# Patient Record
Sex: MALE | Race: ASIAN | NOT HISPANIC OR LATINO
[De-identification: names, ages, dates, MRNs, and addresses within clinical notes are randomized per-mention and may not be internally consistent; named-entity substitution may affect disease eponyms.]

---

## 2016-12-30 NOTE — H&P ADULT. - MUSCULOSKELETAL
detailed exam normal strength/normal/ROM intact/no joint swelling/no joint erythema/no joint warmth/no calf tenderness negative

## 2016-12-30 NOTE — H&P ADULT. - NEGATIVE NEUROLOGICAL SYMPTOMS
no transient paralysis/no weakness/no paresthesias/no generalized seizures/no focal seizures/no syncope/no vertigo/no loss of sensation/no difficulty walking

## 2016-12-30 NOTE — ED PROVIDER NOTE - NS ED MD SCRIBE ATTENDING SCRIBE SECTIONS
HISTORY OF PRESENT ILLNESS/REVIEW OF SYSTEMS/VITAL SIGNS( Pullset)/PHYSICAL EXAM/DISPOSITION/PAST MEDICAL/SURGICAL/SOCIAL HISTORY

## 2016-12-30 NOTE — H&P ADULT. - GASTROINTESTINAL DETAILS
normal/soft/nontender/no distention/no masses palpable/bowel sounds normal/no bruit/no rebound tenderness/no guarding/no rigidity/no organomegaly

## 2016-12-30 NOTE — H&P ADULT. - NEGATIVE GASTROINTESTINAL SYMPTOMS
no nausea/no vomiting/no diarrhea/no constipation/no change in bowel habits/no flatulence/no abdominal pain/no melena/no hematochezia/no steatorrhea/no jaundice/no hiccoughs

## 2016-12-30 NOTE — H&P ADULT. - FAMILY HISTORY
Father  Still living? Unknown  Family history of cancer, Age at diagnosis: Age Unknown     Mother  Still living? Unknown  Family history of cancer, Age at diagnosis: Age Unknown

## 2016-12-30 NOTE — H&P ADULT. - NEGATIVE CARDIOVASCULAR SYMPTOMS
no chest pain/no palpitations/no dyspnea on exertion/no orthopnea/no paroxysmal nocturnal dyspnea/no peripheral edema/no claudication

## 2016-12-30 NOTE — ED PROVIDER NOTE - MEDICAL DECISION MAKING DETAILS
72 y/o M c/o R arm tingling & weakness. Will evaluate for stroke. Pt took Aspirin this morning. 72 y/o M c/o R arm tingling & weakness. Will evaluate for stroke. Pt took Aspirin this morning.  at 12:50 pt complained of chest pain, ecg sinus 62, no acute st elevation or depression, intial enzymes negative, will admit give arm symptoms, age, and new chest pain.

## 2016-12-30 NOTE — H&P ADULT. - PROBLEM SELECTOR PLAN 1
most likely 2/2 peripheral neuropathy   non focal neuro exam , no slurring speech and no vision changes   hx chronic LLE numbness , new onset of RUE numbness   least concern for TIA , CT head - negative   T1- negative  ; EKG - NSR ; no chest pain , not trending anymore troponin  obtained O/P MRI records , note in chart , - chronic lumbar disc changes  f/u Dr. martino neuro recs   O/P neuro f/u   pt might benefit from gabapentin , consider starting

## 2016-12-30 NOTE — ED PROVIDER NOTE - OBJECTIVE STATEMENT
70 y/o M w/ PMHx of Paresthesia in L leg presents to the ED c/o R arm tingling & weakness onset yesterday. Pt states Sx has been constant since onset of Sx. Pt denies slurred speech, fever, chills or any other complaints. NKDA. 72 y/o M w/ PMHx of Paresthesia in L leg presents to the ED c/o R arm tingling & weakness onset yesterday. Pt states Sx has been constant since onset of Sx. Pt denies slurred speech, fever, chills or any other complaints. NKDA. It was sudden onset, different then his parasthesia in his leg, he felt like he couldn't write normally.

## 2016-12-30 NOTE — ED PROVIDER NOTE - PHYSICAL EXAMINATION
NEURO: 4.5/5. Slight weakness to R side. R sided sensation to light touch decreased. Sharp touch nml.

## 2016-12-30 NOTE — H&P ADULT. - NEUROLOGICAL DETAILS
alert and oriented x 3/responds to pain/responds to verbal commands/sensation intact/deep reflexes intact/cranial nerves intact/no spontaneous movement/superficial reflexes intact/normal strength

## 2016-12-30 NOTE — H&P ADULT. - ASSESSMENT
71 year old Male from home with PMHx of  boderline DM ( diet controlled ) , HTN , Paresthesia in L leg presents to the ED c/o R arm tingling & weakness from yesterday. Pt is baseline AAO x3 and walks without any assistance, As per pt his numbness is constant since yesterday , was sudden onset, different than his paresthesia in his leg, he felt like he couldn't write normally. He is otherwise normal and not having any other problems. Pt denies vision problems , acute weakness,  slurred speech, fever, chills or any other complaints.   In ED pt's labs and vitals are stable , CT head - negative

## 2016-12-30 NOTE — H&P ADULT. - RS GEN PE MLT RESP DETAILS PC
normal/airway patent/breath sounds equal/good air movement/respirations non-labored/clear to auscultation bilaterally/no chest wall tenderness/no intercostal retractions/no rales/no rhonchi/no subcutaneous emphysema/no wheezes

## 2017-01-03 PROBLEM — Z00.00 ENCOUNTER FOR PREVENTIVE HEALTH EXAMINATION: Status: ACTIVE | Noted: 2017-01-03

## 2017-01-04 ENCOUNTER — RX RENEWAL (OUTPATIENT)
Age: 72
End: 2017-01-04

## 2017-01-04 ENCOUNTER — RESULT REVIEW (OUTPATIENT)
Age: 72
End: 2017-01-04

## 2017-01-05 ENCOUNTER — APPOINTMENT (OUTPATIENT)
Dept: VASCULAR SURGERY | Facility: HOSPITAL | Age: 72
End: 2017-01-05

## 2017-01-08 ENCOUNTER — TRANSCRIPTION ENCOUNTER (OUTPATIENT)
Age: 72
End: 2017-01-08

## 2017-01-08 NOTE — DISCHARGE NOTE ADULT - CARE PROVIDER_API CALL
Cesar Zepeda), Surgery; Vascular Surgery  [address]  [place] [address]  Phone: [phone]  Fax: [phone]

## 2017-01-08 NOTE — DISCHARGE NOTE ADULT - PLAN OF CARE
tolerating diet, voiding, ambulating Follow up with surgeon as outpt, can shower leave steri strips intact

## 2017-01-08 NOTE — DISCHARGE NOTE ADULT - CARE PLAN
Principal Discharge DX:	Weakness  Goal:	tolerating diet, voiding, ambulating  Instructions for follow-up, activity and diet:	Follow up with surgeon as outpt, can shower leave steri strips intact

## 2017-01-08 NOTE — DISCHARGE NOTE ADULT - MEDICATION SUMMARY - MEDICATIONS TO TAKE
I will START or STAY ON the medications listed below when I get home from the hospital:    aspirin 81 mg oral tablet  -- 1 tab(s) by mouth once a day  -- Indication: For As directed    acetaminophen-codeine 300 mg-15 mg oral tablet  -- 1 tab(s) by mouth every 6 hours, As Needed -for moderate pain MDD:4  -- Caution federal law prohibits the transfer of this drug to any person other  than the person for whom it was prescribed.  May cause drowsiness.  Alcohol may intensify this effect.  Use care when operating dangerous machinery.  This product contains acetaminophen.  Do not use  with any other product containing acetaminophen to prevent possible liver damage.  Using more of this medication than prescribed may cause serious breathing problems.    -- Indication: For Pain meds    losartan 100 mg oral tablet  -- 1 tab(s) by mouth once a day  -- Indication: For As directed    amlodipine 10 mg oral tablet  -- 1 tab(s) by mouth once a day  -- Indication: For As directed    hydroCHLOROthiazide 25 mg oral tablet  -- 1 tab(s) by mouth once a day  -- Indication: For As directed    Flonase 50 mcg/inh nasal spray  -- 1 spray(s) into nose once a day  -- Indication: For As directed

## 2017-01-08 NOTE — DISCHARGE NOTE ADULT - NS AS ACTIVITY OBS
No Heavy lifting/straining/Do not make important decisions/Do not drive or operate machinery/Driving allowed

## 2017-01-08 NOTE — DISCHARGE NOTE ADULT - PATIENT PORTAL LINK FT
“You can access the [facility] Patient Portal, offered by [facility], by registering with the following website: [email or web address]”

## 2017-01-08 NOTE — DISCHARGE NOTE ADULT - HOSPITAL COURSE
71 year old Male from home with PMHx of  boderline DM ( diet controlled ) , HTN , Paresthesia in L leg presents to the ED c/o R arm tingling & weakness from yesterday. Pt is baseline AAO x3 and walks without any assistance, As per pt his numbness is constant since yesterday , was sudden onset, different than his paresthesia in his leg, he felt like he couldn't write normally. He is otherwise normal and not having any other problems. Pt denies vision problems , acute weakness,  slurred speech, fever, chills or any other complaints.   In ED pt's labs and vitals are stable , CT head - negative   S/p Left CEA 1/5, Currently patient comfortable, ambulating, denies HA, dizziness,cp/sob/n/v  Plan to discharge pt today. discussed with vascular attending

## 2017-01-08 NOTE — DISCHARGE NOTE ADULT - CARE PROVIDERS DIRECT ADDRESSES
[email or web address]

## 2017-01-10 ENCOUNTER — EMERGENCY (EMERGENCY)
Facility: HOSPITAL | Age: 72
LOS: 1 days | Discharge: HOME | End: 2017-01-10
Attending: EMERGENCY MEDICINE
Payer: COMMERCIAL

## 2017-01-10 VITALS
HEART RATE: 76 BPM | DIASTOLIC BLOOD PRESSURE: 81 MMHG | SYSTOLIC BLOOD PRESSURE: 160 MMHG | HEIGHT: 66 IN | TEMPERATURE: 98 F | OXYGEN SATURATION: 97 % | RESPIRATION RATE: 18 BRPM | WEIGHT: 169.98 LBS

## 2017-01-10 DIAGNOSIS — J45.909 UNSPECIFIED ASTHMA, UNCOMPLICATED: ICD-10-CM

## 2017-01-10 DIAGNOSIS — R51 HEADACHE: ICD-10-CM

## 2017-01-10 DIAGNOSIS — I10 ESSENTIAL (PRIMARY) HYPERTENSION: ICD-10-CM

## 2017-01-10 DIAGNOSIS — Z98.890 OTHER SPECIFIED POSTPROCEDURAL STATES: ICD-10-CM

## 2017-01-10 PROCEDURE — 99053 MED SERV 10PM-8AM 24 HR FAC: CPT

## 2017-01-10 PROCEDURE — 99284 EMERGENCY DEPT VISIT MOD MDM: CPT | Mod: 25

## 2017-01-10 RX ORDER — ACETAMINOPHEN 500 MG
975 TABLET ORAL ONCE
Refills: 0 | Status: COMPLETED | OUTPATIENT
Start: 2017-01-10 | End: 2017-01-10

## 2017-01-10 NOTE — ED PROVIDER NOTE - CRANIAL NERVE AND PUPILLARY EXAM
cranial nerves 2-12 intact/corneal reflex intact/central and peripheral vision intact/extra-ocular movements intact/tongue is midline cranial nerves 2-12 intact/extra-ocular movements intact/tongue is midline

## 2017-01-10 NOTE — ED PROVIDER NOTE - OBJECTIVE STATEMENT
72 y/o M pt w/ PMHx of asthma and HTN presents to ED c/o gradual onset mild L sided headache x3.5 hours (since 8pm today). Pt is s/p L sided carotid endarterectomy on 1/5/17 w/ Dr. Zepeda. Pt denies fever, N/V, numbness/tingling, focal weakness, speech/visual changes, neck pain/increase in L neck swelling, or any other complaints. NKDA.

## 2017-01-10 NOTE — ED PROVIDER NOTE - MEDICAL DECISION MAKING DETAILS
70 y/o M pt w/ headache s/p recent endarterectomy, no focal deficits or signs of infectious process. Will CT head, give Tylenol for pain, reassess. 70 y/o M pt w/ headache s/p recent endarterectomy, no focal neuro deficits or signs of infectious process. Will CT head, give Tylenol for pain, reassess.

## 2017-01-10 NOTE — ED PROVIDER NOTE - NS ED MD SCRIBE ATTENDING SCRIBE SECTIONS
HISTORY OF PRESENT ILLNESS/REVIEW OF SYSTEMS/VITAL SIGNS( Pullset)/PHYSICAL EXAM/RESULTS/DISPOSITION/PAST MEDICAL/SURGICAL/SOCIAL HISTORY

## 2017-01-10 NOTE — ED PROVIDER NOTE - PHYSICAL EXAMINATION
NECK: L carotid area surgical site intact, nontender, minimally swollen, no fluctuance no erythema. NECK: L ant lat neck, carotid area surgical site intact, nontender, minimally swollen, no fluctuance no erythema.

## 2017-01-10 NOTE — ED ADULT TRIAGE NOTE - CHIEF COMPLAINT QUOTE
c/o headache pain 5/10 presently, states it was more severe earlier and his MD told him to go to ED since he just had a carotid endarterectomy 2 days ago

## 2017-01-10 NOTE — ED PROVIDER NOTE - CHPI ED SYMPTOMS NEG
no tingling, no neck pain, no speech/visual changes/no fever/no nausea/no numbness/no vomiting/no weakness

## 2017-01-11 VITALS — DIASTOLIC BLOOD PRESSURE: 72 MMHG | SYSTOLIC BLOOD PRESSURE: 144 MMHG | HEART RATE: 63 BPM

## 2017-01-11 DIAGNOSIS — Z98.890 OTHER SPECIFIED POSTPROCEDURAL STATES: Chronic | ICD-10-CM

## 2017-01-11 PROCEDURE — 70450 CT HEAD/BRAIN W/O DYE: CPT | Mod: 26

## 2017-01-11 RX ADMIN — Medication 975 MILLIGRAM(S): at 01:19

## 2017-01-11 RX ADMIN — Medication 0.1 MILLIGRAM(S): at 01:22

## 2017-01-11 NOTE — ED ADULT NURSE NOTE - OBJECTIVE STATEMENT
pt.is aox3 came to er accompanied by his wife wit complaint of headache. pt had recent enderectomy surgery to left side off neck. pt was seen by md medication adm.

## 2017-01-13 ENCOUNTER — APPOINTMENT (OUTPATIENT)
Dept: VASCULAR SURGERY | Facility: CLINIC | Age: 72
End: 2017-01-13

## 2017-01-13 VITALS — HEIGHT: 66 IN

## 2017-01-13 VITALS
HEART RATE: 66 BPM | TEMPERATURE: 98.2 F | BODY MASS INDEX: 26.63 KG/M2 | DIASTOLIC BLOOD PRESSURE: 62 MMHG | RESPIRATION RATE: 16 BRPM | WEIGHT: 165 LBS | SYSTOLIC BLOOD PRESSURE: 142 MMHG

## 2017-01-20 ENCOUNTER — APPOINTMENT (OUTPATIENT)
Dept: VASCULAR SURGERY | Facility: CLINIC | Age: 72
End: 2017-01-20

## 2017-01-20 VITALS
HEART RATE: 66 BPM | DIASTOLIC BLOOD PRESSURE: 50 MMHG | RESPIRATION RATE: 10 BRPM | SYSTOLIC BLOOD PRESSURE: 138 MMHG | TEMPERATURE: 98.1 F

## 2017-01-20 DIAGNOSIS — Z78.9 OTHER SPECIFIED HEALTH STATUS: ICD-10-CM

## 2017-01-20 DIAGNOSIS — Z87.09 PERSONAL HISTORY OF OTHER DISEASES OF THE RESPIRATORY SYSTEM: ICD-10-CM

## 2017-01-20 DIAGNOSIS — Z87.39 PERSONAL HISTORY OF OTHER DISEASES OF THE MUSCULOSKELETAL SYSTEM AND CONNECTIVE TISSUE: ICD-10-CM

## 2017-01-20 DIAGNOSIS — Z87.01 PERSONAL HISTORY OF PNEUMONIA (RECURRENT): ICD-10-CM

## 2017-01-27 ENCOUNTER — APPOINTMENT (OUTPATIENT)
Dept: PULMONOLOGY | Facility: CLINIC | Age: 72
End: 2017-01-27

## 2017-01-27 VITALS
WEIGHT: 155 LBS | OXYGEN SATURATION: 98 % | TEMPERATURE: 98.1 F | HEART RATE: 91 BPM | HEIGHT: 66 IN | DIASTOLIC BLOOD PRESSURE: 70 MMHG | BODY MASS INDEX: 24.91 KG/M2 | SYSTOLIC BLOOD PRESSURE: 150 MMHG

## 2017-02-10 ENCOUNTER — APPOINTMENT (OUTPATIENT)
Dept: VASCULAR SURGERY | Facility: CLINIC | Age: 72
End: 2017-02-10

## 2017-02-10 VITALS
HEART RATE: 68 BPM | HEIGHT: 66 IN | SYSTOLIC BLOOD PRESSURE: 128 MMHG | TEMPERATURE: 98 F | BODY MASS INDEX: 26.2 KG/M2 | DIASTOLIC BLOOD PRESSURE: 68 MMHG | WEIGHT: 163 LBS

## 2017-02-10 RX ORDER — ACETAMINOPHEN AND CODEINE 300; 30 MG/1; MG/1
300-30 TABLET ORAL
Refills: 0 | Status: DISCONTINUED | COMMUNITY
End: 2017-02-10

## 2017-02-27 ENCOUNTER — APPOINTMENT (OUTPATIENT)
Dept: PULMONOLOGY | Facility: CLINIC | Age: 72
End: 2017-02-27

## 2017-02-27 VITALS
DIASTOLIC BLOOD PRESSURE: 70 MMHG | OXYGEN SATURATION: 97 % | WEIGHT: 164 LBS | HEIGHT: 66 IN | BODY MASS INDEX: 26.36 KG/M2 | HEART RATE: 73 BPM | SYSTOLIC BLOOD PRESSURE: 140 MMHG

## 2017-03-13 ENCOUNTER — RX RENEWAL (OUTPATIENT)
Age: 72
End: 2017-03-13

## 2017-07-04 ENCOUNTER — RX RENEWAL (OUTPATIENT)
Age: 72
End: 2017-07-04

## 2017-08-04 ENCOUNTER — APPOINTMENT (OUTPATIENT)
Dept: VASCULAR SURGERY | Facility: CLINIC | Age: 72
End: 2017-08-04
Payer: MEDICARE

## 2017-08-04 PROCEDURE — 93880 EXTRACRANIAL BILAT STUDY: CPT

## 2017-08-04 PROCEDURE — 99213 OFFICE O/P EST LOW 20 MIN: CPT | Mod: 25

## 2017-08-21 ENCOUNTER — APPOINTMENT (OUTPATIENT)
Dept: PULMONOLOGY | Facility: CLINIC | Age: 72
End: 2017-08-21
Payer: MEDICARE

## 2017-08-21 VITALS
HEIGHT: 66 IN | HEART RATE: 82 BPM | SYSTOLIC BLOOD PRESSURE: 130 MMHG | WEIGHT: 163 LBS | TEMPERATURE: 98.3 F | DIASTOLIC BLOOD PRESSURE: 70 MMHG | OXYGEN SATURATION: 95 % | RESPIRATION RATE: 12 BRPM | BODY MASS INDEX: 26.2 KG/M2

## 2017-08-21 PROCEDURE — 94060 EVALUATION OF WHEEZING: CPT

## 2017-08-21 PROCEDURE — 99214 OFFICE O/P EST MOD 30 MIN: CPT | Mod: 25

## 2017-08-23 ENCOUNTER — RX RENEWAL (OUTPATIENT)
Age: 72
End: 2017-08-23

## 2017-11-06 ENCOUNTER — APPOINTMENT (OUTPATIENT)
Dept: PULMONOLOGY | Facility: CLINIC | Age: 72
End: 2017-11-06
Payer: MEDICARE

## 2017-11-06 VITALS
TEMPERATURE: 98 F | DIASTOLIC BLOOD PRESSURE: 70 MMHG | BODY MASS INDEX: 26.03 KG/M2 | WEIGHT: 162 LBS | SYSTOLIC BLOOD PRESSURE: 150 MMHG | OXYGEN SATURATION: 95 % | HEART RATE: 92 BPM | HEIGHT: 66 IN

## 2017-11-06 PROCEDURE — 99214 OFFICE O/P EST MOD 30 MIN: CPT

## 2017-11-06 RX ORDER — FLUTICASONE PROPIONATE 50 MCG
50 SPRAY, SUSPENSION NASAL
Refills: 0 | Status: DISCONTINUED | COMMUNITY
End: 2017-11-06

## 2017-11-06 RX ORDER — HYDROCHLOROTHIAZIDE 12.5 MG/1
TABLET ORAL
Refills: 0 | Status: DISCONTINUED | COMMUNITY
End: 2017-11-06

## 2017-11-06 RX ORDER — AMLODIPINE BESYLATE 5 MG/1
TABLET ORAL
Refills: 0 | Status: DISCONTINUED | COMMUNITY
End: 2017-11-06

## 2017-11-06 RX ORDER — AZELASTINE HYDROCHLORIDE 137 UG/1
SPRAY, METERED NASAL
Refills: 0 | Status: DISCONTINUED | COMMUNITY
End: 2017-11-06

## 2017-11-20 ENCOUNTER — APPOINTMENT (OUTPATIENT)
Dept: PULMONOLOGY | Facility: CLINIC | Age: 72
End: 2017-11-20
Payer: MEDICARE

## 2017-11-20 PROCEDURE — 94729 DIFFUSING CAPACITY: CPT

## 2017-11-20 PROCEDURE — 94060 EVALUATION OF WHEEZING: CPT

## 2017-11-20 PROCEDURE — 94727 GAS DIL/WSHOT DETER LNG VOL: CPT

## 2018-01-12 ENCOUNTER — APPOINTMENT (OUTPATIENT)
Dept: PULMONOLOGY | Facility: CLINIC | Age: 73
End: 2018-01-12
Payer: MEDICARE

## 2018-01-12 VITALS
WEIGHT: 170 LBS | HEART RATE: 100 BPM | DIASTOLIC BLOOD PRESSURE: 72 MMHG | BODY MASS INDEX: 27.44 KG/M2 | TEMPERATURE: 98.6 F | RESPIRATION RATE: 16 BRPM | SYSTOLIC BLOOD PRESSURE: 154 MMHG | OXYGEN SATURATION: 95 %

## 2018-01-12 PROCEDURE — 99214 OFFICE O/P EST MOD 30 MIN: CPT

## 2018-02-09 ENCOUNTER — APPOINTMENT (OUTPATIENT)
Dept: VASCULAR SURGERY | Facility: CLINIC | Age: 73
End: 2018-02-09
Payer: MEDICARE

## 2018-02-09 VITALS
HEIGHT: 66 IN | TEMPERATURE: 98 F | OXYGEN SATURATION: 93 % | HEART RATE: 80 BPM | WEIGHT: 170 LBS | SYSTOLIC BLOOD PRESSURE: 130 MMHG | BODY MASS INDEX: 27.32 KG/M2 | DIASTOLIC BLOOD PRESSURE: 68 MMHG

## 2018-02-09 PROCEDURE — 99214 OFFICE O/P EST MOD 30 MIN: CPT

## 2018-02-09 PROCEDURE — 93880 EXTRACRANIAL BILAT STUDY: CPT

## 2018-02-12 ENCOUNTER — APPOINTMENT (OUTPATIENT)
Dept: PULMONOLOGY | Facility: CLINIC | Age: 73
End: 2018-02-12
Payer: MEDICARE

## 2018-02-22 ENCOUNTER — APPOINTMENT (OUTPATIENT)
Dept: PULMONOLOGY | Facility: CLINIC | Age: 73
End: 2018-02-22
Payer: MEDICARE

## 2018-02-22 VITALS
DIASTOLIC BLOOD PRESSURE: 78 MMHG | WEIGHT: 170 LBS | HEART RATE: 69 BPM | OXYGEN SATURATION: 97 % | SYSTOLIC BLOOD PRESSURE: 126 MMHG | RESPIRATION RATE: 16 BRPM | TEMPERATURE: 97.7 F | HEIGHT: 66 IN | BODY MASS INDEX: 27.32 KG/M2

## 2018-02-22 PROCEDURE — 99214 OFFICE O/P EST MOD 30 MIN: CPT

## 2018-02-22 RX ORDER — BUDESONIDE AND FORMOTEROL FUMARATE DIHYDRATE 160; 4.5 UG/1; UG/1
AEROSOL RESPIRATORY (INHALATION)
Refills: 0 | Status: COMPLETED | COMMUNITY
End: 2018-02-22

## 2018-02-22 RX ORDER — METHYLPREDNISOLONE 4 MG/1
4 TABLET ORAL
Qty: 1 | Refills: 1 | Status: COMPLETED | COMMUNITY
Start: 2018-01-12 | End: 2018-02-22

## 2018-02-22 RX ORDER — PREDNISONE 20 MG/1
20 TABLET ORAL DAILY
Qty: 5 | Refills: 1 | Status: COMPLETED | COMMUNITY
Start: 2017-11-20 | End: 2018-02-22

## 2018-02-22 RX ORDER — AZITHROMYCIN DIHYDRATE 250 MG/1
250 TABLET, FILM COATED ORAL
Qty: 1 | Refills: 0 | Status: COMPLETED | COMMUNITY
Start: 2018-01-12 | End: 2018-02-22

## 2018-02-26 ENCOUNTER — RX RENEWAL (OUTPATIENT)
Age: 73
End: 2018-02-26

## 2018-02-26 ENCOUNTER — APPOINTMENT (OUTPATIENT)
Dept: PULMONOLOGY | Facility: CLINIC | Age: 73
End: 2018-02-26

## 2018-07-25 ENCOUNTER — NON-APPOINTMENT (OUTPATIENT)
Age: 73
End: 2018-07-25

## 2018-07-25 ENCOUNTER — APPOINTMENT (OUTPATIENT)
Dept: CARDIOLOGY | Facility: CLINIC | Age: 73
End: 2018-07-25
Payer: MEDICARE

## 2018-07-25 VITALS
OXYGEN SATURATION: 98 % | HEART RATE: 69 BPM | WEIGHT: 165 LBS | SYSTOLIC BLOOD PRESSURE: 122 MMHG | BODY MASS INDEX: 26.63 KG/M2 | DIASTOLIC BLOOD PRESSURE: 75 MMHG

## 2018-07-25 DIAGNOSIS — Z86.79 PERSONAL HISTORY OF OTHER DISEASES OF THE CIRCULATORY SYSTEM: ICD-10-CM

## 2018-07-25 PROCEDURE — 99203 OFFICE O/P NEW LOW 30 MIN: CPT

## 2018-07-25 PROCEDURE — 93000 ELECTROCARDIOGRAM COMPLETE: CPT

## 2018-07-25 RX ORDER — MONTELUKAST SODIUM 10 MG/1
TABLET, FILM COATED ORAL
Refills: 0 | Status: DISCONTINUED | COMMUNITY
End: 2018-07-25

## 2018-07-25 RX ORDER — PROMETHAZINE HYDROCHLORIDE 6.25 MG/5ML
6.25 SOLUTION ORAL
Qty: 210 | Refills: 0 | Status: DISCONTINUED | COMMUNITY
Start: 2018-01-11 | End: 2018-07-25

## 2018-07-25 RX ORDER — AMMONIUM LACTATE 12 %
12 CREAM (GRAM) TOPICAL
Qty: 140 | Refills: 0 | Status: DISCONTINUED | COMMUNITY
Start: 2017-10-11 | End: 2018-07-25

## 2018-07-25 RX ORDER — POTASSIUM CHLORIDE 750 MG/1
10 TABLET, FILM COATED, EXTENDED RELEASE ORAL
Qty: 90 | Refills: 0 | Status: DISCONTINUED | COMMUNITY
Start: 2017-08-22 | End: 2018-07-25

## 2018-07-25 RX ORDER — HYDROCORTISONE 25 MG/G
2.5 CREAM TOPICAL
Qty: 30 | Refills: 0 | Status: DISCONTINUED | COMMUNITY
Start: 2017-10-04 | End: 2018-07-25

## 2018-08-02 ENCOUNTER — RX RENEWAL (OUTPATIENT)
Age: 73
End: 2018-08-02

## 2018-08-23 ENCOUNTER — APPOINTMENT (OUTPATIENT)
Dept: PULMONOLOGY | Facility: CLINIC | Age: 73
End: 2018-08-23
Payer: MEDICARE

## 2018-08-23 VITALS
TEMPERATURE: 98 F | HEART RATE: 74 BPM | DIASTOLIC BLOOD PRESSURE: 70 MMHG | OXYGEN SATURATION: 96 % | BODY MASS INDEX: 26.95 KG/M2 | WEIGHT: 167 LBS | RESPIRATION RATE: 14 BRPM | SYSTOLIC BLOOD PRESSURE: 120 MMHG

## 2018-08-23 PROCEDURE — 99214 OFFICE O/P EST MOD 30 MIN: CPT

## 2018-08-24 ENCOUNTER — APPOINTMENT (OUTPATIENT)
Dept: VASCULAR SURGERY | Facility: CLINIC | Age: 73
End: 2018-08-24
Payer: MEDICARE

## 2018-09-07 ENCOUNTER — APPOINTMENT (OUTPATIENT)
Dept: VASCULAR SURGERY | Facility: CLINIC | Age: 73
End: 2018-09-07
Payer: MEDICARE

## 2018-09-07 PROCEDURE — 99213 OFFICE O/P EST LOW 20 MIN: CPT

## 2018-09-07 PROCEDURE — 93880 EXTRACRANIAL BILAT STUDY: CPT

## 2018-09-24 PROBLEM — Z86.79 HISTORY OF CAROTID STENOSIS: Status: ACTIVE | Noted: 2017-01-13

## 2018-11-26 ENCOUNTER — APPOINTMENT (OUTPATIENT)
Dept: PULMONOLOGY | Facility: CLINIC | Age: 73
End: 2018-11-26

## 2018-12-03 ENCOUNTER — APPOINTMENT (OUTPATIENT)
Dept: PULMONOLOGY | Facility: CLINIC | Age: 73
End: 2018-12-03
Payer: MEDICARE

## 2018-12-03 VITALS
OXYGEN SATURATION: 94 % | TEMPERATURE: 98.2 F | HEART RATE: 90 BPM | BODY MASS INDEX: 27.76 KG/M2 | SYSTOLIC BLOOD PRESSURE: 156 MMHG | WEIGHT: 172 LBS | DIASTOLIC BLOOD PRESSURE: 78 MMHG

## 2018-12-03 PROCEDURE — 99213 OFFICE O/P EST LOW 20 MIN: CPT | Mod: 25

## 2018-12-03 PROCEDURE — 36415 COLL VENOUS BLD VENIPUNCTURE: CPT

## 2018-12-03 RX ORDER — DIPHENHYDRAMINE HCL 50 MG/1
50 CAPSULE ORAL
Qty: 30 | Refills: 0 | Status: COMPLETED | COMMUNITY
Start: 2018-07-12

## 2018-12-03 RX ORDER — PNEUMOCOCCAL 23-VAL P-SAC VAC 25MCG/0.5
25 VIAL (ML) INJECTION
Qty: 1 | Refills: 0 | Status: COMPLETED | COMMUNITY
Start: 2018-11-29

## 2018-12-03 RX ORDER — BECLOMETHASONE DIPROPIONATE 80 UG/1
80 AEROSOL, METERED RESPIRATORY (INHALATION) TWICE DAILY
Qty: 3 | Refills: 1 | Status: DISCONTINUED | COMMUNITY
Start: 2018-08-23 | End: 2018-12-03

## 2018-12-04 ENCOUNTER — RX RENEWAL (OUTPATIENT)
Age: 73
End: 2018-12-04

## 2018-12-04 LAB — IGE SER-MCNC: 1702 IU/ML

## 2018-12-06 ENCOUNTER — CLINICAL ADVICE (OUTPATIENT)
Age: 73
End: 2018-12-06

## 2018-12-11 ENCOUNTER — RX RENEWAL (OUTPATIENT)
Age: 73
End: 2018-12-11

## 2018-12-11 RX ORDER — BECLOMETHASONE DIPROPIONATE HFA 80 UG/1
80 AEROSOL, METERED RESPIRATORY (INHALATION)
Qty: 1 | Refills: 2 | Status: DISCONTINUED | COMMUNITY
Start: 2018-12-03 | End: 2018-12-11

## 2018-12-21 ENCOUNTER — APPOINTMENT (OUTPATIENT)
Dept: PULMONOLOGY | Facility: CLINIC | Age: 73
End: 2018-12-21

## 2019-01-10 ENCOUNTER — APPOINTMENT (OUTPATIENT)
Dept: PULMONOLOGY | Facility: CLINIC | Age: 74
End: 2019-01-10
Payer: MEDICARE

## 2019-01-10 VITALS
OXYGEN SATURATION: 95 % | SYSTOLIC BLOOD PRESSURE: 118 MMHG | DIASTOLIC BLOOD PRESSURE: 70 MMHG | RESPIRATION RATE: 14 BRPM | HEART RATE: 81 BPM | BODY MASS INDEX: 27.92 KG/M2 | WEIGHT: 173 LBS

## 2019-01-10 PROCEDURE — 94060 EVALUATION OF WHEEZING: CPT

## 2019-01-10 PROCEDURE — 99214 OFFICE O/P EST MOD 30 MIN: CPT | Mod: 25

## 2019-01-10 RX ORDER — AZITHROMYCIN 250 MG/1
250 TABLET, FILM COATED ORAL
Qty: 1 | Refills: 1 | Status: COMPLETED | COMMUNITY
Start: 2018-12-03 | End: 2019-01-10

## 2019-01-10 RX ORDER — BECLOMETHASONE DIPROPIONATE HFA 80 UG/1
80 AEROSOL, METERED RESPIRATORY (INHALATION)
Qty: 1 | Refills: 1 | Status: COMPLETED | COMMUNITY
Start: 2018-12-11 | End: 2019-01-10

## 2019-01-10 RX ORDER — FLUTICASONE PROPIONATE 110 UG/1
110 AEROSOL, METERED RESPIRATORY (INHALATION)
Qty: 1 | Refills: 1 | Status: COMPLETED | COMMUNITY
Start: 2018-12-11 | End: 2019-01-10

## 2019-01-11 NOTE — HISTORY OF PRESENT ILLNESS
[FreeTextEntry1] : He came for a follow up follow up visit. He is feeling better now. Off prednisone. On Symbicort. No cough, wheezing or shortness of breath. \par \par He saw an Allergist in [facility]. Dr. Reno. \par \par

## 2019-01-11 NOTE — DISCUSSION/SUMMARY
[FreeTextEntry1] : He is a 73 year-old man with mild obstructive airways disease.He recently had an exacerbation. He is doing much better now. \par \par He is off prednisone. He is to continue with Symbicort, montelukast and albuterol as needed.  He should follow up with his Allergist. \par \par Follow up in three months. Sooner if needed. \par \par \par

## 2019-01-11 NOTE — REVIEW OF SYSTEMS
[Ear Disturbance] : ear disturbance [Nasal Congestion] : nasal congestion [Postnasal Drip] : postnasal drip [Hypertension] : ~T hypertension [Fever] : no fever [Fatigue] : no fatigue [Sinus Problems] : no sinus problems [Sore Throat] : no sore throat [Dry Mouth] : no dry mouth [Cough] : no cough [Sputum] : not coughing up ~M sputum [Hemoptysis] : no hemoptysis [Dyspnea] : no dyspnea [Chest Tightness] : no chest tightness [Pleuritic Pain] : no pleuritic pain [Wheezing] : no wheezing [Chest Discomfort] : no chest discomfort [PND] : no PND [Palpitations] : no palpitations [Edema] : ~T edema was not present [Nasal Discharge] : no nasal discharge [Heartburn] : no heartburn [Dysphagia] : no dysphagia [Nocturia] : no nocturia [Back Pain] : ~T no back pain [Myalgias] : no myalgias [Syncope] : no fainting [Depression] : no depression [DVT] : no DVT [Difficulty Initiating Sleep] : no difficulty falling asleep [Difficulty Maintaining Sleep] : no difficulty maintaining sleep [Snoring] : no snoring

## 2019-01-11 NOTE — PROCEDURE
[FreeTextEntry1] : Pulmonary function testing performed on November 20, 2017 demonstrated mild obstructive airways disease. Significant improvement was noted after administration of bronchodilator. Mild restriction was present. Diffusion capacity was normal. FEV1 was 1.77 L.\par \par A chest radiograph was performed on August 14, 2015. The report indicates no acute chest findings.

## 2019-01-11 NOTE — PHYSICAL EXAM
[Normal Appearance] : normal appearance [General Appearance - In No Acute Distress] : no acute distress [Neck Cervical Mass (___cm)] : no neck mass was observed [Heart Sounds] : normal S1 and S2 [Murmurs] : no murmurs present [Respiration, Rhythm And Depth] : normal respiratory rhythm and effort [Exaggerated Use Of Accessory Muscles For Inspiration] : no accessory muscle use [Bowel Sounds] : normal bowel sounds [Abdomen Soft] : soft [Abdomen Tenderness] : non-tender [Abnormal Walk] : normal gait [Cyanosis, Localized] : no localized cyanosis [] : no rash [No Focal Deficits] : no focal deficits [Oriented To Time, Place, And Person] : oriented to person, place, and time [Impaired Insight] : insight and judgment were intact [Erythema] : no erythema of the pharynx [FreeTextEntry1] : There was bilateral wheezing and rhonchi were present.

## 2019-02-04 ENCOUNTER — APPOINTMENT (OUTPATIENT)
Dept: PULMONOLOGY | Facility: CLINIC | Age: 74
End: 2019-02-04
Payer: MEDICARE

## 2019-02-04 VITALS
RESPIRATION RATE: 16 BRPM | HEART RATE: 93 BPM | BODY MASS INDEX: 27.12 KG/M2 | SYSTOLIC BLOOD PRESSURE: 140 MMHG | OXYGEN SATURATION: 96 % | TEMPERATURE: 97.7 F | DIASTOLIC BLOOD PRESSURE: 60 MMHG | WEIGHT: 168 LBS

## 2019-02-04 PROCEDURE — 99214 OFFICE O/P EST MOD 30 MIN: CPT

## 2019-02-04 NOTE — PROCEDURE
[FreeTextEntry1] : Sleep study 1/18/19 demonstrated moderate obstructive pneumonia. \par \par Pulmonary function testing performed on November 20, 2017 demonstrated mild obstructive airways disease. Significant improvement was noted after administration of bronchodilator. Mild restriction was present. Diffusion capacity was normal. FEV1 was 1.77 L.\par \par PFT 1/10/19: Spirometry demonstrated mild obstructive airways disease. No significant response after bronchodilator. \par \par A chest radiograph was performed on August 14, 2015. The report indicates no acute chest findings.

## 2019-02-04 NOTE — REVIEW OF SYSTEMS
[Ear Disturbance] : ear disturbance [Nasal Congestion] : nasal congestion [Postnasal Drip] : postnasal drip [Hypertension] : ~T hypertension [Fatigue] : no fatigue [Sinus Problems] : no sinus problems [Sore Throat] : no sore throat [Dry Mouth] : no dry mouth [Cough] : no cough [Hemoptysis] : no hemoptysis [Dyspnea] : no dyspnea [Chest Tightness] : no chest tightness [Pleuritic Pain] : no pleuritic pain [Wheezing] : no wheezing [Chest Discomfort] : no chest discomfort [PND] : no PND [Palpitations] : no palpitations [Edema] : ~T edema was not present [Nasal Discharge] : no nasal discharge [Heartburn] : no heartburn [Dysphagia] : no dysphagia [Nocturia] : no nocturia [Back Pain] : ~T no back pain [Myalgias] : no myalgias [Syncope] : no fainting [Depression] : no depression [DVT] : no DVT [Difficulty Initiating Sleep] : no difficulty falling asleep [Difficulty Maintaining Sleep] : no difficulty maintaining sleep [Snoring] : no snoring

## 2019-02-04 NOTE — PHYSICAL EXAM
[General Appearance - In No Acute Distress] : no acute distress [Neck Cervical Mass (___cm)] : no neck mass was observed [Heart Sounds] : normal S1 and S2 [Murmurs] : no murmurs present [Bowel Sounds] : normal bowel sounds [Abdomen Soft] : soft [Abdomen Tenderness] : non-tender [Abnormal Walk] : normal gait [Cyanosis, Localized] : no localized cyanosis [] : no rash [No Focal Deficits] : no focal deficits [Oriented To Time, Place, And Person] : oriented to person, place, and time [Impaired Insight] : insight and judgment were intact [Auscultation Breath Sounds / Voice Sounds] : lungs were clear to auscultation bilaterally [Erythema] : no erythema of the pharynx [FreeTextEntry1] : He has a carotid endarterectomy scar on the left side of his neck.

## 2019-02-04 NOTE — DISCUSSION/SUMMARY
[FreeTextEntry1] : He is a 73 year-old man with hypertension, asthma and chronic allergies. \par \par His asthma is adequately controlled at this point in time. \par \par Given his in home sleep apnea test a CPAP titration study will be obtained. \par \par Follow up in three months. \par \par \par \par

## 2019-02-04 NOTE — HISTORY OF PRESENT ILLNESS
[FreeTextEntry1] : He came for a follow up follow up visit. \par \par He is feeling better now. On Symbicort. Needs to use Flovent intermittently as per symptoms. \par \par No cough, wheezing or shortness of breath. \par \par He had sleep study recently. \par \par \par \par

## 2019-03-21 ENCOUNTER — RX RENEWAL (OUTPATIENT)
Age: 74
End: 2019-03-21

## 2019-03-27 ENCOUNTER — RX RENEWAL (OUTPATIENT)
Age: 74
End: 2019-03-27

## 2019-03-27 RX ORDER — ALBUTEROL SULFATE 90 UG/1
108 (90 BASE) AEROSOL, METERED RESPIRATORY (INHALATION) EVERY 6 HOURS
Qty: 1 | Refills: 2 | Status: DISCONTINUED | COMMUNITY
Start: 2018-12-03 | End: 2019-03-27

## 2019-04-18 ENCOUNTER — APPOINTMENT (OUTPATIENT)
Dept: PULMONOLOGY | Facility: CLINIC | Age: 74
End: 2019-04-18
Payer: MEDICARE

## 2019-04-18 VITALS
OXYGEN SATURATION: 96 % | SYSTOLIC BLOOD PRESSURE: 128 MMHG | HEIGHT: 66 IN | DIASTOLIC BLOOD PRESSURE: 64 MMHG | WEIGHT: 167 LBS | BODY MASS INDEX: 26.84 KG/M2 | HEART RATE: 76 BPM

## 2019-04-18 PROCEDURE — 99214 OFFICE O/P EST MOD 30 MIN: CPT

## 2019-04-18 RX ORDER — PREDNISONE 10 MG/1
10 TABLET ORAL
Qty: 60 | Refills: 1 | Status: DISCONTINUED | COMMUNITY
Start: 2018-12-03 | End: 2019-04-18

## 2019-04-18 NOTE — HISTORY OF PRESENT ILLNESS
[FreeTextEntry1] : He came for a follow up follow up visit. \par \par He is feeling better now. On Symbicort. Needs to use Flovent 110 mcg intermittently as per symptoms. This helps him stay off systemic steroids. \par \par He has been on CPAP for several weeks now. Has been using it for 7.5 hours per night on average. \par \par \par

## 2019-04-18 NOTE — PROCEDURE
[FreeTextEntry1] : Home sleep study 1/18/19 was suspicious for moderate obstructive sleep apnea. \par \par Split sleep study performed on February 12, 2019 demonstrated severe obstructive sleep apnea.  The AHI was 42.8. The CPAP titration component indicated a therapeutic pressure of 5 cm of water.\par \par Pulmonary function testing performed on November 20, 2017 demonstrated mild obstructive airways disease. Significant improvement was noted after administration of bronchodilator. Mild restriction was present. Diffusion capacity was normal. FEV1 was 1.77 L.\par \par PFT 1/10/19: Spirometry demonstrated mild obstructive airways disease. No significant response after bronchodilator. \par \par A chest radiograph was performed on August 14, 2015. The report indicates no acute chest findings.

## 2019-04-18 NOTE — PHYSICAL EXAM
[General Appearance - In No Acute Distress] : no acute distress [Neck Cervical Mass (___cm)] : no neck mass was observed [Heart Sounds] : normal S1 and S2 [Murmurs] : no murmurs present [Auscultation Breath Sounds / Voice Sounds] : lungs were clear to auscultation bilaterally [Bowel Sounds] : normal bowel sounds [Abdomen Tenderness] : non-tender [Abdomen Soft] : soft [Cyanosis, Localized] : no localized cyanosis [Abnormal Walk] : normal gait [No Focal Deficits] : no focal deficits [] : no rash [Oriented To Time, Place, And Person] : oriented to person, place, and time [Impaired Insight] : insight and judgment were intact [Erythema] : no erythema of the pharynx [Normal Appearance] : normal appearance [FreeTextEntry1] : He has a carotid endarterectomy scar on the left side of his neck.

## 2019-04-18 NOTE — REVIEW OF SYSTEMS
[Nasal Congestion] : nasal congestion [Ear Disturbance] : ear disturbance [Postnasal Drip] : postnasal drip [Hypertension] : ~T hypertension [Fever] : no fever [Sore Throat] : no sore throat [Sinus Problems] : no sinus problems [Fatigue] : no fatigue [Hemoptysis] : no hemoptysis [Dry Mouth] : no dry mouth [Dyspnea] : no dyspnea [Chest Discomfort] : no chest discomfort [Wheezing] : no wheezing [Palpitations] : no palpitations [PND] : no PND [Nasal Discharge] : no nasal discharge [Edema] : ~T edema was not present [Nocturia] : no nocturia [Heartburn] : no heartburn [Dysphagia] : no dysphagia [Back Pain] : ~T no back pain [Myalgias] : no myalgias [Depression] : no depression [Syncope] : no fainting [Difficulty Initiating Sleep] : no difficulty falling asleep [DVT] : no DVT [Difficulty Maintaining Sleep] : no difficulty maintaining sleep [Snoring] : no snoring

## 2019-04-18 NOTE — DISCUSSION/SUMMARY
[FreeTextEntry1] : He is a 73 year-old man with hypertension, asthma and chronic allergies. He also has severe obstructive sleep apnea and was placed on CPAP in February 2019. Has been on APAP of 4 to 10 cm H2O. \par \par His asthma is adequately controlled at this point in time. He is to continue with the current medications.\par \par His sleep apnea is also well controlled with APAP of 4 to 10 cm of water. \par \par His pulmonary status is stable for his upcoming colonoscopy procedure.\par \par Follow up in three months.

## 2019-05-02 ENCOUNTER — RESULT REVIEW (OUTPATIENT)
Age: 74
End: 2019-05-02

## 2019-05-02 ENCOUNTER — OUTPATIENT (OUTPATIENT)
Dept: OUTPATIENT SERVICES | Facility: HOSPITAL | Age: 74
LOS: 1 days | Discharge: HOME | End: 2019-05-02
Payer: MEDICARE

## 2019-05-02 DIAGNOSIS — Z86.010 PERSONAL HISTORY OF COLONIC POLYPS: ICD-10-CM

## 2019-05-02 DIAGNOSIS — Z98.890 OTHER SPECIFIED POSTPROCEDURAL STATES: Chronic | ICD-10-CM

## 2019-05-02 PROCEDURE — 88305 TISSUE EXAM BY PATHOLOGIST: CPT | Mod: 26

## 2019-05-06 LAB — SURGICAL PATHOLOGY STUDY: SIGNIFICANT CHANGE UP

## 2019-06-19 ENCOUNTER — RX RENEWAL (OUTPATIENT)
Age: 74
End: 2019-06-19

## 2019-07-18 ENCOUNTER — APPOINTMENT (OUTPATIENT)
Dept: PULMONOLOGY | Facility: CLINIC | Age: 74
End: 2019-07-18
Payer: MEDICARE

## 2019-07-18 VITALS
WEIGHT: 167 LBS | TEMPERATURE: 98 F | BODY MASS INDEX: 26.95 KG/M2 | SYSTOLIC BLOOD PRESSURE: 140 MMHG | RESPIRATION RATE: 16 BRPM | HEART RATE: 65 BPM | OXYGEN SATURATION: 95 % | DIASTOLIC BLOOD PRESSURE: 80 MMHG

## 2019-07-18 PROCEDURE — 99214 OFFICE O/P EST MOD 30 MIN: CPT

## 2019-07-18 NOTE — PHYSICAL EXAM
[Normal Appearance] : normal appearance [General Appearance - In No Acute Distress] : no acute distress [Neck Cervical Mass (___cm)] : no neck mass was observed [Heart Sounds] : normal S1 and S2 [Murmurs] : no murmurs present [Auscultation Breath Sounds / Voice Sounds] : lungs were clear to auscultation bilaterally [Bowel Sounds] : normal bowel sounds [Abdomen Soft] : soft [Abdomen Tenderness] : non-tender [Abnormal Walk] : normal gait [Cyanosis, Localized] : no localized cyanosis [] : no rash [No Focal Deficits] : no focal deficits [Oriented To Time, Place, And Person] : oriented to person, place, and time [Impaired Insight] : insight and judgment were intact [Erythema] : no erythema of the pharynx [FreeTextEntry1] : He has a carotid endarterectomy scar on the left side of his neck.

## 2019-07-18 NOTE — HISTORY OF PRESENT ILLNESS
[FreeTextEntry1] : He came for a follow up follow up visit. \par \par He was wheezing a little. Went back on Flovent in addition to Symbicort was well. He is is feeling better now. \par \par He has been on CPAP for several weeks now. Has been using it for 7.5 hours per night on average. \par \par \par

## 2019-07-18 NOTE — DISCUSSION/SUMMARY
[FreeTextEntry1] : He is a 74 year-old man with hypertension, asthma and chronic nasal allergies. He also has severe obstructive sleep apnea and was placed on CPAP in February 2019. Has been on APAP of 4 to 10 cm of water.  \par \par Advised to continue with Symbicort and Flovent as noted. He is also on Flonase. \par \par His sleep apnea is also well controlled with APAP of 4 to 10 cm of water. \par \par Follow up in three months.

## 2019-07-18 NOTE — REVIEW OF SYSTEMS
[Ear Disturbance] : ear disturbance [Nasal Congestion] : nasal congestion [Postnasal Drip] : postnasal drip [Hypertension] : ~T hypertension [Cough] : cough [Wheezing] : wheezing [Fatigue] : no fatigue [Sinus Problems] : no sinus problems [Sore Throat] : no sore throat [Dry Mouth] : no dry mouth [Hemoptysis] : no hemoptysis [Dyspnea] : no dyspnea [Chest Discomfort] : no chest discomfort [PND] : no PND [Palpitations] : no palpitations [Edema] : ~T edema was not present [Nasal Discharge] : no nasal discharge [Heartburn] : no heartburn [Dysphagia] : no dysphagia [Nocturia] : no nocturia [Back Pain] : ~T no back pain [Myalgias] : no myalgias [Syncope] : no fainting [Depression] : no depression [DVT] : no DVT [Difficulty Initiating Sleep] : no difficulty falling asleep [Difficulty Maintaining Sleep] : no difficulty maintaining sleep [Snoring] : no snoring

## 2019-08-12 ENCOUNTER — RX RENEWAL (OUTPATIENT)
Age: 74
End: 2019-08-12

## 2019-08-29 ENCOUNTER — RX RENEWAL (OUTPATIENT)
Age: 74
End: 2019-08-29

## 2019-09-13 ENCOUNTER — APPOINTMENT (OUTPATIENT)
Dept: PULMONOLOGY | Facility: CLINIC | Age: 74
End: 2019-09-13
Payer: MEDICARE

## 2019-09-13 VITALS
BODY MASS INDEX: 26.68 KG/M2 | HEIGHT: 66 IN | DIASTOLIC BLOOD PRESSURE: 70 MMHG | OXYGEN SATURATION: 96 % | RESPIRATION RATE: 16 BRPM | WEIGHT: 166 LBS | HEART RATE: 76 BPM | SYSTOLIC BLOOD PRESSURE: 142 MMHG | TEMPERATURE: 97 F

## 2019-09-13 PROCEDURE — 99214 OFFICE O/P EST MOD 30 MIN: CPT

## 2019-09-13 NOTE — HISTORY OF PRESENT ILLNESS
[FreeTextEntry1] : He came for a follow up follow up visit. \par \par He is feeling well. He is on Symbicort.\par \par He has been on CPAP for several weeks now. Has been using it every night.

## 2019-09-13 NOTE — DISCUSSION/SUMMARY
[FreeTextEntry1] : He is a 74 year-old man with hypertension, asthma and chronic allergic rhinitis. He also has severe obstructive sleep apnea and was placed on CPAP in February 2019. Has been on APAP of 4 to 10 cm of water.  \par \par For his asthma he is to continue with Symbicort and Flovent as noted. He is also on Flonase. \par \par His sleep apnea is also well controlled with APAP of 4 to 10 cm of water. \par \par To have an MRI of the adrenals. Adenomas noted in 2014. His PCP ordered an MRI.\par \par He declined the flu shot. \par \par Follow up in four months.

## 2019-09-13 NOTE — REVIEW OF SYSTEMS
[Ear Disturbance] : ear disturbance [Nasal Congestion] : nasal congestion [Postnasal Drip] : postnasal drip [Hypertension] : ~T hypertension [Fever] : no fever [Fatigue] : no fatigue [Sinus Problems] : no sinus problems [Sore Throat] : no sore throat [Dry Mouth] : no dry mouth [Cough] : no cough [Sputum] : not coughing up ~M sputum [Dyspnea] : no dyspnea [Wheezing] : no wheezing [Chest Discomfort] : no chest discomfort [PND] : no PND [Palpitations] : no palpitations [Edema] : ~T edema was not present [Nasal Discharge] : no nasal discharge [Heartburn] : no heartburn [Dysphagia] : no dysphagia [Nocturia] : no nocturia [Back Pain] : ~T no back pain [Myalgias] : no myalgias [Syncope] : no fainting [DVT] : no DVT [Depression] : no depression [Difficulty Initiating Sleep] : no difficulty falling asleep [Difficulty Maintaining Sleep] : no difficulty maintaining sleep [Snoring] : no snoring

## 2019-09-13 NOTE — PHYSICAL EXAM
[Normal Appearance] : normal appearance [General Appearance - In No Acute Distress] : no acute distress [Neck Cervical Mass (___cm)] : no neck mass was observed [Heart Sounds] : normal S1 and S2 [Murmurs] : no murmurs present [Bowel Sounds] : normal bowel sounds [Auscultation Breath Sounds / Voice Sounds] : lungs were clear to auscultation bilaterally [Abdomen Soft] : soft [Abdomen Tenderness] : non-tender [Cyanosis, Localized] : no localized cyanosis [Abnormal Walk] : normal gait [] : no rash [No Focal Deficits] : no focal deficits [Oriented To Time, Place, And Person] : oriented to person, place, and time [Impaired Insight] : insight and judgment were intact [Erythema] : no erythema of the pharynx [FreeTextEntry1] : He has a carotid endarterectomy scar on the left side of his neck. [Skin Turgor] : normal skin turgor

## 2019-10-04 ENCOUNTER — APPOINTMENT (OUTPATIENT)
Dept: VASCULAR SURGERY | Facility: CLINIC | Age: 74
End: 2019-10-04
Payer: MEDICARE

## 2019-10-04 PROCEDURE — 99213 OFFICE O/P EST LOW 20 MIN: CPT

## 2019-10-04 PROCEDURE — 93880 EXTRACRANIAL BILAT STUDY: CPT

## 2019-10-04 NOTE — HISTORY OF PRESENT ILLNESS
[FreeTextEntry1] : patient with history of carotid disease. Status post left carotid endarterectomy. No CVA or TIAs.

## 2019-10-04 NOTE — ASSESSMENT
[FreeTextEntry1] : patient with carotid disease, status post left carotid endarterectomy. Asymptomatic. Continue antiplatelet therapy. Followup in 6 months.

## 2019-11-12 ENCOUNTER — INPATIENT (INPATIENT)
Facility: HOSPITAL | Age: 74
LOS: 12 days | Discharge: HOME HEALTH | DRG: 234 | End: 2019-11-25
Attending: THORACIC SURGERY (CARDIOTHORACIC VASCULAR SURGERY) | Admitting: INTERNAL MEDICINE
Payer: MEDICARE

## 2019-11-12 VITALS
SYSTOLIC BLOOD PRESSURE: 137 MMHG | HEIGHT: 66 IN | HEART RATE: 105 BPM | WEIGHT: 169.98 LBS | TEMPERATURE: 98 F | OXYGEN SATURATION: 96 % | RESPIRATION RATE: 16 BRPM | DIASTOLIC BLOOD PRESSURE: 73 MMHG

## 2019-11-12 DIAGNOSIS — Z98.890 OTHER SPECIFIED POSTPROCEDURAL STATES: Chronic | ICD-10-CM

## 2019-11-12 DIAGNOSIS — R94.39 ABNORMAL RESULT OF OTHER CARDIOVASCULAR FUNCTION STUDY: ICD-10-CM

## 2019-11-12 LAB
ALBUMIN SERPL ELPH-MCNC: 4.3 G/DL — SIGNIFICANT CHANGE UP (ref 3.3–5)
ALP SERPL-CCNC: 78 U/L — SIGNIFICANT CHANGE UP (ref 40–120)
ALT FLD-CCNC: 22 U/L — SIGNIFICANT CHANGE UP (ref 10–45)
ANION GAP SERPL CALC-SCNC: 11 MMOL/L — SIGNIFICANT CHANGE UP (ref 5–17)
APPEARANCE UR: CLEAR — SIGNIFICANT CHANGE UP
AST SERPL-CCNC: 26 U/L — SIGNIFICANT CHANGE UP (ref 10–40)
BILIRUB SERPL-MCNC: 0.9 MG/DL — SIGNIFICANT CHANGE UP (ref 0.2–1.2)
BILIRUB UR-MCNC: NEGATIVE — SIGNIFICANT CHANGE UP
BLD GP AB SCN SERPL QL: NEGATIVE — SIGNIFICANT CHANGE UP
BUN SERPL-MCNC: 10 MG/DL — SIGNIFICANT CHANGE UP (ref 7–23)
CALCIUM SERPL-MCNC: 9.6 MG/DL — SIGNIFICANT CHANGE UP (ref 8.4–10.5)
CHLORIDE SERPL-SCNC: 100 MMOL/L — SIGNIFICANT CHANGE UP (ref 96–108)
CO2 SERPL-SCNC: 26 MMOL/L — SIGNIFICANT CHANGE UP (ref 22–31)
COLOR SPEC: SIGNIFICANT CHANGE UP
CREAT SERPL-MCNC: 0.92 MG/DL — SIGNIFICANT CHANGE UP (ref 0.5–1.3)
DIFF PNL FLD: NEGATIVE — SIGNIFICANT CHANGE UP
GLUCOSE BLDC GLUCOMTR-MCNC: 111 MG/DL — HIGH (ref 70–99)
GLUCOSE BLDC GLUCOMTR-MCNC: 127 MG/DL — HIGH (ref 70–99)
GLUCOSE SERPL-MCNC: 125 MG/DL — HIGH (ref 70–99)
GLUCOSE UR QL: NEGATIVE — SIGNIFICANT CHANGE UP
HCT VFR BLD CALC: 47.8 % — SIGNIFICANT CHANGE UP (ref 39–50)
HGB BLD-MCNC: 16.3 G/DL — SIGNIFICANT CHANGE UP (ref 13–17)
KETONES UR-MCNC: NEGATIVE — SIGNIFICANT CHANGE UP
LEUKOCYTE ESTERASE UR-ACNC: NEGATIVE — SIGNIFICANT CHANGE UP
MCHC RBC-ENTMCNC: 30.2 PG — SIGNIFICANT CHANGE UP (ref 27–34)
MCHC RBC-ENTMCNC: 34.1 GM/DL — SIGNIFICANT CHANGE UP (ref 32–36)
MCV RBC AUTO: 88.5 FL — SIGNIFICANT CHANGE UP (ref 80–100)
MRSA PCR RESULT.: SIGNIFICANT CHANGE UP
NITRITE UR-MCNC: NEGATIVE — SIGNIFICANT CHANGE UP
NRBC # BLD: 0 /100 WBCS — SIGNIFICANT CHANGE UP (ref 0–0)
PH UR: 6 — SIGNIFICANT CHANGE UP (ref 5–8)
PLATELET # BLD AUTO: 200 K/UL — SIGNIFICANT CHANGE UP (ref 150–400)
POTASSIUM SERPL-MCNC: 4.2 MMOL/L — SIGNIFICANT CHANGE UP (ref 3.5–5.3)
POTASSIUM SERPL-SCNC: 4.2 MMOL/L — SIGNIFICANT CHANGE UP (ref 3.5–5.3)
PROT SERPL-MCNC: 7.8 G/DL — SIGNIFICANT CHANGE UP (ref 6–8.3)
PROT UR-MCNC: NEGATIVE — SIGNIFICANT CHANGE UP
RBC # BLD: 5.4 M/UL — SIGNIFICANT CHANGE UP (ref 4.2–5.8)
RBC # FLD: 12.4 % — SIGNIFICANT CHANGE UP (ref 10.3–14.5)
RH IG SCN BLD-IMP: POSITIVE — SIGNIFICANT CHANGE UP
S AUREUS DNA NOSE QL NAA+PROBE: DETECTED
SODIUM SERPL-SCNC: 137 MMOL/L — SIGNIFICANT CHANGE UP (ref 135–145)
SP GR SPEC: 1.02 — SIGNIFICANT CHANGE UP (ref 1.01–1.02)
UROBILINOGEN FLD QL: SIGNIFICANT CHANGE UP
WBC # BLD: 8.34 K/UL — SIGNIFICANT CHANGE UP (ref 3.8–10.5)
WBC # FLD AUTO: 8.34 K/UL — SIGNIFICANT CHANGE UP (ref 3.8–10.5)

## 2019-11-12 PROCEDURE — 99231 SBSQ HOSP IP/OBS SF/LOW 25: CPT

## 2019-11-12 PROCEDURE — 99152 MOD SED SAME PHYS/QHP 5/>YRS: CPT

## 2019-11-12 PROCEDURE — 99252 IP/OBS CONSLTJ NEW/EST SF 35: CPT

## 2019-11-12 PROCEDURE — 93010 ELECTROCARDIOGRAM REPORT: CPT

## 2019-11-12 PROCEDURE — 93454 CORONARY ARTERY ANGIO S&I: CPT | Mod: 26

## 2019-11-12 PROCEDURE — 93010 ELECTROCARDIOGRAM REPORT: CPT | Mod: 77

## 2019-11-12 PROCEDURE — 71045 X-RAY EXAM CHEST 1 VIEW: CPT | Mod: 26

## 2019-11-12 RX ORDER — ALBUTEROL 90 UG/1
2 AEROSOL, METERED ORAL EVERY 6 HOURS
Refills: 0 | Status: DISCONTINUED | OUTPATIENT
Start: 2019-11-12 | End: 2019-11-15

## 2019-11-12 RX ORDER — SODIUM CHLORIDE 9 MG/ML
10 INJECTION INTRAMUSCULAR; INTRAVENOUS; SUBCUTANEOUS
Refills: 0 | Status: DISCONTINUED | OUTPATIENT
Start: 2019-11-12 | End: 2019-11-15

## 2019-11-12 RX ORDER — ATORVASTATIN CALCIUM 80 MG/1
40 TABLET, FILM COATED ORAL AT BEDTIME
Refills: 0 | Status: DISCONTINUED | OUTPATIENT
Start: 2019-11-12 | End: 2019-11-15

## 2019-11-12 RX ORDER — CHOLECALCIFEROL (VITAMIN D3) 125 MCG
2000 CAPSULE ORAL DAILY
Refills: 0 | Status: DISCONTINUED | OUTPATIENT
Start: 2019-11-12 | End: 2019-11-15

## 2019-11-12 RX ORDER — FLUTICASONE PROPIONATE 220 MCG
2 AEROSOL WITH ADAPTER (GRAM) INHALATION
Qty: 0 | Refills: 0 | DISCHARGE

## 2019-11-12 RX ORDER — SODIUM CHLORIDE 0.65 %
1 AEROSOL, SPRAY (ML) NASAL THREE TIMES A DAY
Refills: 0 | Status: DISCONTINUED | OUTPATIENT
Start: 2019-11-12 | End: 2019-11-15

## 2019-11-12 RX ORDER — LEVALBUTEROL 1.25 MG/.5ML
0.63 SOLUTION, CONCENTRATE RESPIRATORY (INHALATION) ONCE
Refills: 0 | Status: COMPLETED | OUTPATIENT
Start: 2019-11-12 | End: 2019-11-12

## 2019-11-12 RX ORDER — PANTOPRAZOLE SODIUM 20 MG/1
40 TABLET, DELAYED RELEASE ORAL
Refills: 0 | Status: DISCONTINUED | OUTPATIENT
Start: 2019-11-12 | End: 2019-11-15

## 2019-11-12 RX ORDER — HEPARIN SODIUM 5000 [USP'U]/ML
5000 INJECTION INTRAVENOUS; SUBCUTANEOUS EVERY 8 HOURS
Refills: 0 | Status: DISCONTINUED | OUTPATIENT
Start: 2019-11-12 | End: 2019-11-14

## 2019-11-12 RX ORDER — BUDESONIDE AND FORMOTEROL FUMARATE DIHYDRATE 160; 4.5 UG/1; UG/1
2 AEROSOL RESPIRATORY (INHALATION)
Refills: 0 | Status: DISCONTINUED | OUTPATIENT
Start: 2019-11-12 | End: 2019-11-15

## 2019-11-12 RX ORDER — METOPROLOL TARTRATE 50 MG
25 TABLET ORAL
Refills: 0 | Status: DISCONTINUED | OUTPATIENT
Start: 2019-11-12 | End: 2019-11-15

## 2019-11-12 RX ORDER — CHOLECALCIFEROL (VITAMIN D3) 125 MCG
1 CAPSULE ORAL
Qty: 0 | Refills: 0 | DISCHARGE

## 2019-11-12 RX ORDER — ASPIRIN/CALCIUM CARB/MAGNESIUM 324 MG
81 TABLET ORAL DAILY
Refills: 0 | Status: DISCONTINUED | OUTPATIENT
Start: 2019-11-12 | End: 2019-11-15

## 2019-11-12 RX ORDER — AMLODIPINE BESYLATE 2.5 MG/1
1 TABLET ORAL
Qty: 0 | Refills: 0 | DISCHARGE

## 2019-11-12 RX ORDER — POTASSIUM CHLORIDE 20 MEQ
1 PACKET (EA) ORAL
Qty: 0 | Refills: 0 | DISCHARGE

## 2019-11-12 RX ORDER — OMEGA-3 ACID ETHYL ESTERS 1 G
1 CAPSULE ORAL
Qty: 0 | Refills: 0 | DISCHARGE

## 2019-11-12 RX ADMIN — LEVALBUTEROL 0.63 MILLIGRAM(S): 1.25 SOLUTION, CONCENTRATE RESPIRATORY (INHALATION) at 09:20

## 2019-11-12 RX ADMIN — SODIUM CHLORIDE 10 MILLILITER(S): 9 INJECTION INTRAMUSCULAR; INTRAVENOUS; SUBCUTANEOUS at 17:34

## 2019-11-12 RX ADMIN — ATORVASTATIN CALCIUM 40 MILLIGRAM(S): 80 TABLET, FILM COATED ORAL at 21:57

## 2019-11-12 RX ADMIN — Medication 25 MILLIGRAM(S): at 15:30

## 2019-11-12 RX ADMIN — BUDESONIDE AND FORMOTEROL FUMARATE DIHYDRATE 2 PUFF(S): 160; 4.5 AEROSOL RESPIRATORY (INHALATION) at 22:26

## 2019-11-12 NOTE — H&P CARDIOLOGY - PMH
Asthma    History of hypertension Asthma    Carotid stenosis, bilateral    History of hypertension    PVD (peripheral vascular disease) Asthma    Borderline diabetes    Carotid stenosis, bilateral    History of hypertension    PVD (peripheral vascular disease)

## 2019-11-12 NOTE — H&P CARDIOLOGY - REVIEW OF SYSTEMS
General:  no weakness, fatigue, fevers or chills  Skin: no itching, burning, rashes, or lesions   HEENT: no visual changes;  no headache, no vertigo, no recent colds, nasal stuffiness or sore throat   Neck: no pain, stiffness or swollen glands  Respiratory: no cough, sputum, wheezing, hemoptysis; no shortness of breath  Cardiovascular: no chest pain, dyspnea or palpitations  GI: no abdominal or epigastric pain. no heartburn, nausea, vomiting, or hematemesis; no diarrhea or constipation. no melena or hematochezia  : no dysuria, frequency or hematuria  Peripheral Vascular: no intermittent claudication, leg cramps, varicose veins, swelling or swelling with redness and tenderness   Neuro: no changes in orientation, memory, insight or judgment, no changes in mood, attention or speech.  no dizziness, vertigo or fainting, numbness, tingling or weakness

## 2019-11-12 NOTE — H&P CARDIOLOGY - HISTORY OF PRESENT ILLNESS
75 y/o Chinese male (denies implanted cardiac devices), with PMHx of HTN, Asthma - never requiring intubation, B/L Carotid Stenosis s/p R CEA 75 y/o [place] male (denies implanted cardiac devices), with PMHx of HTN, Asthma - never requiring intubation, B/L Carotid Stenosis s/p L CEA in 2017, and PVD offering c/o intermittent exertional left sided CP with onset 3 months ago - Class II Angina.  He denies accompanying symptoms.  Patient was evaluated by his Cardiologist - Dr. King in 8/2019 and had Exercise stress test with abnormal findings (report unavailable).  It was recommended patient have LHC at that time but he deferred for personal reasons.  Patient has now decided to proceed with LHC for which he presents today.      Antianginals - Amlodipine 75 y/o [place] male (denies implanted cardiac devices), with PMHx of Borderline DM - A1C unknown, HTN, Asthma - never requiring intubation, B/L Carotid Stenosis s/p L CEA in 2017, and PVD offering c/o intermittent exertional left sided CP with onset 3 months ago - Class II Angina.  He denies accompanying symptoms.  Patient was evaluated by his Cardiologist - Dr. King in 8/2019 and had Exercise stress test with abnormal findings (report unavailable).  It was recommended patient have LHC at that time but he deferred for personal reasons.  Patient has now decided to proceed with LHC for which he presents today.      Antianginals - Amlodipine

## 2019-11-12 NOTE — CONSULT NOTE ADULT - SUBJECTIVE AND OBJECTIVE BOX
CHIEF COMPLAINT:Patient is a 74y old  Male who presents with a chief complaint of     HISTORY OF PRESENT ILLNESS:    74 male with history as below known to me from office with abnormal stress test underwent cath showing 3vd planned for cabg  no cp or sob now   has mild wheezing   no dizziness  no syncope     PAST MEDICAL & SURGICAL HISTORY:  Borderline diabetes  PVD (peripheral vascular disease)  Carotid stenosis, bilateral  History of hypertension  Asthma  H/O sinus surgery  H/O carotid endarterectomy          MEDICATIONS:  aspirin  chewable 81 milliGRAM(s) Oral daily  heparin  Injectable 5000 Unit(s) SubCutaneous every 8 hours  metoprolol tartrate 25 milliGRAM(s) Oral two times a day          pantoprazole    Tablet 40 milliGRAM(s) Oral before breakfast    atorvastatin 40 milliGRAM(s) Oral at bedtime    sodium chloride 0.9% lock flush 10 milliLiter(s) IV Push two times a day      FAMILY HISTORY:  Family history of cancer      Non-contributory    SOCIAL HISTORY:    No tobacco, drugs or etoh    Allergies    No Known Allergies    Intolerances    	    REVIEW OF SYSTEMS:  as above  The rest of the 14 points ROS reviewed and except above they are unremarkable.        PHYSICAL EXAM:  T(C): 36.7 (11-12-19 @ 14:10), Max: 36.7 (11-12-19 @ 08:26)  HR: 86 (11-12-19 @ 15:24) (86 - 105)  BP: 138/72 (11-12-19 @ 15:24) (137/73 - 139/82)  RR: 16 (11-12-19 @ 15:24) (16 - 16)  SpO2: 94% (11-12-19 @ 15:24) (94% - 96%)  Wt(kg): --  I&O's Summary      JVP: Normal  Neck: supple  Lung: clear   CV: S1 S2 , Murmur:  Abd: soft  Ext: No edema  neuro: Awake / alert  Psych: flat affect  Skin: normal      LABS/DATA:    TELEMETRY: 	    ECG:  	   	  CARDIAC MARKERS:                                      16.3   8.34  )-----------( 200      ( 12 Nov 2019 08:47 )             47.8     11-12    137  |  100  |  10  ----------------------------<  125<H>  4.2   |  26  |  0.92    Ca    9.6      12 Nov 2019 08:47    TPro  7.8  /  Alb  4.3  /  TBili  0.9  /  DBili  x   /  AST  26  /  ALT  22  /  AlkPhos  78  11-12    proBNP:   Lipid Profile:   HgA1c:   TSH:

## 2019-11-12 NOTE — CONSULT NOTE ADULT - SUBJECTIVE AND OBJECTIVE BOX
History of Present Illness:  74y Male presents for elective cath after he reports "tired " last few weeks. 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  ETOH use: [ ] Yes  [x ] No              FREQUENCY / QUANTITY:  Ilicit Drug use:  [ ] Yes  [x ] No  Occupation: retired   Live with: wife  Assist device use:    Relevant Family History  FAMILY HISTORY:  Family history of cancer      Review of Systems  GENERAL:  Fevers[] chills[] sweats[] fatigue[] weight loss[] weight gain []                                        NEURO:  parathesias[] seizures []  syncope []  confusion []                                                                                  EYES: glasses[x]  blurry vision[]  discharge[] pain[] glaucoma []                                                                            ENMT:  difficulty hearing []  vertigo[]  dysphagia[] epistaxis[] recent dental work []                                      CV:  chest pain[] palpitations[] ASHTON [] diaphoresis [] edema[]                                                                                             RESPIRATORY:  wheezing[] SOB[] cough [] sputum[] hemoptysis[]                                                                    GI:  nausea[]  vomiting []  diarrhea[] constipation [] melena []                                                                        : hematuria[ ]  dysuria[ ] urgency[] incontinence[]                                                                                              MUSKULOSKELETAL:  arthritis[ ]  joint swelling [ ] muscle weakness [ ]                                                                  SKIN/BREAST:  rash[ ] itching [ ]  hair loss[ ] masses[ ]                                                                                                PSYCH:  dementia [ ] depresion [ ] anxiety[ ]                                                                                                                  HEME/LYMPH:  bruises easily[ ] enlarged lymph nodes[ ] tender lymph nodes[ ]                                                 ENDOCRINE:  cold intolerance[ ] heat intolerance[ ] polydipsia[ ]                                                                              PHYSICAL EXAM  Vital Signs Last 24 Hrs  T(C): 36.7 (12 Nov 2019 08:26), Max: 36.7 (12 Nov 2019 08:26)  T(F): 98.1 (12 Nov 2019 08:26), Max: 98.1 (12 Nov 2019 08:26)  HR: 105 (12 Nov 2019 08:26) (105 - 105)  BP: 137/73 (12 Nov 2019 08:26) (137/73 - 137/73)  BP(mean): 94 (12 Nov 2019 08:26) (94 - 94)  RR: 16 (12 Nov 2019 08:26) (16 - 16)  SpO2: 96% (12 Nov 2019 08:26) (96% - 96%)    General: Well nourished, well developed, no acute distress.                                                         Neuro: Normal exam oriented to person/place & time with no focal motor or sensory  deficits.                    Eyes: Normal exam of conjunctiva & lids, pupils equally reactive.   ENT: Normal exam of nasal/oral mucosa with absence of cyanosis.   Neck: Normal exam of jugular veins, trachea & thyroid.   Chest: Normal lung exam with good air movement absence of wheezes, rales, or rhonchi:                                                                          CV:  Auscultation: normal [x ] S3[ ] S4[ ] Irregular [ ] Rub[ ] Clicks[ ]  Murmurs none:[x ]systolic [ ]  diastolic [ ] holosystolic [ ]  Carotids: No Bruits[x ] Other____________ Abdominal Aorta: normal [x ] nonpalpable[ ]                                                                         GI: Normal exam of abdomen, liver & spleen with no noted masses or tenderness.                                                                                              Extremities: Normal no evidence of cyanosis or deformity Edema: none[ x]trace[ ]1+[ ]2+[ ]3+[ ]4+[ ]  Lower Extremity Pulses: Right[ ] Left[ ]Varicosities[ ]  SKIN : Normal exam to inspection & palpation.                                                           LABS:                        16.3   8.34  )-----------( 200      ( 12 Nov 2019 08:47 )             47.8     11-12    137  |  100  |  10  ----------------------------<  125<H>  4.2   |  26  |  0.92    Ca    9.6      12 Nov 2019 08:47    TPro  7.8  /  Alb  4.3  /  TBili  0.9  /  DBili  x   /  AST  26  /  ALT  22  /  AlkPhos  78  11-12                Cardiac Cath:  < from: Cardiac Cath Lab - Adult (11.12.19 @ 09:52) >  CORONARY VESSELS: The coronary circulation is right dominant.  LM:   --  LM: Angiography showed minor luminal irregularities with no flow  limiting lesions.  LAD:   --  Mid LAD: There was a 90 % stenosis.  CX:   --  Ostial circumflex: There was a 60 % stenosis.  --  Proximal circumflex: There was a 90 % stenosis.  --  OM2: There was a 90 % stenosis.  RI:   --  Mid ramus intermedius: There was a 90 % stenosis.  RCA:   --  Proximal RCA: There was a 90 % stenosis.  --  RPDA: There was a 80 % stenosis.        Assessment:  74y Male presents with abnormal stress test 3 months ago>elective cath today revealed 3 VCAD    Plan:    Preop workup initiated   ASA statin betablocker  Will discuss with Dr Phillips

## 2019-11-12 NOTE — CONSULT NOTE ADULT - ASSESSMENT
CAD 3VD   planned for cabg   cont asa, statin   cont BB  obtain echo   carotid doppler   PFT     Wheezing  nebs  consider pulm eval   PFT     Carotid doppler   history of CEA  obtain carotid doppler    HTN  stable  cont current meds

## 2019-11-13 DIAGNOSIS — Z86.79 PERSONAL HISTORY OF OTHER DISEASES OF THE CIRCULATORY SYSTEM: ICD-10-CM

## 2019-11-13 DIAGNOSIS — R73.03 PREDIABETES: ICD-10-CM

## 2019-11-13 DIAGNOSIS — E27.8 OTHER SPECIFIED DISORDERS OF ADRENAL GLAND: ICD-10-CM

## 2019-11-13 DIAGNOSIS — I25.10 ATHEROSCLEROTIC HEART DISEASE OF NATIVE CORONARY ARTERY WITHOUT ANGINA PECTORIS: ICD-10-CM

## 2019-11-13 LAB
ALBUMIN SERPL ELPH-MCNC: 3.7 G/DL — SIGNIFICANT CHANGE UP (ref 3.3–5)
ALP SERPL-CCNC: 71 U/L — SIGNIFICANT CHANGE UP (ref 40–120)
ALT FLD-CCNC: 19 U/L — SIGNIFICANT CHANGE UP (ref 10–45)
ANION GAP SERPL CALC-SCNC: 12 MMOL/L — SIGNIFICANT CHANGE UP (ref 5–17)
AST SERPL-CCNC: 19 U/L — SIGNIFICANT CHANGE UP (ref 10–40)
BILIRUB DIRECT SERPL-MCNC: 0.1 MG/DL — SIGNIFICANT CHANGE UP (ref 0–0.2)
BILIRUB INDIRECT FLD-MCNC: 0.8 MG/DL — SIGNIFICANT CHANGE UP (ref 0.2–1)
BILIRUB SERPL-MCNC: 0.9 MG/DL — SIGNIFICANT CHANGE UP (ref 0.2–1.2)
BUN SERPL-MCNC: 13 MG/DL — SIGNIFICANT CHANGE UP (ref 7–23)
CALCIUM SERPL-MCNC: 9.5 MG/DL — SIGNIFICANT CHANGE UP (ref 8.4–10.5)
CHLORIDE SERPL-SCNC: 103 MMOL/L — SIGNIFICANT CHANGE UP (ref 96–108)
CO2 SERPL-SCNC: 24 MMOL/L — SIGNIFICANT CHANGE UP (ref 22–31)
CREAT SERPL-MCNC: 0.87 MG/DL — SIGNIFICANT CHANGE UP (ref 0.5–1.3)
FIBRINOGEN PPP-MCNC: 564 MG/DL — HIGH (ref 350–510)
GLUCOSE BLDC GLUCOMTR-MCNC: 106 MG/DL — HIGH (ref 70–99)
GLUCOSE BLDC GLUCOMTR-MCNC: 121 MG/DL — HIGH (ref 70–99)
GLUCOSE BLDC GLUCOMTR-MCNC: 121 MG/DL — HIGH (ref 70–99)
GLUCOSE BLDC GLUCOMTR-MCNC: 143 MG/DL — HIGH (ref 70–99)
GLUCOSE SERPL-MCNC: 112 MG/DL — HIGH (ref 70–99)
INR BLD: 1.05 RATIO — SIGNIFICANT CHANGE UP (ref 0.88–1.16)
NT-PROBNP SERPL-SCNC: 39 PG/ML — SIGNIFICANT CHANGE UP (ref 0–300)
POTASSIUM SERPL-MCNC: 3.8 MMOL/L — SIGNIFICANT CHANGE UP (ref 3.5–5.3)
POTASSIUM SERPL-SCNC: 3.8 MMOL/L — SIGNIFICANT CHANGE UP (ref 3.5–5.3)
PROT SERPL-MCNC: 7 G/DL — SIGNIFICANT CHANGE UP (ref 6–8.3)
PROTHROM AB SERPL-ACNC: 12.1 SEC — SIGNIFICANT CHANGE UP (ref 10–12.9)
SODIUM SERPL-SCNC: 139 MMOL/L — SIGNIFICANT CHANGE UP (ref 135–145)

## 2019-11-13 PROCEDURE — 99233 SBSQ HOSP IP/OBS HIGH 50: CPT

## 2019-11-13 PROCEDURE — 93880 EXTRACRANIAL BILAT STUDY: CPT | Mod: 26

## 2019-11-13 PROCEDURE — 94010 BREATHING CAPACITY TEST: CPT | Mod: 26

## 2019-11-13 PROCEDURE — 93306 TTE W/DOPPLER COMPLETE: CPT | Mod: 26

## 2019-11-13 RX ORDER — MUPIROCIN 20 MG/G
1 OINTMENT TOPICAL
Refills: 0 | Status: DISCONTINUED | OUTPATIENT
Start: 2019-11-13 | End: 2019-11-13

## 2019-11-13 RX ORDER — MUPIROCIN 20 MG/G
1 OINTMENT TOPICAL
Refills: 0 | Status: DISCONTINUED | OUTPATIENT
Start: 2019-11-13 | End: 2019-11-15

## 2019-11-13 RX ORDER — POTASSIUM CHLORIDE 20 MEQ
20 PACKET (EA) ORAL ONCE
Refills: 0 | Status: COMPLETED | OUTPATIENT
Start: 2019-11-13 | End: 2019-11-13

## 2019-11-13 RX ADMIN — BUDESONIDE AND FORMOTEROL FUMARATE DIHYDRATE 2 PUFF(S): 160; 4.5 AEROSOL RESPIRATORY (INHALATION) at 06:42

## 2019-11-13 RX ADMIN — Medication 25 MILLIGRAM(S): at 06:42

## 2019-11-13 RX ADMIN — BUDESONIDE AND FORMOTEROL FUMARATE DIHYDRATE 2 PUFF(S): 160; 4.5 AEROSOL RESPIRATORY (INHALATION) at 17:46

## 2019-11-13 RX ADMIN — HEPARIN SODIUM 5000 UNIT(S): 5000 INJECTION INTRAVENOUS; SUBCUTANEOUS at 06:41

## 2019-11-13 RX ADMIN — PANTOPRAZOLE SODIUM 40 MILLIGRAM(S): 20 TABLET, DELAYED RELEASE ORAL at 06:42

## 2019-11-13 RX ADMIN — Medication 81 MILLIGRAM(S): at 10:55

## 2019-11-13 RX ADMIN — SODIUM CHLORIDE 10 MILLILITER(S): 9 INJECTION INTRAMUSCULAR; INTRAVENOUS; SUBCUTANEOUS at 06:37

## 2019-11-13 RX ADMIN — HEPARIN SODIUM 5000 UNIT(S): 5000 INJECTION INTRAVENOUS; SUBCUTANEOUS at 14:03

## 2019-11-13 RX ADMIN — Medication 20 MILLIEQUIVALENT(S): at 10:54

## 2019-11-13 RX ADMIN — SODIUM CHLORIDE 10 MILLILITER(S): 9 INJECTION INTRAMUSCULAR; INTRAVENOUS; SUBCUTANEOUS at 17:44

## 2019-11-13 RX ADMIN — MUPIROCIN 1 APPLICATION(S): 20 OINTMENT TOPICAL at 06:42

## 2019-11-13 RX ADMIN — Medication 25 MILLIGRAM(S): at 17:46

## 2019-11-13 RX ADMIN — Medication 2000 UNIT(S): at 10:55

## 2019-11-13 RX ADMIN — MUPIROCIN 1 APPLICATION(S): 20 OINTMENT TOPICAL at 17:45

## 2019-11-13 RX ADMIN — HEPARIN SODIUM 5000 UNIT(S): 5000 INJECTION INTRAVENOUS; SUBCUTANEOUS at 21:33

## 2019-11-13 RX ADMIN — ATORVASTATIN CALCIUM 40 MILLIGRAM(S): 80 TABLET, FILM COATED ORAL at 21:33

## 2019-11-13 NOTE — PROGRESS NOTE ADULT - PROBLEM SELECTOR PLAN 1
asa, statin, BB  Pre-op work up  Pending carotid dopplers- performed.   F/U results  OR with Dr. Phillips on Friday

## 2019-11-13 NOTE — CONSULT NOTE ADULT - PROBLEM SELECTOR RECOMMENDATION 2
Suggest chemical workup of adrenal mass before procedure tomorrow.  Will order cortisol, aldosterone, metanephrine, and TFT labs and FU.

## 2019-11-13 NOTE — CONSULT NOTE ADULT - PROBLEM SELECTOR RECOMMENDATION 9
Blood sugars within acceptable range without management. Will order A1C.  Will continue monitoring FS preop and postop, will FU. Will continue monitoring FS preop and postop, will FU.

## 2019-11-13 NOTE — CHART NOTE - NSCHARTNOTEFT_GEN_A_CORE
Called by lab with critical results of nasal swab done on 11/12/19, in which MRSA/MSSA was detected. The was done as a preap screen. Mupirocin ointment to nostrils was ordered. Will follow up with day team.     Yudelka Guerrero NP, [id number]

## 2019-11-13 NOTE — PROGRESS NOTE ADULT - SUBJECTIVE AND OBJECTIVE BOX
VITAL SIGNS    Telemetry:  SR 70  Vital Signs Last 24 Hrs  T(C): 36.5 (19 @ 13:46), Max: 36.8 (19 @ 05:06)  T(F): 97.7 (19 @ 13:46), Max: 98.3 (19 @ 05:06)  HR: 64 (19 @ 13:46) (64 - 70)  BP: 128/68 (19 @ 13:46) (128/68 - 143/72)  RR: 18 (19 @ 13:46) (16 - 18)  SpO2: 93% (19 @ 13:46) (93% - 95%)             @ 07:01  -   @ 07:00  --------------------------------------------------------  IN: 140 mL / OUT: 1200 mL / NET: -1060 mL     @ 07:01  -   @ 17:35  --------------------------------------------------------  IN: 830 mL / OUT: 1350 mL / NET: -520 mL       Daily     Daily Weight in k.7 (2019 07:55)  Admit Wt: Drug Dosing Weight  Height (cm): 167.64 (2019 09:06)  Weight (kg): 77.1 (2019 09:06)  BMI (kg/m2): 27.4 (2019 09:06)  BSA (m2): 1.87 (2019 09:06)    Bilirubin Total, Serum: 0.9 mg/dL ( @ 06:49)  Bilirubin Direct, Serum: 0.1 mg/dL ( 06:49)    CAPILLARY BLOOD GLUCOSE      POCT Blood Glucose.: 121 mg/dL (2019 17:21)  POCT Blood Glucose.: 121 mg/dL (2019 11:40)  POCT Blood Glucose.: 106 mg/dL (2019 08:07)  POCT Blood Glucose.: 111 mg/dL (2019 21:53)  POCT Blood Glucose.: 127 mg/dL (2019 18:26)          MEDICATIONS  ALBUTerol    90 MICROgram(s) HFA Inhaler 2 Puff(s) Inhalation every 6 hours PRN  aspirin  chewable 81 milliGRAM(s) Oral daily  atorvastatin 40 milliGRAM(s) Oral at bedtime  budesonide 160 MICROgram(s)/formoterol 4.5 MICROgram(s) Inhaler 2 Puff(s) Inhalation two times a day  cholecalciferol 2000 Unit(s) Oral daily  heparin  Injectable 5000 Unit(s) SubCutaneous every 8 hours  metoprolol tartrate 25 milliGRAM(s) Oral two times a day  mupirocin 2% Ointment 1 Application(s) Topical two times a day  pantoprazole    Tablet 40 milliGRAM(s) Oral before breakfast  sodium chloride 0.65% Nasal 1 Spray(s) Both Nostrils three times a day PRN  sodium chloride 0.9% lock flush 10 milliLiter(s) IV Push two times a day      Interval History: Pt denies any chest pain or sob    PHYSICAL EXAM  General: WN, WD, NAD  Neurology: alert and oriented x 3, nonfocal, no gross deficits  CV : s1, s2  Sternal Wound :  CDI , Stable  Lungs: CTA B/L  Abdomen: soft, NT,ND, ( +)Bowel sounds  :  voiding  Extremities:  -c/c/e     LABS      139  |  103  |  13  ----------------------------<  112<H>  3.8   |  24  |  0.87    Ca    9.5      2019 06:49    TPro  7.0  /  Alb  3.7  /  TBili  0.9  /  DBili  0.1  /  AST  19  /  ALT  19  /  AlkPhos  71                                   16.3   8.34  )-----------( 200      ( 2019 08:47 )             47.8          PT/INR - ( 2019 06:51 )   PT: 12.1 sec;   INR: 1.05 ratio                PAST MEDICAL & SURGICAL HISTORY:  Borderline diabetes  PVD (peripheral vascular disease)  Carotid stenosis, bilateral  History of hypertension  Asthma  H/O sinus surgery  H/O carotid endarterectomy

## 2019-11-13 NOTE — CONSULT NOTE ADULT - ATTENDING COMMENTS
Advanced care planning was discussed with patient and family.  Risks, benefits and alternatives of the cardiac treatments and medical therapy including procedures were discussed in detail and all questions were answered. Importance of compliance with medical therapy and lifestyle modification to improve cardiovascular health were addressed. Appropriate forms and patient educational materials were reviewed. 30 minutes face to face spent.
Blood sugars within acceptable range without management. Will order A1C.

## 2019-11-13 NOTE — PROGRESS NOTE ADULT - SUBJECTIVE AND OBJECTIVE BOX
Subjective: Patient seen and examined. No new events except as noted.     SUBJECTIVE/ROS:  No chest pain, dyspnea, palpitation, or dizziness.       MEDICATIONS:  MEDICATIONS  (STANDING):  aspirin  chewable 81 milliGRAM(s) Oral daily  atorvastatin 40 milliGRAM(s) Oral at bedtime  budesonide 160 MICROgram(s)/formoterol 4.5 MICROgram(s) Inhaler 2 Puff(s) Inhalation two times a day  cholecalciferol 2000 Unit(s) Oral daily  heparin  Injectable 5000 Unit(s) SubCutaneous every 8 hours  metoprolol tartrate 25 milliGRAM(s) Oral two times a day  mupirocin 2% Ointment 1 Application(s) Topical two times a day  pantoprazole    Tablet 40 milliGRAM(s) Oral before breakfast  sodium chloride 0.9% lock flush 10 milliLiter(s) IV Push two times a day      PHYSICAL EXAM:  T(C): 36.8 (11-13-19 @ 05:06), Max: 36.8 (11-13-19 @ 05:06)  HR: 70 (11-13-19 @ 05:06) (66 - 105)  BP: 135/74 (11-13-19 @ 05:06) (135/74 - 143/72)  RR: 18 (11-13-19 @ 05:06) (16 - 18)  SpO2: 94% (11-13-19 @ 05:06) (94% - 96%)  Wt(kg): --  I&O's Summary    12 Nov 2019 07:01  -  13 Nov 2019 07:00  --------------------------------------------------------  IN: 140 mL / OUT: 1200 mL / NET: -1060 mL      Height (cm): 167.64 (11-12 @ 09:06)  Weight (kg): 77.1 (11-12 @ 09:06)  BMI (kg/m2): 27.4 (11-12 @ 09:06)  BSA (m2): 1.87 (11-12 @ 09:06)      JVP: Normal  Neck: supple  Lung: clear   CV: S1 S2 , Murmur:  Abd: soft  Ext: No edema  neuro: Awake / alert  Psych: flat affect  Skin: normal``    LABS/DATA:    CARDIAC MARKERS:                                16.3   8.34  )-----------( 200      ( 12 Nov 2019 08:47 )             47.8     11-13    139  |  103  |  13  ----------------------------<  112<H>  3.8   |  24  |  0.87    Ca    9.5      13 Nov 2019 06:49    TPro  7.0  /  Alb  3.7  /  TBili  0.9  /  DBili  0.1  /  AST  19  /  ALT  19  /  AlkPhos  71  11-13    proBNP: Serum Pro-Brain Natriuretic Peptide: 39 pg/mL (11-13 @ 06:49)    Lipid Profile:   HgA1c:   TSH:     TELE:  EKG:

## 2019-11-13 NOTE — CONSULT NOTE ADULT - ASSESSMENT
Assessment    Assessment  DMT2: 74y Male with "borderline diabetes" as per chart, A1C unknown, was not on DM meds at home, blood sugars trending within acceptable range without management, no hypoglycemic episode, eating meals.  Adrenal Mass: 74y Male with hx adrenal enlargements scheduled for CABG tomorrow. As per patient's documents, enlargements are benign, 2.4cm, not much change/growth between 2014 to present, followed by his endo.  CAD: Planning CABG tomorrow, on medications, no chest pain, stable, monitored.  HTN: Controlled,  on antihypertensive medications.          Subha Iyer MD  Cell: [phone]  Office: [phone] Assessment  DMT2: 74y Male with "borderline diabetes" as per chart, A1C unknown, was not on DM meds at home, blood sugars trending within acceptable range without management, no hypoglycemic episode, eating meals.  Adrenal Mass: 74y Male with hx adrenal enlargements scheduled for CABG tomorrow. As per patient's documents, enlargements are benign, 2.4cm, not much change/growth between 2014 to present, followed by his endo.  CAD: Planning CABG tomorrow, on medications, no chest pain, stable, monitored.  HTN: Controlled,  on antihypertensive medications.          Subha Iyer MD  Cell: [phone]  Office: [phone]

## 2019-11-13 NOTE — CONSULT NOTE ADULT - SUBJECTIVE AND OBJECTIVE BOX
HPI:  75 y/o [place] male (denies implanted cardiac devices), with PMHx of Borderline DM - A1C unknown, HTN, Asthma - never requiring intubation, B/L Carotid Stenosis s/p L CEA in 2017, and PVD offering c/o intermittent exertional left sided CP with onset 3 months ago - Class II Angina.  He denies accompanying symptoms.  Patient was evaluated by his Cardiologist - Dr. King in 8/2019 and had Exercise stress test with abnormal findings (report unavailable).  It was recommended patient have LHC at that time but he deferred for personal reasons.  Patient has now decided to proceed with LHC for which he presents today.      Antianginals - Amlodipine (12 Nov 2019 08:26)      PAST MEDICAL & SURGICAL HISTORY:  Borderline diabetes  PVD (peripheral vascular disease)  Carotid stenosis, bilateral  History of hypertension  Asthma  H/O sinus surgery  H/O carotid endarterectomy      FAMILY HISTORY:  Family history of cancer      Social History:    Outpatient Medications:    MEDICATIONS  (STANDING):  aspirin  chewable 81 milliGRAM(s) Oral daily  atorvastatin 40 milliGRAM(s) Oral at bedtime  budesonide 160 MICROgram(s)/formoterol 4.5 MICROgram(s) Inhaler 2 Puff(s) Inhalation two times a day  cholecalciferol 2000 Unit(s) Oral daily  heparin  Injectable 5000 Unit(s) SubCutaneous every 8 hours  metoprolol tartrate 25 milliGRAM(s) Oral two times a day  mupirocin 2% Ointment 1 Application(s) Topical two times a day  pantoprazole    Tablet 40 milliGRAM(s) Oral before breakfast  sodium chloride 0.9% lock flush 10 milliLiter(s) IV Push two times a day    MEDICATIONS  (PRN):  ALBUTerol    90 MICROgram(s) HFA Inhaler 2 Puff(s) Inhalation every 6 hours PRN Shortness of Breath  sodium chloride 0.65% Nasal 1 Spray(s) Both Nostrils three times a day PRN Nasal Congestion      Allergies    No Known Allergies    Intolerances      Review of Systems:  Constitutional: No fever, no chills  Eyes: No blurry vision  Neuro: No tremors  HEENT: No pain, no neck swelling  Cardiovascular: No chest pain, no palpitations  Respiratory: Has SOB, no cough  GI: No nausea, vomiting, abdominal pain  : No dysuria  Skin: no rash  MSK: Has leg swelling.  Psych: no depression  Endocrine: no polyuria, polydipsia    ALL OTHER SYSTEMS REVIEWED AND NEGATIVE    UNABLE TO OBTAIN    PHYSICAL EXAM:  VITALS: T(C): 36.8 (11-13-19 @ 05:06)  T(F): 98.3 (11-13-19 @ 05:06), Max: 98.3 (11-13-19 @ 05:06)  HR: 70 (11-13-19 @ 05:06) (66 - 87)  BP: 135/74 (11-13-19 @ 05:06) (135/74 - 143/72)  RR:  (16 - 18)  SpO2:  (94% - 96%)  Wt(kg): --  GENERAL: NAD, well-groomed, well-developed  EYES: No proptosis, no lid lag  HEENT:  Atraumatic, Normocephalic  THYROID: Normal size, no palpable nodules  RESPIRATORY: Clear to auscultation bilaterally; No rales, rhonchi, wheezing  CARDIOVASCULAR: Si S2, No murmurs;  GI: Soft, non distended, normal bowel sounds  SKIN: Dry, intact, No rashes or lesions  MUSCULOSKELETAL: Has BL lower extremity edema.  NEURO:  no tremor, sensation decreased in feet BL,    POCT Blood Glucose.: 106 mg/dL (11-13-19 @ 08:07)  POCT Blood Glucose.: 111 mg/dL (11-12-19 @ 21:53)  POCT Blood Glucose.: 127 mg/dL (11-12-19 @ 18:26)                            16.3   8.34  )-----------( 200      ( 12 Nov 2019 08:47 )             47.8       11-13    139  |  103  |  13  ----------------------------<  112<H>  3.8   |  24  |  0.87    EGFR if : 99  EGFR if non : 86    Ca    9.5      11-13    TPro  7.0  /  Alb  3.7  /  TBili  0.9  /  DBili  0.1  /  AST  19  /  ALT  19  /  AlkPhos  71  11-13      Thyroid Function Tests:              Radiology: HPI:  73 y/o German male (denies implanted cardiac devices), with PMHx of Borderline DM - A1C unknown, HTN, Asthma - never requiring intubation, B/L Carotid Stenosis s/p L CEA in 2017, and PVD offering c/o intermittent exertional left sided CP with onset 3 months ago - Class II Angina.  He denies accompanying symptoms.  Patient was evaluated by his Cardiologist - Dr. King in 8/2019 and had Exercise stress test with abnormal findings (report unavailable).  It was recommended patient have LHC at that time but he deferred for personal reasons.  Patient has now decided to proceed with LHC for which he presents today.      Antianginals - Amlodipine (12 Nov 2019 08:26)      PAST MEDICAL & SURGICAL HISTORY:  Borderline diabetes  PVD (peripheral vascular disease)  Carotid stenosis, bilateral  History of hypertension  Asthma  H/O sinus surgery  H/O carotid endarterectomy      FAMILY HISTORY:  Family history of cancer      Social History:    Outpatient Medications:    MEDICATIONS  (STANDING):  aspirin  chewable 81 milliGRAM(s) Oral daily  atorvastatin 40 milliGRAM(s) Oral at bedtime  budesonide 160 MICROgram(s)/formoterol 4.5 MICROgram(s) Inhaler 2 Puff(s) Inhalation two times a day  cholecalciferol 2000 Unit(s) Oral daily  heparin  Injectable 5000 Unit(s) SubCutaneous every 8 hours  metoprolol tartrate 25 milliGRAM(s) Oral two times a day  mupirocin 2% Ointment 1 Application(s) Topical two times a day  pantoprazole    Tablet 40 milliGRAM(s) Oral before breakfast  sodium chloride 0.9% lock flush 10 milliLiter(s) IV Push two times a day    MEDICATIONS  (PRN):  ALBUTerol    90 MICROgram(s) HFA Inhaler 2 Puff(s) Inhalation every 6 hours PRN Shortness of Breath  sodium chloride 0.65% Nasal 1 Spray(s) Both Nostrils three times a day PRN Nasal Congestion      Allergies    No Known Allergies    Intolerances      Review of Systems:  Constitutional: No fever, no chills  Eyes: No blurry vision  Neuro: No tremors  HEENT: No pain, no neck swelling  Cardiovascular: No chest pain, no palpitations  Respiratory: Has SOB, no cough  GI: No nausea, vomiting, abdominal pain  : No dysuria  Skin: no rash  MSK: Has leg swelling.  Psych: no depression  Endocrine: no polyuria, polydipsia    ALL OTHER SYSTEMS REVIEWED AND NEGATIVE    UNABLE TO OBTAIN    PHYSICAL EXAM:  VITALS: T(C): 36.8 (11-13-19 @ 05:06)  T(F): 98.3 (11-13-19 @ 05:06), Max: 98.3 (11-13-19 @ 05:06)  HR: 70 (11-13-19 @ 05:06) (66 - 87)  BP: 135/74 (11-13-19 @ 05:06) (135/74 - 143/72)  RR:  (16 - 18)  SpO2:  (94% - 96%)  Wt(kg): --  GENERAL: NAD, well-groomed, well-developed  EYES: No proptosis, no lid lag  HEENT:  Atraumatic, Normocephalic  THYROID: Normal size, no palpable nodules  RESPIRATORY: Clear to auscultation bilaterally; No rales, rhonchi, wheezing  CARDIOVASCULAR: Si S2, No murmurs;  GI: Soft, non distended, normal bowel sounds  SKIN: Dry, intact, No rashes or lesions  MUSCULOSKELETAL: Has BL lower extremity edema.  NEURO:  no tremor, sensation decreased in feet BL,    POCT Blood Glucose.: 106 mg/dL (11-13-19 @ 08:07)  POCT Blood Glucose.: 111 mg/dL (11-12-19 @ 21:53)  POCT Blood Glucose.: 127 mg/dL (11-12-19 @ 18:26)                            16.3   8.34  )-----------( 200      ( 12 Nov 2019 08:47 )             47.8       11-13    139  |  103  |  13  ----------------------------<  112<H>  3.8   |  24  |  0.87    EGFR if : 99  EGFR if non : 86    Ca    9.5      11-13    TPro  7.0  /  Alb  3.7  /  TBili  0.9  /  DBili  0.1  /  AST  19  /  ALT  19  /  AlkPhos  71  11-13      Thyroid Function Tests:              Radiology:

## 2019-11-13 NOTE — PROGRESS NOTE ADULT - ASSESSMENT
73 y/o [place]ian male (denies implanted cardiac devices), with PMHx of Borderline DM - A1C unknown, HTN, Asthma - never requiring intubation, B/L Carotid Stenosis s/p L CEA in 2017, and PVD offering c/o intermittent exertional left sided CP with onset 3 months ago - Class II Angina.  He denies accompanying symptoms.  Patient was evaluated by his Cardiologist - Dr. King in 8/2019 and had Exercise stress test with abnormal findings (report unavailable).     11/12 s/p LHC S/P TVD  11/13 VSS; s/p TTE minimal MR. Nml LV/RV. Pending carotid dopplers- performed. F/U results. OR with Dr. Phillips on Friday

## 2019-11-14 LAB
ALDOST SERPL-MCNC: 6.5 NG/DL — SIGNIFICANT CHANGE UP
ANION GAP SERPL CALC-SCNC: 10 MMOL/L — SIGNIFICANT CHANGE UP (ref 5–17)
APTT BLD: 34.9 SEC — SIGNIFICANT CHANGE UP (ref 27.5–36.3)
BLD GP AB SCN SERPL QL: NEGATIVE — SIGNIFICANT CHANGE UP
BUN SERPL-MCNC: 14 MG/DL — SIGNIFICANT CHANGE UP (ref 7–23)
CALCIUM SERPL-MCNC: 9.1 MG/DL — SIGNIFICANT CHANGE UP (ref 8.4–10.5)
CHLORIDE SERPL-SCNC: 105 MMOL/L — SIGNIFICANT CHANGE UP (ref 96–108)
CO2 SERPL-SCNC: 25 MMOL/L — SIGNIFICANT CHANGE UP (ref 22–31)
CORTIS AM PEAK SERPL-MCNC: 8.2 UG/DL — SIGNIFICANT CHANGE UP (ref 6–18.4)
CREAT SERPL-MCNC: 0.89 MG/DL — SIGNIFICANT CHANGE UP (ref 0.5–1.3)
GLUCOSE BLDC GLUCOMTR-MCNC: 101 MG/DL — HIGH (ref 70–99)
GLUCOSE BLDC GLUCOMTR-MCNC: 103 MG/DL — HIGH (ref 70–99)
GLUCOSE BLDC GLUCOMTR-MCNC: 113 MG/DL — HIGH (ref 70–99)
GLUCOSE BLDC GLUCOMTR-MCNC: 89 MG/DL — SIGNIFICANT CHANGE UP (ref 70–99)
GLUCOSE SERPL-MCNC: 116 MG/DL — HIGH (ref 70–99)
HBA1C BLD-MCNC: 6.2 % — HIGH (ref 4–5.6)
HCT VFR BLD CALC: 45.5 % — SIGNIFICANT CHANGE UP (ref 39–50)
HGB BLD-MCNC: 15.3 G/DL — SIGNIFICANT CHANGE UP (ref 13–17)
MAGNESIUM SERPL-MCNC: 2 MG/DL — SIGNIFICANT CHANGE UP (ref 1.6–2.6)
MCHC RBC-ENTMCNC: 29.9 PG — SIGNIFICANT CHANGE UP (ref 27–34)
MCHC RBC-ENTMCNC: 33.6 GM/DL — SIGNIFICANT CHANGE UP (ref 32–36)
MCV RBC AUTO: 88.9 FL — SIGNIFICANT CHANGE UP (ref 80–100)
NRBC # BLD: 0 /100 WBCS — SIGNIFICANT CHANGE UP (ref 0–0)
PLATELET # BLD AUTO: 181 K/UL — SIGNIFICANT CHANGE UP (ref 150–400)
POTASSIUM SERPL-MCNC: 3.8 MMOL/L — SIGNIFICANT CHANGE UP (ref 3.5–5.3)
POTASSIUM SERPL-SCNC: 3.8 MMOL/L — SIGNIFICANT CHANGE UP (ref 3.5–5.3)
RBC # BLD: 5.12 M/UL — SIGNIFICANT CHANGE UP (ref 4.2–5.8)
RBC # FLD: 12.3 % — SIGNIFICANT CHANGE UP (ref 10.3–14.5)
RH IG SCN BLD-IMP: POSITIVE — SIGNIFICANT CHANGE UP
SODIUM SERPL-SCNC: 140 MMOL/L — SIGNIFICANT CHANGE UP (ref 135–145)
T4 FREE SERPL-MCNC: 1.3 NG/DL — SIGNIFICANT CHANGE UP (ref 0.9–1.8)
TSH SERPL-MCNC: 2.45 UIU/ML — SIGNIFICANT CHANGE UP (ref 0.27–4.2)
WBC # BLD: 7.88 K/UL — SIGNIFICANT CHANGE UP (ref 3.8–10.5)
WBC # FLD AUTO: 7.88 K/UL — SIGNIFICANT CHANGE UP (ref 3.8–10.5)

## 2019-11-14 RX ORDER — CEFUROXIME AXETIL 250 MG
1500 TABLET ORAL ONCE
Refills: 0 | Status: DISCONTINUED | OUTPATIENT
Start: 2019-11-14 | End: 2019-11-15

## 2019-11-14 RX ORDER — CHLORHEXIDINE GLUCONATE 213 G/1000ML
1 SOLUTION TOPICAL ONCE
Refills: 0 | Status: COMPLETED | OUTPATIENT
Start: 2019-11-14 | End: 2019-11-14

## 2019-11-14 RX ORDER — CHLORHEXIDINE GLUCONATE 213 G/1000ML
30 SOLUTION TOPICAL ONCE
Refills: 0 | Status: COMPLETED | OUTPATIENT
Start: 2019-11-14 | End: 2019-11-15

## 2019-11-14 RX ADMIN — CHLORHEXIDINE GLUCONATE 1 APPLICATION(S): 213 SOLUTION TOPICAL at 19:07

## 2019-11-14 RX ADMIN — ATORVASTATIN CALCIUM 40 MILLIGRAM(S): 80 TABLET, FILM COATED ORAL at 22:42

## 2019-11-14 RX ADMIN — SODIUM CHLORIDE 10 MILLILITER(S): 9 INJECTION INTRAMUSCULAR; INTRAVENOUS; SUBCUTANEOUS at 05:30

## 2019-11-14 RX ADMIN — MUPIROCIN 1 APPLICATION(S): 20 OINTMENT TOPICAL at 17:06

## 2019-11-14 RX ADMIN — Medication 81 MILLIGRAM(S): at 13:11

## 2019-11-14 RX ADMIN — SODIUM CHLORIDE 10 MILLILITER(S): 9 INJECTION INTRAMUSCULAR; INTRAVENOUS; SUBCUTANEOUS at 17:05

## 2019-11-14 RX ADMIN — BUDESONIDE AND FORMOTEROL FUMARATE DIHYDRATE 2 PUFF(S): 160; 4.5 AEROSOL RESPIRATORY (INHALATION) at 05:29

## 2019-11-14 RX ADMIN — MUPIROCIN 1 APPLICATION(S): 20 OINTMENT TOPICAL at 05:29

## 2019-11-14 RX ADMIN — Medication 25 MILLIGRAM(S): at 17:06

## 2019-11-14 RX ADMIN — HEPARIN SODIUM 5000 UNIT(S): 5000 INJECTION INTRAVENOUS; SUBCUTANEOUS at 05:29

## 2019-11-14 RX ADMIN — Medication 2000 UNIT(S): at 13:11

## 2019-11-14 RX ADMIN — BUDESONIDE AND FORMOTEROL FUMARATE DIHYDRATE 2 PUFF(S): 160; 4.5 AEROSOL RESPIRATORY (INHALATION) at 17:06

## 2019-11-14 RX ADMIN — PANTOPRAZOLE SODIUM 40 MILLIGRAM(S): 20 TABLET, DELAYED RELEASE ORAL at 05:29

## 2019-11-14 RX ADMIN — Medication 25 MILLIGRAM(S): at 05:29

## 2019-11-14 NOTE — PROGRESS NOTE ADULT - PROBLEM SELECTOR PLAN 1
Will continue monitoring FS and FU.  Patient counseled for compliance with consistent low carb diet and exercise as tolerated outpatient.

## 2019-11-14 NOTE — PROGRESS NOTE ADULT - SUBJECTIVE AND OBJECTIVE BOX
Chief complaint  Patient is a 74y old  Male who presents with a chief complaint of  Review of systems  Patient in bed, looks comfortable, no fever, no hypoglycemia.    Labs and Fingersticks  CAPILLARY BLOOD GLUCOSE      POCT Blood Glucose.: 89 mg/dL (14 Nov 2019 11:49)  POCT Blood Glucose.: 101 mg/dL (14 Nov 2019 08:01)  POCT Blood Glucose.: 143 mg/dL (13 Nov 2019 21:33)  POCT Blood Glucose.: 121 mg/dL (13 Nov 2019 17:21)      Anion Gap, Serum: 10 (11-14 @ 06:39)  Anion Gap, Serum: 12 (11-13 @ 06:49)    Hemoglobin A1C, Whole Blood: 6.2 <H> (11-14 @ 08:36)    Calcium, Total Serum: 9.1 (11-14 @ 06:39)  Calcium, Total Serum: 9.5 (11-13 @ 06:49)  Albumin, Serum: 3.7 (11-13 @ 06:49)    Alanine Aminotransferase (ALT/SGPT): 19 (11-13 @ 06:49)  Alkaline Phosphatase, Serum: 71 (11-13 @ 06:49)  Aspartate Aminotransferase (AST/SGOT): 19 (11-13 @ 06:49)        11-14    140  |  105  |  14  ----------------------------<  116<H>  3.8   |  25  |  0.89    Ca    9.1      14 Nov 2019 06:39  Mg     2.0     11-14    TPro  7.0  /  Alb  3.7  /  TBili  0.9  /  DBili  0.1  /  AST  19  /  ALT  19  /  AlkPhos  71  11-13                        15.3   7.88  )-----------( 181      ( 14 Nov 2019 12:50 )             45.5     Medications  MEDICATIONS  (STANDING):  aspirin  chewable 81 milliGRAM(s) Oral daily  atorvastatin 40 milliGRAM(s) Oral at bedtime  budesonide 160 MICROgram(s)/formoterol 4.5 MICROgram(s) Inhaler 2 Puff(s) Inhalation two times a day  cefuroxime  IVPB 1500 milliGRAM(s) IV Intermittent once  chlorhexidine 0.12% Liquid 30 milliLiter(s) Swish and Spit once  chlorhexidine 4% Liquid 1 Application(s) Topical once  cholecalciferol 2000 Unit(s) Oral daily  metoprolol tartrate 25 milliGRAM(s) Oral two times a day  mupirocin 2% Ointment 1 Application(s) Topical two times a day  pantoprazole    Tablet 40 milliGRAM(s) Oral before breakfast  sodium chloride 0.9% lock flush 10 milliLiter(s) IV Push two times a day      Physical Exam  General: Patient comfortable in bed  Vital Signs Last 12 Hrs  T(F): 98.4 (11-14-19 @ 13:00), Max: 98.4 (11-14-19 @ 05:12)  HR: 66 (11-14-19 @ 13:00) (66 - 67)  BP: 146/69 (11-14-19 @ 13:00) (146/69 - 147/72)  BP(mean): --  RR: 18 (11-14-19 @ 13:00) (18 - 18)  SpO2: 94% (11-14-19 @ 13:00) (94% - 95%)  Neck: No palpable thyroid nodules.  CVS: S1S2, No murmurs  Respiratory: No wheezing, no crepitations  GI: Abdomen soft, bowel sounds positive  Musculoskeletal:  edema lower extremities.   Skin: No skin rashes, no ecchymosis    Diagnostics    Hemoglobin A1C, Whole Blood: Routine (11-13 @ 11:44)  Cortisol AM, Serum: Routine (11-13 @ 11:44)  Aldosterone, Serum: Routine (11-13 @ 11:44)  Metanephrine, Plasma: Routine (11-13 @ 11:44)  Thyroid Stimulating Hormone, Serum: AM Sched. Collection: 14-Nov-2019 06:00 (11-13 @ 11:44)  Free Thyroxine, Serum: AM Sched. Collection: 14-Nov-2019 06:00 (11-13 @ 11:44) Chief complaint  Patient is a 74y old  Male who presents with a chief complaint of  Review of systems  Patient in bed, looks comfortable, no fever, no  hypoglycemia.    Labs and Fingersticks  CAPILLARY BLOOD GLUCOSE      POCT Blood Glucose.: 89 mg/dL (14 Nov 2019 11:49)  POCT Blood Glucose.: 101 mg/dL (14 Nov 2019 08:01)  POCT Blood Glucose.: 143 mg/dL (13 Nov 2019 21:33)  POCT Blood Glucose.: 121 mg/dL (13 Nov 2019 17:21)      Anion Gap, Serum: 10 (11-14 @ 06:39)  Anion Gap, Serum: 12 (11-13 @ 06:49)    Hemoglobin A1C, Whole Blood: 6.2 <H> (11-14 @ 08:36)    Calcium, Total Serum: 9.1 (11-14 @ 06:39)  Calcium, Total Serum: 9.5 (11-13 @ 06:49)  Albumin, Serum: 3.7 (11-13 @ 06:49)    Alanine Aminotransferase (ALT/SGPT): 19 (11-13 @ 06:49)  Alkaline Phosphatase, Serum: 71 (11-13 @ 06:49)  Aspartate Aminotransferase (AST/SGOT): 19 (11-13 @ 06:49)        11-14    140  |  105  |  14  ----------------------------<  116<H>  3.8   |  25  |  0.89    Ca    9.1      14 Nov 2019 06:39  Mg     2.0     11-14    TPro  7.0  /  Alb  3.7  /  TBili  0.9  /  DBili  0.1  /  AST  19  /  ALT  19  /  AlkPhos  71  11-13                        15.3   7.88  )-----------( 181      ( 14 Nov 2019 12:50 )             45.5     Medications  MEDICATIONS  (STANDING):  aspirin  chewable 81 milliGRAM(s) Oral daily  atorvastatin 40 milliGRAM(s) Oral at bedtime  budesonide 160 MICROgram(s)/formoterol 4.5 MICROgram(s) Inhaler 2 Puff(s) Inhalation two times a day  cefuroxime  IVPB 1500 milliGRAM(s) IV Intermittent once  chlorhexidine 0.12% Liquid 30 milliLiter(s) Swish and Spit once  chlorhexidine 4% Liquid 1 Application(s) Topical once  cholecalciferol 2000 Unit(s) Oral daily  metoprolol tartrate 25 milliGRAM(s) Oral two times a day  mupirocin 2% Ointment 1 Application(s) Topical two times a day  pantoprazole    Tablet 40 milliGRAM(s) Oral before breakfast  sodium chloride 0.9% lock flush 10 milliLiter(s) IV Push two times a day      Physical Exam  General: Patient comfortable in bed  Vital Signs Last 12 Hrs  T(F): 98.4 (11-14-19 @ 13:00), Max: 98.4 (11-14-19 @ 05:12)  HR: 66 (11-14-19 @ 13:00) (66 - 67)  BP: 146/69 (11-14-19 @ 13:00) (146/69 - 147/72)  BP(mean): --  RR: 18 (11-14-19 @ 13:00) (18 - 18)  SpO2: 94% (11-14-19 @ 13:00) (94% - 95%)  Neck: No palpable thyroid nodules.  CVS: S1S2, No murmurs  Respiratory: No wheezing, no crepitations  GI: Abdomen soft, bowel sounds positive  Musculoskeletal:  edema lower extremities.   Skin: No skin rashes, no ecchymosis    Diagnostics    Hemoglobin A1C, Whole Blood: Routine (11-13 @ 11:44)  Cortisol AM, Serum: Routine (11-13 @ 11:44)  Aldosterone, Serum: Routine (11-13 @ 11:44)  Metanephrine, Plasma: Routine (11-13 @ 11:44)  Thyroid Stimulating Hormone, Serum: AM Sched. Collection: 14-Nov-2019 06:00 (11-13 @ 11:44)  Free Thyroxine, Serum: AM Sched. Collection: 14-Nov-2019 06:00 (11-13 @ 11:44)

## 2019-11-14 NOTE — PROGRESS NOTE ADULT - SUBJECTIVE AND OBJECTIVE BOX
Cardiac Surgery Pre-op Note:    CC: Patient is a 74y old  Male who presents with a chief complaint of     Referring Physician: Dr. King                                                                                                           Surgeon: Dr. Phillips    Procedure: 11/15/19  CABG    Allergies    No Known Allergies    Intolerances        HPI:  73 y/o [place] male (denies implanted cardiac devices), with PMHx of Borderline DM - A1C unknown, HTN, Asthma - never requiring intubation, B/L Carotid Stenosis s/p L CEA in 2017, and PVD offering c/o intermittent exertional left sided CP with onset 3 months ago - Class II Angina.  He denies accompanying symptoms.  Patient was evaluated by his Cardiologist - Dr. King in 2019 and had Exercise stress test with abnormal findings (report unavailable).  It was recommended patient have LHC at that time but he deferred for personal reasons.  Patient has now decided to proceed with LHC for which he presents today.            PAST MEDICAL & SURGICAL HISTORY:  Borderline diabetes  PVD (peripheral vascular disease)  Carotid stenosis, bilateral  History of hypertension  Asthma  H/O sinus surgery  H/O carotid endarterectomy      MEDICATIONS  (STANDING):  aspirin  chewable 81 milliGRAM(s) Oral daily  atorvastatin 40 milliGRAM(s) Oral at bedtime  budesonide 160 MICROgram(s)/formoterol 4.5 MICROgram(s) Inhaler 2 Puff(s) Inhalation two times a day  cefuroxime  IVPB 1500 milliGRAM(s) IV Intermittent once  chlorhexidine 0.12% Liquid 30 milliLiter(s) Swish and Spit once  chlorhexidine 4% Liquid 1 Application(s) Topical once  cholecalciferol 2000 Unit(s) Oral daily  metoprolol tartrate 25 milliGRAM(s) Oral two times a day  mupirocin 2% Ointment 1 Application(s) Topical two times a day  pantoprazole    Tablet 40 milliGRAM(s) Oral before breakfast  sodium chloride 0.9% lock flush 10 milliLiter(s) IV Push two times a day    MEDICATIONS  (PRN):  ALBUTerol    90 MICROgram(s) HFA Inhaler 2 Puff(s) Inhalation every 6 hours PRN Shortness of Breath  sodium chloride 0.65% Nasal 1 Spray(s) Both Nostrils three times a day PRN Nasal Congestion        Labs:                        15.3   7.88  )-----------( 181      ( 2019 12:50 )             45.5     11-14    140  |  105  |  14  ----------------------------<  116<H>  3.8   |  25  |  0.89    Ca    9.1      2019 06:39  Mg     2.0         TPro  7.0  /  Alb  3.7  /  TBili  0.9  /  DBili  0.1  /  AST  19  /  ALT  19  /  AlkPhos  71      PT/INR - ( 2019 06:51 )   PT: 12.1 sec;   INR: 1.05 ratio         PTT - ( 2019 08:23 )  PTT:34.9 sec    Blood Type: ABO Interpretation: B ( @ 16:45)    HGB A1C: Hemoglobin A1C, Whole Blood: 6.2 % ( @ 08:36)    Prealbumin:   Pro-BNP: Serum Pro-Brain Natriuretic Peptide: 39 pg/mL ( @ 06:49)    Thyroid Panel:  @ 09:15/2.45  1.3/--/--    MRSA: MRSA PCR Result.: James ( @ 19:03)   / MSSA:   Urinalysis Basic - ( 2019 19:03 )    Color: Light Yellow / Appearance: Clear / S.018 / pH: x  Gluc: x / Ketone: Negative  / Bili: Negative / Urobili: <2 mg/dL   Blood: x / Protein: Negative / Nitrite: Negative   Leuk Esterase: Negative / RBC: x / WBC x   Sq Epi: x / Non Sq Epi: x / Bacteria: x        CXR: < from: Xray Chest 1 View- PORTABLE-Urgent (19 @ 18:01) >  IMPRESSION:    Clear lungs.    < end of copied text >      EKG: < from: 12 Lead ECG (19 @ 14:49) >  Diagnosis Line SINUS RHYTHM WITH 1ST DEGREE A-V BLOCK  NONSPECIFIC T WAVE ABNORMALITY  ABNORMAL ECG    < end of copied text >      Carotid Duplex:    < from: VA Duplex Carotid, Bilat (19 @ 15:52) >    There is antegrade flow through both vertebral arteries.    IMPRESSION: There is a moderate, 50-69% stenosis of the right internal   carotid artery.  Despite the elevated velocities, the left internal carotid artery appears   widely patent.    < end of copied text >      PFT's: Completed 19    Echocardiogram: < from: Transthoracic Echocardiogram (19 @ 08:38) >  Conclusions:  1. Normal mitral valve. Minimal mitral regurgitation.  2. Calcified trileaflet aortic valve with normal opening.  No aortic valve regurgitation seen.  3. Normal left ventricular systolic function. No segmental  wall motion abnormalities.  4. Normal right ventricular size and function.    < end of copied text >      Cardiac catheterization:  < from: Cardiac Cath Lab - Adult (19 @ 09:52) >  CORONARY VESSELS: The coronary circulation is right dominant.  LM:   --  LM: Angiography showed minor luminal irregularities with no flow  limiting lesions.  LAD:   --  Mid LAD: There was a 90 % stenosis.  CX:   --  Ostial circumflex: There was a 60 % stenosis.  --  Proximal circumflex: There was a 90 % stenosis.  --  OM2: There was a 90 % stenosis.  RI:   --  Mid ramus intermedius: There was a 90 % stenosis.  RCA:   --  Proximal RCA: There was a 90 % stenosis.  --  RPDA: There was a 80 % stenosis.  COMPLICATIONS: There were no complications.  DIAGNOSTIC RECOMMENDATIONS: Consultation with a cardiac surgeon will be  obtained for coronary artery bypass grafting.    < end of copied text >        Gen: WN/WD NAD  Neuro: AAOx3, nonfocal  Pulm: CTA B/L  CV: RRR, S1S2  Abd: Soft, NT, ND +BS  Ext: No edema, + peripheral pulses      Pt has AICD/PPM [ ] Yes  [ x] No             Brand Name:  Pre-op Beta Blocker ordered within 24 hrs of surgery (CABG ONLY)?  [x ] Yes  [ ] No  If not, Why?  Type & Cross  [x ] Yes  [ ] No  NPO after Midnight [x ] Yes  [ ] No  Pre-op ABX ordered, to be taped on chart:  [x ] Yes  [ ] No     Hibiclens/Peridex ordered [x ] Yes  [ ] No  Intraop on Hold: PRBCs, CXR, ANALI [x ]   Consent obtained  [x ] Yes  [ ] No

## 2019-11-14 NOTE — PROGRESS NOTE ADULT - ASSESSMENT
Assessment  DMT2: 74y Male with borderline diabetes as per chart, A1C 6.2% , was not on DM meds at home, blood sugars trending within acceptable range without management, no hypoglycemic episode, eating meals.  Adrenal Mass: 74y Male with hx adrenal enlargements scheduled for CABG tomorrow. As per patient's documents, enlargements are benign, 2.4cm, not much change/growth between 2014 to present, followed by his endo. Aldosterone, cortisol, TFT labs within normal limits.  CAD: Planning CABG tomorrow, on medications, no chest pain, stable, monitored.  HTN: Controlled,  on antihypertensive medications.          Subha Iyer MD  Cell: [phone]  Office: [phone] Assessment  DMT2: 74y Male with borderline diabetes as per chart, A1C 6.2% , was not on DM meds at home, blood sugars trending within acceptable range without management, no hypoglycemic episode, eating meals.  Adrenal Mass: 74y Male with hx adrenal enlargements scheduled for CABG tomorrow. As per patient's documents, enlargements are benign, 2.4cm, not much change/growth between 2014 to present, followed by his endo. Aldosterone, cortisol,  TFT labs within normal limits.  CAD: Planning CABG tomorrow, on medications, no chest pain, stable, monitored.  HTN: Controlled,  on antihypertensive medications.          Subha Iyer MD  Cell: [phone]  Office: [phone]

## 2019-11-14 NOTE — PROGRESS NOTE ADULT - PROBLEM SELECTOR PLAN 2
Adrenal mass benign, labs within normal limits.  Patient cleared for cardiac op, suggest to FU endo outpatient. Patient cleared for cardiac op, suggest to FU endo outpatient.

## 2019-11-14 NOTE — PROGRESS NOTE ADULT - SUBJECTIVE AND OBJECTIVE BOX
Subjective: Patient seen and examined. No new events except as noted.     SUBJECTIVE/ROS:    No chest pain, dyspnea, palpitation, or dizziness.     MEDICATIONS:  MEDICATIONS  (STANDING):  aspirin  chewable 81 milliGRAM(s) Oral daily  atorvastatin 40 milliGRAM(s) Oral at bedtime  budesonide 160 MICROgram(s)/formoterol 4.5 MICROgram(s) Inhaler 2 Puff(s) Inhalation two times a day  cholecalciferol 2000 Unit(s) Oral daily  heparin  Injectable 5000 Unit(s) SubCutaneous every 8 hours  metoprolol tartrate 25 milliGRAM(s) Oral two times a day  mupirocin 2% Ointment 1 Application(s) Topical two times a day  pantoprazole    Tablet 40 milliGRAM(s) Oral before breakfast  sodium chloride 0.9% lock flush 10 milliLiter(s) IV Push two times a day      PHYSICAL EXAM:  T(C): 36.9 (11-14-19 @ 05:12), Max: 36.9 (11-13-19 @ 20:19)  HR: 67 (11-14-19 @ 05:12) (63 - 68)  BP: 147/72 (11-14-19 @ 05:12) (128/68 - 147/72)  RR: 18 (11-14-19 @ 05:12) (18 - 18)  SpO2: 95% (11-14-19 @ 05:12) (93% - 95%)  Wt(kg): --  I&O's Summary    13 Nov 2019 07:01  -  14 Nov 2019 07:00  --------------------------------------------------------  IN: 1190 mL / OUT: 3175 mL / NET: -1985 mL    14 Nov 2019 07:01  -  14 Nov 2019 08:42  --------------------------------------------------------  IN: 0 mL / OUT: 500 mL / NET: -500 mL            JVP: Normal  Neck: supple  Lung: clear   CV: S1 S2 , Murmur:  Abd: soft  Ext: No edema  neuro: Awake / alert  Psych: flat affect  Skin: normal``    LABS/DATA:    CARDIAC MARKERS:                                16.3   8.34  )-----------( 200      ( 12 Nov 2019 08:47 )             47.8     11-14    140  |  105  |  14  ----------------------------<  116<H>  3.8   |  25  |  0.89    Ca    9.1      14 Nov 2019 06:39  Mg     2.0     11-14    TPro  7.0  /  Alb  3.7  /  TBili  0.9  /  DBili  0.1  /  AST  19  /  ALT  19  /  AlkPhos  71  11-13    proBNP:   Lipid Profile:   HgA1c:   TSH:     TELE:  EKG:      < from: Transthoracic Echocardiogram (11.13.19 @ 08:38) >  ------------------------------------------------------------------------  Conclusions:  1. Normal mitral valve. Minimal mitral regurgitation.  2. Calcified trileaflet aortic valve with normal opening.  No aortic valve regurgitation seen.  3. Normal left ventricular systolic function. No segmental  wall motion abnormalities.  4. Normal right ventricular size and function.  *** No previous Echo exam.  ------------------------------------------------------------------------  Confirmed on  11/13/2019 - 11:56:15 by Rajesh Booth M.D.  ------------------------------------------------------------------------    < end of copied text >

## 2019-11-14 NOTE — PROGRESS NOTE ADULT - ASSESSMENT
CAD 3VD   planned for cabg tomorrow   cont asa, statin   cont BB  normal LV function echo     Carotid doppler   history of CEA  obtain carotid doppler    HTN  stable  cont current meds

## 2019-11-15 ENCOUNTER — APPOINTMENT (OUTPATIENT)
Dept: CARDIOTHORACIC SURGERY | Facility: HOSPITAL | Age: 74
End: 2019-11-15

## 2019-11-15 LAB
ALBUMIN SERPL ELPH-MCNC: 2.9 G/DL — LOW (ref 3.3–5)
ALP SERPL-CCNC: 43 U/L — SIGNIFICANT CHANGE UP (ref 40–120)
ALT FLD-CCNC: 17 U/L — SIGNIFICANT CHANGE UP (ref 10–45)
ANION GAP SERPL CALC-SCNC: 10 MMOL/L — SIGNIFICANT CHANGE UP (ref 5–17)
APTT BLD: 33.5 SEC — SIGNIFICANT CHANGE UP (ref 27.5–36.3)
AST SERPL-CCNC: 28 U/L — SIGNIFICANT CHANGE UP (ref 10–40)
BASOPHILS # BLD AUTO: 0.03 K/UL — SIGNIFICANT CHANGE UP (ref 0–0.2)
BASOPHILS NFR BLD AUTO: 0.2 % — SIGNIFICANT CHANGE UP (ref 0–2)
BILIRUB SERPL-MCNC: 1.2 MG/DL — SIGNIFICANT CHANGE UP (ref 0.2–1.2)
BUN SERPL-MCNC: 12 MG/DL — SIGNIFICANT CHANGE UP (ref 7–23)
CALCIUM SERPL-MCNC: 7.5 MG/DL — LOW (ref 8.4–10.5)
CHLORIDE SERPL-SCNC: 108 MMOL/L — SIGNIFICANT CHANGE UP (ref 96–108)
CK MB BLD-MCNC: 5.1 % — HIGH (ref 0–3.5)
CK MB CFR SERPL CALC: 20.4 NG/ML — HIGH (ref 0–6.7)
CK SERPL-CCNC: 403 U/L — HIGH (ref 30–200)
CO2 SERPL-SCNC: 19 MMOL/L — LOW (ref 22–31)
CREAT SERPL-MCNC: 0.66 MG/DL — SIGNIFICANT CHANGE UP (ref 0.5–1.3)
EOSINOPHIL # BLD AUTO: 0.09 K/UL — SIGNIFICANT CHANGE UP (ref 0–0.5)
EOSINOPHIL NFR BLD AUTO: 0.5 % — SIGNIFICANT CHANGE UP (ref 0–6)
FIBRINOGEN PPP-MCNC: 383 MG/DL — SIGNIFICANT CHANGE UP (ref 350–510)
GAS PNL BLDA: SIGNIFICANT CHANGE UP
GLUCOSE BLDC GLUCOMTR-MCNC: 139 MG/DL — HIGH (ref 70–99)
GLUCOSE BLDC GLUCOMTR-MCNC: 151 MG/DL — HIGH (ref 70–99)
GLUCOSE SERPL-MCNC: 190 MG/DL — HIGH (ref 70–99)
HCT VFR BLD CALC: 35 % — LOW (ref 39–50)
HEPARINASE TEG R TIME: 8.3 MIN — SIGNIFICANT CHANGE UP (ref 4.3–8.3)
HGB BLD-MCNC: 12 G/DL — LOW (ref 13–17)
IMM GRANULOCYTES NFR BLD AUTO: 0.7 % — SIGNIFICANT CHANGE UP (ref 0–1.5)
INR BLD: 1.2 RATIO — HIGH (ref 0.88–1.16)
LYMPHOCYTES # BLD AUTO: 1.91 K/UL — SIGNIFICANT CHANGE UP (ref 1–3.3)
LYMPHOCYTES # BLD AUTO: 10.9 % — LOW (ref 13–44)
MAGNESIUM SERPL-MCNC: 2.7 MG/DL — HIGH (ref 1.6–2.6)
MCHC RBC-ENTMCNC: 30.8 PG — SIGNIFICANT CHANGE UP (ref 27–34)
MCHC RBC-ENTMCNC: 34.3 GM/DL — SIGNIFICANT CHANGE UP (ref 32–36)
MCV RBC AUTO: 89.7 FL — SIGNIFICANT CHANGE UP (ref 80–100)
MONOCYTES # BLD AUTO: 0.85 K/UL — SIGNIFICANT CHANGE UP (ref 0–0.9)
MONOCYTES NFR BLD AUTO: 4.9 % — SIGNIFICANT CHANGE UP (ref 2–14)
NEUTROPHILS # BLD AUTO: 14.46 K/UL — HIGH (ref 1.8–7.4)
NEUTROPHILS NFR BLD AUTO: 82.8 % — HIGH (ref 43–77)
NRBC # BLD: 0 /100 WBCS — SIGNIFICANT CHANGE UP (ref 0–0)
PHOSPHATE SERPL-MCNC: 2 MG/DL — LOW (ref 2.5–4.5)
PLATELET # BLD AUTO: 133 K/UL — LOW (ref 150–400)
POTASSIUM SERPL-MCNC: 4.8 MMOL/L — SIGNIFICANT CHANGE UP (ref 3.5–5.3)
POTASSIUM SERPL-SCNC: 4.8 MMOL/L — SIGNIFICANT CHANGE UP (ref 3.5–5.3)
PROT SERPL-MCNC: 4.8 G/DL — LOW (ref 6–8.3)
PROTHROM AB SERPL-ACNC: 13.7 SEC — HIGH (ref 10–12.9)
RAPIDTEG MAXIMUM AMPLITUDE: 64.2 MM — SIGNIFICANT CHANGE UP (ref 52–70)
RBC # BLD: 3.9 M/UL — LOW (ref 4.2–5.8)
RBC # FLD: 12.3 % — SIGNIFICANT CHANGE UP (ref 10.3–14.5)
SODIUM SERPL-SCNC: 137 MMOL/L — SIGNIFICANT CHANGE UP (ref 135–145)
TEG FUNCTIONAL FIBRINOGEN: 23.9 MM — SIGNIFICANT CHANGE UP (ref 15–32)
TEG MAXIMUM AMPLITUDE: 60.3 MM — SIGNIFICANT CHANGE UP (ref 52–69)
TEG REACTION TIME: 9.6 MIN (ref 4.6–9.1)
TROPONIN T, HIGH SENSITIVITY RESULT: 277 NG/L — HIGH (ref 0–51)
WBC # BLD: 17.47 K/UL — HIGH (ref 3.8–10.5)
WBC # FLD AUTO: 17.47 K/UL — HIGH (ref 3.8–10.5)

## 2019-11-15 PROCEDURE — 93010 ELECTROCARDIOGRAM REPORT: CPT

## 2019-11-15 PROCEDURE — 33508 ENDOSCOPIC VEIN HARVEST: CPT

## 2019-11-15 PROCEDURE — 71045 X-RAY EXAM CHEST 1 VIEW: CPT | Mod: 26

## 2019-11-15 PROCEDURE — 33519 CABG ARTERY-VEIN THREE: CPT

## 2019-11-15 PROCEDURE — 99291 CRITICAL CARE FIRST HOUR: CPT

## 2019-11-15 PROCEDURE — 33533 CABG ARTERIAL SINGLE: CPT

## 2019-11-15 PROCEDURE — 33519 CABG ARTERY-VEIN THREE: CPT | Mod: AS

## 2019-11-15 PROCEDURE — 33508 ENDOSCOPIC VEIN HARVEST: CPT | Mod: AS

## 2019-11-15 PROCEDURE — 33533 CABG ARTERIAL SINGLE: CPT | Mod: AS

## 2019-11-15 RX ORDER — INSULIN HUMAN 100 [IU]/ML
3 INJECTION, SOLUTION SUBCUTANEOUS
Qty: 100 | Refills: 0 | Status: DISCONTINUED | OUTPATIENT
Start: 2019-11-15 | End: 2019-11-21

## 2019-11-15 RX ORDER — DEXTROSE 50 % IN WATER 50 %
50 SYRINGE (ML) INTRAVENOUS
Refills: 0 | Status: DISCONTINUED | OUTPATIENT
Start: 2019-11-15 | End: 2019-11-25

## 2019-11-15 RX ORDER — ASPIRIN/CALCIUM CARB/MAGNESIUM 324 MG
81 TABLET ORAL DAILY
Refills: 0 | Status: DISCONTINUED | OUTPATIENT
Start: 2019-11-16 | End: 2019-11-25

## 2019-11-15 RX ORDER — ACETAMINOPHEN 500 MG
650 TABLET ORAL EVERY 6 HOURS
Refills: 0 | Status: DISCONTINUED | OUTPATIENT
Start: 2019-11-15 | End: 2019-11-25

## 2019-11-15 RX ORDER — SODIUM CHLORIDE 9 MG/ML
1000 INJECTION INTRAMUSCULAR; INTRAVENOUS; SUBCUTANEOUS
Refills: 0 | Status: DISCONTINUED | OUTPATIENT
Start: 2019-11-15 | End: 2019-11-25

## 2019-11-15 RX ORDER — ALBUTEROL 90 UG/1
1 AEROSOL, METERED ORAL EVERY 4 HOURS
Refills: 0 | Status: DISCONTINUED | OUTPATIENT
Start: 2019-11-15 | End: 2019-11-15

## 2019-11-15 RX ORDER — ASPIRIN/CALCIUM CARB/MAGNESIUM 324 MG
81 TABLET ORAL ONCE
Refills: 0 | Status: COMPLETED | OUTPATIENT
Start: 2019-11-15 | End: 2019-11-15

## 2019-11-15 RX ORDER — OXYCODONE HYDROCHLORIDE 5 MG/1
5 TABLET ORAL EVERY 6 HOURS
Refills: 0 | Status: DISCONTINUED | OUTPATIENT
Start: 2019-11-15 | End: 2019-11-20

## 2019-11-15 RX ORDER — CALCIUM GLUCONATE 100 MG/ML
1 VIAL (ML) INTRAVENOUS ONCE
Refills: 0 | Status: COMPLETED | OUTPATIENT
Start: 2019-11-15 | End: 2019-11-15

## 2019-11-15 RX ORDER — ALBUTEROL 90 UG/1
1 AEROSOL, METERED ORAL EVERY 4 HOURS
Refills: 0 | Status: DISCONTINUED | OUTPATIENT
Start: 2019-11-15 | End: 2019-11-16

## 2019-11-15 RX ORDER — METOCLOPRAMIDE HCL 10 MG
10 TABLET ORAL EVERY 8 HOURS
Refills: 0 | Status: COMPLETED | OUTPATIENT
Start: 2019-11-15 | End: 2019-11-17

## 2019-11-15 RX ORDER — DEXTROSE 50 % IN WATER 50 %
25 SYRINGE (ML) INTRAVENOUS
Refills: 0 | Status: DISCONTINUED | OUTPATIENT
Start: 2019-11-15 | End: 2019-11-25

## 2019-11-15 RX ORDER — POTASSIUM CHLORIDE 20 MEQ
10 PACKET (EA) ORAL
Refills: 0 | Status: DISCONTINUED | OUTPATIENT
Start: 2019-11-15 | End: 2019-11-25

## 2019-11-15 RX ORDER — CHLORHEXIDINE GLUCONATE 213 G/1000ML
1 SOLUTION TOPICAL
Refills: 0 | Status: DISCONTINUED | OUTPATIENT
Start: 2019-11-15 | End: 2019-11-25

## 2019-11-15 RX ORDER — IPRATROPIUM/ALBUTEROL SULFATE 18-103MCG
3 AEROSOL WITH ADAPTER (GRAM) INHALATION EVERY 6 HOURS
Refills: 0 | Status: DISCONTINUED | OUTPATIENT
Start: 2019-11-15 | End: 2019-11-15

## 2019-11-15 RX ORDER — CHLORHEXIDINE GLUCONATE 213 G/1000ML
15 SOLUTION TOPICAL EVERY 12 HOURS
Refills: 0 | Status: DISCONTINUED | OUTPATIENT
Start: 2019-11-15 | End: 2019-11-15

## 2019-11-15 RX ORDER — MEPERIDINE HYDROCHLORIDE 50 MG/ML
25 INJECTION INTRAMUSCULAR; INTRAVENOUS; SUBCUTANEOUS ONCE
Refills: 0 | Status: DISCONTINUED | OUTPATIENT
Start: 2019-11-15 | End: 2019-11-15

## 2019-11-15 RX ORDER — POLYETHYLENE GLYCOL 3350 17 G/17G
17 POWDER, FOR SOLUTION ORAL DAILY
Refills: 0 | Status: DISCONTINUED | OUTPATIENT
Start: 2019-11-15 | End: 2019-11-25

## 2019-11-15 RX ORDER — ASPIRIN/CALCIUM CARB/MAGNESIUM 324 MG
300 TABLET ORAL ONCE
Refills: 0 | Status: DISCONTINUED | OUTPATIENT
Start: 2019-11-15 | End: 2019-11-16

## 2019-11-15 RX ORDER — IPRATROPIUM/ALBUTEROL SULFATE 18-103MCG
3 AEROSOL WITH ADAPTER (GRAM) INHALATION EVERY 6 HOURS
Refills: 0 | Status: DISCONTINUED | OUTPATIENT
Start: 2019-11-15 | End: 2019-11-23

## 2019-11-15 RX ORDER — PANTOPRAZOLE SODIUM 20 MG/1
40 TABLET, DELAYED RELEASE ORAL DAILY
Refills: 0 | Status: DISCONTINUED | OUTPATIENT
Start: 2019-11-15 | End: 2019-11-15

## 2019-11-15 RX ORDER — CEFUROXIME AXETIL 250 MG
1500 TABLET ORAL EVERY 8 HOURS
Refills: 0 | Status: COMPLETED | OUTPATIENT
Start: 2019-11-15 | End: 2019-11-17

## 2019-11-15 RX ORDER — HYDROMORPHONE HYDROCHLORIDE 2 MG/ML
1 INJECTION INTRAMUSCULAR; INTRAVENOUS; SUBCUTANEOUS ONCE
Refills: 0 | Status: DISCONTINUED | OUTPATIENT
Start: 2019-11-15 | End: 2019-11-15

## 2019-11-15 RX ORDER — BUDESONIDE, MICRONIZED 100 %
0.5 POWDER (GRAM) MISCELLANEOUS
Refills: 0 | Status: DISCONTINUED | OUTPATIENT
Start: 2019-11-15 | End: 2019-11-25

## 2019-11-15 RX ORDER — ACETAMINOPHEN 500 MG
1000 TABLET ORAL ONCE
Refills: 0 | Status: COMPLETED | OUTPATIENT
Start: 2019-11-15 | End: 2019-11-15

## 2019-11-15 RX ORDER — ATORVASTATIN CALCIUM 80 MG/1
40 TABLET, FILM COATED ORAL AT BEDTIME
Refills: 0 | Status: DISCONTINUED | OUTPATIENT
Start: 2019-11-15 | End: 2019-11-25

## 2019-11-15 RX ORDER — SODIUM CHLORIDE 9 MG/ML
250 INJECTION, SOLUTION INTRAVENOUS ONCE
Refills: 0 | Status: COMPLETED | OUTPATIENT
Start: 2019-11-15 | End: 2019-11-15

## 2019-11-15 RX ORDER — NOREPINEPHRINE BITARTRATE/D5W 8 MG/250ML
0.08 PLASTIC BAG, INJECTION (ML) INTRAVENOUS
Qty: 8 | Refills: 0 | Status: DISCONTINUED | OUTPATIENT
Start: 2019-11-15 | End: 2019-11-16

## 2019-11-15 RX ORDER — TIOTROPIUM BROMIDE 18 UG/1
1 CAPSULE ORAL; RESPIRATORY (INHALATION) DAILY
Refills: 0 | Status: DISCONTINUED | OUTPATIENT
Start: 2019-11-15 | End: 2019-11-15

## 2019-11-15 RX ORDER — TIOTROPIUM BROMIDE 18 UG/1
1 CAPSULE ORAL; RESPIRATORY (INHALATION) DAILY
Refills: 0 | Status: DISCONTINUED | OUTPATIENT
Start: 2019-11-15 | End: 2019-11-16

## 2019-11-15 RX ADMIN — INSULIN HUMAN 3 UNIT(S)/HR: 100 INJECTION, SOLUTION SUBCUTANEOUS at 18:25

## 2019-11-15 RX ADMIN — Medication 81 MILLIGRAM(S): at 20:46

## 2019-11-15 RX ADMIN — Medication 25 MILLIGRAM(S): at 05:39

## 2019-11-15 RX ADMIN — PANTOPRAZOLE SODIUM 40 MILLIGRAM(S): 20 TABLET, DELAYED RELEASE ORAL at 06:59

## 2019-11-15 RX ADMIN — SODIUM CHLORIDE 1000 MILLILITER(S): 9 INJECTION, SOLUTION INTRAVENOUS at 15:13

## 2019-11-15 RX ADMIN — HYDROMORPHONE HYDROCHLORIDE 1 MILLIGRAM(S): 2 INJECTION INTRAMUSCULAR; INTRAVENOUS; SUBCUTANEOUS at 20:46

## 2019-11-15 RX ADMIN — BUDESONIDE AND FORMOTEROL FUMARATE DIHYDRATE 2 PUFF(S): 160; 4.5 AEROSOL RESPIRATORY (INHALATION) at 05:38

## 2019-11-15 RX ADMIN — CHLORHEXIDINE GLUCONATE 15 MILLILITER(S): 213 SOLUTION TOPICAL at 18:25

## 2019-11-15 RX ADMIN — Medication 100 MILLIGRAM(S): at 16:44

## 2019-11-15 RX ADMIN — SODIUM CHLORIDE 1000 MILLILITER(S): 9 INJECTION, SOLUTION INTRAVENOUS at 15:12

## 2019-11-15 RX ADMIN — Medication 10 MILLIGRAM(S): at 22:24

## 2019-11-15 RX ADMIN — MUPIROCIN 1 APPLICATION(S): 20 OINTMENT TOPICAL at 05:39

## 2019-11-15 RX ADMIN — Medication 200 GRAM(S): at 15:13

## 2019-11-15 RX ADMIN — Medication 1000 MILLIGRAM(S): at 17:58

## 2019-11-15 RX ADMIN — Medication 11.56 MICROGRAM(S)/KG/MIN: at 18:25

## 2019-11-15 RX ADMIN — CHLORHEXIDINE GLUCONATE 30 MILLILITER(S): 213 SOLUTION TOPICAL at 05:44

## 2019-11-15 RX ADMIN — Medication 400 MILLIGRAM(S): at 17:28

## 2019-11-15 RX ADMIN — HYDROMORPHONE HYDROCHLORIDE 1 MILLIGRAM(S): 2 INJECTION INTRAMUSCULAR; INTRAVENOUS; SUBCUTANEOUS at 21:00

## 2019-11-15 RX ADMIN — SODIUM CHLORIDE 10 MILLILITER(S): 9 INJECTION INTRAMUSCULAR; INTRAVENOUS; SUBCUTANEOUS at 05:35

## 2019-11-15 RX ADMIN — Medication 10 MILLIGRAM(S): at 17:28

## 2019-11-15 NOTE — PROGRESS NOTE ADULT - SUBJECTIVE AND OBJECTIVE BOX
INOCENCIA ARMSTRONG  MRN-[id number]  Patient is a 74y old  Male who presents with a chief complaint of   HPI:  73 y/o [place] male (denies implanted cardiac devices), with PMHx of Borderline DM - A1C unknown, HTN, Asthma - never requiring intubation, B/L Carotid Stenosis s/p L CEA in 2017, and PVD offering c/o intermittent exertional left sided CP with onset 3 months ago - Class II Angina.  He denies accompanying symptoms.  Patient was evaluated by his Cardiologist - Dr. King in 8/2019 and had Exercise stress test with abnormal findings (report unavailable).  It was recommended patient have LHC at that time but he deferred for personal reasons.  Patient has now decided to proceed with LHC for which he presents today.      Antianginals - Amlodipine (12 Nov 2019 08:26)      Surgery/Hospital course:    Today:    REVIEW OF SYSTEMS:  Gen: No fever  EYES/ENT: No visual changes;  No vertigo or throat pain   NECK: No pain   RES:  No shortness of breath or Cough  Chest: + incisional pain  CARD: No chest pain   GI: No abdominal pain  : No dysuria  NEURO: No weakness  SKIN: No itching, rashes     Physical Exam:  Vital Signs Last 24 Hrs  T(C): 36.8 (15 Nov 2019 17:45), Max: 36.8 (15 Nov 2019 16:45)  T(F): 98.2 (15 Nov 2019 17:45), Max: 98.2 (15 Nov 2019 16:45)  HR: 77 (15 Nov 2019 17:45) (68 - 81)  BP: 144/79 (15 Nov 2019 08:51) (144/79 - 171/78)  BP(mean): --  RR: 11 (15 Nov 2019 17:45) (11 - 18)  SpO2: 99% (15 Nov 2019 17:45) (95% - 100%)  Gen:  Awake, alert   CNS: non focal 	  Neck: no JVD  RES : clear , no wheezing    Chest:   + chest tubes                     CVS: Regular  rhythm. Normal S1/S2  Abd: Soft, non-distended. Bowel sounds present.  Skin: No rash.  Ext:  no edema, A Line  PSY:  ============================I/O===========================   I&O's Detail    14 Nov 2019 07:01  -  15 Nov 2019 07:00  --------------------------------------------------------  IN:    Oral Fluid: 540 mL  Total IN: 540 mL    OUT:    Voided: 1850 mL  Total OUT: 1850 mL    Total NET: -1310 mL      15 Nov 2019 07:01  -  15 Nov 2019 18:09  --------------------------------------------------------  IN:    Lactated Ringers IV Bolus: 500 mL    norepinephrine Infusion: 26.6 mL  Total IN: 526.6 mL    OUT:    Chest Tube: 80 mL    Indwelling Catheter - Urethral: 570 mL  Total OUT: 650 mL    Total NET: -123.4 mL        ============================ LABS =========================                        12.0   17.47 )-----------( 133      ( 15 Nov 2019 14:02 )             35.0     11-15    137  |  108  |  12  ----------------------------<  190<H>  4.8   |  19<L>  |  0.66    Ca    7.5<L>      15 Nov 2019 14:02  Phos  2.0     11-15  Mg     2.7     11-15    TPro  4.8<L>  /  Alb  2.9<L>  /  TBili  1.2  /  DBili  x   /  AST  28  /  ALT  17  /  AlkPhos  43  11-15    LIVER FUNCTIONS - ( 15 Nov 2019 14:02 )  Alb: 2.9 g/dL / Pro: 4.8 g/dL / ALK PHOS: 43 U/L / ALT: 17 U/L / AST: 28 U/L / GGT: x           PT/INR - ( 15 Nov 2019 14:02 )   PT: 13.7 sec;   INR: 1.20 ratio         PTT - ( 15 Nov 2019 14:02 )  PTT:33.5 sec  ABG - ( 15 Nov 2019 16:58 )  pH, Arterial: 7.39  pH, Blood: x     /  pCO2: 37    /  pO2: 181   / HCO3: 22    / Base Excess: -1.7  /  SaO2: 99                  ======================Micro/Rad/Cardio=================  Culture: Reviewed   CXR: Reviewed  Echo:Reviewed  ======================================================  PAST MEDICAL & SURGICAL HISTORY:  Borderline diabetes  PVD (peripheral vascular disease)  Carotid stenosis, bilateral  History of hypertension  Asthma  H/O sinus surgery  H/O carotid endarterectomy    ====================ASSESMENT ==============  CNS:  RES:  CVS:  Hem:  Marc:  GI:  Endo:  ID:  Skin  Plan:  ====================== NEUROLOGY=====================  acetaminophen   Tablet .. 650 milliGRAM(s) Oral every 6 hours PRN Mild Pain (1 - 3)  aspirin Suppository 300 milliGRAM(s) Rectal once  meperidine     Injectable 25 milliGRAM(s) IV Push once  metoclopramide Injectable 10 milliGRAM(s) IV Push every 8 hours  oxyCODONE    IR 5 milliGRAM(s) Oral every 6 hours PRN Moderate Pain (4 - 6)    ==================== RESPIRATORY======================  Mechanical Ventilation:  Mode: AC/ CMV (Assist Control/ Continuous Mandatory Ventilation)  RR (machine): 12  TV (machine): 600  FiO2: 50  PEEP: 5  ITime: 1  MAP: 10  PIP: 25      ====================CARDIOVASCULAR==================  norepinephrine Infusion 0.08 MICROgram(s)/kG/Min (11.565 mL/Hr) IV Continuous <Continuous>    ===================HEMATOLOGIC/ONC ===================    ===================== RENAL =========================  Jacobsen for monitoring urine output    ==================== GASTROINTESTINAL===================  pantoprazole  Injectable 40 milliGRAM(s) IV Push daily  polyethylene glycol 3350 17 Gram(s) Oral daily  potassium chloride  10 mEq/50 mL IVPB 10 milliEquivalent(s) IV Intermittent every 1 hour  potassium chloride  10 mEq/50 mL IVPB 10 milliEquivalent(s) IV Intermittent every 1 hour  potassium chloride  10 mEq/50 mL IVPB 10 milliEquivalent(s) IV Intermittent every 1 hour  sodium chloride 0.9%. 1000 milliLiter(s) (10 mL/Hr) IV Continuous <Continuous>    =======================    ENDOCRINE  =====================  dextrose 50% Injectable 50 milliLiter(s) IV Push every 15 minutes  dextrose 50% Injectable 25 milliLiter(s) IV Push every 15 minutes  insulin regular Infusion 3 Unit(s)/Hr (3 mL/Hr) IV Continuous <Continuous>    ========================INFECTIOUS DISEASE================  cefuroxime  IVPB 1500 milliGRAM(s) IV Intermittent every 8 hours    .crit INOCENCIA ARMSTRONG  MRN-[id number]  Patient is a 74y old  Male who presents with a chief complaint of   HPI:  75 y/o [place] male (denies implanted cardiac devices), with PMHx of Borderline DM - A1C unknown, HTN, Asthma - never requiring intubation, B/L Carotid Stenosis s/p L CEA in 2017, and PVD offering c/o intermittent exertional left sided CP with onset 3 months ago - Class II Angina.  He denies accompanying symptoms.  Patient was evaluated by his Cardiologist - Dr. King in 8/2019 and had Exercise stress test with abnormal findings (report unavailable).  It was recommended patient have LHC at that time but he deferred for personal reasons.  Patient has now decided to proceed with LHC for which he presents today.    Antianginals - Amlodipine (12 Nov 2019 08:26)      Surgery/Hospital course:  11/15/2019 CABG C4 L     intubated , full vent support  on insulin and Norepinephrine      Physical Exam:  Vital Signs Last 24 Hrs  T(C): 36.8 (15 Nov 2019 17:45), Max: 36.8 (15 Nov 2019 16:45)  T(F): 98.2 (15 Nov 2019 17:45), Max: 98.2 (15 Nov 2019 16:45)  HR: 77 (15 Nov 2019 17:45) (68 - 81)  BP: 144/79 (15 Nov 2019 08:51) (144/79 - 171/78)  BP(mean): --  RR: 11 (15 Nov 2019 17:45) (11 - 18)  SpO2: 99% (15 Nov 2019 17:45) (95% - 100%)  Gen:  intubated  CNS: non focal 	  Neck: no JVD  RES : clear , no wheezing    Chest:   + chest tubes                     CVS: Regular  rhythm. Normal S1/S2  Abd: Soft, non-distended. Bowel sounds present.  Skin: No rash.  Ext:  no edema, A Line    ============================I/O===========================   I&O's Detail    14 Nov 2019 07:01  -  15 Nov 2019 07:00  --------------------------------------------------------  IN:    Oral Fluid: 540 mL  Total IN: 540 mL    OUT:    Voided: 1850 mL  Total OUT: 1850 mL    Total NET: -1310 mL      15 Nov 2019 07:01  -  15 Nov 2019 18:09  --------------------------------------------------------  IN:    Lactated Ringers IV Bolus: 500 mL    norepinephrine Infusion: 26.6 mL  Total IN: 526.6 mL    OUT:    Chest Tube: 80 mL    Indwelling Catheter - Urethral: 570 mL  Total OUT: 650 mL    Total NET: -123.4 mL        ============================ LABS =========================                        12.0   17.47 )-----------( 133      ( 15 Nov 2019 14:02 )             35.0     11-15    137  |  108  |  12  ----------------------------<  190<H>  4.8   |  19<L>  |  0.66    Ca    7.5<L>      15 Nov 2019 14:02  Phos  2.0     11-15  Mg     2.7     11-15    TPro  4.8<L>  /  Alb  2.9<L>  /  TBili  1.2  /  DBili  x   /  AST  28  /  ALT  17  /  AlkPhos  43  11-15    LIVER FUNCTIONS - ( 15 Nov 2019 14:02 )  Alb: 2.9 g/dL / Pro: 4.8 g/dL / ALK PHOS: 43 U/L / ALT: 17 U/L / AST: 28 U/L / GGT: x           PT/INR - ( 15 Nov 2019 14:02 )   PT: 13.7 sec;   INR: 1.20 ratio  PTT - ( 15 Nov 2019 14:02 )  PTT:33.5 sec  ABG - ( 15 Nov 2019 16:58 ) pH, Arterial: 7.39  pH, Blood: x     /  pCO2: 37    /  pO2: 181   / HCO3: 22    / Base Excess: -1.7  /  SaO2: 99        ======================Micro/Rad/Cardio=================  CXR: Reviewed  Echo:Reviewed  ======================================================  PAST MEDICAL & SURGICAL HISTORY:  Borderline diabetes  PVD (peripheral vascular disease)  Carotid stenosis, bilateral  History of hypertension  Asthma  H/O sinus surgery  H/O carotid endarterectomy    ====================ASSESMENT ==============  11/15/2019 CABG C4 L   hypotension  hyperglycemia  DM2  Hyperlipidemia  Asthma      Plan:  ====================== NEUROLOGY=====================  acetaminophen   Tablet .. 650 milliGRAM(s) Oral every 6 hours PRN Mild Pain (1 - 3)  aspirin Suppository 300 milliGRAM(s) Rectal once  meperidine     Injectable 25 milliGRAM(s) IV Push once  metoclopramide Injectable 10 milliGRAM(s) IV Push every 8 hours  oxyCODONE    IR 5 milliGRAM(s) Oral every 6 hours PRN Moderate Pain (4 - 6)    ==================== RESPIRATORY======================  Mechanical Ventilation:  Mode: AC/ CMV (Assist Control/ Continuous Mandatory Ventilation)  RR (machine): 12  TV (machine): 600  FiO2: 50  PEEP: 5  ITime: 1  wean to extubate  ====================CARDIOVASCULAR==================  norepinephrine Infusion 0.08 MICROgram(s)/kG/Min (11.565 mL/Hr) IV Continuous <Continuous>  titrate to wean off   ===================HEMATOLOGIC/ONC ===================    ===================== RENAL =========================  Jacobsen for monitoring urine output    ==================== GASTROINTESTINAL===================  pantoprazole  Injectable 40 milliGRAM(s) IV Push daily  polyethylene glycol 3350 17 Gram(s) Oral daily  potassium chloride  10 mEq/50 mL IVPB 10 milliEquivalent(s) IV Intermittent every 1 hour  potassium chloride  10 mEq/50 mL IVPB 10 milliEquivalent(s) IV Intermittent every 1 hour  potassium chloride  10 mEq/50 mL IVPB 10 milliEquivalent(s) IV Intermittent every 1 hour  sodium chloride 0.9%. 1000 milliLiter(s) (10 mL/Hr) IV Continuous <Continuous>    =======================    ENDOCRINE  =====================  dextrose 50% Injectable 50 milliLiter(s) IV Push every 15 minutes  dextrose 50% Injectable 25 milliLiter(s) IV Push every 15 minutes  insulin regular Infusion 3 Unit(s)/Hr (3 mL/Hr) IV Continuous <Continuous>    ========================INFECTIOUS DISEASE================  cefuroxime  IVPB 1500 milliGRAM(s) IV Intermittent every 8 hours    Patient requires continuous monitoring with bedside rhythm monitoring,arterial line,pulse oximetry,ventilator monitoring;intermittent blood gas analysis.  Care plan discussed with ICU care team.  patient remain critical; required more than usual post op care; I have spent 35 minutes providing non routine post op care.

## 2019-11-15 NOTE — PROGRESS NOTE ADULT - ASSESSMENT
Assessment  DMT2: 74y Male with borderline diabetes as per chart, A1C 6.2% , was not on DM meds at home, blood sugars trending within acceptable range without management, no hypoglycemic episode, OR for CABG today  Adrenal Mass: 74y Male with hx adrenal enlargements scheduled for CABG today. As per patient's documents, enlargements are benign, 2.4cm, not much change/growth between 2014 to present, followed by his endo. Aldosterone, cortisol, TFT labs within normal limits, cleared to proceed with cardiac surgery.  CAD: CABG today, on medications, no chest pain, stable, monitored.  HTN: Controlled,  on antihypertensive medications.          Subha Iyer MD  Cell: [phone]  Office: [phone] Assessment  DMT2: 74y Male with borderline diabetes as per chart, A1C 6.2% , was not on DM meds at home, blood sugars trending within acceptable range without management, no hypoglycemic episode, went  for CABG   Adrenal Mass: 74y Male with hx adrenal enlargements scheduled for CABG today. As per patient's documents, enlargements are benign, 2.4cm, not much change/growth between 2014 to present, followed by his endo. Aldosterone, cortisol, TFT labs within normal limits, cleared to proceed with cardiac surgery.  CAD: CABG today, on medications, no chest pain, stable, monitored.  HTN: Controlled,  on antihypertensive medications.    esteban Ortega MD  [phone]

## 2019-11-15 NOTE — PROGRESS NOTE ADULT - SUBJECTIVE AND OBJECTIVE BOX
Chief complaint  Patient is a 74y old  Male who presents with a chief complaint of  Review of systems  Patient in bed, looks comfortable, no fever, no hypoglycemia.    Labs and Fingersticks  CAPILLARY BLOOD GLUCOSE      POCT Blood Glucose.: 113 mg/dL (14 Nov 2019 21:46)  POCT Blood Glucose.: 103 mg/dL (14 Nov 2019 16:58)      Anion Gap, Serum: 10 (11-14 @ 06:39)    Hemoglobin A1C, Whole Blood: 6.2 <H> (11-14 @ 08:36)    Calcium, Total Serum: 9.1 (11-14 @ 06:39)          11-14    140  |  105  |  14  ----------------------------<  116<H>  3.8   |  25  |  0.89    Ca    9.1      14 Nov 2019 06:39  Mg     2.0     11-14                          15.3   7.88  )-----------( 181      ( 14 Nov 2019 12:50 )             45.5     Medications  MEDICATIONS  (STANDING):      Physical Exam  General: Patient comfortable in bed  Vital Signs Last 12 Hrs  T(F): 98.1 (11-15-19 @ 05:08), Max: 98.1 (11-15-19 @ 05:08)  HR: 68 (11-15-19 @ 05:08) (68 - 70)  BP: 144/79 (11-15-19 @ 08:51) (144/79 - 144/79)  BP(mean): --  RR: 18 (11-15-19 @ 08:51) (18 - 18)  SpO2: 98% (11-15-19 @ 08:51) (98% - 99%)  Neck: No palpable thyroid nodules.  CVS: S1S2, No murmurs  Respiratory: No wheezing, no crepitations  GI: Abdomen soft, bowel sounds positive  Musculoskeletal:  edema lower extremities.   Skin: No skin rashes, no ecchymosis    Diagnostics    Hemoglobin A1C, Whole Blood: Routine (11-13 @ 11:44)  Cortisol AM, Serum: Routine (11-13 @ 11:44)  Aldosterone, Serum: Routine (11-13 @ 11:44)  Metanephrine, Plasma: Routine (11-13 @ 11:44)  Thyroid Stimulating Hormone, Serum: AM Sched. Collection: 14-Nov-2019 06:00 (11-13 @ 11:44)  Free Thyroxine, Serum: AM Sched. Collection: 14-Nov-2019 06:00 (11-13 @ 11:44)

## 2019-11-15 NOTE — PROGRESS NOTE ADULT - ASSESSMENT
CAD 3VD   planned for cabg today   cont asa, statin   cont BB  normal LV function echo     HTN  stable  cont current meds

## 2019-11-15 NOTE — BRIEF OPERATIVE NOTE - OPERATION/FINDINGS
good function  good targets  CABx4 (lima-lad, v-om-diag, v-rca) good function  good targets  CABx4 (lima-lad, v-om1-om2, v-rpda)

## 2019-11-15 NOTE — AIRWAY REMOVAL NOTE  ADULT & PEDS - ARTIFICAL AIRWAY REMOVAL COMMENTS
Written order for extubation verified. The patient was identified by full name and birth date compared to the identification band. Present during the procedure was morgan borrego RN

## 2019-11-15 NOTE — BRIEF OPERATIVE NOTE - NSICDXBRIEFPROCEDURE_GEN_ALL_CORE_FT
PROCEDURES:  CABG, with ANALI 15-Nov-2019 11:05:50  Kale, Alexia PROCEDURES:  CABG, with ANALI 15-Nov-2019 11:05:50 CABx4 (lima-lad, v-om1-om2, v-rpda) Race, Alexia

## 2019-11-15 NOTE — PROGRESS NOTE ADULT - SUBJECTIVE AND OBJECTIVE BOX
Subjective: Patient seen and examined. No new events except as noted.     SUBJECTIVE/ROS:    plan for cabg today     MEDICATIONS:  MEDICATIONS  (STANDING):      PHYSICAL EXAM:  T(C): 36.7 (11-15-19 @ 05:08), Max: 36.9 (11-14-19 @ 13:00)  HR: 68 (11-15-19 @ 05:08) (66 - 71)  BP: 144/79 (11-15-19 @ 05:08) (144/79 - 171/78)  RR: 18 (11-15-19 @ 05:08) (18 - 18)  SpO2: 98% (11-15-19 @ 05:08) (94% - 99%)  Wt(kg): --  I&O's Summary    14 Nov 2019 07:01  -  15 Nov 2019 07:00  --------------------------------------------------------  IN: 540 mL / OUT: 1850 mL / NET: -1310 mL            JVP: Normal  Neck: supple  Lung: clear   CV: S1 S2 , Murmur:  Abd: soft  Ext: No edema  neuro: Awake / alert  Psych: flat affect  Skin: normal``    LABS/DATA:    CARDIAC MARKERS:                                15.3   7.88  )-----------( 181      ( 14 Nov 2019 12:50 )             45.5     11-14    140  |  105  |  14  ----------------------------<  116<H>  3.8   |  25  |  0.89    Ca    9.1      14 Nov 2019 06:39  Mg     2.0     11-14      proBNP:   Lipid Profile:   HgA1c:   TSH: Thyroid Stimulating Hormone, Serum: 2.45 uIU/mL (11-14 @ 09:15)      TELE:  EKG:

## 2019-11-16 LAB
ALBUMIN SERPL ELPH-MCNC: 3.1 G/DL — LOW (ref 3.3–5)
ALP SERPL-CCNC: 42 U/L — SIGNIFICANT CHANGE UP (ref 40–120)
ALT FLD-CCNC: 23 U/L — SIGNIFICANT CHANGE UP (ref 10–45)
ANION GAP SERPL CALC-SCNC: 11 MMOL/L — SIGNIFICANT CHANGE UP (ref 5–17)
APTT BLD: 27.3 SEC — LOW (ref 27.5–36.3)
AST SERPL-CCNC: 30 U/L — SIGNIFICANT CHANGE UP (ref 10–40)
BILIRUB SERPL-MCNC: 0.6 MG/DL — SIGNIFICANT CHANGE UP (ref 0.2–1.2)
BUN SERPL-MCNC: 16 MG/DL — SIGNIFICANT CHANGE UP (ref 7–23)
CALCIUM SERPL-MCNC: 8.1 MG/DL — LOW (ref 8.4–10.5)
CHLORIDE SERPL-SCNC: 106 MMOL/L — SIGNIFICANT CHANGE UP (ref 96–108)
CO2 SERPL-SCNC: 23 MMOL/L — SIGNIFICANT CHANGE UP (ref 22–31)
CREAT SERPL-MCNC: 0.9 MG/DL — SIGNIFICANT CHANGE UP (ref 0.5–1.3)
GAS PNL BLDA: SIGNIFICANT CHANGE UP
GAS PNL BLDA: SIGNIFICANT CHANGE UP
GAS PNL BLDV: SIGNIFICANT CHANGE UP
GLUCOSE BLDC GLUCOMTR-MCNC: 123 MG/DL — HIGH (ref 70–99)
GLUCOSE BLDC GLUCOMTR-MCNC: 134 MG/DL — HIGH (ref 70–99)
GLUCOSE BLDC GLUCOMTR-MCNC: 135 MG/DL — HIGH (ref 70–99)
GLUCOSE BLDC GLUCOMTR-MCNC: 136 MG/DL — HIGH (ref 70–99)
GLUCOSE BLDC GLUCOMTR-MCNC: 139 MG/DL — HIGH (ref 70–99)
GLUCOSE BLDC GLUCOMTR-MCNC: 144 MG/DL — HIGH (ref 70–99)
GLUCOSE BLDC GLUCOMTR-MCNC: 145 MG/DL — HIGH (ref 70–99)
GLUCOSE BLDC GLUCOMTR-MCNC: 145 MG/DL — HIGH (ref 70–99)
GLUCOSE BLDC GLUCOMTR-MCNC: 151 MG/DL — HIGH (ref 70–99)
GLUCOSE BLDC GLUCOMTR-MCNC: 157 MG/DL — HIGH (ref 70–99)
GLUCOSE BLDC GLUCOMTR-MCNC: 173 MG/DL — HIGH (ref 70–99)
GLUCOSE BLDC GLUCOMTR-MCNC: 174 MG/DL — HIGH (ref 70–99)
GLUCOSE BLDC GLUCOMTR-MCNC: 216 MG/DL — HIGH (ref 70–99)
GLUCOSE BLDC GLUCOMTR-MCNC: 220 MG/DL — HIGH (ref 70–99)
GLUCOSE BLDC GLUCOMTR-MCNC: 235 MG/DL — HIGH (ref 70–99)
GLUCOSE BLDC GLUCOMTR-MCNC: 249 MG/DL — HIGH (ref 70–99)
GLUCOSE BLDC GLUCOMTR-MCNC: 87 MG/DL — SIGNIFICANT CHANGE UP (ref 70–99)
GLUCOSE SERPL-MCNC: 148 MG/DL — HIGH (ref 70–99)
HCT VFR BLD CALC: 32.1 % — LOW (ref 39–50)
HGB BLD-MCNC: 11 G/DL — LOW (ref 13–17)
INR BLD: 1.19 RATIO — HIGH (ref 0.88–1.16)
MAGNESIUM SERPL-MCNC: 2.5 MG/DL — SIGNIFICANT CHANGE UP (ref 1.6–2.6)
MCHC RBC-ENTMCNC: 30.3 PG — SIGNIFICANT CHANGE UP (ref 27–34)
MCHC RBC-ENTMCNC: 34.3 GM/DL — SIGNIFICANT CHANGE UP (ref 32–36)
MCV RBC AUTO: 88.4 FL — SIGNIFICANT CHANGE UP (ref 80–100)
NRBC # BLD: 0 /100 WBCS — SIGNIFICANT CHANGE UP (ref 0–0)
PHOSPHATE SERPL-MCNC: 3.3 MG/DL — SIGNIFICANT CHANGE UP (ref 2.5–4.5)
PLATELET # BLD AUTO: 131 K/UL — LOW (ref 150–400)
POTASSIUM SERPL-MCNC: 4.3 MMOL/L — SIGNIFICANT CHANGE UP (ref 3.5–5.3)
POTASSIUM SERPL-SCNC: 4.3 MMOL/L — SIGNIFICANT CHANGE UP (ref 3.5–5.3)
PROT SERPL-MCNC: 5.3 G/DL — LOW (ref 6–8.3)
PROTHROM AB SERPL-ACNC: 13.6 SEC — HIGH (ref 10–12.9)
RBC # BLD: 3.63 M/UL — LOW (ref 4.2–5.8)
RBC # FLD: 12.4 % — SIGNIFICANT CHANGE UP (ref 10.3–14.5)
SODIUM SERPL-SCNC: 140 MMOL/L — SIGNIFICANT CHANGE UP (ref 135–145)
WBC # BLD: 16.02 K/UL — HIGH (ref 3.8–10.5)
WBC # FLD AUTO: 16.02 K/UL — HIGH (ref 3.8–10.5)

## 2019-11-16 PROCEDURE — 93010 ELECTROCARDIOGRAM REPORT: CPT

## 2019-11-16 PROCEDURE — 71045 X-RAY EXAM CHEST 1 VIEW: CPT | Mod: 26

## 2019-11-16 PROCEDURE — 99233 SBSQ HOSP IP/OBS HIGH 50: CPT

## 2019-11-16 RX ORDER — POTASSIUM CHLORIDE 20 MEQ
10 PACKET (EA) ORAL
Refills: 0 | Status: COMPLETED | OUTPATIENT
Start: 2019-11-16 | End: 2019-11-16

## 2019-11-16 RX ORDER — ONDANSETRON 8 MG/1
4 TABLET, FILM COATED ORAL ONCE
Refills: 0 | Status: COMPLETED | OUTPATIENT
Start: 2019-11-16 | End: 2019-11-16

## 2019-11-16 RX ORDER — ACETAMINOPHEN 500 MG
1000 TABLET ORAL ONCE
Refills: 0 | Status: COMPLETED | OUTPATIENT
Start: 2019-11-16 | End: 2019-11-16

## 2019-11-16 RX ORDER — AMIODARONE HYDROCHLORIDE 400 MG/1
200 TABLET ORAL DAILY
Refills: 0 | Status: DISCONTINUED | OUTPATIENT
Start: 2019-11-20 | End: 2019-11-21

## 2019-11-16 RX ORDER — AMIODARONE HYDROCHLORIDE 400 MG/1
400 TABLET ORAL EVERY 8 HOURS
Refills: 0 | Status: COMPLETED | OUTPATIENT
Start: 2019-11-16 | End: 2019-11-20

## 2019-11-16 RX ORDER — HYDROMORPHONE HYDROCHLORIDE 2 MG/ML
0.5 INJECTION INTRAMUSCULAR; INTRAVENOUS; SUBCUTANEOUS ONCE
Refills: 0 | Status: DISCONTINUED | OUTPATIENT
Start: 2019-11-16 | End: 2019-11-16

## 2019-11-16 RX ORDER — METOPROLOL TARTRATE 50 MG
25 TABLET ORAL EVERY 8 HOURS
Refills: 0 | Status: DISCONTINUED | OUTPATIENT
Start: 2019-11-16 | End: 2019-11-19

## 2019-11-16 RX ORDER — INSULIN LISPRO 100/ML
VIAL (ML) SUBCUTANEOUS AT BEDTIME
Refills: 0 | Status: DISCONTINUED | OUTPATIENT
Start: 2019-11-16 | End: 2019-11-25

## 2019-11-16 RX ORDER — ALBUMIN HUMAN 25 %
250 VIAL (ML) INTRAVENOUS ONCE
Refills: 0 | Status: COMPLETED | OUTPATIENT
Start: 2019-11-16 | End: 2019-11-16

## 2019-11-16 RX ORDER — INSULIN GLARGINE 100 [IU]/ML
10 INJECTION, SOLUTION SUBCUTANEOUS ONCE
Refills: 0 | Status: COMPLETED | OUTPATIENT
Start: 2019-11-16 | End: 2019-11-16

## 2019-11-16 RX ORDER — AMIODARONE HYDROCHLORIDE 400 MG/1
TABLET ORAL
Refills: 0 | Status: DISCONTINUED | OUTPATIENT
Start: 2019-11-16 | End: 2019-11-21

## 2019-11-16 RX ORDER — ENOXAPARIN SODIUM 100 MG/ML
40 INJECTION SUBCUTANEOUS DAILY
Refills: 0 | Status: DISCONTINUED | OUTPATIENT
Start: 2019-11-16 | End: 2019-11-25

## 2019-11-16 RX ORDER — METOPROLOL TARTRATE 50 MG
25 TABLET ORAL
Refills: 0 | Status: DISCONTINUED | OUTPATIENT
Start: 2019-11-16 | End: 2019-11-16

## 2019-11-16 RX ORDER — INSULIN GLARGINE 100 [IU]/ML
10 INJECTION, SOLUTION SUBCUTANEOUS AT BEDTIME
Refills: 0 | Status: DISCONTINUED | OUTPATIENT
Start: 2019-11-17 | End: 2019-11-17

## 2019-11-16 RX ORDER — MAGNESIUM SULFATE 500 MG/ML
2 VIAL (ML) INJECTION ONCE
Refills: 0 | Status: COMPLETED | OUTPATIENT
Start: 2019-11-16 | End: 2019-11-16

## 2019-11-16 RX ORDER — AMIODARONE HYDROCHLORIDE 400 MG/1
150 TABLET ORAL ONCE
Refills: 0 | Status: COMPLETED | OUTPATIENT
Start: 2019-11-16 | End: 2019-11-16

## 2019-11-16 RX ORDER — ONDANSETRON 8 MG/1
4 TABLET, FILM COATED ORAL ONCE
Refills: 0 | Status: DISCONTINUED | OUTPATIENT
Start: 2019-11-16 | End: 2019-11-16

## 2019-11-16 RX ORDER — FUROSEMIDE 40 MG
40 TABLET ORAL ONCE
Refills: 0 | Status: COMPLETED | OUTPATIENT
Start: 2019-11-16 | End: 2019-11-16

## 2019-11-16 RX ORDER — PANTOPRAZOLE SODIUM 20 MG/1
40 TABLET, DELAYED RELEASE ORAL
Refills: 0 | Status: DISCONTINUED | OUTPATIENT
Start: 2019-11-16 | End: 2019-11-25

## 2019-11-16 RX ORDER — BUDESONIDE AND FORMOTEROL FUMARATE DIHYDRATE 160; 4.5 UG/1; UG/1
2 AEROSOL RESPIRATORY (INHALATION)
Refills: 0 | Status: DISCONTINUED | OUTPATIENT
Start: 2019-11-16 | End: 2019-11-25

## 2019-11-16 RX ORDER — INSULIN LISPRO 100/ML
VIAL (ML) SUBCUTANEOUS
Refills: 0 | Status: DISCONTINUED | OUTPATIENT
Start: 2019-11-16 | End: 2019-11-25

## 2019-11-16 RX ADMIN — HYDROMORPHONE HYDROCHLORIDE 0.5 MILLIGRAM(S): 2 INJECTION INTRAMUSCULAR; INTRAVENOUS; SUBCUTANEOUS at 04:46

## 2019-11-16 RX ADMIN — Medication 100 MILLIGRAM(S): at 15:42

## 2019-11-16 RX ADMIN — Medication 0.5 MILLIGRAM(S): at 05:26

## 2019-11-16 RX ADMIN — Medication 3 MILLILITER(S): at 00:26

## 2019-11-16 RX ADMIN — Medication 0.5 MILLIGRAM(S): at 17:36

## 2019-11-16 RX ADMIN — Medication 125 MILLILITER(S): at 10:54

## 2019-11-16 RX ADMIN — HYDROMORPHONE HYDROCHLORIDE 0.5 MILLIGRAM(S): 2 INJECTION INTRAMUSCULAR; INTRAVENOUS; SUBCUTANEOUS at 18:13

## 2019-11-16 RX ADMIN — OXYCODONE HYDROCHLORIDE 5 MILLIGRAM(S): 5 TABLET ORAL at 14:00

## 2019-11-16 RX ADMIN — AMIODARONE HYDROCHLORIDE 400 MILLIGRAM(S): 400 TABLET ORAL at 20:07

## 2019-11-16 RX ADMIN — ATORVASTATIN CALCIUM 40 MILLIGRAM(S): 80 TABLET, FILM COATED ORAL at 21:43

## 2019-11-16 RX ADMIN — Medication 100 MILLIEQUIVALENT(S): at 06:21

## 2019-11-16 RX ADMIN — HYDROMORPHONE HYDROCHLORIDE 0.5 MILLIGRAM(S): 2 INJECTION INTRAMUSCULAR; INTRAVENOUS; SUBCUTANEOUS at 18:33

## 2019-11-16 RX ADMIN — Medication 25 MILLIGRAM(S): at 13:26

## 2019-11-16 RX ADMIN — Medication 3 MILLILITER(S): at 11:16

## 2019-11-16 RX ADMIN — BUDESONIDE AND FORMOTEROL FUMARATE DIHYDRATE 2 PUFF(S): 160; 4.5 AEROSOL RESPIRATORY (INHALATION) at 17:36

## 2019-11-16 RX ADMIN — AMIODARONE HYDROCHLORIDE 600 MILLIGRAM(S): 400 TABLET ORAL at 19:39

## 2019-11-16 RX ADMIN — Medication 1000 MILLIGRAM(S): at 09:27

## 2019-11-16 RX ADMIN — OXYCODONE HYDROCHLORIDE 5 MILLIGRAM(S): 5 TABLET ORAL at 13:26

## 2019-11-16 RX ADMIN — Medication 3 MILLILITER(S): at 17:36

## 2019-11-16 RX ADMIN — Medication 100 MILLIGRAM(S): at 01:16

## 2019-11-16 RX ADMIN — ENOXAPARIN SODIUM 40 MILLIGRAM(S): 100 INJECTION SUBCUTANEOUS at 15:50

## 2019-11-16 RX ADMIN — Medication 50 GRAM(S): at 19:39

## 2019-11-16 RX ADMIN — ONDANSETRON 4 MILLIGRAM(S): 8 TABLET, FILM COATED ORAL at 04:47

## 2019-11-16 RX ADMIN — Medication 400 MILLIGRAM(S): at 09:00

## 2019-11-16 RX ADMIN — HYDROMORPHONE HYDROCHLORIDE 0.5 MILLIGRAM(S): 2 INJECTION INTRAMUSCULAR; INTRAVENOUS; SUBCUTANEOUS at 09:30

## 2019-11-16 RX ADMIN — POLYETHYLENE GLYCOL 3350 17 GRAM(S): 17 POWDER, FOR SOLUTION ORAL at 12:56

## 2019-11-16 RX ADMIN — Medication 100 MILLIGRAM(S): at 09:24

## 2019-11-16 RX ADMIN — Medication 3 MILLILITER(S): at 05:25

## 2019-11-16 RX ADMIN — HYDROMORPHONE HYDROCHLORIDE 0.5 MILLIGRAM(S): 2 INJECTION INTRAMUSCULAR; INTRAVENOUS; SUBCUTANEOUS at 09:27

## 2019-11-16 RX ADMIN — Medication 10 MILLIGRAM(S): at 21:43

## 2019-11-16 RX ADMIN — INSULIN GLARGINE 10 UNIT(S): 100 INJECTION, SOLUTION SUBCUTANEOUS at 15:04

## 2019-11-16 RX ADMIN — Medication 10 MILLIGRAM(S): at 06:22

## 2019-11-16 RX ADMIN — Medication 10 MILLIGRAM(S): at 13:26

## 2019-11-16 RX ADMIN — Medication 100 MILLIEQUIVALENT(S): at 06:22

## 2019-11-16 RX ADMIN — HYDROMORPHONE HYDROCHLORIDE 0.5 MILLIGRAM(S): 2 INJECTION INTRAMUSCULAR; INTRAVENOUS; SUBCUTANEOUS at 05:00

## 2019-11-16 RX ADMIN — Medication 81 MILLIGRAM(S): at 12:56

## 2019-11-16 RX ADMIN — CHLORHEXIDINE GLUCONATE 1 APPLICATION(S): 213 SOLUTION TOPICAL at 06:17

## 2019-11-16 RX ADMIN — ATORVASTATIN CALCIUM 40 MILLIGRAM(S): 80 TABLET, FILM COATED ORAL at 01:16

## 2019-11-16 RX ADMIN — Medication 40 MILLIGRAM(S): at 21:50

## 2019-11-16 NOTE — PHYSICAL THERAPY INITIAL EVALUATION ADULT - ADDITIONAL COMMENTS
Pt lives will private home with his wife, 5 steps to enter, +handrail. Pt states that his bedroom is on the second floor, 1 flight of stairs, +handrail; but he is able to reside on the first floor while recovering. Pt states prior to this admission he was independent with all ADLs and functional mobility. Pt denies owning any DME.

## 2019-11-16 NOTE — PHYSICAL THERAPY INITIAL EVALUATION ADULT - TRANSFER TRAINING, PT EVAL
GOAL: Pt will perform all transfers, independently within 2 weeks GOAL: Pt will perform all transfers, with least restrictive device,  independently within 2 weeks

## 2019-11-16 NOTE — PHYSICAL THERAPY INITIAL EVALUATION ADULT - GAIT TRAINING, PT EVAL
GOAL: Pt will ambulate 150 feet, independently within 2 weeks GOAL: Pt will ambulate 150 feet, with least restrictive device, independently within 2 weeks

## 2019-11-16 NOTE — PROGRESS NOTE ADULT - SUBJECTIVE AND OBJECTIVE BOX
Endocrinology Attending Covering for Dr. Iyer      Chief complaint  Patient is a 74y old  Male who presents with a chief complaint of  Review of systems  Patient in bed, looks comfortable, no fever,  had no hypoglycemia.    Labs and Fingersticks  CAPILLARY BLOOD GLUCOSE      POCT Blood Glucose.: 235 mg/dL (16 Nov 2019 11:57)  POCT Blood Glucose.: 174 mg/dL (16 Nov 2019 11:01)  POCT Blood Glucose.: 157 mg/dL (16 Nov 2019 10:09)  POCT Blood Glucose.: 151 mg/dL (16 Nov 2019 08:55)  POCT Blood Glucose.: 139 mg/dL (16 Nov 2019 08:27)  POCT Blood Glucose.: 144 mg/dL (16 Nov 2019 06:51)  POCT Blood Glucose.: 145 mg/dL (16 Nov 2019 06:26)  POCT Blood Glucose.: 145 mg/dL (16 Nov 2019 05:35)  POCT Blood Glucose.: 123 mg/dL (16 Nov 2019 03:21)  POCT Blood Glucose.: 135 mg/dL (16 Nov 2019 01:57)  POCT Blood Glucose.: 136 mg/dL (16 Nov 2019 01:07)  POCT Blood Glucose.: 139 mg/dL (15 Nov 2019 22:59)  POCT Blood Glucose.: 151 mg/dL (15 Nov 2019 21:03)      Anion Gap, Serum: 11 (11-16 @ 00:38)  Anion Gap, Serum: 10 (11-15 @ 14:02)      Calcium, Total Serum: 8.1 <L> (11-16 @ 00:38)  Calcium, Total Serum: 7.5 <L> (11-15 @ 14:02)  Albumin, Serum: 3.1 <L> (11-16 @ 00:38)  Albumin, Serum: 2.9 <L> (11-15 @ 14:02)    Alanine Aminotransferase (ALT/SGPT): 23 (11-16 @ 00:38)  Alanine Aminotransferase (ALT/SGPT): 17 (11-15 @ 14:02)  Alkaline Phosphatase, Serum: 42 (11-16 @ 00:38)  Alkaline Phosphatase, Serum: 43 (11-15 @ 14:02)  Aspartate Aminotransferase (AST/SGOT): 30 (11-16 @ 00:38)  Aspartate Aminotransferase (AST/SGOT): 28 (11-15 @ 14:02)        11-16    140  |  106  |  16  ----------------------------<  148<H>  4.3   |  23  |  0.90    Ca    8.1<L>      16 Nov 2019 00:38  Phos  3.3     11-16  Mg     2.5     11-16    TPro  5.3<L>  /  Alb  3.1<L>  /  TBili  0.6  /  DBili  x   /  AST  30  /  ALT  23  /  AlkPhos  42  11-16                        11.0   16.02 )-----------( 131      ( 16 Nov 2019 00:38 )             32.1     Medications  MEDICATIONS  (STANDING):  albuterol/ipratropium for Nebulization 3 milliLiter(s) Nebulizer every 6 hours  aspirin enteric coated 81 milliGRAM(s) Oral daily  atorvastatin 40 milliGRAM(s) Oral at bedtime  buDESOnide    Inhalation Suspension 0.5 milliGRAM(s) Inhalation two times a day  cefuroxime  IVPB 1500 milliGRAM(s) IV Intermittent every 8 hours  chlorhexidine 2% Cloths 1 Application(s) Topical <User Schedule>  dextrose 50% Injectable 50 milliLiter(s) IV Push every 15 minutes  dextrose 50% Injectable 25 milliLiter(s) IV Push every 15 minutes  insulin regular Infusion 3 Unit(s)/Hr (3 mL/Hr) IV Continuous <Continuous>  metoclopramide Injectable 10 milliGRAM(s) IV Push every 8 hours  polyethylene glycol 3350 17 Gram(s) Oral daily  potassium chloride  10 mEq/50 mL IVPB 10 milliEquivalent(s) IV Intermittent every 1 hour  potassium chloride  10 mEq/50 mL IVPB 10 milliEquivalent(s) IV Intermittent every 1 hour  potassium chloride  10 mEq/50 mL IVPB 10 milliEquivalent(s) IV Intermittent every 1 hour  sodium chloride 0.9%. 1000 milliLiter(s) (10 mL/Hr) IV Continuous <Continuous>      Physical Exam  General: Patient comfortable in bed  Vital Signs Last 12 Hrs  T(F): 98.8 (11-16-19 @ 12:00), Max: 99.3 (11-16-19 @ 08:00)  HR: 104 (11-16-19 @ 12:00) (71 - 104)  BP: 129/62 (11-16-19 @ 11:00) (124/65 - 147/70)  BP(mean): 88 (11-16-19 @ 11:00) (86 - 95)  RR: 30 (11-16-19 @ 12:00) (15 - 30)  SpO2: 98% (11-16-19 @ 12:00) (91% - 99%)  Neck: No palpable thyroid nodules.  CVS: S1S2, No murmurs  Respiratory: No wheezing, no crepitations  GI: Abdomen soft, bowel sounds positive  Musculoskeletal:  edema lower extremities.   Skin: No skin rashes, no ecchymosis    Diagnostics

## 2019-11-16 NOTE — PROGRESS NOTE ADULT - SUBJECTIVE AND OBJECTIVE BOX
Patient discussed on morning rounds with attending     Operation / Date: 11/15 C4L     SUBJECTIVE ASSESSMENT:  74y Male PMH DM A1c 6.2, HTN, asthma, B/L Carotid a. stenosis s/p L CEA, PVD now s/p C4L POD1     Vital Signs Last 24 Hrs  T(C): 37.1 (16 Nov 2019 20:37), Max: 37.6 (16 Nov 2019 16:00)  T(F): 98.7 (16 Nov 2019 20:37), Max: 99.7 (16 Nov 2019 16:00)  HR: 102 (16 Nov 2019 20:37) (71 - 108)  BP: 113/62 (16 Nov 2019 20:37) (111/59 - 152/67)  BP(mean): 79 (16 Nov 2019 19:00) (78 - 99)  RR: 18 (16 Nov 2019 20:37) (14 - 30)  SpO2: 94% (16 Nov 2019 20:37) (91% - 100%)  I&O's Detail    15 Nov 2019 07:01  -  16 Nov 2019 07:00  --------------------------------------------------------  IN:    insulin regular Infusion: 39 mL    Lactated Ringers IV Bolus: 500 mL    norepinephrine Infusion: 28.6 mL    sodium chloride 0.9%.: 120 mL  Total IN: 687.6 mL    OUT:    Chest Tube: 175 mL    Indwelling Catheter - Urethral: 1305 mL  Total OUT: 1480 mL    Total NET: -792.4 mL      16 Nov 2019 07:01  -  16 Nov 2019 23:17  --------------------------------------------------------  IN:    insulin regular Infusion: 43 mL    IV PiggyBack: 550 mL    Oral Fluid: 320 mL    sodium chloride 0.9%.: 110 mL  Total IN: 1023 mL    OUT:    Chest Tube: 10 mL    Chest Tube: 160 mL    Indwelling Catheter - Urethral: 640 mL    Voided: 150 mL  Total OUT: 960 mL    Total NET: 63 mL    CHEST TUBE:  Yes, Meds and LPL. AIR LEAKS: No. Suction  CHRIS DRAIN:  No.  EPICARDIAL WIRES: 1V, 1 ground.  TIE DOWNS: No.  NORRIS: No.    PHYSICAL EXAM:  General: NAD  HEENT:  NC/AT  Neuro: A&Ox4, gait steady, speech clear, no focal deficits noted  Respiratory: B/L BS CTA  Cardiovascular: Atrial fibrillation, rate controlled, normal S1S2, no murmur noted  GI: Abd soft, NT/ND, +BSx4Q +BM  Peripheral Vascular: 2+ pedal edema, 2+ peripheral pulses, no clubbing, cyanosis, varicosities/PVD noted  Musculoskeletal: B/L UE and LE 5/5 strength   Psychiatric: Normal mood, normal affect observed  Skin: Normal to inspection and palpation, warm and well perfused   Incision Sites: MSI c/d/i    LABS:                        11.0   16.02 )-----------( 131      ( 16 Nov 2019 00:38 )             32.1       COUMADIN:  No    PT/INR - ( 16 Nov 2019 00:38 )   PT: 13.6 sec;   INR: 1.19 ratio         PTT - ( 16 Nov 2019 00:38 )  PTT:27.3 sec    11-16    140  |  106  |  16  ----------------------------<  148<H>  4.3   |  23  |  0.90    Ca    8.1<L>      16 Nov 2019 00:38  Phos  3.3     11-16  Mg     2.5     11-16    TPro  5.3<L>  /  Alb  3.1<L>  /  TBili  0.6  /  DBili  x   /  AST  30  /  ALT  23  /  AlkPhos  42  11-16      MEDICATIONS  (STANDING):  albuterol/ipratropium for Nebulization 3 milliLiter(s) Nebulizer every 6 hours  aMIOdarone    Tablet 400 milliGRAM(s) Oral every 8 hours  aMIOdarone    Tablet   Oral   aspirin enteric coated 81 milliGRAM(s) Oral daily  atorvastatin 40 milliGRAM(s) Oral at bedtime  buDESOnide    Inhalation Suspension 0.5 milliGRAM(s) Inhalation two times a day  budesonide 160 MICROgram(s)/formoterol 4.5 MICROgram(s) Inhaler 2 Puff(s) Inhalation two times a day  cefuroxime  IVPB 1500 milliGRAM(s) IV Intermittent every 8 hours  chlorhexidine 2% Cloths 1 Application(s) Topical <User Schedule>  dextrose 50% Injectable 50 milliLiter(s) IV Push every 15 minutes  dextrose 50% Injectable 25 milliLiter(s) IV Push every 15 minutes  enoxaparin Injectable 40 milliGRAM(s) SubCutaneous daily  insulin lispro (HumaLOG) corrective regimen sliding scale   SubCutaneous three times a day before meals  insulin lispro (HumaLOG) corrective regimen sliding scale   SubCutaneous at bedtime  insulin regular Infusion 3 Unit(s)/Hr (3 mL/Hr) IV Continuous <Continuous>  metoclopramide Injectable 10 milliGRAM(s) IV Push every 8 hours  metoprolol tartrate 25 milliGRAM(s) Oral two times a day  pantoprazole    Tablet 40 milliGRAM(s) Oral before breakfast  polyethylene glycol 3350 17 Gram(s) Oral daily  potassium chloride  10 mEq/50 mL IVPB 10 milliEquivalent(s) IV Intermittent every 1 hour  potassium chloride  10 mEq/50 mL IVPB 10 milliEquivalent(s) IV Intermittent every 1 hour  potassium chloride  10 mEq/50 mL IVPB 10 milliEquivalent(s) IV Intermittent every 1 hour  sodium chloride 0.9%. 1000 milliLiter(s) (10 mL/Hr) IV Continuous <Continuous>    MEDICATIONS  (PRN):  acetaminophen   Tablet .. 650 milliGRAM(s) Oral every 6 hours PRN Mild Pain (1 - 3)  oxyCODONE    IR 5 milliGRAM(s) Oral every 6 hours PRN Moderate Pain (4 - 6)        RADIOLOGY & ADDITIONAL TESTS: < from: Xray Chest 1 View- PORTABLE-Routine (11.16.19 @ 04:16) >    IMPRESSION:    Clear lungs.    < end of copied text >

## 2019-11-16 NOTE — PROGRESS NOTE ADULT - PROBLEM SELECTOR PLAN 1
can start Lantus 10 units tonigth, D/C iv insulin infusion 2hr after starting lantus  since not eating well, fS with coverage for now  Patient counseled for compliance with consistent low carb diet and exercise as tolerated outpatient.

## 2019-11-16 NOTE — PROGRESS NOTE ADULT - PROBLEM SELECTOR PLAN 1
s/p C4L POD 1  ASA/Statin for graft patency  On Lopressor and Amio load 2/2 GEORGES. Will uptitrate BB as BP allows  K>4, Mg>2  DVT prophylaxis  GI prophylaxis  Humalog SSI and Lantus for FSG control. Consult Endocrinology  Monitor chest tube output and d/c as per Dr. Phillips   D/c intro in AM after AM labs   Incentive spirometry, ambulate, OOB - chair  Shower POD 5  Discharge planning s/p C4L POD 1  ASA/Statin for graft patency  On Lopressor and Amio load 2/2 GEORGES. Will uptitrate BB as BP allows  K>4, Mg>2  DVT prophylaxis  GI prophylaxis  Humalog SSI and Lantus for FSG control. Consult Endocrinology  Monitor chest tube output and d/c as per Dr. Phililps   D/c intro in AM after AM labs   Incentive spirometry, ambulate, OOB - chair  Lasix 40 IVx1 for 2+ pedal edema   Shower POD 5  Discharge planning

## 2019-11-16 NOTE — PHYSICAL THERAPY INITIAL EVALUATION ADULT - GENERAL OBSERVATIONS, REHAB EVAL
Pt oscar 40 min initial eval well. Pt s/p CABG. Pt rec'd in chair, +tele, +chest tube, +ext pacer, +5L NC, +ryan, daughter at bedside. PT educated on sternal precautions. Pt oscar 40 min initial eval well. Pt s/p CABG x4. Pt rec'd in chair, +tele, +chest tube, +ext pacer, +5L NC, +ryan, daughter at bedside. PT educated on sternal precautions.

## 2019-11-16 NOTE — PROGRESS NOTE ADULT - ASSESSMENT
Assessment  DMT2: 74y Male with borderline diabetes as per chart, A1C 6.2% ,   Post CABG,on IV insulin drip  Adrenal Mass: 74y Male with hx adrenal enlargements scheduled for CABG today. As per patient's documents, enlargements are benign, 2.4cm, not much change/growth between 2014 to present, followed by his endo. Aldosterone, cortisol, TFT labs within normal limits, cleared to proceed with cardiac surgery.  CAD: CABG today, on medications, no chest pain, stable, monitored.  HTN: Controlled,  on antihypertensive medications.  Case discussed with the NP  esteban Ortega MD  [phone]

## 2019-11-16 NOTE — PHYSICAL THERAPY INITIAL EVALUATION ADULT - PERTINENT HX OF CURRENT PROBLEM, REHAB EVAL
75 y/o M (denies implanted cardiac devices), with PMHx of Borderline DM - A1C unknown, HTN, Asthma - never requiring intubation, B/L Carotid Stenosis s/p L CEA in 2017, and PVD offering c/o intermittent exertional left sided CP with onset 3 months ago - Class II Angina.  He denies accompanying symptoms. Pt had abnormal findings on his exercise stress test (8/2019)(report unavailable). It was recommended patient have LHC at that time but he deferred for personal reasons.  Pt now s/p CABG.

## 2019-11-16 NOTE — PROGRESS NOTE ADULT - SUBJECTIVE AND OBJECTIVE BOX
INOCENCIA ARMSTRONG  MRN-[id number]  Patient is a 74y old  Male who presents with a chief complaint of CABG (16 Nov 2019 12:33)    HPI:  73 y/o [place] male (denies implanted cardiac devices), with PMHx of Borderline DM - A1C unknown, HTN, Asthma - never requiring intubation, B/L Carotid Stenosis s/p L CEA in 2017, and PVD offering c/o intermittent exertional left sided CP with onset 3 months ago - Class II Angina.  He denies accompanying symptoms.  Patient was evaluated by his Cardiologist - Dr. King in 8/2019 and had Exercise stress test with abnormal findings (report unavailable).  It was recommended patient have LHC at that time but he deferred for personal reasons.  Patient has now decided to proceed with LHC for which he presents today.      Antianginals - Amlodipine (12 Nov 2019 08:26)      Surgery/Hospital course:    Today:    REVIEW OF SYSTEMS:  Gen: No fever  EYES/ENT: No visual changes;  No vertigo or throat pain   NECK: No pain   RES:  No shortness of breath or Cough  Chest: + incisional pain  CARD: No chest pain   GI: No abdominal pain  : No dysuria  NEURO: No weakness  SKIN: No itching, rashes     Physical Exam:  Vital Signs Last 24 Hrs  T(C): 37.6 (16 Nov 2019 16:00), Max: 37.6 (16 Nov 2019 16:00)  T(F): 99.7 (16 Nov 2019 16:00), Max: 99.7 (16 Nov 2019 16:00)  HR: 85 (16 Nov 2019 17:00) (71 - 108)  BP: 114/59 (16 Nov 2019 17:00) (111/59 - 152/67)  BP(mean): 82 (16 Nov 2019 17:00) (78 - 99)  RR: 20 (16 Nov 2019 17:00) (11 - 30)  SpO2: 99% (16 Nov 2019 17:00) (91% - 99%)  Gen:  Awake, alert   CNS: non focal 	  Neck: no JVD  RES : clear , no wheezing    Chest:   + chest tubes                     CVS: Regular  rhythm. Normal S1/S2  Abd: Soft, non-distended. Bowel sounds present.  Skin: No rash.  Ext:  no edema, A Line  PSY:  ============================I/O===========================   I&O's Detail    15 Nov 2019 07:01  -  16 Nov 2019 07:00  --------------------------------------------------------  IN:    insulin regular Infusion: 39 mL    Lactated Ringers IV Bolus: 500 mL    norepinephrine Infusion: 28.6 mL    sodium chloride 0.9%.: 120 mL  Total IN: 687.6 mL    OUT:    Chest Tube: 175 mL    Indwelling Catheter - Urethral: 1305 mL  Total OUT: 1480 mL    Total NET: -792.4 mL      16 Nov 2019 07:01  -  16 Nov 2019 17:43  --------------------------------------------------------  IN:    insulin regular Infusion: 43 mL    IV PiggyBack: 350 mL    Oral Fluid: 320 mL    sodium chloride 0.9%.: 100 mL  Total IN: 813 mL    OUT:    Chest Tube: 10 mL    Chest Tube: 130 mL    Indwelling Catheter - Urethral: 580 mL  Total OUT: 720 mL    Total NET: 93 mL        ============================ LABS =========================                        11.0   16.02 )-----------( 131      ( 16 Nov 2019 00:38 )             32.1     11-16    140  |  106  |  16  ----------------------------<  148<H>  4.3   |  23  |  0.90    Ca    8.1<L>      16 Nov 2019 00:38  Phos  3.3     11-16  Mg     2.5     11-16    TPro  5.3<L>  /  Alb  3.1<L>  /  TBili  0.6  /  DBili  x   /  AST  30  /  ALT  23  /  AlkPhos  42  11-16    LIVER FUNCTIONS - ( 16 Nov 2019 00:38 )  Alb: 3.1 g/dL / Pro: 5.3 g/dL / ALK PHOS: 42 U/L / ALT: 23 U/L / AST: 30 U/L / GGT: x           PT/INR - ( 16 Nov 2019 00:38 )   PT: 13.6 sec;   INR: 1.19 ratio         PTT - ( 16 Nov 2019 00:38 )  PTT:27.3 sec  ABG - ( 16 Nov 2019 04:29 )  pH, Arterial: 7.43  pH, Blood: x     /  pCO2: 40    /  pO2: 79    / HCO3: 26    / Base Excess: 2.0   /  SaO2: 96                  ======================Micro/Rad/Cardio=================  Culture: Reviewed   CXR: Reviewed  Echo:Reviewed  ======================================================  PAST MEDICAL & SURGICAL HISTORY:  Borderline diabetes  PVD (peripheral vascular disease)  Carotid stenosis, bilateral  History of hypertension  Asthma  H/O sinus surgery  H/O carotid endarterectomy    ====================ASSESMENT ==============  CNS:  RES:  CVS:  Hem:  Marc:  GI:  Endo:  ID:  Skin  Plan:  ====================== NEUROLOGY=====================  acetaminophen   Tablet .. 650 milliGRAM(s) Oral every 6 hours PRN Mild Pain (1 - 3)  HYDROmorphone  Injectable 0.5 milliGRAM(s) IV Push once  metoclopramide Injectable 10 milliGRAM(s) IV Push every 8 hours  oxyCODONE    IR 5 milliGRAM(s) Oral every 6 hours PRN Moderate Pain (4 - 6)    ==================== RESPIRATORY======================  Mechanical Ventilation:  Mode: CPAP with PS  FiO2: 50  PEEP: 5  PS: 5  MAP: 10  PIP: 23    albuterol/ipratropium for Nebulization 3 milliLiter(s) Nebulizer every 6 hours  buDESOnide    Inhalation Suspension 0.5 milliGRAM(s) Inhalation two times a day  budesonide 160 MICROgram(s)/formoterol 4.5 MICROgram(s) Inhaler 2 Puff(s) Inhalation two times a day    ====================CARDIOVASCULAR==================  metoprolol tartrate 25 milliGRAM(s) Oral two times a day    ===================HEMATOLOGIC/ONC ===================  aspirin enteric coated 81 milliGRAM(s) Oral daily  enoxaparin Injectable 40 milliGRAM(s) SubCutaneous daily    ===================== RENAL =========================  Jacobsen for monitoring urine output    ==================== GASTROINTESTINAL===================  pantoprazole    Tablet 40 milliGRAM(s) Oral before breakfast  polyethylene glycol 3350 17 Gram(s) Oral daily  potassium chloride  10 mEq/50 mL IVPB 10 milliEquivalent(s) IV Intermittent every 1 hour  potassium chloride  10 mEq/50 mL IVPB 10 milliEquivalent(s) IV Intermittent every 1 hour  potassium chloride  10 mEq/50 mL IVPB 10 milliEquivalent(s) IV Intermittent every 1 hour  sodium chloride 0.9%. 1000 milliLiter(s) (10 mL/Hr) IV Continuous <Continuous>    =======================    ENDOCRINE  =====================  atorvastatin 40 milliGRAM(s) Oral at bedtime  dextrose 50% Injectable 50 milliLiter(s) IV Push every 15 minutes  dextrose 50% Injectable 25 milliLiter(s) IV Push every 15 minutes  insulin lispro (HumaLOG) corrective regimen sliding scale   SubCutaneous three times a day before meals  insulin lispro (HumaLOG) corrective regimen sliding scale   SubCutaneous at bedtime  insulin regular Infusion 3 Unit(s)/Hr (3 mL/Hr) IV Continuous <Continuous>    ========================INFECTIOUS DISEASE================  cefuroxime  IVPB 1500 milliGRAM(s) IV Intermittent every 8 hours    .crit

## 2019-11-16 NOTE — PROGRESS NOTE ADULT - ASSESSMENT
74y Male PMH DM A1c 6.2, HTN, asthma, B/L Carotid a. stenosis s/p L CEA, PVD now s/p C4L  POD 1 GEORGES given 150mg Amio bolus and started on PO Amio load. Transferred to SDU

## 2019-11-16 NOTE — PHYSICAL THERAPY INITIAL EVALUATION ADULT - PLANNED THERAPY INTERVENTIONS, PT EVAL
gait training/strengthening/transfer training/balance training/GOAL: Pt will negotiate 5 steps, unilateral handrail, independently within 2 weeks

## 2019-11-16 NOTE — PROGRESS NOTE ADULT - ASSESSMENT
Cad   s/p cabg  cont asa, statin   appreciate cticu care     dm  Monitor finger stick. Insulin coverage.

## 2019-11-16 NOTE — PROGRESS NOTE ADULT - SUBJECTIVE AND OBJECTIVE BOX
Subjective: Patient seen and examined. No new events except as noted.     SUBJECTIVE/ROS:  had n/v tody   no cp   no sob       MEDICATIONS:  MEDICATIONS  (STANDING):  albuterol/ipratropium for Nebulization 3 milliLiter(s) Nebulizer every 6 hours  aspirin enteric coated 81 milliGRAM(s) Oral daily  atorvastatin 40 milliGRAM(s) Oral at bedtime  buDESOnide    Inhalation Suspension 0.5 milliGRAM(s) Inhalation two times a day  cefuroxime  IVPB 1500 milliGRAM(s) IV Intermittent every 8 hours  chlorhexidine 2% Cloths 1 Application(s) Topical <User Schedule>  dextrose 50% Injectable 50 milliLiter(s) IV Push every 15 minutes  dextrose 50% Injectable 25 milliLiter(s) IV Push every 15 minutes  insulin regular Infusion 3 Unit(s)/Hr (3 mL/Hr) IV Continuous <Continuous>  metoclopramide Injectable 10 milliGRAM(s) IV Push every 8 hours  polyethylene glycol 3350 17 Gram(s) Oral daily  potassium chloride  10 mEq/50 mL IVPB 10 milliEquivalent(s) IV Intermittent every 1 hour  potassium chloride  10 mEq/50 mL IVPB 10 milliEquivalent(s) IV Intermittent every 1 hour  potassium chloride  10 mEq/50 mL IVPB 10 milliEquivalent(s) IV Intermittent every 1 hour  sodium chloride 0.9%. 1000 milliLiter(s) (10 mL/Hr) IV Continuous <Continuous>      PHYSICAL EXAM:  T(C): 36.7 (11-16-19 @ 04:00), Max: 36.8 (11-15-19 @ 16:45)  HR: 96 (11-16-19 @ 07:00) (70 - 101)  BP: 144/79 (11-15-19 @ 08:51) (144/79 - 144/79)  RR: 17 (11-16-19 @ 07:00) (11 - 23)  SpO2: 98% (11-16-19 @ 07:00) (91% - 100%)  Wt(kg): --  I&O's Summary    15 Nov 2019 07:01  -  16 Nov 2019 07:00  --------------------------------------------------------  IN: 687.6 mL / OUT: 1480 mL / NET: -792.4 mL      Height (cm): 167.6 (11-15 @ 08:51)  Weight (kg): 77.1 (11-15 @ 08:51)  BMI (kg/m2): 27.4 (11-15 @ 08:51)  BSA (m2): 1.87 (11-15 @ 08:51)      JVP: Normal  Neck: supple  Lung: clear   CV: S1 S2 , Murmur:  Abd: soft  Ext: No edema  neuro: Awake / alert  Psych: flat affect  Skin: normal``    LABS/DATA:    CARDIAC MARKERS:  CARDIAC MARKERS ( 15 Nov 2019 14:02 )  x     / x     / 403 U/L / x     / 20.4 ng/mL                                11.0   16.02 )-----------( 131      ( 16 Nov 2019 00:38 )             32.1     11-16    140  |  106  |  16  ----------------------------<  148<H>  4.3   |  23  |  0.90    Ca    8.1<L>      16 Nov 2019 00:38  Phos  3.3     11-16  Mg     2.5     11-16    TPro  5.3<L>  /  Alb  3.1<L>  /  TBili  0.6  /  DBili  x   /  AST  30  /  ALT  23  /  AlkPhos  42  11-16    proBNP:   Lipid Profile:   HgA1c:   TSH:     TELE:  EKG:

## 2019-11-17 DIAGNOSIS — Z96.89 PRESENCE OF OTHER SPECIFIED FUNCTIONAL IMPLANTS: ICD-10-CM

## 2019-11-17 DIAGNOSIS — I48.91 UNSPECIFIED ATRIAL FIBRILLATION: ICD-10-CM

## 2019-11-17 DIAGNOSIS — Z29.9 ENCOUNTER FOR PROPHYLACTIC MEASURES, UNSPECIFIED: ICD-10-CM

## 2019-11-17 LAB
ANION GAP SERPL CALC-SCNC: 11 MMOL/L — SIGNIFICANT CHANGE UP (ref 5–17)
BUN SERPL-MCNC: 17 MG/DL — SIGNIFICANT CHANGE UP (ref 7–23)
CALCIUM SERPL-MCNC: 8.1 MG/DL — LOW (ref 8.4–10.5)
CHLORIDE SERPL-SCNC: 102 MMOL/L — SIGNIFICANT CHANGE UP (ref 96–108)
CO2 SERPL-SCNC: 25 MMOL/L — SIGNIFICANT CHANGE UP (ref 22–31)
CREAT SERPL-MCNC: 0.85 MG/DL — SIGNIFICANT CHANGE UP (ref 0.5–1.3)
GLUCOSE BLDC GLUCOMTR-MCNC: 166 MG/DL — HIGH (ref 70–99)
GLUCOSE BLDC GLUCOMTR-MCNC: 172 MG/DL — HIGH (ref 70–99)
GLUCOSE BLDC GLUCOMTR-MCNC: 177 MG/DL — HIGH (ref 70–99)
GLUCOSE BLDC GLUCOMTR-MCNC: 203 MG/DL — HIGH (ref 70–99)
GLUCOSE SERPL-MCNC: 172 MG/DL — HIGH (ref 70–99)
HCT VFR BLD CALC: 23.8 % — LOW (ref 39–50)
HCT VFR BLD CALC: 24.8 % — LOW (ref 39–50)
HGB BLD-MCNC: 8.3 G/DL — LOW (ref 13–17)
HGB BLD-MCNC: 8.5 G/DL — LOW (ref 13–17)
MCHC RBC-ENTMCNC: 30.7 PG — SIGNIFICANT CHANGE UP (ref 27–34)
MCHC RBC-ENTMCNC: 31.1 PG — SIGNIFICANT CHANGE UP (ref 27–34)
MCHC RBC-ENTMCNC: 34.3 GM/DL — SIGNIFICANT CHANGE UP (ref 32–36)
MCHC RBC-ENTMCNC: 34.9 GM/DL — SIGNIFICANT CHANGE UP (ref 32–36)
MCV RBC AUTO: 89.1 FL — SIGNIFICANT CHANGE UP (ref 80–100)
MCV RBC AUTO: 89.5 FL — SIGNIFICANT CHANGE UP (ref 80–100)
NRBC # BLD: 0 /100 WBCS — SIGNIFICANT CHANGE UP (ref 0–0)
NRBC # BLD: 0 /100 WBCS — SIGNIFICANT CHANGE UP (ref 0–0)
PLATELET # BLD AUTO: 113 K/UL — LOW (ref 150–400)
PLATELET # BLD AUTO: 133 K/UL — LOW (ref 150–400)
POTASSIUM SERPL-MCNC: 4.2 MMOL/L — SIGNIFICANT CHANGE UP (ref 3.5–5.3)
POTASSIUM SERPL-SCNC: 4.2 MMOL/L — SIGNIFICANT CHANGE UP (ref 3.5–5.3)
RBC # BLD: 2.67 M/UL — LOW (ref 4.2–5.8)
RBC # BLD: 2.77 M/UL — LOW (ref 4.2–5.8)
RBC # FLD: 12.7 % — SIGNIFICANT CHANGE UP (ref 10.3–14.5)
RBC # FLD: 12.9 % — SIGNIFICANT CHANGE UP (ref 10.3–14.5)
SODIUM SERPL-SCNC: 138 MMOL/L — SIGNIFICANT CHANGE UP (ref 135–145)
WBC # BLD: 22.39 K/UL — HIGH (ref 3.8–10.5)
WBC # BLD: 22.99 K/UL — HIGH (ref 3.8–10.5)
WBC # FLD AUTO: 22.39 K/UL — HIGH (ref 3.8–10.5)
WBC # FLD AUTO: 22.99 K/UL — HIGH (ref 3.8–10.5)

## 2019-11-17 PROCEDURE — 93010 ELECTROCARDIOGRAM REPORT: CPT

## 2019-11-17 PROCEDURE — 71045 X-RAY EXAM CHEST 1 VIEW: CPT | Mod: 26

## 2019-11-17 RX ORDER — POTASSIUM CHLORIDE 20 MEQ
20 PACKET (EA) ORAL DAILY
Refills: 0 | Status: DISCONTINUED | OUTPATIENT
Start: 2019-11-17 | End: 2019-11-25

## 2019-11-17 RX ORDER — FUROSEMIDE 40 MG
40 TABLET ORAL DAILY
Refills: 0 | Status: DISCONTINUED | OUTPATIENT
Start: 2019-11-17 | End: 2019-11-25

## 2019-11-17 RX ORDER — SODIUM CHLORIDE 9 MG/ML
1000 INJECTION, SOLUTION INTRAVENOUS
Refills: 0 | Status: DISCONTINUED | OUTPATIENT
Start: 2019-11-17 | End: 2019-11-25

## 2019-11-17 RX ORDER — INSULIN GLARGINE 100 [IU]/ML
12 INJECTION, SOLUTION SUBCUTANEOUS AT BEDTIME
Refills: 0 | Status: DISCONTINUED | OUTPATIENT
Start: 2019-11-17 | End: 2019-11-25

## 2019-11-17 RX ORDER — GLUCAGON INJECTION, SOLUTION 0.5 MG/.1ML
1 INJECTION, SOLUTION SUBCUTANEOUS ONCE
Refills: 0 | Status: DISCONTINUED | OUTPATIENT
Start: 2019-11-17 | End: 2019-11-25

## 2019-11-17 RX ORDER — DEXTROSE 50 % IN WATER 50 %
15 SYRINGE (ML) INTRAVENOUS ONCE
Refills: 0 | Status: DISCONTINUED | OUTPATIENT
Start: 2019-11-17 | End: 2019-11-25

## 2019-11-17 RX ORDER — INSULIN LISPRO 100/ML
4 VIAL (ML) SUBCUTANEOUS
Refills: 0 | Status: DISCONTINUED | OUTPATIENT
Start: 2019-11-17 | End: 2019-11-25

## 2019-11-17 RX ADMIN — BUDESONIDE AND FORMOTEROL FUMARATE DIHYDRATE 2 PUFF(S): 160; 4.5 AEROSOL RESPIRATORY (INHALATION) at 05:48

## 2019-11-17 RX ADMIN — PANTOPRAZOLE SODIUM 40 MILLIGRAM(S): 20 TABLET, DELAYED RELEASE ORAL at 05:47

## 2019-11-17 RX ADMIN — Medication 25 MILLIGRAM(S): at 01:04

## 2019-11-17 RX ADMIN — ENOXAPARIN SODIUM 40 MILLIGRAM(S): 100 INJECTION SUBCUTANEOUS at 08:41

## 2019-11-17 RX ADMIN — Medication 25 MILLIGRAM(S): at 13:09

## 2019-11-17 RX ADMIN — Medication 81 MILLIGRAM(S): at 08:41

## 2019-11-17 RX ADMIN — AMIODARONE HYDROCHLORIDE 400 MILLIGRAM(S): 400 TABLET ORAL at 13:09

## 2019-11-17 RX ADMIN — Medication 25 MILLIGRAM(S): at 21:55

## 2019-11-17 RX ADMIN — Medication 40 MILLIGRAM(S): at 08:37

## 2019-11-17 RX ADMIN — Medication 0.5 MILLIGRAM(S): at 05:46

## 2019-11-17 RX ADMIN — Medication 3 MILLILITER(S): at 12:15

## 2019-11-17 RX ADMIN — POLYETHYLENE GLYCOL 3350 17 GRAM(S): 17 POWDER, FOR SOLUTION ORAL at 08:41

## 2019-11-17 RX ADMIN — Medication 4: at 12:14

## 2019-11-17 RX ADMIN — ATORVASTATIN CALCIUM 40 MILLIGRAM(S): 80 TABLET, FILM COATED ORAL at 21:55

## 2019-11-17 RX ADMIN — AMIODARONE HYDROCHLORIDE 400 MILLIGRAM(S): 400 TABLET ORAL at 21:55

## 2019-11-17 RX ADMIN — Medication 2: at 08:40

## 2019-11-17 RX ADMIN — AMIODARONE HYDROCHLORIDE 400 MILLIGRAM(S): 400 TABLET ORAL at 05:46

## 2019-11-17 RX ADMIN — Medication 3 MILLILITER(S): at 01:03

## 2019-11-17 RX ADMIN — BUDESONIDE AND FORMOTEROL FUMARATE DIHYDRATE 2 PUFF(S): 160; 4.5 AEROSOL RESPIRATORY (INHALATION) at 17:01

## 2019-11-17 RX ADMIN — Medication 4 UNIT(S): at 17:07

## 2019-11-17 RX ADMIN — Medication 3 MILLILITER(S): at 05:46

## 2019-11-17 RX ADMIN — Medication 100 MILLIGRAM(S): at 01:03

## 2019-11-17 RX ADMIN — Medication 20 MILLIEQUIVALENT(S): at 08:37

## 2019-11-17 RX ADMIN — Medication 0.5 MILLIGRAM(S): at 17:01

## 2019-11-17 RX ADMIN — INSULIN GLARGINE 12 UNIT(S): 100 INJECTION, SOLUTION SUBCUTANEOUS at 21:55

## 2019-11-17 RX ADMIN — Medication 25 MILLIGRAM(S): at 05:46

## 2019-11-17 RX ADMIN — Medication 10 MILLIGRAM(S): at 05:51

## 2019-11-17 RX ADMIN — Medication 3 MILLILITER(S): at 17:01

## 2019-11-17 RX ADMIN — Medication 2: at 17:07

## 2019-11-17 NOTE — PROGRESS NOTE ADULT - SUBJECTIVE AND OBJECTIVE BOX
VITAL SIGNS-Telemetry:  afib 80s converted to SR at 3:30am 88  Vital Signs Last 24 Hrs  T(C): 36.7 (11-17-19 @ 08:15), Max: 37.6 (11-16-19 @ 16:00)  T(F): 98.1 (11-17-19 @ 08:15), Max: 99.7 (11-16-19 @ 16:00)  HR: 90 (11-17-19 @ 08:15) (71 - 108)  BP: 109/57 (11-17-19 @ 08:15) (103/56 - 152/67)  RR: 18 (11-17-19 @ 08:15) (18 - 30)  SpO2: 95% (11-17-19 @ 08:15) (93% - 100%)         11-16 @ 07:01  -  11-17 @ 07:00  --------------------------------------------------------  IN: 1023 mL / OUT: 1525 mL / NET: -502 mL    11-17 @ 07:01  -  11-17 @ 10:24  --------------------------------------------------------  IN: 480 mL / OUT: 400 mL / NET: 80 mL    Daily     Daily     CAPILLARY BLOOD GLUCOSE  POCT Blood Glucose.: 177 mg/dL (17 Nov 2019 07:59)  POCT Blood Glucose.: 249 mg/dL (16 Nov 2019 21:39)  POCT Blood Glucose.: 87 mg/dL (16 Nov 2019 17:13)    Drains:     MS         [ x ] Drainage:     80/240 total            Pacing Wires        [  ]   Settings:                                  Isolated  [  x]    PHYSICAL EXAM:  Neurology: alert and oriented x 3, nonfocal, no gross deficits  CV : S1S2  Sternal Wound :  CDI , Stable  Lungs:   Abdomen: soft, nontender, nondistended, positive bowel sounds, last bowel movement         Extremities:         acetaminophen   Tablet .. 650 milliGRAM(s) Oral every 6 hours PRN  albuterol/ipratropium for Nebulization 3 milliLiter(s) Nebulizer every 6 hours  aMIOdarone    Tablet 400 milliGRAM(s) Oral every 8 hours  aMIOdarone    Tablet   Oral   aspirin enteric coated 81 milliGRAM(s) Oral daily  atorvastatin 40 milliGRAM(s) Oral at bedtime  buDESOnide    Inhalation Suspension 0.5 milliGRAM(s) Inhalation two times a day  budesonide 160 MICROgram(s)/formoterol 4.5 MICROgram(s) Inhaler 2 Puff(s) Inhalation two times a day  chlorhexidine 2% Cloths 1 Application(s) Topical <User Schedule>  dextrose 50% Injectable 50 milliLiter(s) IV Push every 15 minutes  dextrose 50% Injectable 25 milliLiter(s) IV Push every 15 minutes  enoxaparin Injectable 40 milliGRAM(s) SubCutaneous daily  furosemide    Tablet 40 milliGRAM(s) Oral daily  insulin glargine Injectable (LANTUS) 10 Unit(s) SubCutaneous at bedtime  insulin lispro (HumaLOG) corrective regimen sliding scale   SubCutaneous three times a day before meals  insulin lispro (HumaLOG) corrective regimen sliding scale   SubCutaneous at bedtime  insulin regular Infusion 3 Unit(s)/Hr IV Continuous <Continuous>  metoprolol tartrate 25 milliGRAM(s) Oral every 8 hours  oxyCODONE    IR 5 milliGRAM(s) Oral every 6 hours PRN  pantoprazole    Tablet 40 milliGRAM(s) Oral before breakfast  polyethylene glycol 3350 17 Gram(s) Oral daily  potassium chloride    Tablet ER 20 milliEquivalent(s) Oral daily  potassium chloride  10 mEq/50 mL IVPB 10 milliEquivalent(s) IV Intermittent every 1 hour  potassium chloride  10 mEq/50 mL IVPB 10 milliEquivalent(s) IV Intermittent every 1 hour  potassium chloride  10 mEq/50 mL IVPB 10 milliEquivalent(s) IV Intermittent every 1 hour  sodium chloride 0.9%. 1000 milliLiter(s) IV Continuous <Continuous>      Physical Therapy Rec:   Home  [  ]   Home w/ PT  [  x]  Rehab  [  ]  Discussed with Cardiothoracic Team at AM rounds.

## 2019-11-17 NOTE — PROGRESS NOTE ADULT - SUBJECTIVE AND OBJECTIVE BOX
Subjective: Patient seen and examined. No new events except as noted.     SUBJECTIVE/ROS:  resting  afib overnight converted to sinus with amio       MEDICATIONS:  MEDICATIONS  (STANDING):  albuterol/ipratropium for Nebulization 3 milliLiter(s) Nebulizer every 6 hours  aMIOdarone    Tablet 400 milliGRAM(s) Oral every 8 hours  aMIOdarone    Tablet   Oral   aspirin enteric coated 81 milliGRAM(s) Oral daily  atorvastatin 40 milliGRAM(s) Oral at bedtime  buDESOnide    Inhalation Suspension 0.5 milliGRAM(s) Inhalation two times a day  budesonide 160 MICROgram(s)/formoterol 4.5 MICROgram(s) Inhaler 2 Puff(s) Inhalation two times a day  chlorhexidine 2% Cloths 1 Application(s) Topical <User Schedule>  dextrose 50% Injectable 50 milliLiter(s) IV Push every 15 minutes  dextrose 50% Injectable 25 milliLiter(s) IV Push every 15 minutes  enoxaparin Injectable 40 milliGRAM(s) SubCutaneous daily  furosemide    Tablet 40 milliGRAM(s) Oral daily  insulin glargine Injectable (LANTUS) 10 Unit(s) SubCutaneous at bedtime  insulin lispro (HumaLOG) corrective regimen sliding scale   SubCutaneous three times a day before meals  insulin lispro (HumaLOG) corrective regimen sliding scale   SubCutaneous at bedtime  insulin regular Infusion 3 Unit(s)/Hr (3 mL/Hr) IV Continuous <Continuous>  metoprolol tartrate 25 milliGRAM(s) Oral every 8 hours  pantoprazole    Tablet 40 milliGRAM(s) Oral before breakfast  polyethylene glycol 3350 17 Gram(s) Oral daily  potassium chloride    Tablet ER 20 milliEquivalent(s) Oral daily  potassium chloride  10 mEq/50 mL IVPB 10 milliEquivalent(s) IV Intermittent every 1 hour  potassium chloride  10 mEq/50 mL IVPB 10 milliEquivalent(s) IV Intermittent every 1 hour  potassium chloride  10 mEq/50 mL IVPB 10 milliEquivalent(s) IV Intermittent every 1 hour  sodium chloride 0.9%. 1000 milliLiter(s) (10 mL/Hr) IV Continuous <Continuous>      PHYSICAL EXAM:  T(C): 36.7 (11-17-19 @ 08:15), Max: 37.6 (11-16-19 @ 16:00)  HR: 90 (11-17-19 @ 08:15) (71 - 108)  BP: 109/57 (11-17-19 @ 08:15) (103/56 - 152/67)  RR: 18 (11-17-19 @ 08:15) (18 - 30)  SpO2: 95% (11-17-19 @ 08:15) (93% - 100%)  Wt(kg): --  I&O's Summary    16 Nov 2019 07:01  -  17 Nov 2019 07:00  --------------------------------------------------------  IN: 1023 mL / OUT: 1525 mL / NET: -502 mL    17 Nov 2019 07:01  -  17 Nov 2019 09:42  --------------------------------------------------------  IN: 480 mL / OUT: 400 mL / NET: 80 mL            JVP: Normal  Neck: supple  Lung: clear   CV: S1 S2 , Murmur:  Abd: soft  Ext: pos edema  neuro: Awake / alert  Psych: flat affect  Skin: normal``    LABS/DATA:    CARDIAC MARKERS:  CARDIAC MARKERS ( 15 Nov 2019 14:02 )  x     / x     / 403 U/L / x     / 20.4 ng/mL                                8.5    22.39 )-----------( 133      ( 17 Nov 2019 07:47 )             24.8     11-17    138  |  102  |  17  ----------------------------<  172<H>  4.2   |  25  |  0.85    Ca    8.1<L>      17 Nov 2019 05:42  Phos  3.3     11-16  Mg     2.5     11-16    TPro  5.3<L>  /  Alb  3.1<L>  /  TBili  0.6  /  DBili  x   /  AST  30  /  ALT  23  /  AlkPhos  42  11-16    proBNP:   Lipid Profile:   HgA1c:   TSH:     TELE:  EKG:

## 2019-11-17 NOTE — PROGRESS NOTE ADULT - ASSESSMENT
Assessment  DMT2: 74y Male with borderline diabetes as per chart, A1C 6.2% , was not on DM meds at home, now post CABG on insulin, patient is starting to eat full meals, blood sugars trending up.  Adrenal Mass: 74y Male with hx adrenal enlargements scheduled for CABG today. As per patient's documents, enlargements are benign, 2.4cm, not much change/growth between 2014 to present, followed by his endo. Aldosterone, cortisol, TFT labs within normal limits, cleared to proceed with cardiac surgery.  CAD: S/P CABG, on medications, no chest pain, stable, monitored.  HTN: Controlled,  on antihypertensive medications.  Case discussed with the NP  esteban Ortega MD  [phone]

## 2019-11-17 NOTE — PROGRESS NOTE ADULT - ASSESSMENT
74y Male PMH DM A1c 6.2, HTN, asthma, B/L Carotid a. stenosis s/p L CEA, PVD now s/p C4L  POD 1 GEORGES given 150mg Amio bolus and started on PO Amio load. Transferred to SDU POD #1  11/17/19 VSS rounds made w/ dr. agrawal- HCT down to 24 from 32- pt asymptomatic - up in weight- lasix 40mg po daily initiated - will monitor u/o. wbc 22 - inc. cdi afebrile.  l ct removed. mediastinal ct remains in place. gastric bubble -reglan. d/c planning

## 2019-11-17 NOTE — PROGRESS NOTE ADULT - PROBLEM SELECTOR PLAN 1
Will increase Lantus to 12u at bedtime.  Now that patient is eating meals, will start pre-meal Humalog 4u before each meal and continue sliding scale. Will continue monitoring FS, log, and FU.  Patient counseled for compliance with consistent low carb diet and exercise as tolerated outpatient.

## 2019-11-17 NOTE — PROGRESS NOTE ADULT - PROBLEM SELECTOR PLAN 1
s/p C4L POD 1  ASA/Statin for graft patency  On Lopressor and Amio load 2/2 GEORGES. Will uptitrate BB as BP allows  K>4, Mg>2  DVT prophylaxis  GI prophylaxis  Humalog SSI and Lantus for FSG control. Consult Endocrinology  Monitor chest tube output and d/c as per Dr. Phillips   D/c intro in AM after AM labs   Incentive spirometry, ambulate, OOB - chair  Lasix 40 IVx1 for 2+ pedal edema   Shower POD 5  Discharge planning

## 2019-11-17 NOTE — PROGRESS NOTE ADULT - ASSESSMENT
Cad   s/p cabg  cont asa, statin     post op afib  in sinus  cont amio     st changes on tele   obtain ekg  ?pericarditis     dm  Monitor finger stick. Insulin coverage.

## 2019-11-17 NOTE — PROGRESS NOTE ADULT - SUBJECTIVE AND OBJECTIVE BOX
Chief complaint  Patient is a 74y old  Male who presents with a chief complaint of sob (17 Nov 2019 10:24)   Review of systems  Patient in bed, looks comfortable, no fever, no hypoglycemia.    Labs and Fingersticks  CAPILLARY BLOOD GLUCOSE      POCT Blood Glucose.: 203 mg/dL (17 Nov 2019 11:49)  POCT Blood Glucose.: 177 mg/dL (17 Nov 2019 07:59)  POCT Blood Glucose.: 249 mg/dL (16 Nov 2019 21:39)  POCT Blood Glucose.: 87 mg/dL (16 Nov 2019 17:13)  POCT Blood Glucose.: 134 mg/dL (16 Nov 2019 15:49)  POCT Blood Glucose.: 173 mg/dL (16 Nov 2019 15:01)  POCT Blood Glucose.: 216 mg/dL (16 Nov 2019 14:09)  POCT Blood Glucose.: 220 mg/dL (16 Nov 2019 13:29)      Anion Gap, Serum: 11 (11-17 @ 05:42)  Anion Gap, Serum: 11 (11-16 @ 00:38)  Anion Gap, Serum: 10 (11-15 @ 14:02)      Calcium, Total Serum: 8.1 <L> (11-17 @ 05:42)  Calcium, Total Serum: 8.1 <L> (11-16 @ 00:38)  Calcium, Total Serum: 7.5 <L> (11-15 @ 14:02)  Albumin, Serum: 3.1 <L> (11-16 @ 00:38)  Albumin, Serum: 2.9 <L> (11-15 @ 14:02)    Alanine Aminotransferase (ALT/SGPT): 23 (11-16 @ 00:38)  Alanine Aminotransferase (ALT/SGPT): 17 (11-15 @ 14:02)  Alkaline Phosphatase, Serum: 42 (11-16 @ 00:38)  Alkaline Phosphatase, Serum: 43 (11-15 @ 14:02)  Aspartate Aminotransferase (AST/SGOT): 30 (11-16 @ 00:38)  Aspartate Aminotransferase (AST/SGOT): 28 (11-15 @ 14:02)        11-17    138  |  102  |  17  ----------------------------<  172<H>  4.2   |  25  |  0.85    Ca    8.1<L>      17 Nov 2019 05:42  Phos  3.3     11-16  Mg     2.5     11-16    TPro  5.3<L>  /  Alb  3.1<L>  /  TBili  0.6  /  DBili  x   /  AST  30  /  ALT  23  /  AlkPhos  42  11-16                        8.5    22.39 )-----------( 133      ( 17 Nov 2019 07:47 )             24.8     Medications  MEDICATIONS  (STANDING):  albuterol/ipratropium for Nebulization 3 milliLiter(s) Nebulizer every 6 hours  aMIOdarone    Tablet 400 milliGRAM(s) Oral every 8 hours  aMIOdarone    Tablet   Oral   aspirin enteric coated 81 milliGRAM(s) Oral daily  atorvastatin 40 milliGRAM(s) Oral at bedtime  buDESOnide    Inhalation Suspension 0.5 milliGRAM(s) Inhalation two times a day  budesonide 160 MICROgram(s)/formoterol 4.5 MICROgram(s) Inhaler 2 Puff(s) Inhalation two times a day  chlorhexidine 2% Cloths 1 Application(s) Topical <User Schedule>  dextrose 5%. 1000 milliLiter(s) (50 mL/Hr) IV Continuous <Continuous>  dextrose 50% Injectable 50 milliLiter(s) IV Push every 15 minutes  dextrose 50% Injectable 25 milliLiter(s) IV Push every 15 minutes  enoxaparin Injectable 40 milliGRAM(s) SubCutaneous daily  furosemide    Tablet 40 milliGRAM(s) Oral daily  insulin glargine Injectable (LANTUS) 12 Unit(s) SubCutaneous at bedtime  insulin lispro (HumaLOG) corrective regimen sliding scale   SubCutaneous three times a day before meals  insulin lispro (HumaLOG) corrective regimen sliding scale   SubCutaneous at bedtime  insulin lispro Injectable (HumaLOG) 4 Unit(s) SubCutaneous three times a day before meals  insulin regular Infusion 3 Unit(s)/Hr (3 mL/Hr) IV Continuous <Continuous>  metoprolol tartrate 25 milliGRAM(s) Oral every 8 hours  pantoprazole    Tablet 40 milliGRAM(s) Oral before breakfast  polyethylene glycol 3350 17 Gram(s) Oral daily  potassium chloride    Tablet ER 20 milliEquivalent(s) Oral daily  potassium chloride  10 mEq/50 mL IVPB 10 milliEquivalent(s) IV Intermittent every 1 hour  potassium chloride  10 mEq/50 mL IVPB 10 milliEquivalent(s) IV Intermittent every 1 hour  potassium chloride  10 mEq/50 mL IVPB 10 milliEquivalent(s) IV Intermittent every 1 hour  sodium chloride 0.9%. 1000 milliLiter(s) (10 mL/Hr) IV Continuous <Continuous>      Physical Exam  General: Patient comfortable in bed  Vital Signs Last 12 Hrs  T(F): 98.3 (11-17-19 @ 11:18), Max: 98.3 (11-17-19 @ 11:18)  HR: 82 (11-17-19 @ 11:18) (81 - 99)  BP: 126/63 (11-17-19 @ 11:18) (103/56 - 126/63)  BP(mean): --  RR: 18 (11-17-19 @ 11:18) (18 - 18)  SpO2: 96% (11-17-19 @ 11:18) (95% - 98%)  Neck: No palpable thyroid nodules.  CVS: S1S2, No murmurs  Respiratory: No wheezing, no crepitations  GI: Abdomen soft, bowel sounds positive  Musculoskeletal:  edema lower extremities.   Skin: No skin rashes, no ecchymosis    Diagnostics    Hemoglobin A1C, Whole Blood: Routine (11-13 @ 11:44)  Cortisol AM, Serum: Routine (11-13 @ 11:44)  Aldosterone, Serum: Routine (11-13 @ 11:44)  Metanephrine, Plasma: Routine (11-13 @ 11:44)  Thyroid Stimulating Hormone, Serum: AM Sched. Collection: 14-Nov-2019 06:00 (11-13 @ 11:44)  Free Thyroxine, Serum: AM Sched. Collection: 14-Nov-2019 06:00 (11-13 @ 11:44)

## 2019-11-18 LAB
ANION GAP SERPL CALC-SCNC: 11 MMOL/L — SIGNIFICANT CHANGE UP (ref 5–17)
BUN SERPL-MCNC: 16 MG/DL — SIGNIFICANT CHANGE UP (ref 7–23)
CALCIUM SERPL-MCNC: 8.1 MG/DL — LOW (ref 8.4–10.5)
CHLORIDE SERPL-SCNC: 103 MMOL/L — SIGNIFICANT CHANGE UP (ref 96–108)
CO2 SERPL-SCNC: 25 MMOL/L — SIGNIFICANT CHANGE UP (ref 22–31)
CREAT SERPL-MCNC: 0.79 MG/DL — SIGNIFICANT CHANGE UP (ref 0.5–1.3)
GLUCOSE BLDC GLUCOMTR-MCNC: 124 MG/DL — HIGH (ref 70–99)
GLUCOSE BLDC GLUCOMTR-MCNC: 135 MG/DL — HIGH (ref 70–99)
GLUCOSE BLDC GLUCOMTR-MCNC: 147 MG/DL — HIGH (ref 70–99)
GLUCOSE BLDC GLUCOMTR-MCNC: 167 MG/DL — HIGH (ref 70–99)
GLUCOSE SERPL-MCNC: 122 MG/DL — HIGH (ref 70–99)
HCT VFR BLD CALC: 23.6 % — LOW (ref 39–50)
HGB BLD-MCNC: 8.1 G/DL — LOW (ref 13–17)
MCHC RBC-ENTMCNC: 30.7 PG — SIGNIFICANT CHANGE UP (ref 27–34)
MCHC RBC-ENTMCNC: 34.3 GM/DL — SIGNIFICANT CHANGE UP (ref 32–36)
MCV RBC AUTO: 89.4 FL — SIGNIFICANT CHANGE UP (ref 80–100)
NRBC # BLD: 0 /100 WBCS — SIGNIFICANT CHANGE UP (ref 0–0)
PLATELET # BLD AUTO: 126 K/UL — LOW (ref 150–400)
POTASSIUM SERPL-MCNC: 3.9 MMOL/L — SIGNIFICANT CHANGE UP (ref 3.5–5.3)
POTASSIUM SERPL-SCNC: 3.9 MMOL/L — SIGNIFICANT CHANGE UP (ref 3.5–5.3)
RBC # BLD: 2.64 M/UL — LOW (ref 4.2–5.8)
RBC # FLD: 12.8 % — SIGNIFICANT CHANGE UP (ref 10.3–14.5)
SODIUM SERPL-SCNC: 139 MMOL/L — SIGNIFICANT CHANGE UP (ref 135–145)
WBC # BLD: 15.37 K/UL — HIGH (ref 3.8–10.5)
WBC # FLD AUTO: 15.37 K/UL — HIGH (ref 3.8–10.5)

## 2019-11-18 PROCEDURE — 71045 X-RAY EXAM CHEST 1 VIEW: CPT | Mod: 26

## 2019-11-18 RX ADMIN — OXYCODONE HYDROCHLORIDE 5 MILLIGRAM(S): 5 TABLET ORAL at 01:38

## 2019-11-18 RX ADMIN — Medication 0.5 MILLIGRAM(S): at 06:19

## 2019-11-18 RX ADMIN — Medication 1 DROP(S): at 20:07

## 2019-11-18 RX ADMIN — Medication 4 UNIT(S): at 17:16

## 2019-11-18 RX ADMIN — INSULIN GLARGINE 12 UNIT(S): 100 INJECTION, SOLUTION SUBCUTANEOUS at 22:01

## 2019-11-18 RX ADMIN — AMIODARONE HYDROCHLORIDE 400 MILLIGRAM(S): 400 TABLET ORAL at 06:18

## 2019-11-18 RX ADMIN — Medication 3 MILLILITER(S): at 11:15

## 2019-11-18 RX ADMIN — Medication 3 MILLILITER(S): at 17:19

## 2019-11-18 RX ADMIN — Medication 3 MILLILITER(S): at 23:29

## 2019-11-18 RX ADMIN — AMIODARONE HYDROCHLORIDE 400 MILLIGRAM(S): 400 TABLET ORAL at 22:01

## 2019-11-18 RX ADMIN — Medication 81 MILLIGRAM(S): at 11:15

## 2019-11-18 RX ADMIN — ENOXAPARIN SODIUM 40 MILLIGRAM(S): 100 INJECTION SUBCUTANEOUS at 11:14

## 2019-11-18 RX ADMIN — BUDESONIDE AND FORMOTEROL FUMARATE DIHYDRATE 2 PUFF(S): 160; 4.5 AEROSOL RESPIRATORY (INHALATION) at 06:19

## 2019-11-18 RX ADMIN — BUDESONIDE AND FORMOTEROL FUMARATE DIHYDRATE 2 PUFF(S): 160; 4.5 AEROSOL RESPIRATORY (INHALATION) at 17:17

## 2019-11-18 RX ADMIN — Medication 25 MILLIGRAM(S): at 06:18

## 2019-11-18 RX ADMIN — Medication 3 MILLILITER(S): at 06:18

## 2019-11-18 RX ADMIN — Medication 40 MILLIGRAM(S): at 06:19

## 2019-11-18 RX ADMIN — Medication 25 MILLIGRAM(S): at 13:07

## 2019-11-18 RX ADMIN — CHLORHEXIDINE GLUCONATE 1 APPLICATION(S): 213 SOLUTION TOPICAL at 07:45

## 2019-11-18 RX ADMIN — PANTOPRAZOLE SODIUM 40 MILLIGRAM(S): 20 TABLET, DELAYED RELEASE ORAL at 06:19

## 2019-11-18 RX ADMIN — Medication 20 MILLIEQUIVALENT(S): at 11:15

## 2019-11-18 RX ADMIN — Medication 4 UNIT(S): at 12:03

## 2019-11-18 RX ADMIN — Medication 25 MILLIGRAM(S): at 22:01

## 2019-11-18 RX ADMIN — Medication 3 MILLILITER(S): at 01:48

## 2019-11-18 RX ADMIN — AMIODARONE HYDROCHLORIDE 400 MILLIGRAM(S): 400 TABLET ORAL at 13:07

## 2019-11-18 RX ADMIN — ATORVASTATIN CALCIUM 40 MILLIGRAM(S): 80 TABLET, FILM COATED ORAL at 22:01

## 2019-11-18 RX ADMIN — Medication 2: at 17:16

## 2019-11-18 RX ADMIN — Medication 0.5 MILLIGRAM(S): at 17:17

## 2019-11-18 RX ADMIN — Medication 1 DROP(S): at 11:14

## 2019-11-18 RX ADMIN — POLYETHYLENE GLYCOL 3350 17 GRAM(S): 17 POWDER, FOR SOLUTION ORAL at 11:14

## 2019-11-18 RX ADMIN — Medication 4 UNIT(S): at 07:46

## 2019-11-18 NOTE — PROGRESS NOTE ADULT - ASSESSMENT
Assessment  DMT2: 74y Male with borderline diabetes as per chart, A1C 6.2% , was not on DM meds at home, now post CABG on basal bolus insulin, blood sugars improving and trending within acceptable range. Patient is eating meals, ambulating, appears comfortable, family by the bedside.  Adrenal Mass: 74y Male with hx adrenal enlargements scheduled for CABG today. As per patient's documents, enlargements are benign, 2.4cm, not much change/growth between 2014 to present, followed by his endo. Aldosterone, cortisol, TFT labs within normal limits, cleared to proceed with cardiac surgery.  CAD: S/P CABG, on medications, no chest pain, stable, monitored.  HTN: Controlled,  on antihypertensive medications.  Case discussed with the NP  esteban Ortega MD  [phone] Assessment  DMT2: 74y Male with borderline diabetes as per chart, A1C 6.2% , was not on DM meds at home, now post CABG on basal bolus insulin, blood sugars improving and trending within acceptable  range. Patient is eating meals, ambulating, appears comfortable, family by the bedside.  Adrenal Mass: 74y Male with hx adrenal enlargements scheduled for CABG today. As per patient's documents, enlargements are benign, 2.4cm, not much change/growth between 2014 to present, followed by his endo. Aldosterone, cortisol, TFT labs within normal limits, cleared to proceed with cardiac surgery.  CAD: S/P CABG, on medications, no chest pain, stable, monitored.  HTN: Controlled,  on antihypertensive medications.  Case discussed with the NP  esteban Ortega MD  [phone]

## 2019-11-18 NOTE — PROGRESS NOTE ADULT - SUBJECTIVE AND OBJECTIVE BOX
im ok    VITAL SIGNS    Telemetry:  nsr 84    Vital Signs Last 24 Hrs  T(C): 36.8 (19 @ 10:56), Max: 36.9 (19 @ 14:59)  T(F): 98.3 (19 @ 10:56), Max: 98.4 (19 @ 14:59)  HR: 94 (19 @ 14:00) (75 - 94)  BP: 156/84 (19 @ 14:00) (121/69 - 156/84)  RR: 18 (19 @ 10:56) (18 - 18)  SpO2: 92% (19 @ 14:00) (91% - 97%)                    @ 07:01  -   @ 07:00  --------------------------------------------------------  IN: 2400 mL / OUT: 2610 mL / NET: -210 mL     @ 07:01  -   @ 14:51  --------------------------------------------------------  IN: 600 mL / OUT: 850 mL / NET: -250 mL          Daily     Daily Weight in k.3 (2019 08:21)            CAPILLARY BLOOD GLUCOSE      POCT Blood Glucose.: 135 mg/dL (2019 11:51)  POCT Blood Glucose.: 124 mg/dL (2019 07:43)  POCT Blood Glucose.: 166 mg/dL (2019 21:27)  POCT Blood Glucose.: 172 mg/dL (2019 17:06)            Drains:     MS         [ x ] Drainage:         30        L Pleural  [  ]  Drainage:                R Pleural  [  ]  Drainage:    Pacing Wires        [  ]   Settings:                                  Isolated  [ x ]    Coumadin    [ ] YES          [ x ]      NO                             PHYSICAL EXAM        Neurology: alert and oriented x 3, nonfocal, no gross deficits  CV : s1 s2 RRR  Sternal Wound :  CDI , Stable  Lungs: cta  Abdomen: soft, nontender, nondistended, positive bowel sounds, last bowel movement                       chest tubes  :    voiding / ryan - sbd         Extremities:      edema   /  -   calve tenderness ,    L leg  /  R leg  incisions cdi          acetaminophen   Tablet .. 650 milliGRAM(s) Oral every 6 hours PRN  albuterol/ipratropium for Nebulization 3 milliLiter(s) Nebulizer every 6 hours  aMIOdarone    Tablet 400 milliGRAM(s) Oral every 8 hours  aMIOdarone    Tablet   Oral   artificial tears (preservative free) Ophthalmic Solution 1 Drop(s) Both EYES three times a day PRN  aspirin enteric coated 81 milliGRAM(s) Oral daily  atorvastatin 40 milliGRAM(s) Oral at bedtime  buDESOnide    Inhalation Suspension 0.5 milliGRAM(s) Inhalation two times a day  budesonide 160 MICROgram(s)/formoterol 4.5 MICROgram(s) Inhaler 2 Puff(s) Inhalation two times a day  chlorhexidine 2% Cloths 1 Application(s) Topical <User Schedule>  dextrose 40% Gel 15 Gram(s) Oral once PRN  dextrose 5%. 1000 milliLiter(s) IV Continuous <Continuous>  dextrose 50% Injectable 50 milliLiter(s) IV Push every 15 minutes  dextrose 50% Injectable 25 milliLiter(s) IV Push every 15 minutes  enoxaparin Injectable 40 milliGRAM(s) SubCutaneous daily  furosemide    Tablet 40 milliGRAM(s) Oral daily  glucagon  Injectable 1 milliGRAM(s) IntraMuscular once PRN  insulin glargine Injectable (LANTUS) 12 Unit(s) SubCutaneous at bedtime  insulin lispro (HumaLOG) corrective regimen sliding scale   SubCutaneous three times a day before meals  insulin lispro (HumaLOG) corrective regimen sliding scale   SubCutaneous at bedtime  insulin lispro Injectable (HumaLOG) 4 Unit(s) SubCutaneous three times a day before meals  insulin regular Infusion 3 Unit(s)/Hr IV Continuous <Continuous>  metoprolol tartrate 25 milliGRAM(s) Oral every 8 hours  oxyCODONE    IR 5 milliGRAM(s) Oral every 6 hours PRN  pantoprazole    Tablet 40 milliGRAM(s) Oral before breakfast  polyethylene glycol 3350 17 Gram(s) Oral daily  potassium chloride    Tablet ER 20 milliEquivalent(s) Oral daily  potassium chloride  10 mEq/50 mL IVPB 10 milliEquivalent(s) IV Intermittent every 1 hour  potassium chloride  10 mEq/50 mL IVPB 10 milliEquivalent(s) IV Intermittent every 1 hour  potassium chloride  10 mEq/50 mL IVPB 10 milliEquivalent(s) IV Intermittent every 1 hour  sodium chloride 0.9%. 1000 milliLiter(s) IV Continuous <Continuous>                    Physical Therapy Rec:   Home  [  ]   Home w/ PT  [  ]  Rehab  [  ]  Discussed with Cardiothoracic Team at AM rounds.

## 2019-11-18 NOTE — PROGRESS NOTE ADULT - ASSESSMENT
Cad   s/p cabg  cont asa, statin     post op afib, aflutter   in sinus  cont amio   if recurrent , start a/c once deemed safe     dm  Monitor finger stick. Insulin coverage.

## 2019-11-18 NOTE — PROGRESS NOTE ADULT - SUBJECTIVE AND OBJECTIVE BOX
Chief complaint  Patient is a 74y old  Male who presents with a chief complaint of CABG (17 Nov 2019 12:00)   Review of systems  Patient in bed, looks comfortable, no fever, no hypoglycemia.    Labs and Fingersticks  CAPILLARY BLOOD GLUCOSE      POCT Blood Glucose.: 135 mg/dL (18 Nov 2019 11:51)  POCT Blood Glucose.: 124 mg/dL (18 Nov 2019 07:43)  POCT Blood Glucose.: 166 mg/dL (17 Nov 2019 21:27)  POCT Blood Glucose.: 172 mg/dL (17 Nov 2019 17:06)      Anion Gap, Serum: 11 (11-18 @ 07:16)  Anion Gap, Serum: 11 (11-17 @ 05:42)      Calcium, Total Serum: 8.1 <L> (11-18 @ 07:16)  Calcium, Total Serum: 8.1 <L> (11-17 @ 05:42)          11-18    139  |  103  |  16  ----------------------------<  122<H>  3.9   |  25  |  0.79    Ca    8.1<L>      18 Nov 2019 07:16                          8.1    15.37 )-----------( 126      ( 18 Nov 2019 07:16 )             23.6     Medications  MEDICATIONS  (STANDING):  albuterol/ipratropium for Nebulization 3 milliLiter(s) Nebulizer every 6 hours  aMIOdarone    Tablet 400 milliGRAM(s) Oral every 8 hours  aMIOdarone    Tablet   Oral   aspirin enteric coated 81 milliGRAM(s) Oral daily  atorvastatin 40 milliGRAM(s) Oral at bedtime  buDESOnide    Inhalation Suspension 0.5 milliGRAM(s) Inhalation two times a day  budesonide 160 MICROgram(s)/formoterol 4.5 MICROgram(s) Inhaler 2 Puff(s) Inhalation two times a day  chlorhexidine 2% Cloths 1 Application(s) Topical <User Schedule>  dextrose 5%. 1000 milliLiter(s) (50 mL/Hr) IV Continuous <Continuous>  dextrose 50% Injectable 50 milliLiter(s) IV Push every 15 minutes  dextrose 50% Injectable 25 milliLiter(s) IV Push every 15 minutes  enoxaparin Injectable 40 milliGRAM(s) SubCutaneous daily  furosemide    Tablet 40 milliGRAM(s) Oral daily  insulin glargine Injectable (LANTUS) 12 Unit(s) SubCutaneous at bedtime  insulin lispro (HumaLOG) corrective regimen sliding scale   SubCutaneous three times a day before meals  insulin lispro (HumaLOG) corrective regimen sliding scale   SubCutaneous at bedtime  insulin lispro Injectable (HumaLOG) 4 Unit(s) SubCutaneous three times a day before meals  insulin regular Infusion 3 Unit(s)/Hr (3 mL/Hr) IV Continuous <Continuous>  metoprolol tartrate 25 milliGRAM(s) Oral every 8 hours  pantoprazole    Tablet 40 milliGRAM(s) Oral before breakfast  polyethylene glycol 3350 17 Gram(s) Oral daily  potassium chloride    Tablet ER 20 milliEquivalent(s) Oral daily  potassium chloride  10 mEq/50 mL IVPB 10 milliEquivalent(s) IV Intermittent every 1 hour  potassium chloride  10 mEq/50 mL IVPB 10 milliEquivalent(s) IV Intermittent every 1 hour  potassium chloride  10 mEq/50 mL IVPB 10 milliEquivalent(s) IV Intermittent every 1 hour  sodium chloride 0.9%. 1000 milliLiter(s) (10 mL/Hr) IV Continuous <Continuous>      Physical Exam  General: Patient comfortable in bed  Vital Signs Last 12 Hrs  T(F): 98.3 (11-18-19 @ 10:56), Max: 98.4 (11-18-19 @ 02:58)  HR: 94 (11-18-19 @ 14:00) (80 - 94)  BP: 156/84 (11-18-19 @ 14:00) (121/69 - 156/84)  BP(mean): 98 (11-18-19 @ 13:07) (89 - 100)  RR: 18 (11-18-19 @ 10:56) (18 - 18)  SpO2: 92% (11-18-19 @ 14:00) (91% - 96%)  Neck: No palpable thyroid nodules.  CVS: S1S2, No murmurs  Respiratory: No wheezing, no crepitations  GI: Abdomen soft, bowel sounds positive  Musculoskeletal:  edema lower extremities.   Skin: No skin rashes, no ecchymosis    Diagnostics    Hemoglobin A1C, Whole Blood: Routine (11-13 @ 11:44)  Cortisol AM, Serum: Routine (11-13 @ 11:44)  Aldosterone, Serum: Routine (11-13 @ 11:44)  Metanephrine, Plasma: Routine (11-13 @ 11:44)  Thyroid Stimulating Hormone, Serum: AM Sched. Collection: 14-Nov-2019 06:00 (11-13 @ 11:44)  Free Thyroxine, Serum: AM Sched. Collection: 14-Nov-2019 06:00 (11-13 @ 11:44) Chief complaint  Patient is a 74y old  Male who presents with a chief complaint of CABG (17 Nov 2019 12:00)   Review of systems  Patient in bed, looks comfortable, no fever,  no hypoglycemia.    Labs and Fingersticks  CAPILLARY BLOOD GLUCOSE      POCT Blood Glucose.: 135 mg/dL (18 Nov 2019 11:51)  POCT Blood Glucose.: 124 mg/dL (18 Nov 2019 07:43)  POCT Blood Glucose.: 166 mg/dL (17 Nov 2019 21:27)  POCT Blood Glucose.: 172 mg/dL (17 Nov 2019 17:06)      Anion Gap, Serum: 11 (11-18 @ 07:16)  Anion Gap, Serum: 11 (11-17 @ 05:42)      Calcium, Total Serum: 8.1 <L> (11-18 @ 07:16)  Calcium, Total Serum: 8.1 <L> (11-17 @ 05:42)          11-18    139  |  103  |  16  ----------------------------<  122<H>  3.9   |  25  |  0.79    Ca    8.1<L>      18 Nov 2019 07:16                          8.1    15.37 )-----------( 126      ( 18 Nov 2019 07:16 )             23.6     Medications  MEDICATIONS  (STANDING):  albuterol/ipratropium for Nebulization 3 milliLiter(s) Nebulizer every 6 hours  aMIOdarone    Tablet 400 milliGRAM(s) Oral every 8 hours  aMIOdarone    Tablet   Oral   aspirin enteric coated 81 milliGRAM(s) Oral daily  atorvastatin 40 milliGRAM(s) Oral at bedtime  buDESOnide    Inhalation Suspension 0.5 milliGRAM(s) Inhalation two times a day  budesonide 160 MICROgram(s)/formoterol 4.5 MICROgram(s) Inhaler 2 Puff(s) Inhalation two times a day  chlorhexidine 2% Cloths 1 Application(s) Topical <User Schedule>  dextrose 5%. 1000 milliLiter(s) (50 mL/Hr) IV Continuous <Continuous>  dextrose 50% Injectable 50 milliLiter(s) IV Push every 15 minutes  dextrose 50% Injectable 25 milliLiter(s) IV Push every 15 minutes  enoxaparin Injectable 40 milliGRAM(s) SubCutaneous daily  furosemide    Tablet 40 milliGRAM(s) Oral daily  insulin glargine Injectable (LANTUS) 12 Unit(s) SubCutaneous at bedtime  insulin lispro (HumaLOG) corrective regimen sliding scale   SubCutaneous three times a day before meals  insulin lispro (HumaLOG) corrective regimen sliding scale   SubCutaneous at bedtime  insulin lispro Injectable (HumaLOG) 4 Unit(s) SubCutaneous three times a day before meals  insulin regular Infusion 3 Unit(s)/Hr (3 mL/Hr) IV Continuous <Continuous>  metoprolol tartrate 25 milliGRAM(s) Oral every 8 hours  pantoprazole    Tablet 40 milliGRAM(s) Oral before breakfast  polyethylene glycol 3350 17 Gram(s) Oral daily  potassium chloride    Tablet ER 20 milliEquivalent(s) Oral daily  potassium chloride  10 mEq/50 mL IVPB 10 milliEquivalent(s) IV Intermittent every 1 hour  potassium chloride  10 mEq/50 mL IVPB 10 milliEquivalent(s) IV Intermittent every 1 hour  potassium chloride  10 mEq/50 mL IVPB 10 milliEquivalent(s) IV Intermittent every 1 hour  sodium chloride 0.9%. 1000 milliLiter(s) (10 mL/Hr) IV Continuous <Continuous>      Physical Exam  General: Patient comfortable in bed  Vital Signs Last 12 Hrs  T(F): 98.3 (11-18-19 @ 10:56), Max: 98.4 (11-18-19 @ 02:58)  HR: 94 (11-18-19 @ 14:00) (80 - 94)  BP: 156/84 (11-18-19 @ 14:00) (121/69 - 156/84)  BP(mean): 98 (11-18-19 @ 13:07) (89 - 100)  RR: 18 (11-18-19 @ 10:56) (18 - 18)  SpO2: 92% (11-18-19 @ 14:00) (91% - 96%)  Neck: No palpable thyroid nodules.  CVS: S1S2, No murmurs  Respiratory: No wheezing, no crepitations  GI: Abdomen soft, bowel sounds positive  Musculoskeletal:  edema lower extremities.   Skin: No skin rashes, no ecchymosis    Diagnostics    Hemoglobin A1C, Whole Blood: Routine (11-13 @ 11:44)  Cortisol AM, Serum: Routine (11-13 @ 11:44)  Aldosterone, Serum: Routine (11-13 @ 11:44)  Metanephrine, Plasma: Routine (11-13 @ 11:44)  Thyroid Stimulating Hormone, Serum: AM Sched. Collection: 14-Nov-2019 06:00 (11-13 @ 11:44)  Free Thyroxine, Serum: AM Sched. Collection: 14-Nov-2019 06:00 (11-13 @ 11:44)

## 2019-11-18 NOTE — PROGRESS NOTE ADULT - ASSESSMENT
74y Male PMH DM A1c 6.2, HTN, asthma, B/L Carotid a. stenosis s/p L CEA, PVD now s/p C4L  POD 1 GEORGES given 150mg Amio bolus and started on PO Amio load. Transferred to SDU POD #1  11/17/19 VSS rounds made w/ dr. agrawal- HCT down to 24 from 32- pt asymptomatic - up in weight- lasix 40mg po daily initiated - will monitor u/o. wbc 22 - inc. cdi afebrile.  l ct removed. mediastinal ct remains in place. gastric bubble -reglan. d/c planning  11/18 Remaining ct d/c.  Pt ambulating, no c/o abd discomfort  Paf overnight, if any further consider anticoagulation with hep/coum. Cont amio  Hct 23.4  May transfer to floor

## 2019-11-19 LAB
ANION GAP SERPL CALC-SCNC: 9 MMOL/L — SIGNIFICANT CHANGE UP (ref 5–17)
BUN SERPL-MCNC: 12 MG/DL — SIGNIFICANT CHANGE UP (ref 7–23)
CALCIUM SERPL-MCNC: 8 MG/DL — LOW (ref 8.4–10.5)
CHLORIDE SERPL-SCNC: 103 MMOL/L — SIGNIFICANT CHANGE UP (ref 96–108)
CO2 SERPL-SCNC: 24 MMOL/L — SIGNIFICANT CHANGE UP (ref 22–31)
CREAT SERPL-MCNC: 0.68 MG/DL — SIGNIFICANT CHANGE UP (ref 0.5–1.3)
GLUCOSE BLDC GLUCOMTR-MCNC: 116 MG/DL — HIGH (ref 70–99)
GLUCOSE BLDC GLUCOMTR-MCNC: 126 MG/DL — HIGH (ref 70–99)
GLUCOSE BLDC GLUCOMTR-MCNC: 133 MG/DL — HIGH (ref 70–99)
GLUCOSE BLDC GLUCOMTR-MCNC: 133 MG/DL — HIGH (ref 70–99)
GLUCOSE SERPL-MCNC: 123 MG/DL — HIGH (ref 70–99)
HCT VFR BLD CALC: 26.8 % — LOW (ref 39–50)
HGB BLD-MCNC: 9 G/DL — LOW (ref 13–17)
MCHC RBC-ENTMCNC: 30.6 PG — SIGNIFICANT CHANGE UP (ref 27–34)
MCHC RBC-ENTMCNC: 33.6 GM/DL — SIGNIFICANT CHANGE UP (ref 32–36)
MCV RBC AUTO: 91.2 FL — SIGNIFICANT CHANGE UP (ref 80–100)
METANEPHRINE, PL: 58 PG/ML — SIGNIFICANT CHANGE UP (ref 0–62)
NORMETANEPHRINE, PL: 54 PG/ML — SIGNIFICANT CHANGE UP (ref 0–145)
NRBC # BLD: 0 /100 WBCS — SIGNIFICANT CHANGE UP (ref 0–0)
PLATELET # BLD AUTO: 167 K/UL — SIGNIFICANT CHANGE UP (ref 150–400)
POTASSIUM SERPL-MCNC: 3.8 MMOL/L — SIGNIFICANT CHANGE UP (ref 3.5–5.3)
POTASSIUM SERPL-SCNC: 3.8 MMOL/L — SIGNIFICANT CHANGE UP (ref 3.5–5.3)
RBC # BLD: 2.94 M/UL — LOW (ref 4.2–5.8)
RBC # FLD: 12.9 % — SIGNIFICANT CHANGE UP (ref 10.3–14.5)
SODIUM SERPL-SCNC: 136 MMOL/L — SIGNIFICANT CHANGE UP (ref 135–145)
WBC # BLD: 14.85 K/UL — HIGH (ref 3.8–10.5)
WBC # FLD AUTO: 14.85 K/UL — HIGH (ref 3.8–10.5)

## 2019-11-19 RX ORDER — POTASSIUM BICARBONATE 978 MG/1
25 TABLET, EFFERVESCENT ORAL ONCE
Refills: 0 | Status: COMPLETED | OUTPATIENT
Start: 2019-11-19 | End: 2019-11-19

## 2019-11-19 RX ORDER — DILTIAZEM HCL 120 MG
30 CAPSULE, EXT RELEASE 24 HR ORAL EVERY 6 HOURS
Refills: 0 | Status: DISCONTINUED | OUTPATIENT
Start: 2019-11-19 | End: 2019-11-21

## 2019-11-19 RX ORDER — CHLORHEXIDINE GLUCONATE 213 G/1000ML
1 SOLUTION TOPICAL
Refills: 0 | Status: DISCONTINUED | OUTPATIENT
Start: 2019-11-19 | End: 2019-11-25

## 2019-11-19 RX ADMIN — ENOXAPARIN SODIUM 40 MILLIGRAM(S): 100 INJECTION SUBCUTANEOUS at 11:43

## 2019-11-19 RX ADMIN — Medication 3 MILLILITER(S): at 11:42

## 2019-11-19 RX ADMIN — Medication 40 MILLIGRAM(S): at 05:06

## 2019-11-19 RX ADMIN — Medication 25 MILLIGRAM(S): at 05:06

## 2019-11-19 RX ADMIN — PANTOPRAZOLE SODIUM 40 MILLIGRAM(S): 20 TABLET, DELAYED RELEASE ORAL at 05:06

## 2019-11-19 RX ADMIN — Medication 3 MILLILITER(S): at 23:07

## 2019-11-19 RX ADMIN — Medication 3 MILLILITER(S): at 05:07

## 2019-11-19 RX ADMIN — Medication 25 MILLIGRAM(S): at 14:43

## 2019-11-19 RX ADMIN — Medication 20 MILLIEQUIVALENT(S): at 11:43

## 2019-11-19 RX ADMIN — BUDESONIDE AND FORMOTEROL FUMARATE DIHYDRATE 2 PUFF(S): 160; 4.5 AEROSOL RESPIRATORY (INHALATION) at 18:19

## 2019-11-19 RX ADMIN — BUDESONIDE AND FORMOTEROL FUMARATE DIHYDRATE 2 PUFF(S): 160; 4.5 AEROSOL RESPIRATORY (INHALATION) at 05:07

## 2019-11-19 RX ADMIN — Medication 0.5 MILLIGRAM(S): at 05:07

## 2019-11-19 RX ADMIN — Medication 30 MILLIGRAM(S): at 23:07

## 2019-11-19 RX ADMIN — Medication 81 MILLIGRAM(S): at 11:42

## 2019-11-19 RX ADMIN — Medication 3 MILLILITER(S): at 18:19

## 2019-11-19 RX ADMIN — ATORVASTATIN CALCIUM 40 MILLIGRAM(S): 80 TABLET, FILM COATED ORAL at 21:10

## 2019-11-19 RX ADMIN — AMIODARONE HYDROCHLORIDE 400 MILLIGRAM(S): 400 TABLET ORAL at 05:07

## 2019-11-19 RX ADMIN — Medication 4 UNIT(S): at 18:19

## 2019-11-19 RX ADMIN — AMIODARONE HYDROCHLORIDE 400 MILLIGRAM(S): 400 TABLET ORAL at 21:10

## 2019-11-19 RX ADMIN — POTASSIUM BICARBONATE 25 MILLIEQUIVALENT(S): 978 TABLET, EFFERVESCENT ORAL at 09:32

## 2019-11-19 RX ADMIN — CHLORHEXIDINE GLUCONATE 1 APPLICATION(S): 213 SOLUTION TOPICAL at 05:44

## 2019-11-19 RX ADMIN — AMIODARONE HYDROCHLORIDE 400 MILLIGRAM(S): 400 TABLET ORAL at 14:43

## 2019-11-19 RX ADMIN — Medication 4 UNIT(S): at 08:49

## 2019-11-19 RX ADMIN — INSULIN GLARGINE 12 UNIT(S): 100 INJECTION, SOLUTION SUBCUTANEOUS at 21:54

## 2019-11-19 RX ADMIN — Medication 4 UNIT(S): at 11:52

## 2019-11-19 NOTE — PROGRESS NOTE ADULT - ASSESSMENT
Cad   s/p cabg  cont asa, statin     post op afib, aflutter   in sinus  cont amio   if recurrent , start a/c once deemed safe     dm  Monitor finger stick. Insulin coverage.     dc planning as per primary team

## 2019-11-19 NOTE — PROGRESS NOTE ADULT - ASSESSMENT
74y Male PMH DM A1c 6.2, HTN, asthma, B/L Carotid a. stenosis s/p L CEA, PVD now s/p C4L  POD 1 GEORGES given 150mg Amio bolus and started on PO Amio load. Transferred to SDU POD #1  11/17/19 VSS rounds made w/ dr. agrawal- HCT down to 24 from 32- pt asymptomatic - up in weight- lasix 40mg po daily initiated - will monitor u/o. wbc 22 - inc. cdi afebrile.  l ct removed. mediastinal ct remains in place. gastric bubble -reglan. d/c planning  11/18 Remaining ct d/c.  Pt ambulating, no c/o abd discomfort  Paf overnight, if any further consider anticoagulation with hep/coum. Cont amio  Hct 23.4  May transfer to floor  11/19 to d/c pw, increase ambulation.  Cont amio.  D/c pw, may transfer to floor

## 2019-11-19 NOTE — PROGRESS NOTE ADULT - ASSESSMENT
Assessment  DMT2: 74y Male with prediabetes, A1C 6.2% , was not on DM meds at home, now post CABG on low-dose basal bolus insulin, blood sugars improved and trending within acceptable  range. Patient is eating meals, ambulating, appears comfortable, family by the bedside.  Adrenal Mass: 74y Male with hx adrenal enlargements scheduled for CABG today. As per patient's documents, enlargements are benign, 2.4cm, not much change/growth between 2014 to present, followed by his endo. Aldosterone, cortisol, TFT labs within normal limits, cleared to proceed with cardiac surgery.  CAD: S/P CABG, on medications, no chest pain, stable, monitored.  HTN: Controlled,  on antihypertensive medications.  Case discussed with the NP  esteban Ortega MD  [phone] Assessment  DMT2: 74y Male with prediabetes, A1C 6.2% , was not on DM meds at home, now post CABG on low-dose basal bolus insulin, blood sugars improved and trending within acceptable range. Patient is eating meals, ambulating, appears comfortable, family by the bedside.  Adrenal Mass: 74y Male with hx adrenal enlargements scheduled for CABG today. As per patient's documents, enlargements are benign, 2.4cm, not much change/growth between 2014 to present, followed by his endo. Aldosterone, cortisol, TFT labs within normal limits, cleared to proceed with cardiac surgery.  CAD: S/P CABG, on medications, no chest pain, stable, monitored.  HTN: Controlled,  on antihypertensive medications.  Case discussed with the NP  esteban Ortega MD  [phone]

## 2019-11-19 NOTE — PROGRESS NOTE ADULT - SUBJECTIVE AND OBJECTIVE BOX
Subjective: Patient seen and examined. No new events except as noted.     SUBJECTIVE/ROS:  feels well  No chest pain, dyspnea, palpitation, or dizziness.       MEDICATIONS:  MEDICATIONS  (STANDING):  albuterol/ipratropium for Nebulization 3 milliLiter(s) Nebulizer every 6 hours  aMIOdarone    Tablet 400 milliGRAM(s) Oral every 8 hours  aMIOdarone    Tablet   Oral   aspirin enteric coated 81 milliGRAM(s) Oral daily  atorvastatin 40 milliGRAM(s) Oral at bedtime  buDESOnide    Inhalation Suspension 0.5 milliGRAM(s) Inhalation two times a day  budesonide 160 MICROgram(s)/formoterol 4.5 MICROgram(s) Inhaler 2 Puff(s) Inhalation two times a day  chlorhexidine 2% Cloths 1 Application(s) Topical <User Schedule>  dextrose 5%. 1000 milliLiter(s) (50 mL/Hr) IV Continuous <Continuous>  dextrose 50% Injectable 50 milliLiter(s) IV Push every 15 minutes  dextrose 50% Injectable 25 milliLiter(s) IV Push every 15 minutes  enoxaparin Injectable 40 milliGRAM(s) SubCutaneous daily  furosemide    Tablet 40 milliGRAM(s) Oral daily  insulin glargine Injectable (LANTUS) 12 Unit(s) SubCutaneous at bedtime  insulin lispro (HumaLOG) corrective regimen sliding scale   SubCutaneous three times a day before meals  insulin lispro (HumaLOG) corrective regimen sliding scale   SubCutaneous at bedtime  insulin lispro Injectable (HumaLOG) 4 Unit(s) SubCutaneous three times a day before meals  insulin regular Infusion 3 Unit(s)/Hr (3 mL/Hr) IV Continuous <Continuous>  metoprolol tartrate 25 milliGRAM(s) Oral every 8 hours  pantoprazole    Tablet 40 milliGRAM(s) Oral before breakfast  polyethylene glycol 3350 17 Gram(s) Oral daily  potassium chloride    Tablet ER 20 milliEquivalent(s) Oral daily  potassium chloride  10 mEq/50 mL IVPB 10 milliEquivalent(s) IV Intermittent every 1 hour  potassium chloride  10 mEq/50 mL IVPB 10 milliEquivalent(s) IV Intermittent every 1 hour  potassium chloride  10 mEq/50 mL IVPB 10 milliEquivalent(s) IV Intermittent every 1 hour  sodium chloride 0.9%. 1000 milliLiter(s) (10 mL/Hr) IV Continuous <Continuous>      PHYSICAL EXAM:  T(C): 36.4 (11-19-19 @ 07:01), Max: 37.1 (11-18-19 @ 19:23)  HR: 76 (11-19-19 @ 07:01) (76 - 94)  BP: 119/65 (11-19-19 @ 07:01) (119/65 - 156/84)  RR: 18 (11-19-19 @ 07:01) (18 - 18)  SpO2: 94% (11-19-19 @ 07:01) (91% - 97%)  Wt(kg): --  I&O's Summary    18 Nov 2019 07:01  -  19 Nov 2019 07:00  --------------------------------------------------------  IN: 1380 mL / OUT: 1850 mL / NET: -470 mL    19 Nov 2019 07:01  -  19 Nov 2019 08:37  --------------------------------------------------------  IN: 0 mL / OUT: 500 mL / NET: -500 mL            JVP: Normal  Neck: supple  Lung: clear   CV: S1 S2 , Murmur:  Abd: soft  Ext: No edema  neuro: Awake / alert  Psych: flat affect  Skin: normal``    LABS/DATA:    CARDIAC MARKERS:                                9.0    14.85 )-----------( 167      ( 19 Nov 2019 05:40 )             26.8     11-19    136  |  103  |  12  ----------------------------<  123<H>  3.8   |  24  |  0.68    Ca    8.0<L>      19 Nov 2019 05:39      proBNP:   Lipid Profile:   HgA1c:   TSH:     TELE:  EKG:

## 2019-11-19 NOTE — PROGRESS NOTE ADULT - SUBJECTIVE AND OBJECTIVE BOX
im ok    VITAL SIGNS    Telemetry:  nsr 77  Vital Signs Last 24 Hrs  T(C): 37 (19 @ 11:10), Max: 37.1 (19 @ 19:23)  T(F): 98.6 (19 @ 11:10), Max: 98.7 (19 @ 19:23)  HR: 79 (19 11:10) (76 - 94)  BP: 128/64 (19 11:10) (119/65 - 156/84)  RR: 18 (19 @ 11:10) (18 - 18)  SpO2: 94% (19 @ 11:10) (92% - 97%)                       9.0<L>                x    | x    | x            14.85<H> >-----------< 167     ------------------------< x                     26.8<L>                x    | x    | x                                                                         Ca x     Mg x     Ph x        ,             x                    136  | 24   | 12           x     >-----------< x       ------------------------< 123<H>                 x                    3.8  | 103  | 0.68                                                                      Ca 8.0<L> Mg x     Ph x                19 @ 07:01  -  19 @ 07:00  --------------------------------------------------------  IN: 1380 mL / OUT: 1850 mL / NET: -470 mL    19 @ 07:01  -  19 @ 13:12  --------------------------------------------------------  IN: 630 mL / OUT: 1100 mL / NET: -470 mL    11-16 @ 00:38  PT13.6 INR1.19  PTT27.3  11-15 @ 14:02  PT13.7 INR1.20  PTT33.5      Daily     Daily Weight in k.9 (2019 05:06)        CAPILLARY BLOOD GLUCOSE      POCT Blood Glucose.: 116 mg/dL (2019 11:47)  POCT Blood Glucose.: 126 mg/dL (2019 07:43)  POCT Blood Glucose.: 147 mg/dL (2019 21:44)  POCT Blood Glucose.: 167 mg/dL (2019 17:13)                Coumadin    [ ] YES          [ x ]      NO                                   PHYSICAL EXAM        Neurology: alert and oriented x 3, nonfocal, no gross deficits  CV : .S1S2 RRR  Sternal Wound :  CDI , Stable  Pacing Wires        [ x]   Settings:      vvi                            Isolated  [  ]  Lungs: bibasilar crackles   Abdomen: soft, nontender, nondistended, positive bowel sounds, last bowel movement +  :         voiding    Extremities:     trace__ edema, -___calf tenderness.                            bilat__svg site cdi          acetaminophen   Tablet .. 650 milliGRAM(s) Oral every 6 hours PRN  albuterol/ipratropium for Nebulization 3 milliLiter(s) Nebulizer every 6 hours  aMIOdarone    Tablet 400 milliGRAM(s) Oral every 8 hours  aMIOdarone    Tablet   Oral   artificial tears (preservative free) Ophthalmic Solution 1 Drop(s) Both EYES three times a day PRN  aspirin enteric coated 81 milliGRAM(s) Oral daily  atorvastatin 40 milliGRAM(s) Oral at bedtime  buDESOnide    Inhalation Suspension 0.5 milliGRAM(s) Inhalation two times a day  budesonide 160 MICROgram(s)/formoterol 4.5 MICROgram(s) Inhaler 2 Puff(s) Inhalation two times a day  chlorhexidine 2% Cloths 1 Application(s) Topical <User Schedule>  dextrose 40% Gel 15 Gram(s) Oral once PRN  dextrose 5%. 1000 milliLiter(s) IV Continuous <Continuous>  dextrose 50% Injectable 50 milliLiter(s) IV Push every 15 minutes  dextrose 50% Injectable 25 milliLiter(s) IV Push every 15 minutes  enoxaparin Injectable 40 milliGRAM(s) SubCutaneous daily  furosemide    Tablet 40 milliGRAM(s) Oral daily  glucagon  Injectable 1 milliGRAM(s) IntraMuscular once PRN  insulin glargine Injectable (LANTUS) 12 Unit(s) SubCutaneous at bedtime  insulin lispro (HumaLOG) corrective regimen sliding scale   SubCutaneous three times a day before meals  insulin lispro (HumaLOG) corrective regimen sliding scale   SubCutaneous at bedtime  insulin lispro Injectable (HumaLOG) 4 Unit(s) SubCutaneous three times a day before meals  insulin regular Infusion 3 Unit(s)/Hr IV Continuous <Continuous>  metoprolol tartrate 25 milliGRAM(s) Oral every 8 hours  oxyCODONE    IR 5 milliGRAM(s) Oral every 6 hours PRN  pantoprazole    Tablet 40 milliGRAM(s) Oral before breakfast  polyethylene glycol 3350 17 Gram(s) Oral daily  potassium chloride    Tablet ER 20 milliEquivalent(s) Oral daily  potassium chloride  10 mEq/50 mL IVPB 10 milliEquivalent(s) IV Intermittent every 1 hour  potassium chloride  10 mEq/50 mL IVPB 10 milliEquivalent(s) IV Intermittent every 1 hour  potassium chloride  10 mEq/50 mL IVPB 10 milliEquivalent(s) IV Intermittent every 1 hour  sodium chloride 0.9%. 1000 milliLiter(s) IV Continuous <Continuous>                    Physical Therapy Rec:   Home  [  ]   Home w/ PT  [  ]  Rehab  [  ]  Discussed with Cardiothoracic Team at AM rounds.

## 2019-11-19 NOTE — PROGRESS NOTE ADULT - SUBJECTIVE AND OBJECTIVE BOX
Referral in progress.   Chief complaint  Patient is a 74y old  Male who presents with a chief complaint of CABG (17 Nov 2019 12:00)   Review of systems  Patient in bed, looks comfortable, no fever, no hypoglycemia.    Labs and Fingersticks  CAPILLARY BLOOD GLUCOSE      POCT Blood Glucose.: 116 mg/dL (19 Nov 2019 11:47)  POCT Blood Glucose.: 126 mg/dL (19 Nov 2019 07:43)  POCT Blood Glucose.: 147 mg/dL (18 Nov 2019 21:44)  POCT Blood Glucose.: 167 mg/dL (18 Nov 2019 17:13)      Anion Gap, Serum: 9 (11-19 @ 05:39)  Anion Gap, Serum: 11 (11-18 @ 07:16)      Calcium, Total Serum: 8.0 <L> (11-19 @ 05:39)  Calcium, Total Serum: 8.1 <L> (11-18 @ 07:16)          11-19    136  |  103  |  12  ----------------------------<  123<H>  3.8   |  24  |  0.68    Ca    8.0<L>      19 Nov 2019 05:39                          9.0    14.85 )-----------( 167      ( 19 Nov 2019 05:40 )             26.8     Medications  MEDICATIONS  (STANDING):  albuterol/ipratropium for Nebulization 3 milliLiter(s) Nebulizer every 6 hours  aMIOdarone    Tablet 400 milliGRAM(s) Oral every 8 hours  aMIOdarone    Tablet   Oral   aspirin enteric coated 81 milliGRAM(s) Oral daily  atorvastatin 40 milliGRAM(s) Oral at bedtime  buDESOnide    Inhalation Suspension 0.5 milliGRAM(s) Inhalation two times a day  budesonide 160 MICROgram(s)/formoterol 4.5 MICROgram(s) Inhaler 2 Puff(s) Inhalation two times a day  chlorhexidine 2% Cloths 1 Application(s) Topical <User Schedule>  chlorhexidine 2% Cloths 1 Application(s) Topical <User Schedule>  dextrose 5%. 1000 milliLiter(s) (50 mL/Hr) IV Continuous <Continuous>  dextrose 50% Injectable 50 milliLiter(s) IV Push every 15 minutes  dextrose 50% Injectable 25 milliLiter(s) IV Push every 15 minutes  enoxaparin Injectable 40 milliGRAM(s) SubCutaneous daily  furosemide    Tablet 40 milliGRAM(s) Oral daily  insulin glargine Injectable (LANTUS) 12 Unit(s) SubCutaneous at bedtime  insulin lispro (HumaLOG) corrective regimen sliding scale   SubCutaneous three times a day before meals  insulin lispro (HumaLOG) corrective regimen sliding scale   SubCutaneous at bedtime  insulin lispro Injectable (HumaLOG) 4 Unit(s) SubCutaneous three times a day before meals  insulin regular Infusion 3 Unit(s)/Hr (3 mL/Hr) IV Continuous <Continuous>  metoprolol tartrate 25 milliGRAM(s) Oral every 8 hours  pantoprazole    Tablet 40 milliGRAM(s) Oral before breakfast  polyethylene glycol 3350 17 Gram(s) Oral daily  potassium chloride    Tablet ER 20 milliEquivalent(s) Oral daily  potassium chloride  10 mEq/50 mL IVPB 10 milliEquivalent(s) IV Intermittent every 1 hour  potassium chloride  10 mEq/50 mL IVPB 10 milliEquivalent(s) IV Intermittent every 1 hour  potassium chloride  10 mEq/50 mL IVPB 10 milliEquivalent(s) IV Intermittent every 1 hour  sodium chloride 0.9%. 1000 milliLiter(s) (10 mL/Hr) IV Continuous <Continuous>      Physical Exam  General: Patient comfortable in bed  Vital Signs Last 12 Hrs  T(F): 98.3 (11-19-19 @ 15:05), Max: 98.6 (11-19-19 @ 11:10)  HR: 88 (11-19-19 @ 15:05) (76 - 92)  BP: 105/57 (11-19-19 @ 15:05) (105/57 - 142/72)  BP(mean): 76 (11-19-19 @ 15:05) (76 - 100)  RR: 18 (11-19-19 @ 11:10) (18 - 18)  SpO2: 95% (11-19-19 @ 15:05) (92% - 95%)  Neck: No palpable thyroid nodules.  CVS: S1S2, No murmurs  Respiratory: No wheezing, no crepitations  GI: Abdomen soft, bowel sounds positive  Musculoskeletal:  edema lower extremities.   Skin: No skin rashes, no ecchymosis    Diagnostics    Hemoglobin A1C, Whole Blood: Routine (11-13 @ 11:44)  Cortisol AM, Serum: Routine (11-13 @ 11:44)  Aldosterone, Serum: Routine (11-13 @ 11:44)  Metanephrine, Plasma: Routine (11-13 @ 11:44)  Thyroid Stimulating Hormone, Serum: AM Sched. Collection: 14-Nov-2019 06:00 (11-13 @ 11:44)  Free Thyroxine, Serum: AM Sched. Collection: 14-Nov-2019 06:00 (11-13 @ 11:44) Chief complaint  Patient is a 74y old  Male who presents with a chief complaint of CABG (17 Nov 2019 12:00)   Review of systems  Patient in bed, looks comfortable, no fever,  no hypoglycemia.    Labs and Fingersticks  CAPILLARY BLOOD GLUCOSE      POCT Blood Glucose.: 116 mg/dL (19 Nov 2019 11:47)  POCT Blood Glucose.: 126 mg/dL (19 Nov 2019 07:43)  POCT Blood Glucose.: 147 mg/dL (18 Nov 2019 21:44)  POCT Blood Glucose.: 167 mg/dL (18 Nov 2019 17:13)      Anion Gap, Serum: 9 (11-19 @ 05:39)  Anion Gap, Serum: 11 (11-18 @ 07:16)      Calcium, Total Serum: 8.0 <L> (11-19 @ 05:39)  Calcium, Total Serum: 8.1 <L> (11-18 @ 07:16)          11-19    136  |  103  |  12  ----------------------------<  123<H>  3.8   |  24  |  0.68    Ca    8.0<L>      19 Nov 2019 05:39                          9.0    14.85 )-----------( 167      ( 19 Nov 2019 05:40 )             26.8     Medications  MEDICATIONS  (STANDING):  albuterol/ipratropium for Nebulization 3 milliLiter(s) Nebulizer every 6 hours  aMIOdarone    Tablet 400 milliGRAM(s) Oral every 8 hours  aMIOdarone    Tablet   Oral   aspirin enteric coated 81 milliGRAM(s) Oral daily  atorvastatin 40 milliGRAM(s) Oral at bedtime  buDESOnide    Inhalation Suspension 0.5 milliGRAM(s) Inhalation two times a day  budesonide 160 MICROgram(s)/formoterol 4.5 MICROgram(s) Inhaler 2 Puff(s) Inhalation two times a day  chlorhexidine 2% Cloths 1 Application(s) Topical <User Schedule>  chlorhexidine 2% Cloths 1 Application(s) Topical <User Schedule>  dextrose 5%. 1000 milliLiter(s) (50 mL/Hr) IV Continuous <Continuous>  dextrose 50% Injectable 50 milliLiter(s) IV Push every 15 minutes  dextrose 50% Injectable 25 milliLiter(s) IV Push every 15 minutes  enoxaparin Injectable 40 milliGRAM(s) SubCutaneous daily  furosemide    Tablet 40 milliGRAM(s) Oral daily  insulin glargine Injectable (LANTUS) 12 Unit(s) SubCutaneous at bedtime  insulin lispro (HumaLOG) corrective regimen sliding scale   SubCutaneous three times a day before meals  insulin lispro (HumaLOG) corrective regimen sliding scale   SubCutaneous at bedtime  insulin lispro Injectable (HumaLOG) 4 Unit(s) SubCutaneous three times a day before meals  insulin regular Infusion 3 Unit(s)/Hr (3 mL/Hr) IV Continuous <Continuous>  metoprolol tartrate 25 milliGRAM(s) Oral every 8 hours  pantoprazole    Tablet 40 milliGRAM(s) Oral before breakfast  polyethylene glycol 3350 17 Gram(s) Oral daily  potassium chloride    Tablet ER 20 milliEquivalent(s) Oral daily  potassium chloride  10 mEq/50 mL IVPB 10 milliEquivalent(s) IV Intermittent every 1 hour  potassium chloride  10 mEq/50 mL IVPB 10 milliEquivalent(s) IV Intermittent every 1 hour  potassium chloride  10 mEq/50 mL IVPB 10 milliEquivalent(s) IV Intermittent every 1 hour  sodium chloride 0.9%. 1000 milliLiter(s) (10 mL/Hr) IV Continuous <Continuous>      Physical Exam  General: Patient comfortable in bed  Vital Signs Last 12 Hrs  T(F): 98.3 (11-19-19 @ 15:05), Max: 98.6 (11-19-19 @ 11:10)  HR: 88 (11-19-19 @ 15:05) (76 - 92)  BP: 105/57 (11-19-19 @ 15:05) (105/57 - 142/72)  BP(mean): 76 (11-19-19 @ 15:05) (76 - 100)  RR: 18 (11-19-19 @ 11:10) (18 - 18)  SpO2: 95% (11-19-19 @ 15:05) (92% - 95%)  Neck: No palpable thyroid nodules.  CVS: S1S2, No murmurs  Respiratory: No wheezing, no crepitations  GI: Abdomen soft, bowel sounds positive  Musculoskeletal:  edema lower extremities.   Skin: No skin rashes, no ecchymosis    Diagnostics    Hemoglobin A1C, Whole Blood: Routine (11-13 @ 11:44)  Cortisol AM, Serum: Routine (11-13 @ 11:44)  Aldosterone, Serum: Routine (11-13 @ 11:44)  Metanephrine, Plasma: Routine (11-13 @ 11:44)  Thyroid Stimulating Hormone, Serum: AM Sched. Collection: 14-Nov-2019 06:00 (11-13 @ 11:44)  Free Thyroxine, Serum: AM Sched. Collection: 14-Nov-2019 06:00 (11-13 @ 11:44)

## 2019-11-20 LAB
ALBUMIN SERPL ELPH-MCNC: 3.2 G/DL — LOW (ref 3.3–5)
ALP SERPL-CCNC: 52 U/L — SIGNIFICANT CHANGE UP (ref 40–120)
ALT FLD-CCNC: 30 U/L — SIGNIFICANT CHANGE UP (ref 10–45)
ANION GAP SERPL CALC-SCNC: 13 MMOL/L — SIGNIFICANT CHANGE UP (ref 5–17)
AST SERPL-CCNC: 16 U/L — SIGNIFICANT CHANGE UP (ref 10–40)
BILIRUB SERPL-MCNC: 1 MG/DL — SIGNIFICANT CHANGE UP (ref 0.2–1.2)
BUN SERPL-MCNC: 15 MG/DL — SIGNIFICANT CHANGE UP (ref 7–23)
CALCIUM SERPL-MCNC: 8.3 MG/DL — LOW (ref 8.4–10.5)
CHLORIDE SERPL-SCNC: 100 MMOL/L — SIGNIFICANT CHANGE UP (ref 96–108)
CO2 SERPL-SCNC: 27 MMOL/L — SIGNIFICANT CHANGE UP (ref 22–31)
CREAT SERPL-MCNC: 0.96 MG/DL — SIGNIFICANT CHANGE UP (ref 0.5–1.3)
GLUCOSE BLDC GLUCOMTR-MCNC: 108 MG/DL — HIGH (ref 70–99)
GLUCOSE BLDC GLUCOMTR-MCNC: 133 MG/DL — HIGH (ref 70–99)
GLUCOSE BLDC GLUCOMTR-MCNC: 138 MG/DL — HIGH (ref 70–99)
GLUCOSE BLDC GLUCOMTR-MCNC: 139 MG/DL — HIGH (ref 70–99)
GLUCOSE SERPL-MCNC: 110 MG/DL — HIGH (ref 70–99)
HCT VFR BLD CALC: 27.1 % — LOW (ref 39–50)
HGB BLD-MCNC: 8.8 G/DL — LOW (ref 13–17)
MCHC RBC-ENTMCNC: 29.8 PG — SIGNIFICANT CHANGE UP (ref 27–34)
MCHC RBC-ENTMCNC: 32.5 GM/DL — SIGNIFICANT CHANGE UP (ref 32–36)
MCV RBC AUTO: 91.9 FL — SIGNIFICANT CHANGE UP (ref 80–100)
NRBC # BLD: 0 /100 WBCS — SIGNIFICANT CHANGE UP (ref 0–0)
PLATELET # BLD AUTO: 196 K/UL — SIGNIFICANT CHANGE UP (ref 150–400)
POTASSIUM SERPL-MCNC: 3.6 MMOL/L — SIGNIFICANT CHANGE UP (ref 3.5–5.3)
POTASSIUM SERPL-SCNC: 3.6 MMOL/L — SIGNIFICANT CHANGE UP (ref 3.5–5.3)
PROT SERPL-MCNC: 6 G/DL — SIGNIFICANT CHANGE UP (ref 6–8.3)
RBC # BLD: 2.95 M/UL — LOW (ref 4.2–5.8)
RBC # FLD: 12.8 % — SIGNIFICANT CHANGE UP (ref 10.3–14.5)
SODIUM SERPL-SCNC: 140 MMOL/L — SIGNIFICANT CHANGE UP (ref 135–145)
WBC # BLD: 10.27 K/UL — SIGNIFICANT CHANGE UP (ref 3.8–10.5)
WBC # FLD AUTO: 10.27 K/UL — SIGNIFICANT CHANGE UP (ref 3.8–10.5)

## 2019-11-20 RX ORDER — POTASSIUM BICARBONATE 978 MG/1
25 TABLET, EFFERVESCENT ORAL
Refills: 0 | Status: COMPLETED | OUTPATIENT
Start: 2019-11-20 | End: 2019-11-20

## 2019-11-20 RX ADMIN — Medication 4 UNIT(S): at 12:25

## 2019-11-20 RX ADMIN — PANTOPRAZOLE SODIUM 40 MILLIGRAM(S): 20 TABLET, DELAYED RELEASE ORAL at 06:10

## 2019-11-20 RX ADMIN — OXYCODONE HYDROCHLORIDE 5 MILLIGRAM(S): 5 TABLET ORAL at 08:48

## 2019-11-20 RX ADMIN — Medication 40 MILLIGRAM(S): at 06:10

## 2019-11-20 RX ADMIN — Medication 30 MILLIGRAM(S): at 17:22

## 2019-11-20 RX ADMIN — CHLORHEXIDINE GLUCONATE 1 APPLICATION(S): 213 SOLUTION TOPICAL at 06:28

## 2019-11-20 RX ADMIN — POTASSIUM BICARBONATE 25 MILLIEQUIVALENT(S): 978 TABLET, EFFERVESCENT ORAL at 17:24

## 2019-11-20 RX ADMIN — AMIODARONE HYDROCHLORIDE 400 MILLIGRAM(S): 400 TABLET ORAL at 06:09

## 2019-11-20 RX ADMIN — ENOXAPARIN SODIUM 40 MILLIGRAM(S): 100 INJECTION SUBCUTANEOUS at 11:21

## 2019-11-20 RX ADMIN — Medication 4 UNIT(S): at 18:19

## 2019-11-20 RX ADMIN — BUDESONIDE AND FORMOTEROL FUMARATE DIHYDRATE 2 PUFF(S): 160; 4.5 AEROSOL RESPIRATORY (INHALATION) at 17:27

## 2019-11-20 RX ADMIN — Medication 0.5 MILLIGRAM(S): at 17:24

## 2019-11-20 RX ADMIN — Medication 30 MILLIGRAM(S): at 23:30

## 2019-11-20 RX ADMIN — Medication 0.5 MILLIGRAM(S): at 06:12

## 2019-11-20 RX ADMIN — Medication 4 UNIT(S): at 08:00

## 2019-11-20 RX ADMIN — Medication 30 MILLIGRAM(S): at 06:10

## 2019-11-20 RX ADMIN — Medication 30 MILLIGRAM(S): at 11:21

## 2019-11-20 RX ADMIN — Medication 81 MILLIGRAM(S): at 11:21

## 2019-11-20 RX ADMIN — POTASSIUM BICARBONATE 25 MILLIEQUIVALENT(S): 978 TABLET, EFFERVESCENT ORAL at 15:10

## 2019-11-20 RX ADMIN — ATORVASTATIN CALCIUM 40 MILLIGRAM(S): 80 TABLET, FILM COATED ORAL at 21:53

## 2019-11-20 RX ADMIN — Medication 3 MILLILITER(S): at 23:30

## 2019-11-20 RX ADMIN — OXYCODONE HYDROCHLORIDE 5 MILLIGRAM(S): 5 TABLET ORAL at 09:20

## 2019-11-20 RX ADMIN — Medication 20 MILLIEQUIVALENT(S): at 11:21

## 2019-11-20 RX ADMIN — BUDESONIDE AND FORMOTEROL FUMARATE DIHYDRATE 2 PUFF(S): 160; 4.5 AEROSOL RESPIRATORY (INHALATION) at 06:23

## 2019-11-20 RX ADMIN — INSULIN GLARGINE 12 UNIT(S): 100 INJECTION, SOLUTION SUBCUTANEOUS at 21:52

## 2019-11-20 RX ADMIN — Medication 3 MILLILITER(S): at 17:22

## 2019-11-20 RX ADMIN — Medication 3 MILLILITER(S): at 11:25

## 2019-11-20 RX ADMIN — Medication 3 MILLILITER(S): at 06:12

## 2019-11-20 RX ADMIN — AMIODARONE HYDROCHLORIDE 400 MILLIGRAM(S): 400 TABLET ORAL at 13:04

## 2019-11-20 NOTE — PROGRESS NOTE ADULT - SUBJECTIVE AND OBJECTIVE BOX
Chief complaint  Patient is a 74y old  Male who presents with a chief complaint of CABG (17 Nov 2019 12:00)   Review of systems  Patient in bed, looks comfortable, no fever, no hypoglycemia.    Labs and Fingersticks  CAPILLARY BLOOD GLUCOSE      POCT Blood Glucose.: 133 mg/dL (20 Nov 2019 12:21)  POCT Blood Glucose.: 108 mg/dL (20 Nov 2019 07:57)  POCT Blood Glucose.: 133 mg/dL (19 Nov 2019 21:37)  POCT Blood Glucose.: 133 mg/dL (19 Nov 2019 16:51)      Anion Gap, Serum: 13 (11-20 @ 06:44)  Anion Gap, Serum: 9 (11-19 @ 05:39)      Calcium, Total Serum: 8.3 <L> (11-20 @ 06:44)  Calcium, Total Serum: 8.0 <L> (11-19 @ 05:39)  Albumin, Serum: 3.2 <L> (11-20 @ 06:44)    Alanine Aminotransferase (ALT/SGPT): 30 (11-20 @ 06:44)  Alkaline Phosphatase, Serum: 52 (11-20 @ 06:44)  Aspartate Aminotransferase (AST/SGOT): 16 (11-20 @ 06:44)        11-20    140  |  100  |  15  ----------------------------<  110<H>  3.6   |  27  |  0.96    Ca    8.3<L>      20 Nov 2019 06:44    TPro  6.0  /  Alb  3.2<L>  /  TBili  1.0  /  DBili  x   /  AST  16  /  ALT  30  /  AlkPhos  52  11-20                        8.8    10.27 )-----------( 196      ( 20 Nov 2019 06:45 )             27.1     Medications  MEDICATIONS  (STANDING):  albuterol/ipratropium for Nebulization 3 milliLiter(s) Nebulizer every 6 hours  aMIOdarone    Tablet 400 milliGRAM(s) Oral every 8 hours  aMIOdarone    Tablet   Oral   aMIOdarone    Tablet 200 milliGRAM(s) Oral daily  aspirin enteric coated 81 milliGRAM(s) Oral daily  atorvastatin 40 milliGRAM(s) Oral at bedtime  buDESOnide    Inhalation Suspension 0.5 milliGRAM(s) Inhalation two times a day  budesonide 160 MICROgram(s)/formoterol 4.5 MICROgram(s) Inhaler 2 Puff(s) Inhalation two times a day  chlorhexidine 2% Cloths 1 Application(s) Topical <User Schedule>  chlorhexidine 2% Cloths 1 Application(s) Topical <User Schedule>  dextrose 5%. 1000 milliLiter(s) (50 mL/Hr) IV Continuous <Continuous>  dextrose 50% Injectable 50 milliLiter(s) IV Push every 15 minutes  dextrose 50% Injectable 25 milliLiter(s) IV Push every 15 minutes  diltiazem    Tablet 30 milliGRAM(s) Oral every 6 hours  enoxaparin Injectable 40 milliGRAM(s) SubCutaneous daily  furosemide    Tablet 40 milliGRAM(s) Oral daily  insulin glargine Injectable (LANTUS) 12 Unit(s) SubCutaneous at bedtime  insulin lispro (HumaLOG) corrective regimen sliding scale   SubCutaneous three times a day before meals  insulin lispro (HumaLOG) corrective regimen sliding scale   SubCutaneous at bedtime  insulin lispro Injectable (HumaLOG) 4 Unit(s) SubCutaneous three times a day before meals  insulin regular Infusion 3 Unit(s)/Hr (3 mL/Hr) IV Continuous <Continuous>  pantoprazole    Tablet 40 milliGRAM(s) Oral before breakfast  polyethylene glycol 3350 17 Gram(s) Oral daily  potassium chloride    Tablet ER 20 milliEquivalent(s) Oral daily  potassium chloride  10 mEq/50 mL IVPB 10 milliEquivalent(s) IV Intermittent every 1 hour  potassium chloride  10 mEq/50 mL IVPB 10 milliEquivalent(s) IV Intermittent every 1 hour  potassium chloride  10 mEq/50 mL IVPB 10 milliEquivalent(s) IV Intermittent every 1 hour  sodium chloride 0.9%. 1000 milliLiter(s) (10 mL/Hr) IV Continuous <Continuous>      Physical Exam  General: Patient comfortable in bed  Vital Signs Last 12 Hrs  T(F): 98.3 (11-20-19 @ 07:00), Max: 98.5 (11-20-19 @ 02:55)  HR: 87 (11-20-19 @ 10:52) (81 - 93)  BP: 118/58 (11-20-19 @ 10:52) (115/55 - 129/60)  BP(mean): 84 (11-20-19 @ 10:52) (77 - 84)  RR: 18 (11-20-19 @ 10:52) (18 - 18)  SpO2: 95% (11-20-19 @ 10:52) (93% - 100%)  Neck: No palpable thyroid nodules.  CVS: S1S2, No murmurs  Respiratory: No wheezing, no crepitations  GI: Abdomen soft, bowel sounds positive  Musculoskeletal:  edema lower extremities.   Skin: No skin rashes, no ecchymosis    Diagnostics    Hemoglobin A1C, Whole Blood: Routine (11-13 @ 11:44)  Cortisol AM, Serum: Routine (11-13 @ 11:44)  Aldosterone, Serum: Routine (11-13 @ 11:44)  Metanephrine, Plasma: Routine (11-13 @ 11:44)  Thyroid Stimulating Hormone, Serum: AM Sched. Collection: 14-Nov-2019 06:00 (11-13 @ 11:44)  Free Thyroxine, Serum: AM Sched. Collection: 14-Nov-2019 06:00 (11-13 @ 11:44) Chief complaint  Patient is a 74y old  Male who presents with a chief complaint of CABG (17 Nov 2019 12:00)   Review of systems  Patient in bed, looks comfortable, no fever,  no hypoglycemia.    Labs and Fingersticks  CAPILLARY BLOOD GLUCOSE      POCT Blood Glucose.: 133 mg/dL (20 Nov 2019 12:21)  POCT Blood Glucose.: 108 mg/dL (20 Nov 2019 07:57)  POCT Blood Glucose.: 133 mg/dL (19 Nov 2019 21:37)  POCT Blood Glucose.: 133 mg/dL (19 Nov 2019 16:51)      Anion Gap, Serum: 13 (11-20 @ 06:44)  Anion Gap, Serum: 9 (11-19 @ 05:39)      Calcium, Total Serum: 8.3 <L> (11-20 @ 06:44)  Calcium, Total Serum: 8.0 <L> (11-19 @ 05:39)  Albumin, Serum: 3.2 <L> (11-20 @ 06:44)    Alanine Aminotransferase (ALT/SGPT): 30 (11-20 @ 06:44)  Alkaline Phosphatase, Serum: 52 (11-20 @ 06:44)  Aspartate Aminotransferase (AST/SGOT): 16 (11-20 @ 06:44)        11-20    140  |  100  |  15  ----------------------------<  110<H>  3.6   |  27  |  0.96    Ca    8.3<L>      20 Nov 2019 06:44    TPro  6.0  /  Alb  3.2<L>  /  TBili  1.0  /  DBili  x   /  AST  16  /  ALT  30  /  AlkPhos  52  11-20                        8.8    10.27 )-----------( 196      ( 20 Nov 2019 06:45 )             27.1     Medications  MEDICATIONS  (STANDING):  albuterol/ipratropium for Nebulization 3 milliLiter(s) Nebulizer every 6 hours  aMIOdarone    Tablet 400 milliGRAM(s) Oral every 8 hours  aMIOdarone    Tablet   Oral   aMIOdarone    Tablet 200 milliGRAM(s) Oral daily  aspirin enteric coated 81 milliGRAM(s) Oral daily  atorvastatin 40 milliGRAM(s) Oral at bedtime  buDESOnide    Inhalation Suspension 0.5 milliGRAM(s) Inhalation two times a day  budesonide 160 MICROgram(s)/formoterol 4.5 MICROgram(s) Inhaler 2 Puff(s) Inhalation two times a day  chlorhexidine 2% Cloths 1 Application(s) Topical <User Schedule>  chlorhexidine 2% Cloths 1 Application(s) Topical <User Schedule>  dextrose 5%. 1000 milliLiter(s) (50 mL/Hr) IV Continuous <Continuous>  dextrose 50% Injectable 50 milliLiter(s) IV Push every 15 minutes  dextrose 50% Injectable 25 milliLiter(s) IV Push every 15 minutes  diltiazem    Tablet 30 milliGRAM(s) Oral every 6 hours  enoxaparin Injectable 40 milliGRAM(s) SubCutaneous daily  furosemide    Tablet 40 milliGRAM(s) Oral daily  insulin glargine Injectable (LANTUS) 12 Unit(s) SubCutaneous at bedtime  insulin lispro (HumaLOG) corrective regimen sliding scale   SubCutaneous three times a day before meals  insulin lispro (HumaLOG) corrective regimen sliding scale   SubCutaneous at bedtime  insulin lispro Injectable (HumaLOG) 4 Unit(s) SubCutaneous three times a day before meals  insulin regular Infusion 3 Unit(s)/Hr (3 mL/Hr) IV Continuous <Continuous>  pantoprazole    Tablet 40 milliGRAM(s) Oral before breakfast  polyethylene glycol 3350 17 Gram(s) Oral daily  potassium chloride    Tablet ER 20 milliEquivalent(s) Oral daily  potassium chloride  10 mEq/50 mL IVPB 10 milliEquivalent(s) IV Intermittent every 1 hour  potassium chloride  10 mEq/50 mL IVPB 10 milliEquivalent(s) IV Intermittent every 1 hour  potassium chloride  10 mEq/50 mL IVPB 10 milliEquivalent(s) IV Intermittent every 1 hour  sodium chloride 0.9%. 1000 milliLiter(s) (10 mL/Hr) IV Continuous <Continuous>      Physical Exam  General: Patient comfortable in bed  Vital Signs Last 12 Hrs  T(F): 98.3 (11-20-19 @ 07:00), Max: 98.5 (11-20-19 @ 02:55)  HR: 87 (11-20-19 @ 10:52) (81 - 93)  BP: 118/58 (11-20-19 @ 10:52) (115/55 - 129/60)  BP(mean): 84 (11-20-19 @ 10:52) (77 - 84)  RR: 18 (11-20-19 @ 10:52) (18 - 18)  SpO2: 95% (11-20-19 @ 10:52) (93% - 100%)  Neck: No palpable thyroid nodules.  CVS: S1S2, No murmurs  Respiratory: No wheezing, no crepitations  GI: Abdomen soft, bowel sounds positive  Musculoskeletal:  edema lower extremities.   Skin: No skin rashes, no ecchymosis    Diagnostics    Hemoglobin A1C, Whole Blood: Routine (11-13 @ 11:44)  Cortisol AM, Serum: Routine (11-13 @ 11:44)  Aldosterone, Serum: Routine (11-13 @ 11:44)  Metanephrine, Plasma: Routine (11-13 @ 11:44)  Thyroid Stimulating Hormone, Serum: AM Sched. Collection: 14-Nov-2019 06:00 (11-13 @ 11:44)  Free Thyroxine, Serum: AM Sched. Collection: 14-Nov-2019 06:00 (11-13 @ 11:44)

## 2019-11-20 NOTE — PROGRESS NOTE ADULT - ASSESSMENT
Cad   s/p cabg  cont asa, statin     post op afib, aflutter   in sinus with frequent apcs today   cont amio , cardizem   if recurrent , start a/c once deemed safe     dm  Monitor finger stick. Insulin coverage.     dc planning as per primary team

## 2019-11-20 NOTE — CHART NOTE - NSCHARTNOTEFT_GEN_A_CORE
Called by Rn for pt complaining of dizziness with episode of coughing,   pt seen and examined in chair , No chest pain , sob , palpitation ,   tele reviewed with no events , NSR 1 HB, with this evening ,   VS noted post event , unchanged     Lungs clear ángela   CV: reg s1s2   Abd : soft , + bs , nt   ext no edema   sternum:  no click , intact     Assessment	  74y Male PMH DM A1c 6.2, HTN, asthma, B/L Carotid a. stenosis s/p L CEA, PVD now s/p C4L  POD 1 GEORGES given 150mg Amio bolus and started on PO Amio load. Transferred to SDU POD #1  11/17/19 VSS rounds made w/ dr. agrawal- HCT down to 24 from 32- pt asymptomatic - up in weight- lasix 40mg po daily initiated - will monitor u/o. wbc 22 - inc. cdi afebrile.  l ct removed. mediastinal ct remains in place. gastric bubble -reglan. d/c planning  11/18 Remaining ct d/c.  Pt ambulating, no c/o abd discomfort  Paf overnight, if any further consider anticoagulation with hep/coum. Cont amio  Hct 23.4  May transfer to floor  11/19 to d/c pw, increase ambulation.  Cont amio.   *** most likely vasovagal episode with coughing episode. no intervention   cont tele

## 2019-11-20 NOTE — PROGRESS NOTE ADULT - ASSESSMENT
Assessment  DMT2: 74y Male with prediabetes, A1C 6.2% , was not on DM meds at home, s/p CABG on low-dose basal bolus insulin, blood sugars improved and trending within acceptable range. Patient is eating meals, ambulating, appears comfortable, states his appetite is improved.  Adrenal Mass: 74y Male with hx adrenal enlargements scheduled for CABG today. As per patient's documents, enlargements are benign, 2.4cm, not much change/growth between 2014 to present, followed by his endo. Aldosterone, cortisol, TFT labs within normal limits, cleared to proceed with cardiac surgery.  CAD: S/P CABG, on medications, no chest pain, stable, monitored.  HTN: Controlled,  on antihypertensive medications. Assessment  DMT2: 74y Male with prediabetes, A1C 6.2% , was not on DM meds at home, s/p CABG on low-dose basal bolus insulin, blood sugars improved and trending within acceptable range. Patient is eating meals, ambulating,  appears comfortable, states his appetite is improved.  Adrenal Mass: 74y Male with hx adrenal enlargements scheduled for CABG today. As per patient's documents, enlargements are benign, 2.4cm, not much change/growth between 2014 to present, followed by his endo. Aldosterone, cortisol, TFT labs within normal limits, cleared to proceed with cardiac surgery.  CAD: S/P CABG, on medications, no chest pain, stable, monitored.  HTN: Controlled,  on antihypertensive medications.

## 2019-11-20 NOTE — PROGRESS NOTE ADULT - SUBJECTIVE AND OBJECTIVE BOX
Subjective: Patient seen and examined. No new events except as noted.     SUBJECTIVE/ROS:  No chest pain, dyspnea, palpitation, or dizziness.       MEDICATIONS:  MEDICATIONS  (STANDING):  albuterol/ipratropium for Nebulization 3 milliLiter(s) Nebulizer every 6 hours  aMIOdarone    Tablet   Oral   aMIOdarone    Tablet 200 milliGRAM(s) Oral daily  aspirin enteric coated 81 milliGRAM(s) Oral daily  atorvastatin 40 milliGRAM(s) Oral at bedtime  buDESOnide    Inhalation Suspension 0.5 milliGRAM(s) Inhalation two times a day  budesonide 160 MICROgram(s)/formoterol 4.5 MICROgram(s) Inhaler 2 Puff(s) Inhalation two times a day  chlorhexidine 2% Cloths 1 Application(s) Topical <User Schedule>  chlorhexidine 2% Cloths 1 Application(s) Topical <User Schedule>  dextrose 5%. 1000 milliLiter(s) (50 mL/Hr) IV Continuous <Continuous>  dextrose 50% Injectable 50 milliLiter(s) IV Push every 15 minutes  dextrose 50% Injectable 25 milliLiter(s) IV Push every 15 minutes  diltiazem    Tablet 30 milliGRAM(s) Oral every 6 hours  enoxaparin Injectable 40 milliGRAM(s) SubCutaneous daily  furosemide    Tablet 40 milliGRAM(s) Oral daily  insulin glargine Injectable (LANTUS) 12 Unit(s) SubCutaneous at bedtime  insulin lispro (HumaLOG) corrective regimen sliding scale   SubCutaneous three times a day before meals  insulin lispro (HumaLOG) corrective regimen sliding scale   SubCutaneous at bedtime  insulin lispro Injectable (HumaLOG) 4 Unit(s) SubCutaneous three times a day before meals  insulin regular Infusion 3 Unit(s)/Hr (3 mL/Hr) IV Continuous <Continuous>  pantoprazole    Tablet 40 milliGRAM(s) Oral before breakfast  polyethylene glycol 3350 17 Gram(s) Oral daily  potassium chloride    Tablet ER 20 milliEquivalent(s) Oral daily  potassium chloride  10 mEq/50 mL IVPB 10 milliEquivalent(s) IV Intermittent every 1 hour  potassium chloride  10 mEq/50 mL IVPB 10 milliEquivalent(s) IV Intermittent every 1 hour  potassium chloride  10 mEq/50 mL IVPB 10 milliEquivalent(s) IV Intermittent every 1 hour  sodium chloride 0.9%. 1000 milliLiter(s) (10 mL/Hr) IV Continuous <Continuous>      PHYSICAL EXAM:  T(C): 36.5 (11-20-19 @ 13:01), Max: 37.1 (11-19-19 @ 19:14)  HR: 88 (11-20-19 @ 13:01) (77 - 93)  BP: 111/56 (11-20-19 @ 13:01) (101/56 - 129/60)  RR: 18 (11-20-19 @ 13:01) (18 - 18)  SpO2: 95% (11-20-19 @ 13:01) (92% - 100%)  Wt(kg): --  I&O's Summary    19 Nov 2019 07:01  -  20 Nov 2019 07:00  --------------------------------------------------------  IN: 1270 mL / OUT: 2850 mL / NET: -1580 mL    20 Nov 2019 07:01  -  20 Nov 2019 14:32  --------------------------------------------------------  IN: 300 mL / OUT: 950 mL / NET: -650 mL            JVP: Normal  Neck: supple  Lung: clear   CV: S1 S2 , Murmur:  Abd: soft  Ext: No edema  neuro: Awake / alert  Psych: flat affect  Skin: normal``    LABS/DATA:    CARDIAC MARKERS:                                8.8    10.27 )-----------( 196      ( 20 Nov 2019 06:45 )             27.1     11-20    140  |  100  |  15  ----------------------------<  110<H>  3.6   |  27  |  0.96    Ca    8.3<L>      20 Nov 2019 06:44    TPro  6.0  /  Alb  3.2<L>  /  TBili  1.0  /  DBili  x   /  AST  16  /  ALT  30  /  AlkPhos  52  11-20    proBNP:   Lipid Profile:   HgA1c:   TSH:     TELE:  EKG:

## 2019-11-21 LAB
ALBUMIN SERPL ELPH-MCNC: 2.9 G/DL — LOW (ref 3.3–5)
ALP SERPL-CCNC: 45 U/L — SIGNIFICANT CHANGE UP (ref 40–120)
ALT FLD-CCNC: 24 U/L — SIGNIFICANT CHANGE UP (ref 10–45)
ANION GAP SERPL CALC-SCNC: 8 MMOL/L — SIGNIFICANT CHANGE UP (ref 5–17)
APTT BLD: 29.4 SEC — SIGNIFICANT CHANGE UP (ref 27.5–36.3)
AST SERPL-CCNC: 12 U/L — SIGNIFICANT CHANGE UP (ref 10–40)
BILIRUB SERPL-MCNC: 1 MG/DL — SIGNIFICANT CHANGE UP (ref 0.2–1.2)
BUN SERPL-MCNC: 11 MG/DL — SIGNIFICANT CHANGE UP (ref 7–23)
CALCIUM SERPL-MCNC: 8.3 MG/DL — LOW (ref 8.4–10.5)
CHLORIDE SERPL-SCNC: 100 MMOL/L — SIGNIFICANT CHANGE UP (ref 96–108)
CO2 SERPL-SCNC: 27 MMOL/L — SIGNIFICANT CHANGE UP (ref 22–31)
CREAT SERPL-MCNC: 0.95 MG/DL — SIGNIFICANT CHANGE UP (ref 0.5–1.3)
GLUCOSE BLDC GLUCOMTR-MCNC: 119 MG/DL — HIGH (ref 70–99)
GLUCOSE BLDC GLUCOMTR-MCNC: 129 MG/DL — HIGH (ref 70–99)
GLUCOSE BLDC GLUCOMTR-MCNC: 145 MG/DL — HIGH (ref 70–99)
GLUCOSE BLDC GLUCOMTR-MCNC: 171 MG/DL — HIGH (ref 70–99)
GLUCOSE SERPL-MCNC: 124 MG/DL — HIGH (ref 70–99)
HCT VFR BLD CALC: 24.6 % — LOW (ref 39–50)
HGB BLD-MCNC: 8.2 G/DL — LOW (ref 13–17)
INR BLD: 1.18 RATIO — HIGH (ref 0.88–1.16)
MCHC RBC-ENTMCNC: 30.7 PG — SIGNIFICANT CHANGE UP (ref 27–34)
MCHC RBC-ENTMCNC: 33.3 GM/DL — SIGNIFICANT CHANGE UP (ref 32–36)
MCV RBC AUTO: 92.1 FL — SIGNIFICANT CHANGE UP (ref 80–100)
NRBC # BLD: 0 /100 WBCS — SIGNIFICANT CHANGE UP (ref 0–0)
PLATELET # BLD AUTO: 186 K/UL — SIGNIFICANT CHANGE UP (ref 150–400)
POTASSIUM SERPL-MCNC: 3.5 MMOL/L — SIGNIFICANT CHANGE UP (ref 3.5–5.3)
POTASSIUM SERPL-SCNC: 3.5 MMOL/L — SIGNIFICANT CHANGE UP (ref 3.5–5.3)
PROT SERPL-MCNC: 5.5 G/DL — LOW (ref 6–8.3)
PROTHROM AB SERPL-ACNC: 13.5 SEC — HIGH (ref 10–12.9)
RBC # BLD: 2.67 M/UL — LOW (ref 4.2–5.8)
RBC # FLD: 13.6 % — SIGNIFICANT CHANGE UP (ref 10.3–14.5)
SODIUM SERPL-SCNC: 135 MMOL/L — SIGNIFICANT CHANGE UP (ref 135–145)
WBC # BLD: 10.22 K/UL — SIGNIFICANT CHANGE UP (ref 3.8–10.5)
WBC # FLD AUTO: 10.22 K/UL — SIGNIFICANT CHANGE UP (ref 3.8–10.5)

## 2019-11-21 RX ORDER — FERROUS SULFATE 325(65) MG
325 TABLET ORAL DAILY
Refills: 0 | Status: DISCONTINUED | OUTPATIENT
Start: 2019-11-21 | End: 2019-11-25

## 2019-11-21 RX ORDER — ASCORBIC ACID 60 MG
500 TABLET,CHEWABLE ORAL DAILY
Refills: 0 | Status: DISCONTINUED | OUTPATIENT
Start: 2019-11-21 | End: 2019-11-25

## 2019-11-21 RX ORDER — POTASSIUM CHLORIDE 20 MEQ
20 PACKET (EA) ORAL
Refills: 0 | Status: COMPLETED | OUTPATIENT
Start: 2019-11-21 | End: 2019-11-21

## 2019-11-21 RX ORDER — AMIODARONE HYDROCHLORIDE 400 MG/1
400 TABLET ORAL EVERY 8 HOURS
Refills: 0 | Status: DISCONTINUED | OUTPATIENT
Start: 2019-11-21 | End: 2019-11-21

## 2019-11-21 RX ORDER — CARVEDILOL PHOSPHATE 80 MG/1
6.25 CAPSULE, EXTENDED RELEASE ORAL EVERY 12 HOURS
Refills: 0 | Status: DISCONTINUED | OUTPATIENT
Start: 2019-11-21 | End: 2019-11-25

## 2019-11-21 RX ORDER — AMIODARONE HYDROCHLORIDE 400 MG/1
TABLET ORAL
Refills: 0 | Status: DISCONTINUED | OUTPATIENT
Start: 2019-11-21 | End: 2019-11-21

## 2019-11-21 RX ORDER — FOLIC ACID 0.8 MG
1 TABLET ORAL DAILY
Refills: 0 | Status: DISCONTINUED | OUTPATIENT
Start: 2019-11-21 | End: 2019-11-25

## 2019-11-21 RX ORDER — WARFARIN SODIUM 2.5 MG/1
3 TABLET ORAL ONCE
Refills: 0 | Status: COMPLETED | OUTPATIENT
Start: 2019-11-21 | End: 2019-11-21

## 2019-11-21 RX ADMIN — BUDESONIDE AND FORMOTEROL FUMARATE DIHYDRATE 2 PUFF(S): 160; 4.5 AEROSOL RESPIRATORY (INHALATION) at 05:54

## 2019-11-21 RX ADMIN — Medication 0.5 MILLIGRAM(S): at 18:13

## 2019-11-21 RX ADMIN — AMIODARONE HYDROCHLORIDE 200 MILLIGRAM(S): 400 TABLET ORAL at 05:54

## 2019-11-21 RX ADMIN — Medication 0.5 MILLIGRAM(S): at 05:54

## 2019-11-21 RX ADMIN — ENOXAPARIN SODIUM 40 MILLIGRAM(S): 100 INJECTION SUBCUTANEOUS at 10:02

## 2019-11-21 RX ADMIN — CHLORHEXIDINE GLUCONATE 1 APPLICATION(S): 213 SOLUTION TOPICAL at 09:20

## 2019-11-21 RX ADMIN — Medication 3 MILLILITER(S): at 05:55

## 2019-11-21 RX ADMIN — PANTOPRAZOLE SODIUM 40 MILLIGRAM(S): 20 TABLET, DELAYED RELEASE ORAL at 05:53

## 2019-11-21 RX ADMIN — ATORVASTATIN CALCIUM 40 MILLIGRAM(S): 80 TABLET, FILM COATED ORAL at 22:17

## 2019-11-21 RX ADMIN — Medication 20 MILLIEQUIVALENT(S): at 13:21

## 2019-11-21 RX ADMIN — Medication 3 MILLILITER(S): at 13:22

## 2019-11-21 RX ADMIN — Medication 40 MILLIGRAM(S): at 05:53

## 2019-11-21 RX ADMIN — Medication 3 MILLILITER(S): at 18:10

## 2019-11-21 RX ADMIN — Medication 4 UNIT(S): at 18:09

## 2019-11-21 RX ADMIN — Medication 2: at 08:19

## 2019-11-21 RX ADMIN — Medication 20 MILLIEQUIVALENT(S): at 09:56

## 2019-11-21 RX ADMIN — Medication 325 MILLIGRAM(S): at 09:56

## 2019-11-21 RX ADMIN — WARFARIN SODIUM 3 MILLIGRAM(S): 2.5 TABLET ORAL at 22:17

## 2019-11-21 RX ADMIN — Medication 1 MILLIGRAM(S): at 09:57

## 2019-11-21 RX ADMIN — BUDESONIDE AND FORMOTEROL FUMARATE DIHYDRATE 2 PUFF(S): 160; 4.5 AEROSOL RESPIRATORY (INHALATION) at 18:10

## 2019-11-21 RX ADMIN — CARVEDILOL PHOSPHATE 6.25 MILLIGRAM(S): 80 CAPSULE, EXTENDED RELEASE ORAL at 18:15

## 2019-11-21 RX ADMIN — Medication 30 MILLIGRAM(S): at 05:54

## 2019-11-21 RX ADMIN — Medication 81 MILLIGRAM(S): at 09:57

## 2019-11-21 RX ADMIN — CARVEDILOL PHOSPHATE 6.25 MILLIGRAM(S): 80 CAPSULE, EXTENDED RELEASE ORAL at 11:01

## 2019-11-21 RX ADMIN — Medication 3 MILLILITER(S): at 23:10

## 2019-11-21 RX ADMIN — CHLORHEXIDINE GLUCONATE 1 APPLICATION(S): 213 SOLUTION TOPICAL at 09:19

## 2019-11-21 RX ADMIN — Medication 4 UNIT(S): at 12:12

## 2019-11-21 RX ADMIN — INSULIN GLARGINE 12 UNIT(S): 100 INJECTION, SOLUTION SUBCUTANEOUS at 22:17

## 2019-11-21 RX ADMIN — Medication 4 UNIT(S): at 08:20

## 2019-11-21 RX ADMIN — Medication 500 MILLIGRAM(S): at 09:57

## 2019-11-21 NOTE — PROGRESS NOTE ADULT - SUBJECTIVE AND OBJECTIVE BOX
Subjective: Patient seen and examined. No new events except as noted.     SUBJECTIVE/ROS:  feels ok       MEDICATIONS:  MEDICATIONS  (STANDING):  albuterol/ipratropium for Nebulization 3 milliLiter(s) Nebulizer every 6 hours  aMIOdarone    Tablet   Oral   aMIOdarone    Tablet 400 milliGRAM(s) Oral every 8 hours  ascorbic acid 500 milliGRAM(s) Oral daily  aspirin enteric coated 81 milliGRAM(s) Oral daily  atorvastatin 40 milliGRAM(s) Oral at bedtime  buDESOnide    Inhalation Suspension 0.5 milliGRAM(s) Inhalation two times a day  budesonide 160 MICROgram(s)/formoterol 4.5 MICROgram(s) Inhaler 2 Puff(s) Inhalation two times a day  carvedilol 6.25 milliGRAM(s) Oral every 12 hours  chlorhexidine 2% Cloths 1 Application(s) Topical <User Schedule>  chlorhexidine 2% Cloths 1 Application(s) Topical <User Schedule>  dextrose 5%. 1000 milliLiter(s) (50 mL/Hr) IV Continuous <Continuous>  dextrose 50% Injectable 50 milliLiter(s) IV Push every 15 minutes  dextrose 50% Injectable 25 milliLiter(s) IV Push every 15 minutes  enoxaparin Injectable 40 milliGRAM(s) SubCutaneous daily  ferrous    sulfate 325 milliGRAM(s) Oral daily  folic acid 1 milliGRAM(s) Oral daily  furosemide    Tablet 40 milliGRAM(s) Oral daily  insulin glargine Injectable (LANTUS) 12 Unit(s) SubCutaneous at bedtime  insulin lispro (HumaLOG) corrective regimen sliding scale   SubCutaneous three times a day before meals  insulin lispro (HumaLOG) corrective regimen sliding scale   SubCutaneous at bedtime  insulin lispro Injectable (HumaLOG) 4 Unit(s) SubCutaneous three times a day before meals  insulin regular Infusion 3 Unit(s)/Hr (3 mL/Hr) IV Continuous <Continuous>  pantoprazole    Tablet 40 milliGRAM(s) Oral before breakfast  polyethylene glycol 3350 17 Gram(s) Oral daily  potassium chloride    Tablet ER 20 milliEquivalent(s) Oral every 2 hours  potassium chloride    Tablet ER 20 milliEquivalent(s) Oral daily  potassium chloride  10 mEq/50 mL IVPB 10 milliEquivalent(s) IV Intermittent every 1 hour  potassium chloride  10 mEq/50 mL IVPB 10 milliEquivalent(s) IV Intermittent every 1 hour  potassium chloride  10 mEq/50 mL IVPB 10 milliEquivalent(s) IV Intermittent every 1 hour  sodium chloride 0.9%. 1000 milliLiter(s) (10 mL/Hr) IV Continuous <Continuous>      PHYSICAL EXAM:  T(C): 37.1 (11-21-19 @ 05:00), Max: 37.1 (11-21-19 @ 05:00)  HR: 90 (11-21-19 @ 09:09) (84 - 93)  BP: 120/71 (11-21-19 @ 09:09) (110/68 - 156/78)  RR: 18 (11-21-19 @ 09:09) (16 - 18)  SpO2: 96% (11-21-19 @ 09:09) (95% - 99%)  Wt(kg): --  I&O's Summary    20 Nov 2019 07:01  -  21 Nov 2019 07:00  --------------------------------------------------------  IN: 660 mL / OUT: 2600 mL / NET: -1940 mL    21 Nov 2019 07:01  -  21 Nov 2019 09:55  --------------------------------------------------------  IN: 500 mL / OUT: 0 mL / NET: 500 mL            JVP: Normal  Neck: supple  Lung: clear   CV: S1 S2 , Murmur:  Abd: soft  Ext: No edema  neuro: Awake  Psych: flat affect  Skin: normal``    LABS/DATA:    CARDIAC MARKERS:                                8.2    10.22 )-----------( 186      ( 21 Nov 2019 07:26 )             24.6     11-21    135  |  100  |  11  ----------------------------<  124<H>  3.5   |  27  |  0.95    Ca    8.3<L>      21 Nov 2019 07:26    TPro  5.5<L>  /  Alb  2.9<L>  /  TBili  1.0  /  DBili  x   /  AST  12  /  ALT  24  /  AlkPhos  45  11-21    proBNP:   Lipid Profile:   HgA1c:   TSH:     TELE:  EKG:

## 2019-11-21 NOTE — PROGRESS NOTE ADULT - SUBJECTIVE AND OBJECTIVE BOX
Chief complaint  Patient is a 74y old  Male who presents with a chief complaint of sp  CABG (21 Nov 2019 09:28)  Overnight events:  A fib, now on AC  Review of systems:  Patient in bed, looks comfortable, no fever, no hypoglycemia.    Labs and Fingersticks  CAPILLARY BLOOD GLUCOSE      POCT Blood Glucose.: 171 mg/dL (21 Nov 2019 08:00)  POCT Blood Glucose.: 139 mg/dL (20 Nov 2019 21:51)  POCT Blood Glucose.: 138 mg/dL (20 Nov 2019 17:25)  POCT Blood Glucose.: 133 mg/dL (20 Nov 2019 12:21)      Anion Gap, Serum: 8 (11-21 @ 07:26)  Anion Gap, Serum: 13 (11-20 @ 06:44)      Calcium, Total Serum: 8.3 <L> (11-21 @ 07:26)  Calcium, Total Serum: 8.3 <L> (11-20 @ 06:44)  Albumin, Serum: 2.9 <L> (11-21 @ 07:26)  Albumin, Serum: 3.2 <L> (11-20 @ 06:44)    Alanine Aminotransferase (ALT/SGPT): 24 (11-21 @ 07:26)  Alanine Aminotransferase (ALT/SGPT): 30 (11-20 @ 06:44)  Alkaline Phosphatase, Serum: 45 (11-21 @ 07:26)  Alkaline Phosphatase, Serum: 52 (11-20 @ 06:44)  Aspartate Aminotransferase (AST/SGOT): 12 (11-21 @ 07:26)  Aspartate Aminotransferase (AST/SGOT): 16 (11-20 @ 06:44)        11-21    135  |  100  |  11  ----------------------------<  124<H>  3.5   |  27  |  0.95    Ca    8.3<L>      21 Nov 2019 07:26    TPro  5.5<L>  /  Alb  2.9<L>  /  TBili  1.0  /  DBili  x   /  AST  12  /  ALT  24  /  AlkPhos  45  11-21                        8.2    10.22 )-----------( 186      ( 21 Nov 2019 07:26 )             24.6     Medications  MEDICATIONS  (STANDING):  albuterol/ipratropium for Nebulization 3 milliLiter(s) Nebulizer every 6 hours  aMIOdarone    Tablet   Oral   aMIOdarone    Tablet 400 milliGRAM(s) Oral every 8 hours  ascorbic acid 500 milliGRAM(s) Oral daily  aspirin enteric coated 81 milliGRAM(s) Oral daily  atorvastatin 40 milliGRAM(s) Oral at bedtime  buDESOnide    Inhalation Suspension 0.5 milliGRAM(s) Inhalation two times a day  budesonide 160 MICROgram(s)/formoterol 4.5 MICROgram(s) Inhaler 2 Puff(s) Inhalation two times a day  carvedilol 6.25 milliGRAM(s) Oral every 12 hours  chlorhexidine 2% Cloths 1 Application(s) Topical <User Schedule>  chlorhexidine 2% Cloths 1 Application(s) Topical <User Schedule>  dextrose 5%. 1000 milliLiter(s) (50 mL/Hr) IV Continuous <Continuous>  dextrose 50% Injectable 50 milliLiter(s) IV Push every 15 minutes  dextrose 50% Injectable 25 milliLiter(s) IV Push every 15 minutes  enoxaparin Injectable 40 milliGRAM(s) SubCutaneous daily  ferrous    sulfate 325 milliGRAM(s) Oral daily  folic acid 1 milliGRAM(s) Oral daily  furosemide    Tablet 40 milliGRAM(s) Oral daily  insulin glargine Injectable (LANTUS) 12 Unit(s) SubCutaneous at bedtime  insulin lispro (HumaLOG) corrective regimen sliding scale   SubCutaneous three times a day before meals  insulin lispro (HumaLOG) corrective regimen sliding scale   SubCutaneous at bedtime  insulin lispro Injectable (HumaLOG) 4 Unit(s) SubCutaneous three times a day before meals  insulin regular Infusion 3 Unit(s)/Hr (3 mL/Hr) IV Continuous <Continuous>  pantoprazole    Tablet 40 milliGRAM(s) Oral before breakfast  polyethylene glycol 3350 17 Gram(s) Oral daily  potassium chloride    Tablet ER 20 milliEquivalent(s) Oral daily  potassium chloride  10 mEq/50 mL IVPB 10 milliEquivalent(s) IV Intermittent every 1 hour  potassium chloride  10 mEq/50 mL IVPB 10 milliEquivalent(s) IV Intermittent every 1 hour  potassium chloride  10 mEq/50 mL IVPB 10 milliEquivalent(s) IV Intermittent every 1 hour  sodium chloride 0.9%. 1000 milliLiter(s) (10 mL/Hr) IV Continuous <Continuous>      Physical Exam  General: Patient comfortable in bed  Vital Signs Last 12 Hrs  T(F): 98.8 (11-21-19 @ 05:00), Max: 98.8 (11-21-19 @ 05:00)  HR: 90 (11-21-19 @ 09:09) (90 - 92)  BP: 120/71 (11-21-19 @ 09:09) (120/71 - 156/78)  BP(mean): --  RR: 18 (11-21-19 @ 09:09) (16 - 18)  SpO2: 96% (11-21-19 @ 09:09) (96% - 97%)  Neck: No palpable thyroid nodules.  CVS: S1S2, No murmurs  Respiratory: No wheezing, no crepitations  GI: Abdomen soft, bowel sounds positive  Musculoskeletal:  edema lower extremities.   Skin: No skin rashes, no ecchymosis    Diagnostics    Hemoglobin A1C, Whole Blood: Routine (11-13 @ 11:44)  Cortisol AM, Serum: Routine (11-13 @ 11:44)  Aldosterone, Serum: Routine (11-13 @ 11:44)  Metanephrine, Plasma: Routine (11-13 @ 11:44)  Thyroid Stimulating Hormone, Serum: AM Sched. Collection: 14-Nov-2019 06:00 (11-13 @ 11:44)  Free Thyroxine, Serum: AM Sched. Collection: 14-Nov-2019 06:00 (11-13 @ 11:44) Chief complaint  Patient is a 74y old  Male who presents with a chief complaint of sp  CABG (21 Nov 2019 09:28)  Overnight events:  A fib, now on AC  Review of systems:  Patient in bed, looks comfortable, no fever,  no hypoglycemia.    Labs and Fingersticks  CAPILLARY BLOOD GLUCOSE      POCT Blood Glucose.: 171 mg/dL (21 Nov 2019 08:00)  POCT Blood Glucose.: 139 mg/dL (20 Nov 2019 21:51)  POCT Blood Glucose.: 138 mg/dL (20 Nov 2019 17:25)  POCT Blood Glucose.: 133 mg/dL (20 Nov 2019 12:21)      Anion Gap, Serum: 8 (11-21 @ 07:26)  Anion Gap, Serum: 13 (11-20 @ 06:44)      Calcium, Total Serum: 8.3 <L> (11-21 @ 07:26)  Calcium, Total Serum: 8.3 <L> (11-20 @ 06:44)  Albumin, Serum: 2.9 <L> (11-21 @ 07:26)  Albumin, Serum: 3.2 <L> (11-20 @ 06:44)    Alanine Aminotransferase (ALT/SGPT): 24 (11-21 @ 07:26)  Alanine Aminotransferase (ALT/SGPT): 30 (11-20 @ 06:44)  Alkaline Phosphatase, Serum: 45 (11-21 @ 07:26)  Alkaline Phosphatase, Serum: 52 (11-20 @ 06:44)  Aspartate Aminotransferase (AST/SGOT): 12 (11-21 @ 07:26)  Aspartate Aminotransferase (AST/SGOT): 16 (11-20 @ 06:44)        11-21    135  |  100  |  11  ----------------------------<  124<H>  3.5   |  27  |  0.95    Ca    8.3<L>      21 Nov 2019 07:26    TPro  5.5<L>  /  Alb  2.9<L>  /  TBili  1.0  /  DBili  x   /  AST  12  /  ALT  24  /  AlkPhos  45  11-21                        8.2    10.22 )-----------( 186      ( 21 Nov 2019 07:26 )             24.6     Medications  MEDICATIONS  (STANDING):  albuterol/ipratropium for Nebulization 3 milliLiter(s) Nebulizer every 6 hours  aMIOdarone    Tablet   Oral   aMIOdarone    Tablet 400 milliGRAM(s) Oral every 8 hours  ascorbic acid 500 milliGRAM(s) Oral daily  aspirin enteric coated 81 milliGRAM(s) Oral daily  atorvastatin 40 milliGRAM(s) Oral at bedtime  buDESOnide    Inhalation Suspension 0.5 milliGRAM(s) Inhalation two times a day  budesonide 160 MICROgram(s)/formoterol 4.5 MICROgram(s) Inhaler 2 Puff(s) Inhalation two times a day  carvedilol 6.25 milliGRAM(s) Oral every 12 hours  chlorhexidine 2% Cloths 1 Application(s) Topical <User Schedule>  chlorhexidine 2% Cloths 1 Application(s) Topical <User Schedule>  dextrose 5%. 1000 milliLiter(s) (50 mL/Hr) IV Continuous <Continuous>  dextrose 50% Injectable 50 milliLiter(s) IV Push every 15 minutes  dextrose 50% Injectable 25 milliLiter(s) IV Push every 15 minutes  enoxaparin Injectable 40 milliGRAM(s) SubCutaneous daily  ferrous    sulfate 325 milliGRAM(s) Oral daily  folic acid 1 milliGRAM(s) Oral daily  furosemide    Tablet 40 milliGRAM(s) Oral daily  insulin glargine Injectable (LANTUS) 12 Unit(s) SubCutaneous at bedtime  insulin lispro (HumaLOG) corrective regimen sliding scale   SubCutaneous three times a day before meals  insulin lispro (HumaLOG) corrective regimen sliding scale   SubCutaneous at bedtime  insulin lispro Injectable (HumaLOG) 4 Unit(s) SubCutaneous three times a day before meals  insulin regular Infusion 3 Unit(s)/Hr (3 mL/Hr) IV Continuous <Continuous>  pantoprazole    Tablet 40 milliGRAM(s) Oral before breakfast  polyethylene glycol 3350 17 Gram(s) Oral daily  potassium chloride    Tablet ER 20 milliEquivalent(s) Oral daily  potassium chloride  10 mEq/50 mL IVPB 10 milliEquivalent(s) IV Intermittent every 1 hour  potassium chloride  10 mEq/50 mL IVPB 10 milliEquivalent(s) IV Intermittent every 1 hour  potassium chloride  10 mEq/50 mL IVPB 10 milliEquivalent(s) IV Intermittent every 1 hour  sodium chloride 0.9%. 1000 milliLiter(s) (10 mL/Hr) IV Continuous <Continuous>      Physical Exam  General: Patient comfortable in bed  Vital Signs Last 12 Hrs  T(F): 98.8 (11-21-19 @ 05:00), Max: 98.8 (11-21-19 @ 05:00)  HR: 90 (11-21-19 @ 09:09) (90 - 92)  BP: 120/71 (11-21-19 @ 09:09) (120/71 - 156/78)  BP(mean): --  RR: 18 (11-21-19 @ 09:09) (16 - 18)  SpO2: 96% (11-21-19 @ 09:09) (96% - 97%)  Neck: No palpable thyroid nodules.  CVS: S1S2, No murmurs  Respiratory: No wheezing, no crepitations  GI: Abdomen soft, bowel sounds positive  Musculoskeletal:  edema lower extremities.   Skin: No skin rashes, no ecchymosis    Diagnostics    Hemoglobin A1C, Whole Blood: Routine (11-13 @ 11:44)  Cortisol AM, Serum: Routine (11-13 @ 11:44)  Aldosterone, Serum: Routine (11-13 @ 11:44)  Metanephrine, Plasma: Routine (11-13 @ 11:44)  Thyroid Stimulating Hormone, Serum: AM Sched. Collection: 14-Nov-2019 06:00 (11-13 @ 11:44)  Free Thyroxine, Serum: AM Sched. Collection: 14-Nov-2019 06:00 (11-13 @ 11:44)

## 2019-11-21 NOTE — PROGRESS NOTE ADULT - PROBLEM SELECTOR PLAN 1
Will continue current insulin regimen for now. Will continue monitoring FS, log, and FU.  Patient in pre-diabetic range, blood sugars trending at target on low-dose insulin. Suggest to DC home on Metformin 500mg BID and FU endo 4 weeks.  Patient was counseled for compliance with consistent low carb diet and exercise as tolerated. Will continue current insulin regimen for now. Will continue monitoring FS, log, and FU.  Patient was counseled for compliance with consistent low carb diet and exercise as tolerated.

## 2019-11-21 NOTE — PROGRESS NOTE ADULT - ASSESSMENT
Assessment  DMT2: 74y Male with prediabetes, A1C 6.2% , was not on DM meds at home, s/p CABG on low-dose basal bolus insulin, blood sugars improved and trending within acceptable range. Patient is eating meals with improved appetite, a-fib overnight now on AC.  Adrenal Mass: 74y Male with hx adrenal enlargements scheduled for CABG today. As per patient's documents, enlargements are benign, 2.4cm, not much change/growth between 2014 to present, followed by his endo. Aldosterone, cortisol, TFT labs within normal limits, cleared to proceed with cardiac surgery.  CAD: S/P CABG, on medications, no chest pain, stable, monitored.  HTN: Controlled,  on antihypertensive medications. Assessment  DMT2: 74y Male with prediabetes, A1C 6.2% , was not on DM meds at home, s/p CABG on low-dose basal bolus insulin, blood sugars improved and trending within acceptable range. Patient is eating meals with improved appetite,  a-fib overnight now on AC.  Adrenal Mass: 74y Male with hx adrenal enlargements scheduled for CABG today. As per patient's documents, enlargements are benign, 2.4cm, not much change/growth between 2014 to present, followed by his endo. Aldosterone, cortisol, TFT labs within normal limits, cleared to proceed with cardiac surgery.  CAD: S/P CABG, on medications, no chest pain, stable, monitored.  HTN: Controlled,  on antihypertensive medications.

## 2019-11-21 NOTE — PROVIDER CONTACT NOTE (OTHER) - ASSESSMENT
Patient awake,  V/S stable. Pt's wife states, patient is short of breath .  Noted pt. coughing after Duoneb inhalation via nebulizer. Pulse OX= 99% with O2 2 liters nasal cannulla.

## 2019-11-21 NOTE — PROGRESS NOTE ADULT - PROBLEM SELECTOR PLAN 1
sp  CABG   shower today  Humalog SSI and Lantus for FSG control. Endocrinology  diuresis lasix 40 qd  potasium supplement

## 2019-11-21 NOTE — PROGRESS NOTE ADULT - ASSESSMENT
74y Male PMH DM A1c 6.2, HTN, asthma, B/L Carotid a. stenosis s/p L CEA, PVD now s/p C4L  POD 1 GEORGES given 150mg Amio bolus and started on PO Amio load. Transferred to SDU POD #1  11/17/19 VSS rounds made w/ dr. agrawal- HCT down to 24 from 32- pt asymptomatic - up in weight- lasix 40mg po daily initiated - will monitor u/o. wbc 22 - inc. cdi afebrile.  l ct removed. mediastinal ct remains in place. gastric bubble -reglan. d/c planning  11/18 Remaining ct d/c.  Pt ambulating, no c/o abd discomfort  Paf overnight, if any further consider anticoagulation with hep/coum. Cont amio  Hct 23.4  May transfer to floor  11/19 to d/c pw, increase ambulation.  Cont amio.  D/c pw  11/21    20 min afib overnight  recent amio ld   will dw  Dr Agrawal ? second load   dc cardizem    start coumadin dosing   plus one pedal edema   neg 2l  on lasix qd 74y Male PMH DM A1c 6.2, HTN, asthma, B/L Carotid a. stenosis s/p L CEA, PVD now s/p C4L  POD 1 GEORGES given 150mg Amio bolus and started on PO Amio load. Transferred to SDU POD #1  11/17/19 VSS rounds made w/ dr. agrawal- HCT down to 24 from 32- pt asymptomatic - up in weight- lasix 40mg po daily initiated - will monitor u/o. wbc 22 - inc. cdi afebrile.  l ct removed. mediastinal ct remains in place. gastric bubble -reglan. d/c planning  11/18 Remaining ct d/c.  Pt ambulating, no c/o abd discomfort  Paf overnight, if any further consider anticoagulation with hep/coum. Cont amio  Hct 23.4  May transfer to floor  11/19 to d/c pw, increase ambulation.  Cont amio.  D/c pw  11/21    20 min afib overnight  recent amio ld   will dw  Dr Agrawal ? second load   dc cardizem    start coumadin dosing   plus one pedal edema   neg 2l  on lasix qd   coreq 6.25   per cardiology Dr King

## 2019-11-21 NOTE — PROGRESS NOTE ADULT - SUBJECTIVE AND OBJECTIVE BOX
VITAL SIGNS    Telemetry:    sr  90   afib   20 min overnite    Vital Signs Last 24 Hrs  T(C): 37.1 (19 @ 05:00), Max: 37.1 (19 @ 05:00)  T(F): 98.8 (19 @ 05:00), Max: 98.8 (19 @ 05:00)  HR: 90 (19 @ 09:09) (84 - 93)  BP: 120/71 (19 @ 09:09) (110/68 - 156/78)  RR: 18 (19 @ 09:09) (16 - 18)  SpO2: 96% (19 @ 09:09) (95% - 99%)                   Daily     Daily Weight in k.1 (2019 08:12)      Bilirubin Total, Serum: 1.0 mg/dL ( @ 07:26)    CAPILLARY BLOOD GLUCOSE      POCT Blood Glucose.: 171 mg/dL (2019 08:00)  POCT Blood Glucose.: 139 mg/dL (2019 21:51)  POCT Blood Glucose.: 138 mg/dL (2019 17:25)  POCT Blood Glucose.: 133 mg/dL (2019 12:21)              Coumadin  yes                       PHYSICAL EXAM    Neurology: alert and oriented x 3, moves all extremities with no defecits  CV :  RRR  Sternal Wound :  CDI , Stable  Lungs:   CTA B/L  Abdomen: soft, nontender, nondistended, positive bowel sounds, last bowel movement     Extremities:       plus  one pedal edema

## 2019-11-21 NOTE — PROGRESS NOTE ADULT - ASSESSMENT
Cad   s/p cabg  cont asa, statin     post op afib, aflutter   recurrent  amio re load  change cardizem to coreg  a/c recommended     dm  Monitor finger stick. Insulin coverage.

## 2019-11-21 NOTE — PROVIDER CONTACT NOTE (OTHER) - BACKGROUND
11/12 Admitted S/P Cardiac Cath - TVD 11/15 S/P C4L with Right Leg [place]. PMH; Diabetes type 2 , HTN, Asthma, Carotid Stenosis.

## 2019-11-22 ENCOUNTER — TRANSCRIPTION ENCOUNTER (OUTPATIENT)
Age: 74
End: 2019-11-22

## 2019-11-22 LAB
ANION GAP SERPL CALC-SCNC: 14 MMOL/L — SIGNIFICANT CHANGE UP (ref 5–17)
APTT BLD: 29.4 SEC — SIGNIFICANT CHANGE UP (ref 27.5–36.3)
BUN SERPL-MCNC: 15 MG/DL — SIGNIFICANT CHANGE UP (ref 7–23)
CALCIUM SERPL-MCNC: 8.7 MG/DL — SIGNIFICANT CHANGE UP (ref 8.4–10.5)
CHLORIDE SERPL-SCNC: 101 MMOL/L — SIGNIFICANT CHANGE UP (ref 96–108)
CO2 SERPL-SCNC: 24 MMOL/L — SIGNIFICANT CHANGE UP (ref 22–31)
CREAT SERPL-MCNC: 1.01 MG/DL — SIGNIFICANT CHANGE UP (ref 0.5–1.3)
GLUCOSE BLDC GLUCOMTR-MCNC: 106 MG/DL — HIGH (ref 70–99)
GLUCOSE BLDC GLUCOMTR-MCNC: 113 MG/DL — HIGH (ref 70–99)
GLUCOSE BLDC GLUCOMTR-MCNC: 122 MG/DL — HIGH (ref 70–99)
GLUCOSE BLDC GLUCOMTR-MCNC: 149 MG/DL — HIGH (ref 70–99)
GLUCOSE SERPL-MCNC: 98 MG/DL — SIGNIFICANT CHANGE UP (ref 70–99)
HCT VFR BLD CALC: 26.6 % — LOW (ref 39–50)
HGB BLD-MCNC: 8.9 G/DL — LOW (ref 13–17)
INR BLD: 1.14 RATIO — SIGNIFICANT CHANGE UP (ref 0.88–1.16)
MCHC RBC-ENTMCNC: 30.8 PG — SIGNIFICANT CHANGE UP (ref 27–34)
MCHC RBC-ENTMCNC: 33.5 GM/DL — SIGNIFICANT CHANGE UP (ref 32–36)
MCV RBC AUTO: 92 FL — SIGNIFICANT CHANGE UP (ref 80–100)
NRBC # BLD: 0 /100 WBCS — SIGNIFICANT CHANGE UP (ref 0–0)
PLATELET # BLD AUTO: 231 K/UL — SIGNIFICANT CHANGE UP (ref 150–400)
POTASSIUM SERPL-MCNC: 4.2 MMOL/L — SIGNIFICANT CHANGE UP (ref 3.5–5.3)
POTASSIUM SERPL-SCNC: 4.2 MMOL/L — SIGNIFICANT CHANGE UP (ref 3.5–5.3)
PROTHROM AB SERPL-ACNC: 13 SEC — HIGH (ref 10–12.9)
RBC # BLD: 2.89 M/UL — LOW (ref 4.2–5.8)
RBC # FLD: 13.8 % — SIGNIFICANT CHANGE UP (ref 10.3–14.5)
SODIUM SERPL-SCNC: 139 MMOL/L — SIGNIFICANT CHANGE UP (ref 135–145)
WBC # BLD: 14.15 K/UL — HIGH (ref 3.8–10.5)
WBC # FLD AUTO: 14.15 K/UL — HIGH (ref 3.8–10.5)

## 2019-11-22 PROCEDURE — 93010 ELECTROCARDIOGRAM REPORT: CPT

## 2019-11-22 PROCEDURE — 93306 TTE W/DOPPLER COMPLETE: CPT | Mod: 26

## 2019-11-22 RX ORDER — WARFARIN SODIUM 2.5 MG/1
3 TABLET ORAL ONCE
Refills: 0 | Status: COMPLETED | OUTPATIENT
Start: 2019-11-22 | End: 2019-11-22

## 2019-11-22 RX ADMIN — Medication 3 MILLILITER(S): at 23:22

## 2019-11-22 RX ADMIN — Medication 40 MILLIGRAM(S): at 05:31

## 2019-11-22 RX ADMIN — ATORVASTATIN CALCIUM 40 MILLIGRAM(S): 80 TABLET, FILM COATED ORAL at 21:58

## 2019-11-22 RX ADMIN — Medication 3 MILLILITER(S): at 12:15

## 2019-11-22 RX ADMIN — CHLORHEXIDINE GLUCONATE 1 APPLICATION(S): 213 SOLUTION TOPICAL at 07:30

## 2019-11-22 RX ADMIN — WARFARIN SODIUM 3 MILLIGRAM(S): 2.5 TABLET ORAL at 21:58

## 2019-11-22 RX ADMIN — Medication 1 MILLIGRAM(S): at 12:16

## 2019-11-22 RX ADMIN — Medication 4 UNIT(S): at 08:06

## 2019-11-22 RX ADMIN — Medication 3 MILLILITER(S): at 05:31

## 2019-11-22 RX ADMIN — BUDESONIDE AND FORMOTEROL FUMARATE DIHYDRATE 2 PUFF(S): 160; 4.5 AEROSOL RESPIRATORY (INHALATION) at 05:32

## 2019-11-22 RX ADMIN — Medication 325 MILLIGRAM(S): at 12:16

## 2019-11-22 RX ADMIN — CARVEDILOL PHOSPHATE 6.25 MILLIGRAM(S): 80 CAPSULE, EXTENDED RELEASE ORAL at 17:28

## 2019-11-22 RX ADMIN — BUDESONIDE AND FORMOTEROL FUMARATE DIHYDRATE 2 PUFF(S): 160; 4.5 AEROSOL RESPIRATORY (INHALATION) at 17:55

## 2019-11-22 RX ADMIN — ENOXAPARIN SODIUM 40 MILLIGRAM(S): 100 INJECTION SUBCUTANEOUS at 12:15

## 2019-11-22 RX ADMIN — Medication 500 MILLIGRAM(S): at 12:15

## 2019-11-22 RX ADMIN — Medication 81 MILLIGRAM(S): at 12:16

## 2019-11-22 RX ADMIN — INSULIN GLARGINE 12 UNIT(S): 100 INJECTION, SOLUTION SUBCUTANEOUS at 21:59

## 2019-11-22 RX ADMIN — Medication 4 UNIT(S): at 12:14

## 2019-11-22 RX ADMIN — PANTOPRAZOLE SODIUM 40 MILLIGRAM(S): 20 TABLET, DELAYED RELEASE ORAL at 05:31

## 2019-11-22 RX ADMIN — Medication 4 UNIT(S): at 17:59

## 2019-11-22 RX ADMIN — CARVEDILOL PHOSPHATE 6.25 MILLIGRAM(S): 80 CAPSULE, EXTENDED RELEASE ORAL at 05:31

## 2019-11-22 RX ADMIN — Medication 3 MILLILITER(S): at 17:27

## 2019-11-22 RX ADMIN — Medication 0.5 MILLIGRAM(S): at 17:28

## 2019-11-22 RX ADMIN — Medication 0.5 MILLIGRAM(S): at 05:31

## 2019-11-22 RX ADMIN — Medication 20 MILLIEQUIVALENT(S): at 12:16

## 2019-11-22 NOTE — PROGRESS NOTE ADULT - SUBJECTIVE AND OBJECTIVE BOX
VITAL SIGNS    Telemetry:    Vital Signs Last 24 Hrs  T(C): 36.4 (19 @ 12:05), Max: 36.7 (19 @ 19:46)  T(F): 97.5 (19 @ 12:05), Max: 98 (19 @ 19:46)  HR: 84 (19 @ 12:50) (76 - 96)  BP: 132/75 (19 @ 12:05) (106/61 - 160/68)  RR: 18 (19 @ 12:05) (18 - 18)  SpO2: 98% (19 @ 12:50) (97% - 100%)             @ 07:01  -   @ 07:00  --------------------------------------------------------  IN: 980 mL / OUT: 1025 mL / NET: -45 mL     @ 07:01  -   @ 13:53  --------------------------------------------------------  IN: 120 mL / OUT: 600 mL / NET: -480 mL       Daily     Daily Weight in k.5 (2019 10:20)  Admit Wt: Drug Dosing Weight  Height (cm): 167.6 (15 Nov 2019 08:51)  Weight (kg): 77.1 (15 Nov 2019 08:51)  BMI (kg/m2): 27.4 (15 Nov 2019 08:51)  BSA (m2): 1.87 (15 Nov 2019 08:51)     Daily Weight in k.5 (19 @ 10:20), Weight in k.6 (19 @ 08:18)    [facility]      139  |  101  |  15  ----------------------------<  98  4.2   |  24  |  1.01    Ca    8.7      2019 06:38    TPro  5.5<L>  /  Alb  2.9<L>  /  TBili  1.0  /  DBili  x   /  AST  12  /  ALT  24  /  AlkPhos  45  11-21                                 8.9    14.15 )-----------( 231      ( 2019 06:38 )             26.6          PT/INR - ( 2019 06:38 )   PT: 13.0 sec;   INR: 1.14 ratio         PTT - ( 2019 06:38 )  PTT:29.4 sec          CAPILLARY BLOOD GLUCOSE      POCT Blood Glucose.: 106 mg/dL (2019 11:27)  POCT Blood Glucose.: 122 mg/dL (2019 07:37)  POCT Blood Glucose.: 119 mg/dL (2019 21:39)  POCT Blood Glucose.: 129 mg/dL (2019 17:59)          Drains:     MS       [  ]   [  ]            L Pleural [  ]            R Pleural  [  ]            MANUELA  [  ]           Jacobsen  [  ]    Pacing Wires      [  ]   Settings:                                  Isolated  [  ]                    CXR:      MEDICATIONS  acetaminophen   Tablet .. 650 milliGRAM(s) Oral every 6 hours PRN  albuterol/ipratropium for Nebulization 3 milliLiter(s) Nebulizer every 6 hours  artificial tears (preservative free) Ophthalmic Solution 1 Drop(s) Both EYES three times a day PRN  ascorbic acid 500 milliGRAM(s) Oral daily  aspirin enteric coated 81 milliGRAM(s) Oral daily  atorvastatin 40 milliGRAM(s) Oral at bedtime  buDESOnide    Inhalation Suspension 0.5 milliGRAM(s) Inhalation two times a day  budesonide 160 MICROgram(s)/formoterol 4.5 MICROgram(s) Inhaler 2 Puff(s) Inhalation two times a day  carvedilol 6.25 milliGRAM(s) Oral every 12 hours  chlorhexidine 2% Cloths 1 Application(s) Topical <User Schedule>  chlorhexidine 2% Cloths 1 Application(s) Topical <User Schedule>  dextrose 40% Gel 15 Gram(s) Oral once PRN  dextrose 5%. 1000 milliLiter(s) IV Continuous <Continuous>  dextrose 50% Injectable 50 milliLiter(s) IV Push every 15 minutes  dextrose 50% Injectable 25 milliLiter(s) IV Push every 15 minutes  enoxaparin Injectable 40 milliGRAM(s) SubCutaneous daily  ferrous    sulfate 325 milliGRAM(s) Oral daily  folic acid 1 milliGRAM(s) Oral daily  furosemide    Tablet 40 milliGRAM(s) Oral daily  glucagon  Injectable 1 milliGRAM(s) IntraMuscular once PRN  insulin glargine Injectable (LANTUS) 12 Unit(s) SubCutaneous at bedtime  insulin lispro (HumaLOG) corrective regimen sliding scale   SubCutaneous three times a day before meals  insulin lispro (HumaLOG) corrective regimen sliding scale   SubCutaneous at bedtime  insulin lispro Injectable (HumaLOG) 4 Unit(s) SubCutaneous three times a day before meals  oxyCODONE    IR 5 milliGRAM(s) Oral every 6 hours PRN  pantoprazole    Tablet 40 milliGRAM(s) Oral before breakfast  polyethylene glycol 3350 17 Gram(s) Oral daily  potassium chloride    Tablet ER 20 milliEquivalent(s) Oral daily  potassium chloride  10 mEq/50 mL IVPB 10 milliEquivalent(s) IV Intermittent every 1 hour  potassium chloride  10 mEq/50 mL IVPB 10 milliEquivalent(s) IV Intermittent every 1 hour  potassium chloride  10 mEq/50 mL IVPB 10 milliEquivalent(s) IV Intermittent every 1 hour  sodium chloride 0.9%. 1000 milliLiter(s) IV Continuous <Continuous>      PHYSICAL EXAM      Neurology: alert and oriented x 3, nonfocal, no gross deficits  CV :S1S2  Sternal Wound :  CDI , Stable  Lungs: cta  Abdomen: soft, nontender, nondistended, positive bowel sounds, last bowel movement   :   voids    Extremities:  warm to touch                PAST MEDICAL & SURGICAL HISTORY:  Borderline diabetes  PVD (peripheral vascular disease)  Carotid stenosis, bilateral  History of hypertension  Asthma  H/O sinus surgery  H/O carotid endarterectomy                 Discussed with Cardiothoracic Team at AM rounds.

## 2019-11-22 NOTE — PROGRESS NOTE ADULT - ASSESSMENT
74y Male PMH DM A1c 6.2, HTN, asthma, B/L Carotid a. stenosis s/p L CEA, PVD now s/p C4L  POD 1 GEORGES given 150mg Amio bolus and started on PO Amio load. Transferred to SDU POD #1  11/17/19 VSS rounds made w/ dr. agrawal- HCT down to 24 from 32- pt asymptomatic - up in weight- lasix 40mg po daily initiated - will monitor u/o. wbc 22 - inc. cdi afebrile.  l ct removed. mediastinal ct remains in place. gastric bubble -reglan. d/c planning  11/18 Remaining ct d/c.  Pt ambulating, no c/o abd discomfort  Paf overnight, if any further consider anticoagulation with hep/coum. Cont amio  Hct 23.4  May transfer to floor  11/19 to d/c pw, increase ambulation.  Cont amio.  D/c pw  11/21    20 min afib overnight  recent amio ld   will dw  Dr Agrawal ? second load   dc cardizem    start coumadin dosing   plus one pedal edema   neg 2l  on lasix qd   coreq 6.25   per cardiology Dr King  11/22: Episode of vagal like symptoms after strong cough. No changes noted to the monitor pt remains in SR first degree. Pt has had this in the past many years ago. Will Check echo to r/o pericardial effusion.

## 2019-11-22 NOTE — PROVIDER CONTACT NOTE (OTHER) - ASSESSMENT
Pt states he got dizzy after a coughing episode. BP: 160/68. HR: 90s. SpO2: 98% on 3 L NC. Blood glucose: 106

## 2019-11-22 NOTE — DIETITIAN INITIAL EVALUATION ADULT. - OTHER INFO
Pt reports good, improving intake, consuming >50% of meals. Denies nausea/vomiting/diarrhea/constipation, most recent BM this morning (11/22) as per pt. No difficulties chewing or swallowing reported. Pt reports possible allergy to shrimp (consumed once and broke out in hives).     PTA, pt with good appetite and intake. Pt denies following any specific diet/dietary restrictions PTA. Pt endorses recently reducing portion sizes and "trying to watch his intake" in order to intentionally lose weight (pt reports he has not yet lost any wt PTA). States -170 pounds. Pt endorses PTA supplementation of vitamins D and B12, magnesium and potassium.     Encouraged PO intake as tolerated. Discussed consistent carbohydrate diet and general healthy eating strategies. Emphasized importance of limiting concentrated sweets (cakes, cookies, candy), and sugar-sweetened beverages. Educated pt on importance of fiber (for heart health and stable blood sugar) and listed foods that contain fiber (whole grains, fruits, and vegetables). Encouraged lean protein and limitation of sodium and saturated fats. Answered pt's questions. Pt amenable to recommendations. Pt made aware RD to remain available.

## 2019-11-22 NOTE — PROGRESS NOTE ADULT - ASSESSMENT
Assessment  DMT2: 74y Male with prediabetes, A1C 6.2% , was not on DM meds at home, s/p CABG on low-dose basal bolus insulin, blood sugars improved and trending within acceptable range. Patient is eating meals, comfortable, DC planning in progress.  Adrenal Mass: 74y Male with hx adrenal enlargements scheduled for CABG today. As per patient's documents, enlargements are benign, 2.4cm, not much change/growth between 2014 to present, followed by his endo. Aldosterone, cortisol, TFT labs within normal limits, cleared to proceed with cardiac surgery.  CAD: S/P CABG, on medications, no chest pain, stable, monitored.  HTN: Controlled,  on antihypertensive medications. Assessment  DMT2: 74y Male with prediabetes, A1C 6.2% , was not on DM meds at home, s/p CABG on low-dose basal bolus insulin, blood sugars improved and trending within acceptable range. Patient is eating meals, comfortable,  DC planning in progress.  Adrenal Mass: 74y Male with hx adrenal enlargements scheduled for CABG today. As per patient's documents, enlargements are benign, 2.4cm, not much change/growth between 2014 to present, followed by his endo. Aldosterone, cortisol, TFT labs within normal limits, cleared to proceed with cardiac surgery.  CAD: S/P CABG, on medications, no chest pain, stable, monitored.  HTN: Controlled,  on antihypertensive medications.

## 2019-11-22 NOTE — PROGRESS NOTE ADULT - ASSESSMENT
Cad   s/p cabg  cont asa, statin     post op afib, aflutter   recurrent  in sinus now  cont amio, BB  monitor LFT , TSH  a/c recommended     dm  Monitor finger stick. Insulin coverage.

## 2019-11-22 NOTE — DIETITIAN INITIAL EVALUATION ADULT. - PHYSICAL APPEARANCE
No visual signs of fat/muscle loss noted/well nourished/other (specify) Ht: 167.6 cm (66 inches) Wt: 75.6 kg (166.6 pounds) -11/22 standing wt   BMI: 27 kg/m2 (based on 11/22 standing wt)   IBW: 142 pounds +/-10% %IBW: 117%    Skin: no pressure injuries per flowsheets   Edema: +2 edema to b/l feet, b/l ankles, b/l legs per flowsheets

## 2019-11-22 NOTE — PROGRESS NOTE ADULT - SUBJECTIVE AND OBJECTIVE BOX
Subjective: Patient seen and examined. No new events except as noted.     SUBJECTIVE/ROS:  feels ok  has cough with dizziness       MEDICATIONS:  MEDICATIONS  (STANDING):  albuterol/ipratropium for Nebulization 3 milliLiter(s) Nebulizer every 6 hours  ascorbic acid 500 milliGRAM(s) Oral daily  aspirin enteric coated 81 milliGRAM(s) Oral daily  atorvastatin 40 milliGRAM(s) Oral at bedtime  buDESOnide    Inhalation Suspension 0.5 milliGRAM(s) Inhalation two times a day  budesonide 160 MICROgram(s)/formoterol 4.5 MICROgram(s) Inhaler 2 Puff(s) Inhalation two times a day  carvedilol 6.25 milliGRAM(s) Oral every 12 hours  chlorhexidine 2% Cloths 1 Application(s) Topical <User Schedule>  chlorhexidine 2% Cloths 1 Application(s) Topical <User Schedule>  dextrose 5%. 1000 milliLiter(s) (50 mL/Hr) IV Continuous <Continuous>  dextrose 50% Injectable 50 milliLiter(s) IV Push every 15 minutes  dextrose 50% Injectable 25 milliLiter(s) IV Push every 15 minutes  enoxaparin Injectable 40 milliGRAM(s) SubCutaneous daily  ferrous    sulfate 325 milliGRAM(s) Oral daily  folic acid 1 milliGRAM(s) Oral daily  furosemide    Tablet 40 milliGRAM(s) Oral daily  insulin glargine Injectable (LANTUS) 12 Unit(s) SubCutaneous at bedtime  insulin lispro (HumaLOG) corrective regimen sliding scale   SubCutaneous three times a day before meals  insulin lispro (HumaLOG) corrective regimen sliding scale   SubCutaneous at bedtime  insulin lispro Injectable (HumaLOG) 4 Unit(s) SubCutaneous three times a day before meals  pantoprazole    Tablet 40 milliGRAM(s) Oral before breakfast  polyethylene glycol 3350 17 Gram(s) Oral daily  potassium chloride    Tablet ER 20 milliEquivalent(s) Oral daily  potassium chloride  10 mEq/50 mL IVPB 10 milliEquivalent(s) IV Intermittent every 1 hour  potassium chloride  10 mEq/50 mL IVPB 10 milliEquivalent(s) IV Intermittent every 1 hour  potassium chloride  10 mEq/50 mL IVPB 10 milliEquivalent(s) IV Intermittent every 1 hour  sodium chloride 0.9%. 1000 milliLiter(s) (10 mL/Hr) IV Continuous <Continuous>      PHYSICAL EXAM:  T(C): 36.3 (11-22-19 @ 05:27), Max: 36.7 (11-21-19 @ 19:46)  HR: 85 (11-22-19 @ 08:05) (81 - 105)  BP: 135/73 (11-22-19 @ 05:27) (106/61 - 135/73)  RR: 18 (11-22-19 @ 05:27) (18 - 18)  SpO2: 98% (11-22-19 @ 08:05) (96% - 99%)  Wt(kg): --  I&O's Summary    21 Nov 2019 07:01  -  22 Nov 2019 07:00  --------------------------------------------------------  IN: 980 mL / OUT: 1025 mL / NET: -45 mL    22 Nov 2019 07:01  -  22 Nov 2019 09:46  --------------------------------------------------------  IN: 0 mL / OUT: 600 mL / NET: -600 mL            JVP: Normal  Neck: supple  Lung: clear   CV: S1 S2 , Murmur:  Abd: soft  Ext: No edema  neuro: Awake / alert  Psych: flat affect  Skin: normal``    LABS/DATA:    CARDIAC MARKERS:                                8.9    14.15 )-----------( 231      ( 22 Nov 2019 06:38 )             26.6     11-22    139  |  101  |  15  ----------------------------<  98  4.2   |  24  |  1.01    Ca    8.7      22 Nov 2019 06:38    TPro  5.5<L>  /  Alb  2.9<L>  /  TBili  1.0  /  DBili  x   /  AST  12  /  ALT  24  /  AlkPhos  45  11-21    proBNP:   Lipid Profile:   HgA1c:   TSH:     TELE:  EKG:

## 2019-11-22 NOTE — DISCHARGE NOTE NURSING/CASE MANAGEMENT/SOCIAL WORK - NSDPDISTO_GEN_ALL_CORE
----- Message from Benny Merchant DO sent at 7/19/2018  8:11 AM EDT -----  Please call the patient regarding her normal result  Call pt- mammo stable- repeat in 1 year 
Left message to call the office 
Pt called back and received results
Home

## 2019-11-22 NOTE — PROGRESS NOTE ADULT - PROBLEM SELECTOR PLAN 1
Will continue current insulin regimen for now. Will continue monitoring FS, log, and FU.  Patient in pre-diabetic range, blood sugars trending at target on low-dose insulin. Suggest to DC home on Metformin 500mg BID and FU endo 4 weeks.  Patient was counseled for compliance with consistent low carb diet and exercise as tolerated. Will continue current insulin regimen for now. Will continue monitoring FS, log, and FU.

## 2019-11-22 NOTE — DISCHARGE NOTE NURSING/CASE MANAGEMENT/SOCIAL WORK - PATIENT PORTAL LINK FT
You can access the FollowMyHealth Patient Portal offered by [facility] by registering at the following website: [email or web address]. By joining [employer]’s FollowMyHealth portal, you will also be able to view your health information using other applications (apps) compatible with our system.

## 2019-11-22 NOTE — DIETITIAN INITIAL EVALUATION ADULT. - REASON INDICATOR FOR ASSESSMENT
Pt seen for length of stay assessment. Source: chart review, pt. Pt is a 75 yo male with PMH of prediabetes, HTN, asthma, B/L Carotid Stenosis s/p L CEA (2017), and PVD (Class II Angina) who presented for [facility], admitted 11/12. Now POD#7 s/p CABG (11/15).

## 2019-11-22 NOTE — PROGRESS NOTE ADULT - SUBJECTIVE AND OBJECTIVE BOX
Chief complaint  Patient is a 74y old  Male who presents with a chief complaint of sp  CABG (21 Nov 2019 09:28)   Review of systems  Patient in bed, looks comfortable, no fever, no hypoglycemia.    Labs and Fingersticks  CAPILLARY BLOOD GLUCOSE      POCT Blood Glucose.: 106 mg/dL (22 Nov 2019 11:27)  POCT Blood Glucose.: 122 mg/dL (22 Nov 2019 07:37)  POCT Blood Glucose.: 119 mg/dL (21 Nov 2019 21:39)  POCT Blood Glucose.: 129 mg/dL (21 Nov 2019 17:59)      Anion Gap, Serum: 14 (11-22 @ 06:38)  Anion Gap, Serum: 8 (11-21 @ 07:26)      Calcium, Total Serum: 8.7 (11-22 @ 06:38)  Calcium, Total Serum: 8.3 <L> (11-21 @ 07:26)  Albumin, Serum: 2.9 <L> (11-21 @ 07:26)    Alanine Aminotransferase (ALT/SGPT): 24 (11-21 @ 07:26)  Alkaline Phosphatase, Serum: 45 (11-21 @ 07:26)  Aspartate Aminotransferase (AST/SGOT): 12 (11-21 @ 07:26)        11-22    139  |  101  |  15  ----------------------------<  98  4.2   |  24  |  1.01    Ca    8.7      22 Nov 2019 06:38    TPro  5.5<L>  /  Alb  2.9<L>  /  TBili  1.0  /  DBili  x   /  AST  12  /  ALT  24  /  AlkPhos  45  11-21                        8.9    14.15 )-----------( 231      ( 22 Nov 2019 06:38 )             26.6     Medications  MEDICATIONS  (STANDING):  albuterol/ipratropium for Nebulization 3 milliLiter(s) Nebulizer every 6 hours  ascorbic acid 500 milliGRAM(s) Oral daily  aspirin enteric coated 81 milliGRAM(s) Oral daily  atorvastatin 40 milliGRAM(s) Oral at bedtime  buDESOnide    Inhalation Suspension 0.5 milliGRAM(s) Inhalation two times a day  budesonide 160 MICROgram(s)/formoterol 4.5 MICROgram(s) Inhaler 2 Puff(s) Inhalation two times a day  carvedilol 6.25 milliGRAM(s) Oral every 12 hours  chlorhexidine 2% Cloths 1 Application(s) Topical <User Schedule>  chlorhexidine 2% Cloths 1 Application(s) Topical <User Schedule>  dextrose 5%. 1000 milliLiter(s) (50 mL/Hr) IV Continuous <Continuous>  dextrose 50% Injectable 50 milliLiter(s) IV Push every 15 minutes  dextrose 50% Injectable 25 milliLiter(s) IV Push every 15 minutes  enoxaparin Injectable 40 milliGRAM(s) SubCutaneous daily  ferrous    sulfate 325 milliGRAM(s) Oral daily  folic acid 1 milliGRAM(s) Oral daily  furosemide    Tablet 40 milliGRAM(s) Oral daily  insulin glargine Injectable (LANTUS) 12 Unit(s) SubCutaneous at bedtime  insulin lispro (HumaLOG) corrective regimen sliding scale   SubCutaneous three times a day before meals  insulin lispro (HumaLOG) corrective regimen sliding scale   SubCutaneous at bedtime  insulin lispro Injectable (HumaLOG) 4 Unit(s) SubCutaneous three times a day before meals  pantoprazole    Tablet 40 milliGRAM(s) Oral before breakfast  polyethylene glycol 3350 17 Gram(s) Oral daily  potassium chloride    Tablet ER 20 milliEquivalent(s) Oral daily  potassium chloride  10 mEq/50 mL IVPB 10 milliEquivalent(s) IV Intermittent every 1 hour  potassium chloride  10 mEq/50 mL IVPB 10 milliEquivalent(s) IV Intermittent every 1 hour  potassium chloride  10 mEq/50 mL IVPB 10 milliEquivalent(s) IV Intermittent every 1 hour  sodium chloride 0.9%. 1000 milliLiter(s) (10 mL/Hr) IV Continuous <Continuous>      Physical Exam  General: Patient comfortable in bed  Vital Signs Last 12 Hrs  T(F): 97.5 (11-22-19 @ 12:05), Max: 97.5 (11-22-19 @ 12:05)  HR: 84 (11-22-19 @ 12:50) (76 - 96)  BP: 132/75 (11-22-19 @ 12:05) (132/75 - 160/68)  BP(mean): 99 (11-22-19 @ 11:18) (99 - 99)  RR: 18 (11-22-19 @ 12:05) (18 - 18)  SpO2: 98% (11-22-19 @ 12:50) (98% - 100%)  Neck: No palpable thyroid nodules.  CVS: S1S2, No murmurs  Respiratory: No wheezing, no crepitations  GI: Abdomen soft, bowel sounds positive  Musculoskeletal:  edema lower extremities.   Skin: No skin rashes, no ecchymosis    Diagnostics    Hemoglobin A1C, Whole Blood: Routine (11-13 @ 11:44)  Cortisol AM, Serum: Routine (11-13 @ 11:44)  Aldosterone, Serum: Routine (11-13 @ 11:44)  Metanephrine, Plasma: Routine (11-13 @ 11:44)  Thyroid Stimulating Hormone, Serum: AM Sched. Collection: 14-Nov-2019 06:00 (11-13 @ 11:44)  Free Thyroxine, Serum: AM Sched. Collection: 14-Nov-2019 06:00 (11-13 @ 11:44) Chief complaint  Patient is a 74y old  Male who presents with a chief complaint of sp  CABG (21 Nov 2019 09:28)   Review of systems  Patient in bed, looks comfortable, no fever,  no hypoglycemia.    Labs and Fingersticks  CAPILLARY BLOOD GLUCOSE      POCT Blood Glucose.: 106 mg/dL (22 Nov 2019 11:27)  POCT Blood Glucose.: 122 mg/dL (22 Nov 2019 07:37)  POCT Blood Glucose.: 119 mg/dL (21 Nov 2019 21:39)  POCT Blood Glucose.: 129 mg/dL (21 Nov 2019 17:59)      Anion Gap, Serum: 14 (11-22 @ 06:38)  Anion Gap, Serum: 8 (11-21 @ 07:26)      Calcium, Total Serum: 8.7 (11-22 @ 06:38)  Calcium, Total Serum: 8.3 <L> (11-21 @ 07:26)  Albumin, Serum: 2.9 <L> (11-21 @ 07:26)    Alanine Aminotransferase (ALT/SGPT): 24 (11-21 @ 07:26)  Alkaline Phosphatase, Serum: 45 (11-21 @ 07:26)  Aspartate Aminotransferase (AST/SGOT): 12 (11-21 @ 07:26)        11-22    139  |  101  |  15  ----------------------------<  98  4.2   |  24  |  1.01    Ca    8.7      22 Nov 2019 06:38    TPro  5.5<L>  /  Alb  2.9<L>  /  TBili  1.0  /  DBili  x   /  AST  12  /  ALT  24  /  AlkPhos  45  11-21                        8.9    14.15 )-----------( 231      ( 22 Nov 2019 06:38 )             26.6     Medications  MEDICATIONS  (STANDING):  albuterol/ipratropium for Nebulization 3 milliLiter(s) Nebulizer every 6 hours  ascorbic acid 500 milliGRAM(s) Oral daily  aspirin enteric coated 81 milliGRAM(s) Oral daily  atorvastatin 40 milliGRAM(s) Oral at bedtime  buDESOnide    Inhalation Suspension 0.5 milliGRAM(s) Inhalation two times a day  budesonide 160 MICROgram(s)/formoterol 4.5 MICROgram(s) Inhaler 2 Puff(s) Inhalation two times a day  carvedilol 6.25 milliGRAM(s) Oral every 12 hours  chlorhexidine 2% Cloths 1 Application(s) Topical <User Schedule>  chlorhexidine 2% Cloths 1 Application(s) Topical <User Schedule>  dextrose 5%. 1000 milliLiter(s) (50 mL/Hr) IV Continuous <Continuous>  dextrose 50% Injectable 50 milliLiter(s) IV Push every 15 minutes  dextrose 50% Injectable 25 milliLiter(s) IV Push every 15 minutes  enoxaparin Injectable 40 milliGRAM(s) SubCutaneous daily  ferrous    sulfate 325 milliGRAM(s) Oral daily  folic acid 1 milliGRAM(s) Oral daily  furosemide    Tablet 40 milliGRAM(s) Oral daily  insulin glargine Injectable (LANTUS) 12 Unit(s) SubCutaneous at bedtime  insulin lispro (HumaLOG) corrective regimen sliding scale   SubCutaneous three times a day before meals  insulin lispro (HumaLOG) corrective regimen sliding scale   SubCutaneous at bedtime  insulin lispro Injectable (HumaLOG) 4 Unit(s) SubCutaneous three times a day before meals  pantoprazole    Tablet 40 milliGRAM(s) Oral before breakfast  polyethylene glycol 3350 17 Gram(s) Oral daily  potassium chloride    Tablet ER 20 milliEquivalent(s) Oral daily  potassium chloride  10 mEq/50 mL IVPB 10 milliEquivalent(s) IV Intermittent every 1 hour  potassium chloride  10 mEq/50 mL IVPB 10 milliEquivalent(s) IV Intermittent every 1 hour  potassium chloride  10 mEq/50 mL IVPB 10 milliEquivalent(s) IV Intermittent every 1 hour  sodium chloride 0.9%. 1000 milliLiter(s) (10 mL/Hr) IV Continuous <Continuous>      Physical Exam  General: Patient comfortable in bed  Vital Signs Last 12 Hrs  T(F): 97.5 (11-22-19 @ 12:05), Max: 97.5 (11-22-19 @ 12:05)  HR: 84 (11-22-19 @ 12:50) (76 - 96)  BP: 132/75 (11-22-19 @ 12:05) (132/75 - 160/68)  BP(mean): 99 (11-22-19 @ 11:18) (99 - 99)  RR: 18 (11-22-19 @ 12:05) (18 - 18)  SpO2: 98% (11-22-19 @ 12:50) (98% - 100%)  Neck: No palpable thyroid nodules.  CVS: S1S2, No murmurs  Respiratory: No wheezing, no crepitations  GI: Abdomen soft, bowel sounds positive  Musculoskeletal:  edema lower extremities.   Skin: No skin rashes, no ecchymosis    Diagnostics    Hemoglobin A1C, Whole Blood: Routine (11-13 @ 11:44)  Cortisol AM, Serum: Routine (11-13 @ 11:44)  Aldosterone, Serum: Routine (11-13 @ 11:44)  Metanephrine, Plasma: Routine (11-13 @ 11:44)  Thyroid Stimulating Hormone, Serum: AM Sched. Collection: 14-Nov-2019 06:00 (11-13 @ 11:44)  Free Thyroxine, Serum: AM Sched. Collection: 14-Nov-2019 06:00 (11-13 @ 11:44)

## 2019-11-22 NOTE — DIETITIAN INITIAL EVALUATION ADULT. - PERTINENT MEDS FT
Lantus, Humalog, vitamin C, ferrous sulfate, folic acid, Lasix, potassium chloride, Protonix, Miralax, Oxycodone

## 2019-11-23 LAB
ALBUMIN SERPL ELPH-MCNC: 2.7 G/DL — LOW (ref 3.3–5)
ALP SERPL-CCNC: 48 U/L — SIGNIFICANT CHANGE UP (ref 40–120)
ALT FLD-CCNC: 30 U/L — SIGNIFICANT CHANGE UP (ref 10–45)
ANION GAP SERPL CALC-SCNC: 11 MMOL/L — SIGNIFICANT CHANGE UP (ref 5–17)
AST SERPL-CCNC: 17 U/L — SIGNIFICANT CHANGE UP (ref 10–40)
BILIRUB SERPL-MCNC: 0.9 MG/DL — SIGNIFICANT CHANGE UP (ref 0.2–1.2)
BUN SERPL-MCNC: 17 MG/DL — SIGNIFICANT CHANGE UP (ref 7–23)
CALCIUM SERPL-MCNC: 8.5 MG/DL — SIGNIFICANT CHANGE UP (ref 8.4–10.5)
CHLORIDE SERPL-SCNC: 106 MMOL/L — SIGNIFICANT CHANGE UP (ref 96–108)
CO2 SERPL-SCNC: 23 MMOL/L — SIGNIFICANT CHANGE UP (ref 22–31)
CREAT SERPL-MCNC: 1.14 MG/DL — SIGNIFICANT CHANGE UP (ref 0.5–1.3)
GLUCOSE BLDC GLUCOMTR-MCNC: 111 MG/DL — HIGH (ref 70–99)
GLUCOSE BLDC GLUCOMTR-MCNC: 118 MG/DL — HIGH (ref 70–99)
GLUCOSE BLDC GLUCOMTR-MCNC: 132 MG/DL — HIGH (ref 70–99)
GLUCOSE BLDC GLUCOMTR-MCNC: 171 MG/DL — HIGH (ref 70–99)
GLUCOSE SERPL-MCNC: 107 MG/DL — HIGH (ref 70–99)
HCT VFR BLD CALC: 24.3 % — LOW (ref 39–50)
HGB BLD-MCNC: 8.3 G/DL — LOW (ref 13–17)
INR BLD: 1.25 RATIO — HIGH (ref 0.88–1.16)
MCHC RBC-ENTMCNC: 31.2 PG — SIGNIFICANT CHANGE UP (ref 27–34)
MCHC RBC-ENTMCNC: 34.2 GM/DL — SIGNIFICANT CHANGE UP (ref 32–36)
MCV RBC AUTO: 91.4 FL — SIGNIFICANT CHANGE UP (ref 80–100)
NRBC # BLD: 0 /100 WBCS — SIGNIFICANT CHANGE UP (ref 0–0)
PLATELET # BLD AUTO: 222 K/UL — SIGNIFICANT CHANGE UP (ref 150–400)
POTASSIUM SERPL-MCNC: 3.9 MMOL/L — SIGNIFICANT CHANGE UP (ref 3.5–5.3)
POTASSIUM SERPL-SCNC: 3.9 MMOL/L — SIGNIFICANT CHANGE UP (ref 3.5–5.3)
PROT SERPL-MCNC: 5.4 G/DL — LOW (ref 6–8.3)
PROTHROM AB SERPL-ACNC: 14.5 SEC — HIGH (ref 10–12.9)
RBC # BLD: 2.66 M/UL — LOW (ref 4.2–5.8)
RBC # FLD: 13.8 % — SIGNIFICANT CHANGE UP (ref 10.3–14.5)
SODIUM SERPL-SCNC: 140 MMOL/L — SIGNIFICANT CHANGE UP (ref 135–145)
WBC # BLD: 11.49 K/UL — HIGH (ref 3.8–10.5)
WBC # FLD AUTO: 11.49 K/UL — HIGH (ref 3.8–10.5)

## 2019-11-23 PROCEDURE — 71045 X-RAY EXAM CHEST 1 VIEW: CPT | Mod: 26

## 2019-11-23 RX ORDER — WARFARIN SODIUM 2.5 MG/1
3 TABLET ORAL ONCE
Refills: 0 | Status: COMPLETED | OUTPATIENT
Start: 2019-11-23 | End: 2019-11-23

## 2019-11-23 RX ORDER — ALBUTEROL 90 UG/1
2 AEROSOL, METERED ORAL EVERY 6 HOURS
Refills: 0 | Status: DISCONTINUED | OUTPATIENT
Start: 2019-11-23 | End: 2019-11-25

## 2019-11-23 RX ADMIN — WARFARIN SODIUM 3 MILLIGRAM(S): 2.5 TABLET ORAL at 21:31

## 2019-11-23 RX ADMIN — CARVEDILOL PHOSPHATE 6.25 MILLIGRAM(S): 80 CAPSULE, EXTENDED RELEASE ORAL at 17:09

## 2019-11-23 RX ADMIN — Medication 4 UNIT(S): at 11:54

## 2019-11-23 RX ADMIN — Medication 0.5 MILLIGRAM(S): at 05:30

## 2019-11-23 RX ADMIN — Medication 3 MILLILITER(S): at 05:30

## 2019-11-23 RX ADMIN — BUDESONIDE AND FORMOTEROL FUMARATE DIHYDRATE 2 PUFF(S): 160; 4.5 AEROSOL RESPIRATORY (INHALATION) at 05:30

## 2019-11-23 RX ADMIN — Medication 81 MILLIGRAM(S): at 07:55

## 2019-11-23 RX ADMIN — Medication 4 UNIT(S): at 07:55

## 2019-11-23 RX ADMIN — Medication 1 MILLIGRAM(S): at 07:55

## 2019-11-23 RX ADMIN — Medication 40 MILLIGRAM(S): at 05:29

## 2019-11-23 RX ADMIN — Medication 3 MILLILITER(S): at 11:04

## 2019-11-23 RX ADMIN — CARVEDILOL PHOSPHATE 6.25 MILLIGRAM(S): 80 CAPSULE, EXTENDED RELEASE ORAL at 05:30

## 2019-11-23 RX ADMIN — Medication 0.5 MILLIGRAM(S): at 17:09

## 2019-11-23 RX ADMIN — INSULIN GLARGINE 12 UNIT(S): 100 INJECTION, SOLUTION SUBCUTANEOUS at 21:41

## 2019-11-23 RX ADMIN — Medication 500 MILLIGRAM(S): at 07:55

## 2019-11-23 RX ADMIN — ENOXAPARIN SODIUM 40 MILLIGRAM(S): 100 INJECTION SUBCUTANEOUS at 07:55

## 2019-11-23 RX ADMIN — Medication 4 UNIT(S): at 17:09

## 2019-11-23 RX ADMIN — ATORVASTATIN CALCIUM 40 MILLIGRAM(S): 80 TABLET, FILM COATED ORAL at 21:31

## 2019-11-23 RX ADMIN — Medication 20 MILLIEQUIVALENT(S): at 07:55

## 2019-11-23 RX ADMIN — BUDESONIDE AND FORMOTEROL FUMARATE DIHYDRATE 2 PUFF(S): 160; 4.5 AEROSOL RESPIRATORY (INHALATION) at 17:09

## 2019-11-23 RX ADMIN — PANTOPRAZOLE SODIUM 40 MILLIGRAM(S): 20 TABLET, DELAYED RELEASE ORAL at 05:30

## 2019-11-23 RX ADMIN — Medication 325 MILLIGRAM(S): at 07:55

## 2019-11-23 NOTE — PROGRESS NOTE ADULT - SUBJECTIVE AND OBJECTIVE BOX
Subjective: Patient seen and examined. No new events except as noted.     SUBJECTIVE/ROS:  No chest pain, dyspnea, palpitation, or dizziness.       MEDICATIONS:  MEDICATIONS  (STANDING):  albuterol/ipratropium for Nebulization 3 milliLiter(s) Nebulizer every 6 hours  ascorbic acid 500 milliGRAM(s) Oral daily  aspirin enteric coated 81 milliGRAM(s) Oral daily  atorvastatin 40 milliGRAM(s) Oral at bedtime  buDESOnide    Inhalation Suspension 0.5 milliGRAM(s) Inhalation two times a day  budesonide 160 MICROgram(s)/formoterol 4.5 MICROgram(s) Inhaler 2 Puff(s) Inhalation two times a day  carvedilol 6.25 milliGRAM(s) Oral every 12 hours  chlorhexidine 2% Cloths 1 Application(s) Topical <User Schedule>  chlorhexidine 2% Cloths 1 Application(s) Topical <User Schedule>  dextrose 5%. 1000 milliLiter(s) (50 mL/Hr) IV Continuous <Continuous>  dextrose 50% Injectable 50 milliLiter(s) IV Push every 15 minutes  dextrose 50% Injectable 25 milliLiter(s) IV Push every 15 minutes  enoxaparin Injectable 40 milliGRAM(s) SubCutaneous daily  ferrous    sulfate 325 milliGRAM(s) Oral daily  folic acid 1 milliGRAM(s) Oral daily  furosemide    Tablet 40 milliGRAM(s) Oral daily  insulin glargine Injectable (LANTUS) 12 Unit(s) SubCutaneous at bedtime  insulin lispro (HumaLOG) corrective regimen sliding scale   SubCutaneous three times a day before meals  insulin lispro (HumaLOG) corrective regimen sliding scale   SubCutaneous at bedtime  insulin lispro Injectable (HumaLOG) 4 Unit(s) SubCutaneous three times a day before meals  pantoprazole    Tablet 40 milliGRAM(s) Oral before breakfast  polyethylene glycol 3350 17 Gram(s) Oral daily  potassium chloride    Tablet ER 20 milliEquivalent(s) Oral daily  potassium chloride  10 mEq/50 mL IVPB 10 milliEquivalent(s) IV Intermittent every 1 hour  potassium chloride  10 mEq/50 mL IVPB 10 milliEquivalent(s) IV Intermittent every 1 hour  potassium chloride  10 mEq/50 mL IVPB 10 milliEquivalent(s) IV Intermittent every 1 hour  sodium chloride 0.9%. 1000 milliLiter(s) (10 mL/Hr) IV Continuous <Continuous>      PHYSICAL EXAM:  T(C): 36.7 (11-23-19 @ 04:30), Max: 37.2 (11-22-19 @ 19:34)  HR: 84 (11-23-19 @ 07:26) (76 - 96)  BP: 102/60 (11-23-19 @ 04:30) (102/60 - 160/68)  RR: 18 (11-23-19 @ 04:30) (18 - 18)  SpO2: 94% (11-23-19 @ 07:26) (93% - 100%)  Wt(kg): --  I&O's Summary    22 Nov 2019 07:01  -  23 Nov 2019 07:00  --------------------------------------------------------  IN: 600 mL / OUT: 2250 mL / NET: -1650 mL    23 Nov 2019 07:01  -  23 Nov 2019 08:16  --------------------------------------------------------  IN: 0 mL / OUT: 750 mL / NET: -750 mL            JVP: Normal  Neck: supple  Lung: clear   CV: S1 S2 , Murmur:  Abd: soft  Ext: No edema  neuro: Awake / alert  Psych: flat affect  Skin: normal``    LABS/DATA:    CARDIAC MARKERS:                                8.3    11.49 )-----------( 222      ( 23 Nov 2019 04:46 )             24.3     11-23    140  |  106  |  17  ----------------------------<  107<H>  3.9   |  23  |  1.14    Ca    8.5      23 Nov 2019 04:46    TPro  5.4<L>  /  Alb  2.7<L>  /  TBili  0.9  /  DBili  x   /  AST  17  /  ALT  30  /  AlkPhos  48  11-23    proBNP:   Lipid Profile:   HgA1c:   TSH:     TELE:  EKG:

## 2019-11-23 NOTE — PROGRESS NOTE ADULT - ASSESSMENT
Cad   s/p cabg  cont asa, statin     post op afib, aflutter   recurrent  in sinus now  cont amio, BB  monitor LFT , TSH  a/c recommended     dm  Monitor finger stick. Insulin coverage.       outpt follow up

## 2019-11-23 NOTE — PROGRESS NOTE ADULT - ASSESSMENT
Assessment  DMT2: 74y Male with prediabetes, A1C 6.2% , was not on DM meds at home, s/p CABG on low-dose basal bolus insulin, FS in acceptable range Patient is eating meals, comfortable.  Adrenal Mass: 74y Male with hx adrenal enlargements scheduled for CABG today. As per patient's documents, enlargements are benign,  2.4cm, not much change/growth between 2014 to present, followed by his endo. Aldosterone, cortisol, TFT labs within normal limits, cleared to proceed with cardiac surgery.  CAD: S/P CABG, on medications, no chest pain, stable, monitored.  HTN: Controlled,  on antihypertensive medications.

## 2019-11-23 NOTE — PROGRESS NOTE ADULT - SUBJECTIVE AND OBJECTIVE BOX
VITAL SIGNS    Telemetry:    Vital Signs Last 24 Hrs  T(C): 36.6 (19 @ 13:20), Max: 37.2 (19 @ 19:34)  T(F): 97.8 (19 @ 13:20), Max: 98.9 (19 @ 19:34)  HR: 84 (19 @ 13:20) (80 - 87)  BP: 125/61 (19 @ 13:20) (102/60 - 134/69)  RR: 18 (19 @ 13:20) (18 - 18)  SpO2: 98% (19 @ 13:20) (93% - 99%)             @ 07:01  -   @ 07:00  --------------------------------------------------------  IN: 600 mL / OUT: 2250 mL / NET: -1650 mL     @ 07:01  -   @ 15:17  --------------------------------------------------------  IN: 600 mL / OUT: 1400 mL / NET: -800 mL       Daily     Daily Weight in k.4 (2019 07:55)  Admit Wt: Drug Dosing Weight  Height (cm): 167.6 (15 Nov 2019 08:51)  Weight (kg): 75.4 (2019 07:55)  BMI (kg/m2): 26.8 (2019 07:55)  BSA (m2): 1.85 (2019 07:55)     Daily Weight in k.4 (19 @ 07:55)    [facility]      140  |  106  |  17  ----------------------------<  107<H>  3.9   |  23  |  1.14    Ca    8.5      2019 04:46    TPro  5.4<L>  /  Alb  2.7<L>  /  TBili  0.9  /  DBili  x   /  AST  17  /  ALT  30  /  AlkPhos  48                                   8.3    11.49 )-----------( 222      ( 2019 04:46 )             24.3          PT/INR - ( 2019 04:46 )   PT: 14.5 sec;   INR: 1.25 ratio         PTT - ( 2019 06:38 )  PTT:29.4 sec        Bilirubin Total, Serum: 0.9 mg/dL ( @ 04:46)    CAPILLARY BLOOD GLUCOSE      POCT Blood Glucose.: 132 mg/dL (2019 11:47)  POCT Blood Glucose.: 118 mg/dL (2019 07:32)  POCT Blood Glucose.: 149 mg/dL (2019 21:27)  POCT Blood Glucose.: 113 mg/dL (2019 17:58)          Drains:     MS       [  ]   [  ]            L Pleural [  ]            R Pleural  [  ]            MANUELA  [  ]           Jacobsen  [  ]    Pacing Wires      [  ]   Settings:                                  Isolated  [  ]                    CXR:      MEDICATIONS  acetaminophen   Tablet .. 650 milliGRAM(s) Oral every 6 hours PRN  albuterol/ipratropium for Nebulization 3 milliLiter(s) Nebulizer every 6 hours  artificial tears (preservative free) Ophthalmic Solution 1 Drop(s) Both EYES three times a day PRN  ascorbic acid 500 milliGRAM(s) Oral daily  aspirin enteric coated 81 milliGRAM(s) Oral daily  atorvastatin 40 milliGRAM(s) Oral at bedtime  buDESOnide    Inhalation Suspension 0.5 milliGRAM(s) Inhalation two times a day  budesonide 160 MICROgram(s)/formoterol 4.5 MICROgram(s) Inhaler 2 Puff(s) Inhalation two times a day  carvedilol 6.25 milliGRAM(s) Oral every 12 hours  chlorhexidine 2% Cloths 1 Application(s) Topical <User Schedule>  chlorhexidine 2% Cloths 1 Application(s) Topical <User Schedule>  dextrose 40% Gel 15 Gram(s) Oral once PRN  dextrose 5%. 1000 milliLiter(s) IV Continuous <Continuous>  dextrose 50% Injectable 50 milliLiter(s) IV Push every 15 minutes  dextrose 50% Injectable 25 milliLiter(s) IV Push every 15 minutes  enoxaparin Injectable 40 milliGRAM(s) SubCutaneous daily  ferrous    sulfate 325 milliGRAM(s) Oral daily  folic acid 1 milliGRAM(s) Oral daily  furosemide    Tablet 40 milliGRAM(s) Oral daily  glucagon  Injectable 1 milliGRAM(s) IntraMuscular once PRN  insulin glargine Injectable (LANTUS) 12 Unit(s) SubCutaneous at bedtime  insulin lispro (HumaLOG) corrective regimen sliding scale   SubCutaneous three times a day before meals  insulin lispro (HumaLOG) corrective regimen sliding scale   SubCutaneous at bedtime  insulin lispro Injectable (HumaLOG) 4 Unit(s) SubCutaneous three times a day before meals  pantoprazole    Tablet 40 milliGRAM(s) Oral before breakfast  polyethylene glycol 3350 17 Gram(s) Oral daily  potassium chloride    Tablet ER 20 milliEquivalent(s) Oral daily  potassium chloride  10 mEq/50 mL IVPB 10 milliEquivalent(s) IV Intermittent every 1 hour  potassium chloride  10 mEq/50 mL IVPB 10 milliEquivalent(s) IV Intermittent every 1 hour  potassium chloride  10 mEq/50 mL IVPB 10 milliEquivalent(s) IV Intermittent every 1 hour  sodium chloride 0.9%. 1000 milliLiter(s) IV Continuous <Continuous>      PHYSICAL EXAM      Neurology: alert and oriented x 3, nonfocal, no gross deficits  CV :S1S2  Sternal Wound :  CDI , Stable  Lungs: cta  Abdomen: soft, nontender, nondistended, positive bowel sounds, last bowel movement   :   voids    Extremities:   warm to touch               PAST MEDICAL & SURGICAL HISTORY:  Borderline diabetes  PVD (peripheral vascular disease)  Carotid stenosis, bilateral  History of hypertension  Asthma  H/O sinus surgery  H/O carotid endarterectomy                 Discussed with Cardiothoracic Team at AM rounds.

## 2019-11-23 NOTE — PROGRESS NOTE ADULT - ASSESSMENT
74y Male PMH DM A1c 6.2, HTN, asthma, B/L Carotid a. stenosis s/p L CEA, PVD now s/p C4L  POD 1 GEORGES given 150mg Amio bolus and started on PO Amio load. Transferred to SDU POD #1  11/17/19 VSS rounds made w/ dr. agrawal- HCT down to 24 from 32- pt asymptomatic - up in weight- lasix 40mg po daily initiated - will monitor u/o. wbc 22 - inc. cdi afebrile.  l ct removed. mediastinal ct remains in place. gastric bubble -reglan. d/c planning  11/18 Remaining ct d/c.  Pt ambulating, no c/o abd discomfort  Paf overnight, if any further consider anticoagulation with hep/coum. Cont amio  Hct 23.4  May transfer to floor  11/19 to d/c pw, increase ambulation.  Cont amio.  D/c pw  11/21    20 min afib overnight  recent amio ld   will dw  Dr Agrawal ? second load   dc cardizem    start coumadin dosing   plus one pedal edema   neg 2l  on lasix qd   coreq 6.25   per cardiology Dr King  11/22: Episode of vagal like symptoms after strong cough. No changes noted to the monitor pt remains in SR first degree. Pt has had this in the past many years ago. Will Check echo to r/o pericardial effusion.  11/23: right leg more swollen today. Will get dopplers today per Dr. Argawal. Ace wrap leg

## 2019-11-23 NOTE — PROGRESS NOTE ADULT - SUBJECTIVE AND OBJECTIVE BOX
Chief complaint  Patient is a 74y old  Male who presents with a chief complaint of sp  CABG (21 Nov 2019 09:28)   Review of systems  Patient in bed, looks comfortable, no fever, no hypoglycemia.    Labs and Fingersticks  CAPILLARY BLOOD GLUCOSE      POCT Blood Glucose.: 132 mg/dL (23 Nov 2019 11:47)  POCT Blood Glucose.: 118 mg/dL (23 Nov 2019 07:32)  POCT Blood Glucose.: 149 mg/dL (22 Nov 2019 21:27)  POCT Blood Glucose.: 113 mg/dL (22 Nov 2019 17:58)      Anion Gap, Serum: 11 (11-23 @ 04:46)  Anion Gap, Serum: 14 (11-22 @ 06:38)      Calcium, Total Serum: 8.5 (11-23 @ 04:46)  Calcium, Total Serum: 8.7 (11-22 @ 06:38)  Albumin, Serum: 2.7 <L> (11-23 @ 04:46)    Alanine Aminotransferase (ALT/SGPT): 30 (11-23 @ 04:46)  Alkaline Phosphatase, Serum: 48 (11-23 @ 04:46)  Aspartate Aminotransferase (AST/SGOT): 17 (11-23 @ 04:46)        11-23    140  |  106  |  17  ----------------------------<  107<H>  3.9   |  23  |  1.14    Ca    8.5      23 Nov 2019 04:46    TPro  5.4<L>  /  Alb  2.7<L>  /  TBili  0.9  /  DBili  x   /  AST  17  /  ALT  30  /  AlkPhos  48  11-23                        8.3    11.49 )-----------( 222      ( 23 Nov 2019 04:46 )             24.3     Medications  MEDICATIONS  (STANDING):  albuterol/ipratropium for Nebulization 3 milliLiter(s) Nebulizer every 6 hours  ascorbic acid 500 milliGRAM(s) Oral daily  aspirin enteric coated 81 milliGRAM(s) Oral daily  atorvastatin 40 milliGRAM(s) Oral at bedtime  buDESOnide    Inhalation Suspension 0.5 milliGRAM(s) Inhalation two times a day  budesonide 160 MICROgram(s)/formoterol 4.5 MICROgram(s) Inhaler 2 Puff(s) Inhalation two times a day  carvedilol 6.25 milliGRAM(s) Oral every 12 hours  chlorhexidine 2% Cloths 1 Application(s) Topical <User Schedule>  chlorhexidine 2% Cloths 1 Application(s) Topical <User Schedule>  dextrose 5%. 1000 milliLiter(s) (50 mL/Hr) IV Continuous <Continuous>  dextrose 50% Injectable 50 milliLiter(s) IV Push every 15 minutes  dextrose 50% Injectable 25 milliLiter(s) IV Push every 15 minutes  enoxaparin Injectable 40 milliGRAM(s) SubCutaneous daily  ferrous    sulfate 325 milliGRAM(s) Oral daily  folic acid 1 milliGRAM(s) Oral daily  furosemide    Tablet 40 milliGRAM(s) Oral daily  insulin glargine Injectable (LANTUS) 12 Unit(s) SubCutaneous at bedtime  insulin lispro (HumaLOG) corrective regimen sliding scale   SubCutaneous three times a day before meals  insulin lispro (HumaLOG) corrective regimen sliding scale   SubCutaneous at bedtime  insulin lispro Injectable (HumaLOG) 4 Unit(s) SubCutaneous three times a day before meals  pantoprazole    Tablet 40 milliGRAM(s) Oral before breakfast  polyethylene glycol 3350 17 Gram(s) Oral daily  potassium chloride    Tablet ER 20 milliEquivalent(s) Oral daily  potassium chloride  10 mEq/50 mL IVPB 10 milliEquivalent(s) IV Intermittent every 1 hour  potassium chloride  10 mEq/50 mL IVPB 10 milliEquivalent(s) IV Intermittent every 1 hour  potassium chloride  10 mEq/50 mL IVPB 10 milliEquivalent(s) IV Intermittent every 1 hour  sodium chloride 0.9%. 1000 milliLiter(s) (10 mL/Hr) IV Continuous <Continuous>      Physical Exam  General: Patient comfortable in bed  Vital Signs Last 12 Hrs  T(F): 97.8 (11-23-19 @ 13:20), Max: 98.1 (11-23-19 @ 04:30)  HR: 84 (11-23-19 @ 13:20) (80 - 84)  BP: 125/61 (11-23-19 @ 13:20) (102/60 - 125/61)  BP(mean): --  RR: 18 (11-23-19 @ 13:20) (18 - 18)  SpO2: 98% (11-23-19 @ 13:20) (94% - 98%)  Neck: No palpable thyroid nodules.  CVS: S1S2, No murmurs  Respiratory: No wheezing, no crepitations  GI: Abdomen soft, bowel sounds positive  Musculoskeletal:  edema lower extremities.   Skin: No skin rashes, no ecchymosis    Diagnostics

## 2019-11-24 LAB
ALBUMIN SERPL ELPH-MCNC: 3 G/DL — LOW (ref 3.3–5)
ALP SERPL-CCNC: 50 U/L — SIGNIFICANT CHANGE UP (ref 40–120)
ALT FLD-CCNC: 29 U/L — SIGNIFICANT CHANGE UP (ref 10–45)
ANION GAP SERPL CALC-SCNC: 11 MMOL/L — SIGNIFICANT CHANGE UP (ref 5–17)
AST SERPL-CCNC: 14 U/L — SIGNIFICANT CHANGE UP (ref 10–40)
BILIRUB SERPL-MCNC: 1.1 MG/DL — SIGNIFICANT CHANGE UP (ref 0.2–1.2)
BUN SERPL-MCNC: 15 MG/DL — SIGNIFICANT CHANGE UP (ref 7–23)
CALCIUM SERPL-MCNC: 8.6 MG/DL — SIGNIFICANT CHANGE UP (ref 8.4–10.5)
CHLORIDE SERPL-SCNC: 104 MMOL/L — SIGNIFICANT CHANGE UP (ref 96–108)
CO2 SERPL-SCNC: 24 MMOL/L — SIGNIFICANT CHANGE UP (ref 22–31)
CREAT SERPL-MCNC: 1.12 MG/DL — SIGNIFICANT CHANGE UP (ref 0.5–1.3)
GLUCOSE BLDC GLUCOMTR-MCNC: 118 MG/DL — HIGH (ref 70–99)
GLUCOSE BLDC GLUCOMTR-MCNC: 122 MG/DL — HIGH (ref 70–99)
GLUCOSE BLDC GLUCOMTR-MCNC: 125 MG/DL — HIGH (ref 70–99)
GLUCOSE BLDC GLUCOMTR-MCNC: 196 MG/DL — HIGH (ref 70–99)
GLUCOSE SERPL-MCNC: 111 MG/DL — HIGH (ref 70–99)
HCT VFR BLD CALC: 24.7 % — LOW (ref 39–50)
HGB BLD-MCNC: 8.2 G/DL — LOW (ref 13–17)
INR BLD: 1.73 RATIO — HIGH (ref 0.88–1.16)
MCHC RBC-ENTMCNC: 30.8 PG — SIGNIFICANT CHANGE UP (ref 27–34)
MCHC RBC-ENTMCNC: 33.2 GM/DL — SIGNIFICANT CHANGE UP (ref 32–36)
MCV RBC AUTO: 92.9 FL — SIGNIFICANT CHANGE UP (ref 80–100)
NRBC # BLD: 0 /100 WBCS — SIGNIFICANT CHANGE UP (ref 0–0)
PLATELET # BLD AUTO: 237 K/UL — SIGNIFICANT CHANGE UP (ref 150–400)
POTASSIUM SERPL-MCNC: 3.6 MMOL/L — SIGNIFICANT CHANGE UP (ref 3.5–5.3)
POTASSIUM SERPL-SCNC: 3.6 MMOL/L — SIGNIFICANT CHANGE UP (ref 3.5–5.3)
PROT SERPL-MCNC: 5.5 G/DL — LOW (ref 6–8.3)
PROTHROM AB SERPL-ACNC: 20 SEC — HIGH (ref 10–12.9)
RBC # BLD: 2.66 M/UL — LOW (ref 4.2–5.8)
RBC # FLD: 14 % — SIGNIFICANT CHANGE UP (ref 10.3–14.5)
SODIUM SERPL-SCNC: 139 MMOL/L — SIGNIFICANT CHANGE UP (ref 135–145)
WBC # BLD: 9.58 K/UL — SIGNIFICANT CHANGE UP (ref 3.8–10.5)
WBC # FLD AUTO: 9.58 K/UL — SIGNIFICANT CHANGE UP (ref 3.8–10.5)

## 2019-11-24 RX ORDER — FLUTICASONE PROPIONATE 50 MCG
1 SPRAY, SUSPENSION NASAL
Refills: 0 | Status: DISCONTINUED | OUTPATIENT
Start: 2019-11-24 | End: 2019-11-25

## 2019-11-24 RX ORDER — WARFARIN SODIUM 2.5 MG/1
3 TABLET ORAL ONCE
Refills: 0 | Status: COMPLETED | OUTPATIENT
Start: 2019-11-24 | End: 2019-11-24

## 2019-11-24 RX ORDER — POTASSIUM CHLORIDE 20 MEQ
40 PACKET (EA) ORAL
Refills: 0 | Status: COMPLETED | OUTPATIENT
Start: 2019-11-24 | End: 2019-11-24

## 2019-11-24 RX ADMIN — Medication 1 MILLIGRAM(S): at 11:53

## 2019-11-24 RX ADMIN — Medication 325 MILLIGRAM(S): at 11:52

## 2019-11-24 RX ADMIN — Medication 4 UNIT(S): at 17:28

## 2019-11-24 RX ADMIN — Medication 40 MILLIEQUIVALENT(S): at 11:50

## 2019-11-24 RX ADMIN — ENOXAPARIN SODIUM 40 MILLIGRAM(S): 100 INJECTION SUBCUTANEOUS at 11:52

## 2019-11-24 RX ADMIN — Medication 20 MILLIEQUIVALENT(S): at 11:51

## 2019-11-24 RX ADMIN — Medication 500 MILLIGRAM(S): at 11:53

## 2019-11-24 RX ADMIN — ATORVASTATIN CALCIUM 40 MILLIGRAM(S): 80 TABLET, FILM COATED ORAL at 22:02

## 2019-11-24 RX ADMIN — Medication 40 MILLIEQUIVALENT(S): at 08:17

## 2019-11-24 RX ADMIN — WARFARIN SODIUM 3 MILLIGRAM(S): 2.5 TABLET ORAL at 22:02

## 2019-11-24 RX ADMIN — BUDESONIDE AND FORMOTEROL FUMARATE DIHYDRATE 2 PUFF(S): 160; 4.5 AEROSOL RESPIRATORY (INHALATION) at 05:51

## 2019-11-24 RX ADMIN — POLYETHYLENE GLYCOL 3350 17 GRAM(S): 17 POWDER, FOR SOLUTION ORAL at 11:52

## 2019-11-24 RX ADMIN — INSULIN GLARGINE 12 UNIT(S): 100 INJECTION, SOLUTION SUBCUTANEOUS at 22:01

## 2019-11-24 RX ADMIN — Medication 40 MILLIGRAM(S): at 05:51

## 2019-11-24 RX ADMIN — ALBUTEROL 2 PUFF(S): 90 AEROSOL, METERED ORAL at 13:26

## 2019-11-24 RX ADMIN — Medication 0.5 MILLIGRAM(S): at 05:51

## 2019-11-24 RX ADMIN — PANTOPRAZOLE SODIUM 40 MILLIGRAM(S): 20 TABLET, DELAYED RELEASE ORAL at 05:51

## 2019-11-24 RX ADMIN — Medication 4 UNIT(S): at 08:17

## 2019-11-24 RX ADMIN — Medication 0.5 MILLIGRAM(S): at 17:23

## 2019-11-24 RX ADMIN — BUDESONIDE AND FORMOTEROL FUMARATE DIHYDRATE 2 PUFF(S): 160; 4.5 AEROSOL RESPIRATORY (INHALATION) at 17:23

## 2019-11-24 RX ADMIN — Medication 81 MILLIGRAM(S): at 11:53

## 2019-11-24 RX ADMIN — Medication 4 UNIT(S): at 11:53

## 2019-11-24 RX ADMIN — CARVEDILOL PHOSPHATE 6.25 MILLIGRAM(S): 80 CAPSULE, EXTENDED RELEASE ORAL at 17:24

## 2019-11-24 RX ADMIN — CARVEDILOL PHOSPHATE 6.25 MILLIGRAM(S): 80 CAPSULE, EXTENDED RELEASE ORAL at 05:51

## 2019-11-24 NOTE — PROGRESS NOTE ADULT - SUBJECTIVE AND OBJECTIVE BOX
VITAL SIGNS    Telemetry:    Vital Signs Last 24 Hrs  T(C): 36.6 (19 @ 14:14), Max: 36.9 (19 @ 05:40)  T(F): 97.9 (19 @ 14:14), Max: 98.4 (19 @ 05:40)  HR: 81 (19 @ 17:27) (75 - 92)  BP: 134/67 (19 @ 17:27) (125/67 - 152/68)  RR: 18 (19 @ 17:27) (16 - 18)  SpO2: 96% (19 @ 17:27) (94% - 98%)             @ 07:01  -   @ 07:00  --------------------------------------------------------  IN: 840 mL / OUT: 3700 mL / NET: -2860 mL     @ 07:01  -   @ 17:47  --------------------------------------------------------  IN: 600 mL / OUT: 2200 mL / NET: -1600 mL       Daily     Daily Weight in k.7 (2019 08:15)  Admit Wt: Drug Dosing Weight  Height (cm): 167.6 (15 Nov 2019 08:51)  Weight (kg): 75.4 (2019 07:55)  BMI (kg/m2): 26.8 (2019 07:55)  BSA (m2): 1.85 (2019 07:55)     Daily Weight in k.7 (19 @ 08:15)    [facility]      139  |  104  |  15  ----------------------------<  111<H>  3.6   |  24  |  1.12    Ca    8.6      2019 06:37    TPro  5.5<L>  /  Alb  3.0<L>  /  TBili  1.1  /  DBili  x   /  AST  14  /  ALT  29  /  AlkPhos  50                                   8.2    9.58  )-----------( 237      ( 2019 06:37 )             24.7          PT/INR - ( 2019 06:37 )   PT: 20.0 sec;   INR: 1.73 ratio                 Bilirubin Total, Serum: 1.1 mg/dL ( @ 06:37)    CAPILLARY BLOOD GLUCOSE      POCT Blood Glucose.: 118 mg/dL (2019 17:23)  POCT Blood Glucose.: 122 mg/dL (2019 11:48)  POCT Blood Glucose.: 125 mg/dL (2019 07:50)  POCT Blood Glucose.: 171 mg/dL (2019 21:38)          Drains:     MS       [  ]   [  ]            L Pleural [  ]            R Pleural  [  ]            MANUELA  [  ]           Jacobsen  [  ]    Pacing Wires      [  ]   Settings:                                  Isolated  [  ]                    CXR:      MEDICATIONS  acetaminophen   Tablet .. 650 milliGRAM(s) Oral every 6 hours PRN  ALBUTerol    90 MICROgram(s) HFA Inhaler 2 Puff(s) Inhalation every 6 hours PRN  artificial tears (preservative free) Ophthalmic Solution 1 Drop(s) Both EYES three times a day PRN  ascorbic acid 500 milliGRAM(s) Oral daily  aspirin enteric coated 81 milliGRAM(s) Oral daily  atorvastatin 40 milliGRAM(s) Oral at bedtime  buDESOnide    Inhalation Suspension 0.5 milliGRAM(s) Inhalation two times a day  budesonide 160 MICROgram(s)/formoterol 4.5 MICROgram(s) Inhaler 2 Puff(s) Inhalation two times a day  carvedilol 6.25 milliGRAM(s) Oral every 12 hours  chlorhexidine 2% Cloths 1 Application(s) Topical <User Schedule>  chlorhexidine 2% Cloths 1 Application(s) Topical <User Schedule>  dextrose 40% Gel 15 Gram(s) Oral once PRN  dextrose 5%. 1000 milliLiter(s) IV Continuous <Continuous>  dextrose 50% Injectable 50 milliLiter(s) IV Push every 15 minutes  dextrose 50% Injectable 25 milliLiter(s) IV Push every 15 minutes  enoxaparin Injectable 40 milliGRAM(s) SubCutaneous daily  ferrous    sulfate 325 milliGRAM(s) Oral daily  folic acid 1 milliGRAM(s) Oral daily  furosemide    Tablet 40 milliGRAM(s) Oral daily  glucagon  Injectable 1 milliGRAM(s) IntraMuscular once PRN  insulin glargine Injectable (LANTUS) 12 Unit(s) SubCutaneous at bedtime  insulin lispro (HumaLOG) corrective regimen sliding scale   SubCutaneous three times a day before meals  insulin lispro (HumaLOG) corrective regimen sliding scale   SubCutaneous at bedtime  insulin lispro Injectable (HumaLOG) 4 Unit(s) SubCutaneous three times a day before meals  pantoprazole    Tablet 40 milliGRAM(s) Oral before breakfast  polyethylene glycol 3350 17 Gram(s) Oral daily  potassium chloride    Tablet ER 20 milliEquivalent(s) Oral daily  potassium chloride  10 mEq/50 mL IVPB 10 milliEquivalent(s) IV Intermittent every 1 hour  potassium chloride  10 mEq/50 mL IVPB 10 milliEquivalent(s) IV Intermittent every 1 hour  potassium chloride  10 mEq/50 mL IVPB 10 milliEquivalent(s) IV Intermittent every 1 hour  sodium chloride 0.9%. 1000 milliLiter(s) IV Continuous <Continuous>  warfarin 3 milliGRAM(s) Oral once      PHYSICAL EXAM      Neurology: alert and oriented x 3, nonfocal, no gross deficits  CV :S1S2  Sternal Wound :  CDI , Stable  Lungs: cta  Abdomen: soft, nontender, nondistended, positive bowel sounds, last bowel movement   : voids      Extremities:   warm to touch. R leg swelling improved some               PAST MEDICAL & SURGICAL HISTORY:  Borderline diabetes  PVD (peripheral vascular disease)  Carotid stenosis, bilateral  History of hypertension  Asthma  H/O sinus surgery  H/O carotid endarterectomy                 Discussed with Cardiothoracic Team at AM rounds.

## 2019-11-24 NOTE — PROGRESS NOTE ADULT - SUBJECTIVE AND OBJECTIVE BOX
Subjective: Patient seen and examined. No new events except as noted.     SUBJECTIVE/ROS:  pos cough       MEDICATIONS:  MEDICATIONS  (STANDING):  ascorbic acid 500 milliGRAM(s) Oral daily  aspirin enteric coated 81 milliGRAM(s) Oral daily  atorvastatin 40 milliGRAM(s) Oral at bedtime  buDESOnide    Inhalation Suspension 0.5 milliGRAM(s) Inhalation two times a day  budesonide 160 MICROgram(s)/formoterol 4.5 MICROgram(s) Inhaler 2 Puff(s) Inhalation two times a day  carvedilol 6.25 milliGRAM(s) Oral every 12 hours  chlorhexidine 2% Cloths 1 Application(s) Topical <User Schedule>  chlorhexidine 2% Cloths 1 Application(s) Topical <User Schedule>  dextrose 5%. 1000 milliLiter(s) (50 mL/Hr) IV Continuous <Continuous>  dextrose 50% Injectable 50 milliLiter(s) IV Push every 15 minutes  dextrose 50% Injectable 25 milliLiter(s) IV Push every 15 minutes  enoxaparin Injectable 40 milliGRAM(s) SubCutaneous daily  ferrous    sulfate 325 milliGRAM(s) Oral daily  folic acid 1 milliGRAM(s) Oral daily  furosemide    Tablet 40 milliGRAM(s) Oral daily  insulin glargine Injectable (LANTUS) 12 Unit(s) SubCutaneous at bedtime  insulin lispro (HumaLOG) corrective regimen sliding scale   SubCutaneous three times a day before meals  insulin lispro (HumaLOG) corrective regimen sliding scale   SubCutaneous at bedtime  insulin lispro Injectable (HumaLOG) 4 Unit(s) SubCutaneous three times a day before meals  pantoprazole    Tablet 40 milliGRAM(s) Oral before breakfast  polyethylene glycol 3350 17 Gram(s) Oral daily  potassium chloride    Tablet ER 40 milliEquivalent(s) Oral every 2 hours  potassium chloride    Tablet ER 20 milliEquivalent(s) Oral daily  potassium chloride  10 mEq/50 mL IVPB 10 milliEquivalent(s) IV Intermittent every 1 hour  potassium chloride  10 mEq/50 mL IVPB 10 milliEquivalent(s) IV Intermittent every 1 hour  potassium chloride  10 mEq/50 mL IVPB 10 milliEquivalent(s) IV Intermittent every 1 hour  sodium chloride 0.9%. 1000 milliLiter(s) (10 mL/Hr) IV Continuous <Continuous>      PHYSICAL EXAM:  T(C): 36.9 (11-24-19 @ 05:40), Max: 36.9 (11-24-19 @ 05:40)  HR: 82 (11-24-19 @ 10:15) (75 - 86)  BP: 125/67 (11-24-19 @ 05:40) (125/61 - 148/69)  RR: 16 (11-24-19 @ 05:40) (16 - 18)  SpO2: 98% (11-24-19 @ 10:15) (94% - 98%)  Wt(kg): --  I&O's Summary    23 Nov 2019 07:01  -  24 Nov 2019 07:00  --------------------------------------------------------  IN: 840 mL / OUT: 3700 mL / NET: -2860 mL    24 Nov 2019 07:01  -  24 Nov 2019 10:50  --------------------------------------------------------  IN: 240 mL / OUT: 1300 mL / NET: -1060 mL            JVP: Normal  Neck: supple  Lung: clear   CV: S1 S2 , Murmur:  Abd: soft  Ext: pos edema  neuro: Awake / alert  Psych: flat affect  Skin: normal``    LABS/DATA:    CARDIAC MARKERS:                                8.2    9.58  )-----------( 237      ( 24 Nov 2019 06:37 )             24.7     11-24    139  |  104  |  15  ----------------------------<  111<H>  3.6   |  24  |  1.12    Ca    8.6      24 Nov 2019 06:37    TPro  5.5<L>  /  Alb  3.0<L>  /  TBili  1.1  /  DBili  x   /  AST  14  /  ALT  29  /  AlkPhos  50  11-24    proBNP:   Lipid Profile:   HgA1c:   TSH:     TELE:  EKG:

## 2019-11-24 NOTE — PROGRESS NOTE ADULT - ASSESSMENT
Cad   s/p cabg  cont asa, statin     post op afib, aflutter   recurrent  in sinus now  cont amio, BB  monitor LFT , TSH  a/c recommended     dm  Monitor finger stick. Insulin coverage.     Cough with cough syncope syndrome   no events on tele   recommend pulm eval

## 2019-11-24 NOTE — PROGRESS NOTE ADULT - SUBJECTIVE AND OBJECTIVE BOX
Chief complaint  Patient is a 74y old  Male who presents with a chief complaint of sp  CABG (21 Nov 2019 09:28)   Review of systems  Patient in bed, looks comfortable, no fever, no hypoglycemia.    Labs and Fingersticks  CAPILLARY BLOOD GLUCOSE      POCT Blood Glucose.: 118 mg/dL (24 Nov 2019 17:23)  POCT Blood Glucose.: 122 mg/dL (24 Nov 2019 11:48)  POCT Blood Glucose.: 125 mg/dL (24 Nov 2019 07:50)  POCT Blood Glucose.: 171 mg/dL (23 Nov 2019 21:38)      Anion Gap, Serum: 11 (11-24 @ 06:37)  Anion Gap, Serum: 11 (11-23 @ 04:46)      Calcium, Total Serum: 8.6 (11-24 @ 06:37)  Calcium, Total Serum: 8.5 (11-23 @ 04:46)  Albumin, Serum: 3.0 <L> (11-24 @ 06:37)  Albumin, Serum: 2.7 <L> (11-23 @ 04:46)    Alanine Aminotransferase (ALT/SGPT): 29 (11-24 @ 06:37)  Alanine Aminotransferase (ALT/SGPT): 30 (11-23 @ 04:46)  Alkaline Phosphatase, Serum: 50 (11-24 @ 06:37)  Alkaline Phosphatase, Serum: 48 (11-23 @ 04:46)  Aspartate Aminotransferase (AST/SGOT): 14 (11-24 @ 06:37)  Aspartate Aminotransferase (AST/SGOT): 17 (11-23 @ 04:46)        11-24    139  |  104  |  15  ----------------------------<  111<H>  3.6   |  24  |  1.12    Ca    8.6      24 Nov 2019 06:37    TPro  5.5<L>  /  Alb  3.0<L>  /  TBili  1.1  /  DBili  x   /  AST  14  /  ALT  29  /  AlkPhos  50  11-24                        8.2    9.58  )-----------( 237      ( 24 Nov 2019 06:37 )             24.7     Medications  MEDICATIONS  (STANDING):  ascorbic acid 500 milliGRAM(s) Oral daily  aspirin enteric coated 81 milliGRAM(s) Oral daily  atorvastatin 40 milliGRAM(s) Oral at bedtime  buDESOnide    Inhalation Suspension 0.5 milliGRAM(s) Inhalation two times a day  budesonide 160 MICROgram(s)/formoterol 4.5 MICROgram(s) Inhaler 2 Puff(s) Inhalation two times a day  carvedilol 6.25 milliGRAM(s) Oral every 12 hours  chlorhexidine 2% Cloths 1 Application(s) Topical <User Schedule>  chlorhexidine 2% Cloths 1 Application(s) Topical <User Schedule>  dextrose 5%. 1000 milliLiter(s) (50 mL/Hr) IV Continuous <Continuous>  dextrose 50% Injectable 50 milliLiter(s) IV Push every 15 minutes  dextrose 50% Injectable 25 milliLiter(s) IV Push every 15 minutes  enoxaparin Injectable 40 milliGRAM(s) SubCutaneous daily  ferrous    sulfate 325 milliGRAM(s) Oral daily  fluticasone propionate 50 MICROgram(s)/spray Nasal Spray 1 Spray(s) Both Nostrils two times a day  folic acid 1 milliGRAM(s) Oral daily  furosemide    Tablet 40 milliGRAM(s) Oral daily  insulin glargine Injectable (LANTUS) 12 Unit(s) SubCutaneous at bedtime  insulin lispro (HumaLOG) corrective regimen sliding scale   SubCutaneous three times a day before meals  insulin lispro (HumaLOG) corrective regimen sliding scale   SubCutaneous at bedtime  insulin lispro Injectable (HumaLOG) 4 Unit(s) SubCutaneous three times a day before meals  pantoprazole    Tablet 40 milliGRAM(s) Oral before breakfast  polyethylene glycol 3350 17 Gram(s) Oral daily  potassium chloride    Tablet ER 20 milliEquivalent(s) Oral daily  potassium chloride  10 mEq/50 mL IVPB 10 milliEquivalent(s) IV Intermittent every 1 hour  potassium chloride  10 mEq/50 mL IVPB 10 milliEquivalent(s) IV Intermittent every 1 hour  potassium chloride  10 mEq/50 mL IVPB 10 milliEquivalent(s) IV Intermittent every 1 hour  sodium chloride 0.9%. 1000 milliLiter(s) (10 mL/Hr) IV Continuous <Continuous>  warfarin 3 milliGRAM(s) Oral once      Physical Exam  General: Patient comfortable in bed  Vital Signs Last 12 Hrs  T(F): 97.5 (11-24-19 @ 19:50), Max: 97.9 (11-24-19 @ 14:14)  HR: 86 (11-24-19 @ 19:50) (81 - 92)  BP: 165/70 (11-24-19 @ 19:50) (134/67 - 165/70)  BP(mean): --  RR: 18 (11-24-19 @ 19:50) (18 - 18)  SpO2: 97% (11-24-19 @ 19:50) (96% - 98%)  Neck: No palpable thyroid nodules.  CVS: S1S2, No murmurs  Respiratory: No wheezing, no crepitations  GI: Abdomen soft, bowel sounds positive  Musculoskeletal:  edema lower extremities.   Skin: No skin rashes, no ecchymosis    Diagnostics

## 2019-11-24 NOTE — PROGRESS NOTE ADULT - ASSESSMENT
Assessment  DMT2: 74y Male with prediabetes, A1C 6.2% , was not on DM meds at home, s/p CABG on low-dose basal bolus insulin, FS in acceptable range Patient is eating meals, comfortable, ambulating on floor, no chest pain..  Adrenal Mass: 74y Male with hx adrenal enlargements scheduled for CABG today. As per patient's documents, enlargements are benign,  2.4cm, not much change/growth between 2014 to present, followed by his endo. Aldosterone, cortisol, TFT labs within normal limits, cleared to proceed with cardiac surgery.  CAD: S/P CABG, on medications, no chest pain, stable, monitored.  HTN: Controlled,  on antihypertensive medications.

## 2019-11-24 NOTE — PROGRESS NOTE ADULT - ASSESSMENT
74y Male PMH DM A1c 6.2, HTN, asthma, B/L Carotid a. stenosis s/p L CEA, PVD now s/p C4L  POD 1 GEORGES given 150mg Amio bolus and started on PO Amio load. Transferred to SDU POD #1  11/17/19 VSS rounds made w/ dr. agrawal- HCT down to 24 from 32- pt asymptomatic - up in weight- lasix 40mg po daily initiated - will monitor u/o. wbc 22 - inc. cdi afebrile.  l ct removed. mediastinal ct remains in place. gastric bubble -reglan. d/c planning  11/18 Remaining ct d/c.  Pt ambulating, no c/o abd discomfort  Paf overnight, if any further consider anticoagulation with hep/coum. Cont amio  Hct 23.4  May transfer to floor  11/19 to d/c pw, increase ambulation.  Cont amio.  D/c pw  11/21    20 min afib overnight  recent amio ld   will dw  Dr Agrawal ? second load   dc cardizem    start coumadin dosing   plus one pedal edema   neg 2l  on lasix qd   coreq 6.25   per cardiology Dr King  11/22: Episode of vagal like symptoms after strong cough. No changes noted to the monitor pt remains in SR first degree. Pt has had this in the past many years ago. Will Check echo to r/o pericardial effusion.  11/23: right leg more swollen today. Will get dopplers today per Dr. Agrawal. Ace wrap leg  11/24: Still awaiting dopplers of Right lower extremity. Likey discharge on monday

## 2019-11-25 ENCOUNTER — TRANSCRIPTION ENCOUNTER (OUTPATIENT)
Age: 74
End: 2019-11-25

## 2019-11-25 VITALS
TEMPERATURE: 98 F | HEART RATE: 77 BPM | SYSTOLIC BLOOD PRESSURE: 120 MMHG | DIASTOLIC BLOOD PRESSURE: 62 MMHG | RESPIRATION RATE: 18 BRPM | OXYGEN SATURATION: 97 %

## 2019-11-25 PROBLEM — I73.9 PERIPHERAL VASCULAR DISEASE, UNSPECIFIED: Chronic | Status: ACTIVE | Noted: 2019-11-12

## 2019-11-25 LAB
ALBUMIN SERPL ELPH-MCNC: 2.8 G/DL — LOW (ref 3.3–5)
ALP SERPL-CCNC: 51 U/L — SIGNIFICANT CHANGE UP (ref 40–120)
ALT FLD-CCNC: 29 U/L — SIGNIFICANT CHANGE UP (ref 10–45)
ANION GAP SERPL CALC-SCNC: 11 MMOL/L — SIGNIFICANT CHANGE UP (ref 5–17)
AST SERPL-CCNC: 14 U/L — SIGNIFICANT CHANGE UP (ref 10–40)
BILIRUB SERPL-MCNC: 1 MG/DL — SIGNIFICANT CHANGE UP (ref 0.2–1.2)
BUN SERPL-MCNC: 15 MG/DL — SIGNIFICANT CHANGE UP (ref 7–23)
CALCIUM SERPL-MCNC: 8.7 MG/DL — SIGNIFICANT CHANGE UP (ref 8.4–10.5)
CHLORIDE SERPL-SCNC: 107 MMOL/L — SIGNIFICANT CHANGE UP (ref 96–108)
CO2 SERPL-SCNC: 22 MMOL/L — SIGNIFICANT CHANGE UP (ref 22–31)
CREAT SERPL-MCNC: 1.01 MG/DL — SIGNIFICANT CHANGE UP (ref 0.5–1.3)
GLUCOSE BLDC GLUCOMTR-MCNC: 100 MG/DL — HIGH (ref 70–99)
GLUCOSE BLDC GLUCOMTR-MCNC: 110 MG/DL — HIGH (ref 70–99)
GLUCOSE SERPL-MCNC: 100 MG/DL — HIGH (ref 70–99)
HCT VFR BLD CALC: 25.8 % — LOW (ref 39–50)
HGB BLD-MCNC: 8.2 G/DL — LOW (ref 13–17)
INR BLD: 2.22 RATIO — HIGH (ref 0.88–1.16)
MCHC RBC-ENTMCNC: 30.6 PG — SIGNIFICANT CHANGE UP (ref 27–34)
MCHC RBC-ENTMCNC: 31.8 GM/DL — LOW (ref 32–36)
MCV RBC AUTO: 96.3 FL — SIGNIFICANT CHANGE UP (ref 80–100)
NRBC # BLD: 0 /100 WBCS — SIGNIFICANT CHANGE UP (ref 0–0)
PLATELET # BLD AUTO: 217 K/UL — SIGNIFICANT CHANGE UP (ref 150–400)
POTASSIUM SERPL-MCNC: 3.9 MMOL/L — SIGNIFICANT CHANGE UP (ref 3.5–5.3)
POTASSIUM SERPL-SCNC: 3.9 MMOL/L — SIGNIFICANT CHANGE UP (ref 3.5–5.3)
PROT SERPL-MCNC: 5.7 G/DL — LOW (ref 6–8.3)
PROTHROM AB SERPL-ACNC: 26.2 SEC — HIGH (ref 10–12.9)
RBC # BLD: 2.68 M/UL — LOW (ref 4.2–5.8)
RBC # FLD: 14.7 % — HIGH (ref 10.3–14.5)
SODIUM SERPL-SCNC: 140 MMOL/L — SIGNIFICANT CHANGE UP (ref 135–145)
WBC # BLD: 9.84 K/UL — SIGNIFICANT CHANGE UP (ref 3.8–10.5)
WBC # FLD AUTO: 9.84 K/UL — SIGNIFICANT CHANGE UP (ref 3.8–10.5)

## 2019-11-25 PROCEDURE — 97530 THERAPEUTIC ACTIVITIES: CPT

## 2019-11-25 PROCEDURE — 82553 CREATINE MB FRACTION: CPT

## 2019-11-25 PROCEDURE — 81003 URINALYSIS AUTO W/O SCOPE: CPT

## 2019-11-25 PROCEDURE — C1751: CPT

## 2019-11-25 PROCEDURE — 84295 ASSAY OF SERUM SODIUM: CPT

## 2019-11-25 PROCEDURE — P9016: CPT

## 2019-11-25 PROCEDURE — 93971 EXTREMITY STUDY: CPT

## 2019-11-25 PROCEDURE — 82330 ASSAY OF CALCIUM: CPT

## 2019-11-25 PROCEDURE — C1769: CPT

## 2019-11-25 PROCEDURE — 84100 ASSAY OF PHOSPHORUS: CPT

## 2019-11-25 PROCEDURE — 85014 HEMATOCRIT: CPT

## 2019-11-25 PROCEDURE — 82565 ASSAY OF CREATININE: CPT

## 2019-11-25 PROCEDURE — 82947 ASSAY GLUCOSE BLOOD QUANT: CPT

## 2019-11-25 PROCEDURE — 93880 EXTRACRANIAL BILAT STUDY: CPT

## 2019-11-25 PROCEDURE — C8929: CPT

## 2019-11-25 PROCEDURE — 93454 CORONARY ARTERY ANGIO S&I: CPT

## 2019-11-25 PROCEDURE — 93306 TTE W/DOPPLER COMPLETE: CPT

## 2019-11-25 PROCEDURE — 82435 ASSAY OF BLOOD CHLORIDE: CPT

## 2019-11-25 PROCEDURE — 86891 AUTOLOGOUS BLOOD OP SALVAGE: CPT

## 2019-11-25 PROCEDURE — 85396 CLOTTING ASSAY WHOLE BLOOD: CPT

## 2019-11-25 PROCEDURE — 83605 ASSAY OF LACTIC ACID: CPT

## 2019-11-25 PROCEDURE — 84443 ASSAY THYROID STIM HORMONE: CPT

## 2019-11-25 PROCEDURE — 80048 BASIC METABOLIC PNL TOTAL CA: CPT

## 2019-11-25 PROCEDURE — 84484 ASSAY OF TROPONIN QUANT: CPT

## 2019-11-25 PROCEDURE — 82088 ASSAY OF ALDOSTERONE: CPT

## 2019-11-25 PROCEDURE — P9047: CPT

## 2019-11-25 PROCEDURE — 97161 PT EVAL LOW COMPLEX 20 MIN: CPT

## 2019-11-25 PROCEDURE — 93005 ELECTROCARDIOGRAM TRACING: CPT

## 2019-11-25 PROCEDURE — C1889: CPT

## 2019-11-25 PROCEDURE — 84132 ASSAY OF SERUM POTASSIUM: CPT

## 2019-11-25 PROCEDURE — C1729: CPT

## 2019-11-25 PROCEDURE — 82550 ASSAY OF CK (CPK): CPT

## 2019-11-25 PROCEDURE — 93971 EXTREMITY STUDY: CPT | Mod: 26

## 2019-11-25 PROCEDURE — 82248 BILIRUBIN DIRECT: CPT

## 2019-11-25 PROCEDURE — P9045: CPT

## 2019-11-25 PROCEDURE — 83880 ASSAY OF NATRIURETIC PEPTIDE: CPT

## 2019-11-25 PROCEDURE — 84439 ASSAY OF FREE THYROXINE: CPT

## 2019-11-25 PROCEDURE — 85730 THROMBOPLASTIN TIME PARTIAL: CPT

## 2019-11-25 PROCEDURE — 86901 BLOOD TYPING SEROLOGIC RH(D): CPT

## 2019-11-25 PROCEDURE — 94640 AIRWAY INHALATION TREATMENT: CPT

## 2019-11-25 PROCEDURE — 85610 PROTHROMBIN TIME: CPT

## 2019-11-25 PROCEDURE — 94010 BREATHING CAPACITY TEST: CPT

## 2019-11-25 PROCEDURE — 86900 BLOOD TYPING SEROLOGIC ABO: CPT

## 2019-11-25 PROCEDURE — 94002 VENT MGMT INPAT INIT DAY: CPT

## 2019-11-25 PROCEDURE — 85027 COMPLETE CBC AUTOMATED: CPT

## 2019-11-25 PROCEDURE — 93010 ELECTROCARDIOGRAM REPORT: CPT

## 2019-11-25 PROCEDURE — 86923 COMPATIBILITY TEST ELECTRIC: CPT

## 2019-11-25 PROCEDURE — 80053 COMPREHEN METABOLIC PANEL: CPT

## 2019-11-25 PROCEDURE — 94660 CPAP INITIATION&MGMT: CPT

## 2019-11-25 PROCEDURE — 85384 FIBRINOGEN ACTIVITY: CPT

## 2019-11-25 PROCEDURE — 82533 TOTAL CORTISOL: CPT

## 2019-11-25 PROCEDURE — C1887: CPT

## 2019-11-25 PROCEDURE — 99152 MOD SED SAME PHYS/QHP 5/>YRS: CPT

## 2019-11-25 PROCEDURE — 83835 ASSAY OF METANEPHRINES: CPT

## 2019-11-25 PROCEDURE — 82962 GLUCOSE BLOOD TEST: CPT

## 2019-11-25 PROCEDURE — 87640 STAPH A DNA AMP PROBE: CPT

## 2019-11-25 PROCEDURE — C1894: CPT

## 2019-11-25 PROCEDURE — 83036 HEMOGLOBIN GLYCOSYLATED A1C: CPT

## 2019-11-25 PROCEDURE — 82803 BLOOD GASES ANY COMBINATION: CPT

## 2019-11-25 PROCEDURE — 86850 RBC ANTIBODY SCREEN: CPT

## 2019-11-25 PROCEDURE — 71045 X-RAY EXAM CHEST 1 VIEW: CPT

## 2019-11-25 PROCEDURE — 97116 GAIT TRAINING THERAPY: CPT

## 2019-11-25 PROCEDURE — 83735 ASSAY OF MAGNESIUM: CPT

## 2019-11-25 PROCEDURE — 87641 MR-STAPH DNA AMP PROBE: CPT

## 2019-11-25 RX ORDER — BUDESONIDE AND FORMOTEROL FUMARATE DIHYDRATE 160; 4.5 UG/1; UG/1
2 AEROSOL RESPIRATORY (INHALATION)
Qty: 1 | Refills: 0
Start: 2019-11-25 | End: 2019-12-24

## 2019-11-25 RX ORDER — ACETAMINOPHEN 500 MG
2 TABLET ORAL
Qty: 0 | Refills: 0 | DISCHARGE
Start: 2019-11-25

## 2019-11-25 RX ORDER — ATORVASTATIN CALCIUM 80 MG/1
1 TABLET, FILM COATED ORAL
Qty: 30 | Refills: 0
Start: 2019-11-25 | End: 2019-12-24

## 2019-11-25 RX ORDER — ASCORBIC ACID 60 MG
1 TABLET,CHEWABLE ORAL
Qty: 30 | Refills: 0
Start: 2019-11-25 | End: 2019-12-24

## 2019-11-25 RX ORDER — METFORMIN HYDROCHLORIDE 850 MG/1
1 TABLET ORAL
Qty: 60 | Refills: 0
Start: 2019-11-25 | End: 2019-12-24

## 2019-11-25 RX ORDER — ALBUTEROL 90 UG/1
2 AEROSOL, METERED ORAL
Qty: 1 | Refills: 0
Start: 2019-11-25 | End: 2019-12-24

## 2019-11-25 RX ORDER — FERROUS SULFATE 325(65) MG
1 TABLET ORAL
Qty: 90 | Refills: 0
Start: 2019-11-25 | End: 2019-12-24

## 2019-11-25 RX ORDER — FLUTICASONE PROPIONATE 220 MCG
2 AEROSOL WITH ADAPTER (GRAM) INHALATION
Qty: 1 | Refills: 0
Start: 2019-11-25 | End: 2019-12-24

## 2019-11-25 RX ORDER — BUDESONIDE AND FORMOTEROL FUMARATE DIHYDRATE 160; 4.5 UG/1; UG/1
2 AEROSOL RESPIRATORY (INHALATION)
Qty: 0 | Refills: 0 | DISCHARGE

## 2019-11-25 RX ORDER — POTASSIUM CHLORIDE 20 MEQ
1 PACKET (EA) ORAL
Qty: 4 | Refills: 0
Start: 2019-11-25 | End: 2019-11-28

## 2019-11-25 RX ORDER — CARVEDILOL PHOSPHATE 80 MG/1
1 CAPSULE, EXTENDED RELEASE ORAL
Qty: 60 | Refills: 0
Start: 2019-11-25 | End: 2019-12-24

## 2019-11-25 RX ORDER — PANTOPRAZOLE SODIUM 20 MG/1
1 TABLET, DELAYED RELEASE ORAL
Qty: 30 | Refills: 0
Start: 2019-11-25 | End: 2019-12-24

## 2019-11-25 RX ORDER — ALBUTEROL 90 UG/1
2 AEROSOL, METERED ORAL
Qty: 0 | Refills: 0 | DISCHARGE

## 2019-11-25 RX ORDER — FUROSEMIDE 40 MG
20 TABLET ORAL DAILY
Refills: 0 | Status: DISCONTINUED | OUTPATIENT
Start: 2019-11-25 | End: 2019-11-25

## 2019-11-25 RX ORDER — FLUTICASONE PROPIONATE 50 MCG
1 SPRAY, SUSPENSION NASAL
Qty: 1 | Refills: 0
Start: 2019-11-25 | End: 2019-12-24

## 2019-11-25 RX ORDER — WARFARIN SODIUM 2.5 MG/1
1 TABLET ORAL
Qty: 30 | Refills: 0
Start: 2019-11-25 | End: 2019-12-24

## 2019-11-25 RX ORDER — FOLIC ACID 0.8 MG
1 TABLET ORAL
Qty: 30 | Refills: 0
Start: 2019-11-25 | End: 2019-12-24

## 2019-11-25 RX ORDER — POLYETHYLENE GLYCOL 3350 17 G/17G
17 POWDER, FOR SOLUTION ORAL
Qty: 1735 | Refills: 0
Start: 2019-11-25 | End: 2019-12-24

## 2019-11-25 RX ORDER — FUROSEMIDE 40 MG
1 TABLET ORAL
Qty: 5 | Refills: 0
Start: 2019-11-25 | End: 2019-11-29

## 2019-11-25 RX ORDER — ACETAMINOPHEN 500 MG
2 TABLET ORAL
Qty: 240 | Refills: 0
Start: 2019-11-25 | End: 2019-12-24

## 2019-11-25 RX ORDER — FOLIC ACID 0.8 MG
1 TABLET ORAL
Qty: 0 | Refills: 0 | DISCHARGE
Start: 2019-11-25

## 2019-11-25 RX ORDER — ASPIRIN/CALCIUM CARB/MAGNESIUM 324 MG
1 TABLET ORAL
Qty: 30 | Refills: 0
Start: 2019-11-25 | End: 2019-12-24

## 2019-11-25 RX ORDER — ASCORBIC ACID 60 MG
1 TABLET,CHEWABLE ORAL
Qty: 0 | Refills: 0 | DISCHARGE
Start: 2019-11-25

## 2019-11-25 RX ORDER — POLYETHYLENE GLYCOL 3350 17 G/17G
17 POWDER, FOR SOLUTION ORAL
Qty: 0 | Refills: 0 | DISCHARGE
Start: 2019-11-25

## 2019-11-25 RX ADMIN — Medication 20 MILLIEQUIVALENT(S): at 07:21

## 2019-11-25 RX ADMIN — Medication 1 MILLIGRAM(S): at 07:20

## 2019-11-25 RX ADMIN — Medication 325 MILLIGRAM(S): at 07:21

## 2019-11-25 RX ADMIN — Medication 0.5 MILLIGRAM(S): at 05:29

## 2019-11-25 RX ADMIN — Medication 81 MILLIGRAM(S): at 07:20

## 2019-11-25 RX ADMIN — Medication 4 UNIT(S): at 12:08

## 2019-11-25 RX ADMIN — ENOXAPARIN SODIUM 40 MILLIGRAM(S): 100 INJECTION SUBCUTANEOUS at 07:21

## 2019-11-25 RX ADMIN — BUDESONIDE AND FORMOTEROL FUMARATE DIHYDRATE 2 PUFF(S): 160; 4.5 AEROSOL RESPIRATORY (INHALATION) at 05:29

## 2019-11-25 RX ADMIN — CARVEDILOL PHOSPHATE 6.25 MILLIGRAM(S): 80 CAPSULE, EXTENDED RELEASE ORAL at 05:29

## 2019-11-25 RX ADMIN — Medication 4 UNIT(S): at 07:41

## 2019-11-25 RX ADMIN — Medication 500 MILLIGRAM(S): at 07:21

## 2019-11-25 RX ADMIN — PANTOPRAZOLE SODIUM 40 MILLIGRAM(S): 20 TABLET, DELAYED RELEASE ORAL at 05:28

## 2019-11-25 RX ADMIN — Medication 40 MILLIGRAM(S): at 05:28

## 2019-11-25 RX ADMIN — Medication 650 MILLIGRAM(S): at 03:46

## 2019-11-25 RX ADMIN — Medication 1 SPRAY(S): at 05:30

## 2019-11-25 RX ADMIN — Medication 650 MILLIGRAM(S): at 04:21

## 2019-11-25 NOTE — DISCHARGE NOTE PROVIDER - [facility]SDCHOSPICE_GEN_A_CORE
PROGRESS NOTE  Patient Name: Domitila Valera  Age/Sex: 64 y.o. female  : 1953  MRN: [id number]    Date of Admission: 2018  Date of Encounter Visit: 18   LOS: 7 days   Patient Care Team:  Javier Soriano III, MD as PCP - General (Internal Medicine)    Chief Complaint:AMS    Interval History: Patient has progressive encephalopathy with no obvious clear etiology, she was transferred to the CCU on 18 with worsening lethargy purposeless movement and altered mental status and had evidence of compromise airway and ended up being intubated and placed on the mechanical ventilation to prevent from further aspiration given the initial changes on the chest x-ray.  Patient has been followed by neurology and was started on 1 g of Solu-Medrol per day with the first dose provided on 18.  Reintubation and it was noted that she had thick secretions retained in the posterior oropharynx however they were not purulent.  Patient had drop in the platelet count today, no bleeding.  She does have some minimal vesicular lesions over the lips with some minor oozing from them but no significant blood loss.    REVIEW OF SYSTEMS:   No chills, breathing comfortably with the ventilator, properly sedated, good oxygenation.  Exam is not suggestive of any abdominal pain, positive diarrhea without any peculiar smell or consistency to suspect C. difficile colitis, no seizure activity.  Patient is on Cardizem drip at 5 with sinus rhythm that is rate controlled     Ventilator/Non-Invasive Ventilation Settings     On the ventilator assist control 40% FiO2 PEEP of 5             Vital Signs  Temp:  [97.4 °F (36.3 °C)-97.9 °F (36.6 °C)] 97.4 °F (36.3 °C)  Heart Rate:  [] 73  Resp:  [16-40] 40  BP: ()/() 107/73  FiO2 (%):  [40 %-100 %] 40 %  SpO2:  [84 %-100 %] 100 %  on  Flow (L/min):  [2] 2 O2 Device: mechanical ventilator    Intake/Output Summary (Last 24 hours) at 18 0543  Last data filed at 18  "0523   Gross per 24 hour   Intake          2764.94 ml   Output                0 ml   Net          2764.94 ml     Flowsheet Rows         First Filed Value    Admission Height  149.9 cm (59\") Documented at 01/19/2018 1351    Admission Weight  57.6 kg (127 lb) Documented at 01/19/2018 1351        Body mass index is 31.03 kg/(m^2).  Last 3 weights    01/23/18  0537 01/24/18  0500 01/25/18  0500   Weight: 65 kg (143 lb 4.8 oz) 67.6 kg (149 lb) 69.7 kg (153 lb 11.2 oz)       Physical Exam:  GEN:  Orally intubated, on the ventilator, restless, did not follow any commands while off sedation  EYES:   Sclera clear. No icterus. PERRL. Normal EOM  ENT:   External ears/nose normal, no oral lesions, no thrush, mucous membranes moist with excessive drooling, lesion on the lips is unchanged  NECK:  Supple, midline trachea, no JVD  LUNGS: Normal chest on inspection,  no wheezes.  Minimal rhonchi with no crackles. Respirations regular, even and unlabored, in sync with the ventilator and sometime breathing over it.   CV:  Regular rhythm and rate. Normal S1/S2. No murmurs, gallops, or rubs noted.  ABD:  Soft, non-tender and non-distended. Normal bowel sounds. No guarding  EXT:  Moves all extremities with no obvious focal deficit, did not follow any commands, no cyanosis. No redntrace lower extremities edema unchanged  Skin: dry, intact, no bleeding    Results Review:        Results from last 7 days  Lab Units 01/26/18  0507 01/25/18  0551 01/24/18  0249 01/23/18  0542 01/22/18  0455 01/21/18  0541 01/20/18  0445 01/19/18  1457   SODIUM mmol/L 144 145 143 137 134* 135* 135* 127*   POTASSIUM mmol/L 4.1 3.1* 3.2* 3.4* 3.7 4.1 4.7 6.0*   CHLORIDE mmol/L 111* 115* 113* 105 98 97* 101 92*   CO2 mmol/L 17.5* 19.6* 18.3* 20.6* 21.7* 25.4 21.6* 22.8   BUN mg/dL 43* 30* 28* 26* 32* 34* 48* 59*   CREATININE mg/dL 1.53* 1.09* 1.04* 1.04* 1.13* 1.14* 1.24* 1.70*   CALCIUM mg/dL 8.4* 7.3* 6.6* 6.8* 7.7* 7.7* 8.0* 9.0   AST (SGOT) U/L  --  55*  --  " 40* 56* 57*  --  78*   ALT (SGPT) U/L  --  14  --  14 19 20  --  26   ANION GAP mmol/L 15.5 10.4 11.7 11.4 14.3 12.6 12.4 12.2   ALBUMIN g/dL  --  1.70*  --  1.40* 1.90* 1.90* 2.00* 2.60*       Results from last 7 days  Lab Units 01/25/18  0551   CK TOTAL U/L 152       Results from last 7 days  Lab Units 01/24/18  0249   TSH mIU/mL 0.295           Results from last 7 days  Lab Units 01/26/18  0507 01/25/18  0551 01/24/18  0249 01/23/18  0542 01/22/18  0455 01/21/18  1937 01/21/18  0541  01/20/18  0602   WBC 10*3/mm3 4.11* 3.31* 2.75* 3.39* 3.85*  --  5.23  --  4.88   HEMOGLOBIN g/dL 9.7* 9.1* 7.3* 7.4* 8.0* 8.0* 6.6*  < > 7.0*   HEMATOCRIT % 31.3* 29.3* 24.3* 24.2* 26.1* 25.4* 21.9*  < > 22.9*   PLATELETS 10*3/mm3 66* 94* 69* 98* 120*  --  121*  --  126*   MCV fL 76.3* 75.7* 72.5* 71.4* 72.1*  --  69.1*  --  69.2*   NEUTROPHIL % %  --   --   --   --   --   --   --   --  50.5   LYMPHOCYTE % %  --   --   --   --   --   --   --   --  42.4   MONOCYTES % %  --   --   --   --   --   --   --   --  5.7   EOSINOPHIL % %  --   --   --   --   --   --   --   --  0.4   BASOPHIL % %  --   --   --   --   --   --   --   --  0.2   IMM GRAN % %  --   --   --   --   --   --   --   --  0.8*   < > = values in this interval not displayed.        Results from last 7 days  Lab Units 01/25/18  0551 01/21/18  1937   MAGNESIUM mg/dL 1.9 1.9       Results from last 7 days  Lab Units 01/24/18  0249   CHOLESTEROL mg/dL 102   TRIGLYCERIDES mg/dL 347*   HDL CHOL mg/dL 9*   LDL CHOL mg/dL 24       Results from last 7 days  Lab Units 01/25/18  1931 01/25/18  1405   PH, ARTERIAL pH units 7.367 7.431   PCO2, ARTERIAL mm Hg 37.1 28.7*   PO2 ART mm Hg 502.6* 81.9   HCO3 ART mmol/L 21.3* 19.1*       Results from last 7 days  Lab Units 01/24/18  0249   HEMOGLOBIN A1C % 5.60     Glucose   Date/Time Value Ref Range Status   01/26/2018 0728 168 (H) 70 - 130 mg/dL Final   01/26/2018 0353 163 (H) 70 - 130 mg/dL Final   01/25/2018 2014 106 70 - 130 mg/dL Final    01/25/2018 1549 111 70 - 130 mg/dL Final   01/25/2018 1356 92 70 - 130 mg/dL Final       Results from last 7 days  Lab Units 01/22/18  0455 01/20/18  0445 01/20/18  0029 01/19/18  1934   PROCALCITONIN ng/mL 0.94* 1.01*  --   --    LACTATE mmol/L  --   --  1.7 2.1*       Results from last 7 days  Lab Units 01/21/18  2147 01/21/18  1453 01/19/18  2202 01/19/18  1934   BLOODCX   --   --  No growth at 5 days No growth at 5 days   RESPCX  Light growth (2+) Normal Respiratory Mariella  --   --   --    GRAM STAIN RESULT  Few (2+) WBCs seen  Few (2+) Epithelial cells per low power field  Mixed bacterial morphotypes seen on Gram Stain No WBCs or organisms seen  --   --        Results from last 7 days  Lab Units 01/20/18  0219   NITRITE UA  Negative       Results from last 7 days  Lab Units 01/20/18  1127   ADENOVIRUS DETECTION BY PCR  Not Detected   CORONAVIRUS 229E  Not Detected   CORONAVIRUS HKU1  Not Detected   CORONAVIRUS NL63  Not Detected   CORONAVIRUS OC43  Not Detected   HUMAN METAPNEUMOVIRUS  Not Detected   HUMAN RHINOVIRUS/ENTEROVIRUS  Not Detected   INFLUENZA B PCR  Not Detected   PARAINFLUENZA 1  Not Detected   PARAINFLUENZA VIRUS 2  Not Detected   PARAINFLUENZA VIRUS 3  Not Detected   PARAINFLUENZA VIRUS 4  Not Detected   BORDETELLA PERTUSSIS PCR  Not Detected   CHLAMYDOPHILA PNEUMONIAE PCR  Not Detected   MYCOPLAMA PNEUMO PCR  Not Detected   INFLUENZA A PCR  Not Detected   INFLUENZA A H3  Not Detected   INFLUENZA A H1  Not Detected   RSV, PCR  Not Detected       Results from last 7 days  Lab Units 01/20/18  0220 01/20/18  0219   SODIUM UR mmol/L 75  --    CREATININE UR mg/dL  --  40.1   Results for CATRACHO ABBOTT (MRN [id number]) as of 1/25/2018 15:34    Ref. Range 1/21/2018 05:41 1/22/2018 04:55 1/22/2018 16:33   Rheumatoid Factor Quantitative Latest Ref Range: 0.0 - 14.0 IU/mL   42.6 (H)     RPR Latest Ref Range: Non-Reactive  Non-Reactive       HIV-1/ HIV-2 Latest Ref Range: Non-Reactive  Non-Reactive        RNP Antibodies Latest Ref Range: 0.0 - 0.9 AI     >8.0 (H)   Tamez Antibodies Latest Ref Range: 0.0 - 0.9 AI     >8.0 (H)   Results for CATRACHO ABBOTT (MRN [id number]) as of 1/25/2018 15:34    Ref. Range 1/21/2018 14:53 1/25/2018 10:46   Appearance, CSF Latest Ref Range: Clear  Slightly Hazy (A) Cloudy (A)   Color, CSF Latest Ref Range: Colorless  Pink (A) Red (A)   Glucose, CSF Latest Ref Range: 40 - 70 mg/dL 45 79 (H)   Monocytes, CSF Latest Units: % 5 5   Mononuclear, CSF Latest Units: % 7     Neutrophils, CSF Latest Units: % 2 44   nRBC, CSF Latest Units: /100 WBCs   1   Nucleated Cells, CSF Latest Ref Range: 0 - 5 /mm3 11 (H) 123 (H)   Other Cells, CSF Latest Units: /100 WBCs 1     Protein, Total (CSF) Latest Ref Range: 15.0 - 45.0 mg/dL 48.0 (H) 463.0 (H)   RBC, CSF Latest Ref Range: 0 - 0 /mm3 1640 (H) 018050 (H)   Supernatant Color, CSF Latest Ref Range: Colorless    Red (A)   Tube Number, CSF Unknown 1 2   Results for CATRACHO ABBOTT (MRN [id number]) as of 1/25/2018 15:34    Ref. Range 1/20/2018 04:45   C-Reactive Protein Latest Ref Range: 0.00 - 0.50 mg/dL 10.72 (H)      BONE MARROW, ASPIRATE AND BIOPSY:  MILDLY HYPERCELLULAR BONE MARROW (60-70%) WITH TRILINEAGE HEMATOPOIESIS       AND MILD RETICULIN FIBROSIS (SEE COMMENT).  ADEQUATE IRON STORES.   CYTOGENETIC STUDIES ARE PENDING.     IMMUNOPHENOTYPING FAILS TO REVEAL A MONOCLONAL B CELL POPULATION, AN ABNORMAL  T-CELL PHENOTYPE, AN INCREASE IN NATURAL KILLER CELLS, A PLASMA CELL POPULATION  OR A BLAST POPULATION.         Imaging:   Imaging Results (all)     Procedure Component Value Units Date/Time    US Renal Bilateral [[id number]] Collected:  01/20/18 0823     Updated:  01/20/18 0827    Narrative:       US RENAL BILATERAL-     INDICATIONS: Acute kidney injury     TECHNIQUE: ULTRASOUND OF THE KIDNEYS AND URINARY BLADDER.     COMPARISON: None available     FINDINGS:     The right kidney measures 10.0 centimeters, the left kidney measures 9.6  centimeters.      No renal lesion is identified. A few echogenic foci are apparent in the  right kidney, as large as 3 mm, suggesting small nonobstructive stones.  No hydronephrosis or echogenic left nephrolithiasis.     No ureteral jets were observed during the exam. The urinary bladder  otherwise appears unremarkable.       Impression:       No hydronephrosis.     This report was finalized on 1/20/2018 8:24 AM by Dr. Mt Torres MD.       CT Abdomen Pelvis With Contrast [[id number]] Collected:  01/20/18 1649     Updated:  01/20/18 1837    Narrative:       CT CHEST ABDOMEN AND PELVIS WITH CONTRAST     HISTORY: Acute renal failure, hyponatremia, malaise, weakness.     TECHNIQUE: CT chest, abdomen and pelvis with IV and oral contrast.     COMPARISON: There are no previous CTs for comparison.     FINDINGS  CHEST: There are small mediastinal nodes without evidence for  mediastinal or hilar ramya enlargement. Shotty bilateral axillary nodes  are present. There is a 1.1 cm right axillary node. There is also a  mildly enlarged left subpectoral node measuring 1.5 cm.     Small pleural effusions layer dependently and there is left and right  lower lobe atelectasis. Within the lingula and anterior/inferior lateral  left upper lobe there is peripheral airspace disease and ground glass  opacity that is consistent with infiltrate in the proper clinical  setting. Right lung appears clear with the exception of right basilar  atelectasis. Heart size is enlarged. There is advanced bilateral  shoulder osteoarthritis. Mild scoliotic curvature is present of the  thoracic spine.     ABDOMEN/PELVIS:  There is diffuse fat infiltration of the liver. Dense  material layers dependently within the gallbladder fundus due to sludge  or small stones or vicarious excretion of contrast. Splenic size is  within normal limits. Adrenal glands, pancreas, and right kidney appear  within normal limits. There is a 9 mm left mid to lower pole low-density  renal  lesion that is difficult to characterize due to its small size,  though it is most likely a cyst. There are multiple retroperitoneal  lymph nodes with borderline enlargement. A left periaortic node measures  1.2 cm. There is no bowel dilatation or evidence for bowel obstruction.  There is no evidence for appendicitis. Oral contrast extends to the  rectum.  There is no bowel dilatation or evidence for obstruction. There  are bilateral inguinal nodes which are borderline enlarged. A right  inguinal node measures 1.6 x 0.9 cm.       Impression:       1. Small bilateral pleural effusions with lower lobe atelectasis. Within  the lingula and anterior inferior lateral left upper lobe there is  peripheral area of consolidation and ground glass opacity consistent  with infiltrate in the proper clinical setting.  2. Cardiomegaly.  3. Borderline enlarged nodes within the right axilla, left subpectoral  region, retroperitoneum, right inguinal region. These may represent  reactive nodes though are indeterminate with this exam. There are no  previous studies for comparison.  4. Dense material within the gallbladder fundus due to sludge, vicarious  excretion of contrast, or small stones.  5. Sigmoid diverticulosis without evidence for diverticulitis.  6. Advanced bilateral shoulder osteoarthritis.      Radiation dose reduction techniques were utilized, including automated  exposure control and exposure modulation based on body size.     This report was finalized on 1/20/2018 6:34 PM by Dr. Blake Graham MD.       CT Chest With Contrast [[id number]] Collected:  01/20/18 1649     Updated:  01/20/18 1837    Narrative:       CT CHEST ABDOMEN AND PELVIS WITH CONTRAST     HISTORY: Acute renal failure, hyponatremia, malaise, weakness.     TECHNIQUE: CT chest, abdomen and pelvis with IV and oral contrast.     COMPARISON: There are no previous CTs for comparison.     FINDINGS  CHEST: There are small mediastinal nodes without evidence  for  mediastinal or hilar ramya enlargement. Shotty bilateral axillary nodes  are present. There is a 1.1 cm right axillary node. There is also a  mildly enlarged left subpectoral node measuring 1.5 cm.     Small pleural effusions layer dependently and there is left and right  lower lobe atelectasis. Within the lingula and anterior/inferior lateral  left upper lobe there is peripheral airspace disease and ground glass  opacity that is consistent with infiltrate in the proper clinical  setting. Right lung appears clear with the exception of right basilar  atelectasis. Heart size is enlarged. There is advanced bilateral  shoulder osteoarthritis. Mild scoliotic curvature is present of the  thoracic spine.     ABDOMEN/PELVIS:  There is diffuse fat infiltration of the liver. Dense  material layers dependently within the gallbladder fundus due to sludge  or small stones or vicarious excretion of contrast. Splenic size is  within normal limits. Adrenal glands, pancreas, and right kidney appear  within normal limits. There is a 9 mm left mid to lower pole low-density  renal lesion that is difficult to characterize due to its small size,  though it is most likely a cyst. There are multiple retroperitoneal  lymph nodes with borderline enlargement. A left periaortic node measures  1.2 cm. There is no bowel dilatation or evidence for bowel obstruction.  There is no evidence for appendicitis. Oral contrast extends to the  rectum.  There is no bowel dilatation or evidence for obstruction. There  are bilateral inguinal nodes which are borderline enlarged. A right  inguinal node measures 1.6 x 0.9 cm.       Impression:       1. Small bilateral pleural effusions with lower lobe atelectasis. Within  the lingula and anterior inferior lateral left upper lobe there is  peripheral area of consolidation and ground glass opacity consistent  with infiltrate in the proper clinical setting.  2. Cardiomegaly.  3. Borderline enlarged nodes  within the right axilla, left subpectoral  region, retroperitoneum, right inguinal region. These may represent  reactive nodes though are indeterminate with this exam. There are no  previous studies for comparison.  4. Dense material within the gallbladder fundus due to sludge, vicarious  excretion of contrast, or small stones.  5. Sigmoid diverticulosis without evidence for diverticulitis.  6. Advanced bilateral shoulder osteoarthritis.      Radiation dose reduction techniques were utilized, including automated  exposure control and exposure modulation based on body size.     This report was finalized on 1/20/2018 6:34 PM by Dr. Blake Graham MD.       XR Ankle 2 View Bilateral [[id number]] Collected:  01/20/18 1514     Updated:  01/20/18 1837    Narrative:       BOTH ANKLES: AP AND LATERAL VIEWS OF EACH     HISTORY: Pain and swelling for 2 weeks in both ankles.     COMPARISON: None.     RIGHT ANKLE: There is advanced tibiotalar and subtalar arthritis with  joint space loss and exuberant marginal osteophyte formation. Chronic  osteochondral bodies project anterior to the tibiotalar joint and extend  above the talar head where there is spur formation. Generalized soft  tissue swelling is present about the ankle and there are vascular  calcifications. Moderate-to-severe osteoarthritis is present of the  talonavicular joint. There is no evidence for fracture.     LEFT ANKLE: Tibiotalar joint space appears within normal limits. There  are arthritic changes of the subtalar joints and at the talonavicular  navicular cuneiform joints, where there is joint space narrowing and  spur formation. Generalized soft tissue swelling is present about the  ankle. There is no evidence for fracture or acute osseous abnormality.  Atherosclerotic calcifications are evident.     This report was finalized on 1/20/2018 6:34 PM by Dr. Blake Graham MD.       IR Lumbar Puncture Diagnosis [[id number]] Collected:  01/21/18 1628      Updated:  01/21/18 1631    Narrative:       IR LUMBAR PUNCTURE DIAGNOSIS-     INDICATIONS: Fever     FINDINGS:     Following detailed discussion with the patient of the risks, benefits,  alternatives of the procedure, verbal and written informed consent was  obtained from the patient's son, this patient was unable to provide  consent.     Patient was placed in a prone position on the fluoroscopy table. A final  timeout was performed verifying patient identity and procedure.     A location was chosen at the L3/4 level, the overlying skin was cleaned  and anesthetized, and a 20-gauge needle was advanced into the thecal  sac, revealing clear CSF. The fluid was extremely slow-flowing despite  repositioning the needle and the patient, and only approximately 2 cc  could be obtained. Opening pressure could not be assessed. The fluid was  sent to the laboratory for further evaluation. Needle was removed  intact.     Fluoroscopy time: 20 seconds, 4 images       Impression:          Lumbar puncture, as described. Collected fluid was sent to the  laboratory for further evaluation.      This report was finalized on 1/21/2018 4:28 PM by Dr. Mt Torres MD.       MRI Brain With & Without Contrast [[id number]] Collected:  01/22/18 1942     Updated:  01/22/18 2055    Narrative:       MR SCAN OF THE BRAIN WITHOUT AND WITH INTRAVENOUS CONTRAST     HISTORY: Confusion. Generalized weakness.     TECHNIQUE: The MR scan was performed with sagittal, axial, and coronal  images and includes T1 images without and with intravenous contrast.      FINDINGS: There is mild diffuse atrophy and some minimal chronic small  vessel ischemic change. The diffusion sequence demonstrates a tiny focus  of increased signal in the right vertex in the posterior right frontal  region measuring 2 or 3 mm. This is suspicious for a tiny acute infarct.  There is no associated hemorrhage or abnormal enhancement or mass  effect. The remainder of the MR scan is  unremarkable except for some  mild mucosal thickening scattered in the ethmoid air cells.     CONCLUSION: Mild diffuse atrophy and chronic small vessel ischemic  change. Probable tiny acute infarct in the right vertex in the posterior  right frontal region measuring 2 or 3 mm as seen on the diffusion  sequence.     This report was finalized on 1/22/2018 8:52 PM by Dr. Willie Newton MD.       CT Angiogram Head With & Without Contrast [[id number]] Collected:  01/24/18 0818     Updated:  01/24/18 1108    Narrative:       CONTRAST-ENHANCED CT ANGIOGRAM OF THE HEAD AND NECK -  01/24/2018     HISTORY: Subarachnoid hemorrhage, evaluate for cerebral aneurysm and  vasospasm.     TECHNIQUE: Spiral CT images were obtained from the base of the skull to  the vertex both pre and post intravenous contrast, and images were  reformatted and submitted in 3 mm thick axial CT section with brain  algorithm, and additional spiral CT images were obtained from the top of  the aortic arch up through the great vessels in the head and neck during  the arterial phase of contrast, and images were reformatted and  submitted in 1 mm thick axial CT section, 1 mm thick sagittal and  coronal reconstructions and 3-D reconstructions were performed to  complete the CT angiogram of the head and neck.     There are no prior CTs of the head for comparison. This is correlated to  prior MRI of the brain on 01/22/2018 as well as MRA of the head on last  evening 01/23/2018 at 9:50 PM.     FINDINGS: There is a questionable very subtle 2 cm area of low-density  in the inferior lateral right frontal cortex and juxtacortical white  matter.  This is an equivocal finding seen on precontrast axial CT  images 30 through 33.  The remainder of the brain parenchyma is normal  in attenuation. The ventricles are normal in size. I see no mass effect  and no midline shift and no extra-axial fluid collections are  identified. There is no evidence of acute intracranial  hemorrhage. No  abnormal areas of enhancement are seen in the head.  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 The oral  pharynx, hypopharynx, true cords and subglottic airway are normal in  appearance; there is heterogeneous enhancement in the thyroid gland. The  lung apices are clear, there is small bilateral posterior layering  effusions and there is discoid area of low density projecting over the  superior aspect of the left major fissure that could be loculated fluid  in left major fissure. The parotid, , parapharyngeal,  submandibular spaces are symmetric and are normal in appearance. There  is an abnormal appearance to the C1-2 level with marked widening of the  atlantodental interval measuring 7 mm, and there appears to be bony  bridging from the posterior aspect of the anterior ring of C1 and the  anterior cortex of the odontoid and also some bony fusion across the  lateral masses of C1 and C2 bilaterally, and I suspect that this is  related to old trauma and as a result there is moderate narrowing of the  anterior posterior diameter of the canal at the C1 ring level. There is  disc space narrowing, degenerative endplate changes at C5-6, posterior  endplate spurring, mild canal narrowing and left uncovertebral joint  hypertrophy, moderate left bony foraminal narrowing at C5-6. There are  prominent osteoarthritic changes in the left glenohumeral joint with  joint space narrowing, moderate marginal osteophytic spurring and there  is extensive fatty marrow replacement in the visualized proximal left  humerus and a coarse area of calcification in the proximal left humeral  metaphysis and humeral neck in the medullary cavity that measures 17 mm  in size, could be calcification, old bone infarct or an enchondroma.     CT angiogram images demonstrate common origin of left common carotid  artery  No and brachiocephalic artery off the aortic arch constituting a  bovine configuration to the aortic arch which is a normal anatomic  variation. The left subclavian artery origin is normal in appearance, no  stenosis is seen in left subclavian artery. The left vertebral artery  origin is normal in appearance, no stenosis is seen in the left  vertebral artery.  The left common carotid origin is normal in  appearance.  No stenosis is seen in the left common carotid artery, its  bifurcation on the left,  There is coarsely calcified atherosclerotic  plaque involving the inferior wall of the left common carotid  bifurcation, but there is no stenosis in the left common carotid  bifurcation.  There is no stenosis in the left internal carotid artery  using the NASCET criteria. Brachycephalic artery origin is normal in  appearance. No stenosis is seen in the brachycephalic artery, its  bifurcation into the right subclavian and common carotid artery is  normal in appearance, no stenosis is seen in the right subclavian  artery. The right vertebral artery origin is normal in appearance, no  stenosis is seen in the right vertebral artery from its origin to the  vertebrobasilar junction. The right common carotid origin is normal in  appearance, no stenosis is seen in the right common carotid artery, its  bifurcation into the right internal and external carotid arteries is  within normal limits and no stenosis is seen in the right internal  carotid artery using the NASCET criteria.     CT angiogram images through the head demonstrate a normal appearance to  the visualized intracranial segments of the distal vertebral arteries  and normal appearance to the basilar artery and the basilar tip as well  as the posterior cerebral and superior cerebellar arteries bilaterally.  The upper cervical, petrous, cavernous and supracavernous segments of  the internal carotid arteries are within normal limits. There is an  atretic A1 segment of the  right anterior cerebral artery which is a  normal anatomic variation.  The dominant left A1 segment plaque supplies  both A2 segments via a normal-appearing anterior communicating artery.  The M1 segments and middle cerebral arteries and the middle cerebral  artery trifurcations are within normal limits.       Impression:       1. The findings in the cervical spine are similar to prior cervical  spine MRI from [facility] on 07/12/2013. There is  marked widening of the atlantodental interval up to 7 mm with bony  bridging between the posterior cortex in the anterior ring C1 and the  anterior aspect of the odontoid, and there is some bony bridging and  fusion across the right left lateral masses of C1 and C2 and due to the  anterior translation of C1 on C2 there is moderate narrowing of the  anterior posterior diameter of the canal at the C1 ring level. This is  likely an old posttraumatic deformity and correlate with clinical  history.  Cervical spondylosis is present with posterior spurring and  uncovertebral joint hypertrophy contributing to mild canal and moderate  left foraminal narrowing at C5-6 which could affect the exiting left C6  nerve root  2. There is questionable very subtle faint 2 cm area of low-density in  the inferior lateral right frontal cortex corresponding to an area of  FLAIR hyperintensity on the MRA of the head 5 1/2 hours ago, last night,  01/23/2018 at 9:51 PM. The FLAIR images on the MRA of the head also  demonstrated FLAIR high signal in the right frontal sulci and lateral  left temporal occipital sulci. The etiology of the FLAIR hyperintensity  on the MRA 5 1/2 hours ago is uncertain, and I recommend an MRI of the  brain with and without contrast to further evaluate and it can be  correlated to recent MRI of the head 01/22/2018.  I also recommend  correlation with recent lumbar puncture results.  3. There are small bilateral posterior layering effusions and  some  loculated fluid in the superior aspect of the left major fissure, right  subclavian line is in place and its tip is not evaluated on this exam.  4. CT angiogram of the head and neck is otherwise within normal limits  with no stenosis seen in the great vessels in the head or neck and no  intracranial aneurysm identified.     The results of this and recommendations from this study were  communicated to Dr. Endy Welch by telephone on 01/24/2018 at 7:30 AM.     Radiation dose reduction techniques were utilized, including automated  exposure control and exposure modulation based on body size.     This report was finalized on 1/24/2018 11:05 AM by Dr. Javier Rodrigues MD.       CT Angiogram Neck With & Without Contrast [[id number]] Collected:  01/24/18 0818     Updated:  01/24/18 1108    Narrative:       CONTRAST-ENHANCED CT ANGIOGRAM OF THE HEAD AND NECK -  01/24/2018     HISTORY: Subarachnoid hemorrhage, evaluate for cerebral aneurysm and  vasospasm.     TECHNIQUE: Spiral CT images were obtained from the base of the skull to  the vertex both pre and post intravenous contrast, and images were  reformatted and submitted in 3 mm thick axial CT section with brain  algorithm, and additional spiral CT images were obtained from the top of  the aortic arch up through the great vessels in the head and neck during  the arterial phase of contrast, and images were reformatted and  submitted in 1 mm thick axial CT section, 1 mm thick sagittal and  coronal reconstructions and 3-D reconstructions were performed to  complete the CT angiogram of the head and neck.     There are no prior CTs of the head for comparison. This is correlated to  prior MRI of the brain on 01/22/2018 as well as MRA of the head on last  evening 01/23/2018 at 9:50 PM.     FINDINGS: There is a questionable very subtle 2 cm area of low-density  in the inferior lateral right frontal cortex and juxtacortical white  matter.  This is an equivocal finding seen on  precontrast axial CT  images 30 through 33.  The remainder of the brain parenchyma is normal  in attenuation. The ventricles are normal in size. I see no mass effect  and no midline shift and no extra-axial fluid collections are  identified. There is no evidence of acute intracranial hemorrhage. No  abnormal areas of enhancement are seen in the head.  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 The oral  pharynx, hypopharynx, true cords and subglottic airway are normal in  appearance; there is heterogeneous enhancement in the thyroid gland. The  lung apices are clear, there is small bilateral posterior layering  effusions and there is discoid area of low density projecting over the  superior aspect of the left major fissure that could be loculated fluid  in left major fissure. The parotid, , parapharyngeal,  submandibular spaces are symmetric and are normal in appearance. There  is an abnormal appearance to the C1-2 level with marked widening of the  atlantodental interval measuring 7 mm, and there appears to be bony  bridging from the posterior aspect of the anterior ring of C1 and the  anterior cortex of the odontoid and also some bony fusion across the  lateral masses of C1 and C2 bilaterally, and I suspect that this is  related to old trauma and as a result there is moderate narrowing of the  anterior posterior diameter of the canal at the C1 ring level. There is  disc space narrowing, degenerative endplate changes at C5-6, posterior  endplate spurring, mild canal narrowing and left uncovertebral joint  hypertrophy, moderate left bony foraminal narrowing at C5-6. There are  prominent osteoarthritic changes in the left glenohumeral joint with  joint space narrowing, moderate marginal osteophytic spurring and there  is extensive fatty marrow replacement in the visualized proximal left  humerus and  a coarse area of calcification in the proximal left humeral  metaphysis and humeral neck in the medullary cavity that measures 17 mm  in size, could be calcification, old bone infarct or an enchondroma.     CT angiogram images demonstrate common origin of left common carotid  artery and brachiocephalic artery off the aortic arch constituting a  bovine configuration to the aortic arch which is a normal anatomic  variation. The left subclavian artery origin is normal in appearance, no  stenosis is seen in left subclavian artery. The left vertebral artery  origin is normal in appearance, no stenosis is seen in the left  vertebral artery.  The left common carotid origin is normal in  appearance.  No stenosis is seen in the left common carotid artery, its  bifurcation on the left,  There is coarsely calcified atherosclerotic  plaque involving the inferior wall of the left common carotid  bifurcation, but there is no stenosis in the left common carotid  bifurcation.  There is no stenosis in the left internal carotid artery  using the NASCET criteria. Brachycephalic artery origin is normal in  appearance. No stenosis is seen in the brachycephalic artery, its  bifurcation into the right subclavian and common carotid artery is  normal in appearance, no stenosis is seen in the right subclavian  artery. The right vertebral artery origin is normal in appearance, no  stenosis is seen in the right vertebral artery from its origin to the  vertebrobasilar junction. The right common carotid origin is normal in  appearance, no stenosis is seen in the right common carotid artery, its  bifurcation into the right internal and external carotid arteries is  within normal limits and no stenosis is seen in the right internal  carotid artery using the NASCET criteria.     CT angiogram images through the head demonstrate a normal appearance to  the visualized intracranial segments of the distal vertebral arteries  and normal appearance to the  basilar artery and the basilar tip as well  as the posterior cerebral and superior cerebellar arteries bilaterally.  The upper cervical, petrous, cavernous and supracavernous segments of  the internal carotid arteries are within normal limits. There is an  atretic A1 segment of the right anterior cerebral artery which is a  normal anatomic variation.  The dominant left A1 segment plaque supplies  both A2 segments via a normal-appearing anterior communicating artery.  The M1 segments and middle cerebral arteries and the middle cerebral  artery trifurcations are within normal limits.       Impression:       1. The findings in the cervical spine are similar to prior cervical  spine MRI from [facility] on 07/12/2013. There is  marked widening of the atlantodental interval up to 7 mm with bony  bridging between the posterior cortex in the anterior ring C1 and the  anterior aspect of the odontoid, and there is some bony bridging and  fusion across the right left lateral masses of C1 and C2 and due to the  anterior translation of C1 on C2 there is moderate narrowing of the  anterior posterior diameter of the canal at the C1 ring level. This is  likely an old posttraumatic deformity and correlate with clinical  history.  Cervical spondylosis is present with posterior spurring and  uncovertebral joint hypertrophy contributing to mild canal and moderate  left foraminal narrowing at C5-6 which could affect the exiting left C6  nerve root  2. There is questionable very subtle faint 2 cm area of low-density in  the inferior lateral right frontal cortex corresponding to an area of  FLAIR hyperintensity on the MRA of the head 5 1/2 hours ago, last night,  01/23/2018 at 9:51 PM. The FLAIR images on the MRA of the head also  demonstrated FLAIR high signal in the right frontal sulci and lateral  left temporal occipital sulci. The etiology of the FLAIR hyperintensity  on the MRA 5 1/2 hours ago is uncertain, and I  recommend an MRI of the  brain with and without contrast to further evaluate and it can be  correlated to recent MRI of the head 01/22/2018.  I also recommend  correlation with recent lumbar puncture results.  3. There are small bilateral posterior layering effusions and some  loculated fluid in the superior aspect of the left major fissure, right  subclavian line is in place and its tip is not evaluated on this exam.  4. CT angiogram of the head and neck is otherwise within normal limits  with no stenosis seen in the great vessels in the head or neck and no  intracranial aneurysm identified.     The results of this and recommendations from this study were  communicated to Dr. Endy Welch by telephone on 01/24/2018 at 7:30 AM.     Radiation dose reduction techniques were utilized, including automated  exposure control and exposure modulation based on body size.     This report was finalized on 1/24/2018 11:05 AM by Dr. Javier Rodrigues MD.       MRI Lumbar Spine With & Without Contrast [[id number]] Collected:  01/24/18 0827     Updated:  01/24/18 1408    Narrative:       MRI LUMBAR SPINE WITH AND WITHOUT CONTRAST     HISTORY:  Back pain. Confusion.     COMPARISON: No prior MRI of the lumbar spine is available for  comparison.     FINDINGS:     The study is hampered by patient motion. The alignment of the lumbar  spine is within normal limits. There is mild loss of disc height at  L4-L5 and L5-S1. The conus is at L1.     There is increased signal intensity involving the posterior paraspinal  musculature symmetrical extending from L1 to S1, nonspecific. There is a  small amount fluid present within the facet joints at L3-L4 and L4-L5  bilaterally.     There is heterogeneous appearance of the marrow diffusely with decreased  signal intensity involving the anterior aspect of T1 sequences without  any incident. This extends to and involves the upper thoracic spine. The  appearance of the upper thoracic spine is similar to the  MRI examination  of the cervical spine performed on 07/12/2013.      L1-L2: Mild facet degenerative disease is present bilaterally.     L2-L3: There is no osseous bulge or herniation.     L3-L4: Mild facet degenerative disease is present bilaterally. There is  no evidence of disc bulge or herniation.     L4-L5:  Moderate facet degenerative disease is present bilaterally.  There is a broad-based disc osteophyte complex resulting in mild  flattening of ventral surface of the thecal sac. There is mild neural  foraminal compromise bilaterally secondary to extension of a small disc  osteophyte complex into the neural foramen.     L5-S1: Moderate facet degenerative disease is present bilaterally. There  is a broad-based disc osteophyte complex resulting in mild flattening of  the ventral surface of the thecal sac, slightly more prominent to the  right.     The sagittal T1 postcontrast sequence demonstrates enhancement of the  cauda equina and a mildly increased signal intensity involving the CSF  within the inferior aspect of the thecal sac. There is also an epidural  fluid collection posterior to L5-S1. This measures approximately 11 mm  in AP dimension and 2 mm in AP dimension and is located at the midline.  There is enhancement of the posterior paraspinal musculature from L1-L2  to S1.     The axial T2 sequence demonstrates abnormal signal adjacent to the  kidneys bilaterally extending inferiorly, only partially visualized  suggesting perirenal fluid, not present on the CT examination of  01/20/2018.       Impression:       1.  The study is grossly abnormal with enhancement of cauda equina,  intermediate signal within the thecal sac and dural enhancement,  nonspecific. The intermediate signal may be secondary to blood products  although infection cannot be excluded. There is a thin plane of fluid  posterior to the thecal sac at L5-S1. The appearance is nonspecific.  This may be secondary to a small seroma or  posttraumatic fluid  collection although infection or abscess cannot be excluded. There is  edema and enhancement involving the paraspinal musculature bilaterally.  There is no evidence of edema or enhancement involving the vertebral  bodies or disc spaces. Fluid is present within the facet joints at L3-L4  and L4-L5 bilaterally. This may be degenerative in nature although  infected facets cannot be excluded.  2.  Abnormal signal in the perirenal space bilaterally new versus the CT  examination 01/20/2018. This is only partially visualized but may be  secondary to third spacing.     The above information was called to and discussed with Dr. Welch.     This report was finalized on 1/24/2018 2:04 PM by Dr. Deandre Van MD.       MRI Angiogram Head Without Contrast [[id number]] Collected:  01/24/18 0013     Updated:  01/24/18 2353    Narrative:       MRI ANGIOGRAM HEAD - WITHOUT GADOLINIUM INTRAVENOUS CONTRAST.     TECHNIQUE: Routine brain MRA was performed. 3-D time-of-flight technique  was used without intravenous contrast. Source and MIP images were  reviewed.      HISTORY: Neurological change, weakness.     COMPARISON: 01/22/2018.     FINDINGS:     Abnormal gyral high signal is seen on the diffusion sequence is in the  right frontal lobe, anterior right temporal lobe and left temporal lobe.  Findings of small vessel ischemic disease, a few old lacunar infarcts  and cortical atrophy.     Bilateral internal carotid arteries are symmetric and patent. Their  bifurcations are symmetric and patent.      Anterior cerebral arteries are patent. The middle cerebral arteries are  patent.      Distal vertebral arteries are patent. The posterior inferior cerebellar  arteries (PICAs) are symmetric and patent.      The basilar artery is continuously patent. The superior cerebellar  arteries are symmetric and patent.      The P1 segments are patent. The posterior cerebral arteries (PCAs) are  patent. The posterior communicating  arteries are symmetric and patent.      1 cm on Tornwaldt cyst is suspected, further evaluation is recommended.       Impression:       Moderate amount of subarachnoid hemorrhage layering the right frontal  lobe and both temporal lobes. Differential diagnoses includes  encephalitis.  1 cm on Tornwaldt cyst is suspected, further evaluation with CT scan or  MRI of the head and neck may be performed.     Findings called to patient's nurse Ms. Cortez, 12:13 AM.     This report was finalized on 1/24/2018 11:50 PM by Dr. Mayelin Carter MD.       MRI Thoracic Spine With & Without Contrast [[id number]] Collected:  01/24/18 0047     Updated:  01/24/18 2353    Narrative:       MRI OF THE THORACIC SPINE WITHOUT AND WITH CONTRAST.     TECHNIQUE: Multisequence multiplanar MRI images of the thoracic spine.     HISTORY: Mid back thoracic pain.     COMPARISON: No prior studies for comparison.     FINDINGS:  Vertebral body height and signal is normal, no acute fracture.   Intervertebral discs demonstrates mild desiccation changes in the  midthoracic spine. Small disc osteophyte is seen at T3-4 causing mild  spinal canal stenosis.     Thoracic spinal cord demonstrates normal caliber, no atrophy or  swelling.     Small left pleural effusion is incidentally noted.       Impression:       Mild spondylosis of the thoracic spine, small disc osteophyte complex at  T3-4. No fracture or dislocation.  Small left pleural effusion.     This report was finalized on 1/24/2018 11:50 PM by Dr. Mayelin Carter MD.       MRI Brain With & Without Contrast [[id number]] Collected:  01/24/18 1058     Updated:  01/25/18 0653    Narrative:       MRI EXAMINATION OF THE BRAIN WITH AND WITHOUT CONTRAST     HISTORY: Confusion, altered mental status.     COMPARISON: MRI of the brain 01/22/2018, MRA 01/23/2018 and CT angiogram  01/24/2018.     FINDINGS: There is no evidence of restricted diffusion to suggest acute  infarction.     The axial T2 FLAIR sequence  demonstrates increased signal intensity  involving the sulci of the right frontal lobe laterally as well as  extending superiorly to the vertex, more prominent as compared to the T2  FLAIR examination performed on 01/22/2018. There is also increased  signal intensity involving the cortex of the right frontal lobe  laterally evident on the T2 and T2 FLAIR sequences. A similar process is  appreciated involving the left temporal lobe laterally, smaller in area.  There is increased signal intensity involving the sulci involving the  parietal lobes bilaterally, present previously.     There is increased signal intensity appreciated involving the basal  ganglion on the right inferiorly on the T2 FLAIR sequence. This area  measures approximately 9 mm in transverse dimension and is more  prominent as compared to the prior examination. There are small foci of  increased signal intensity appreciated involving the white matter of the  cerebellar hemispheres bilaterally on the T2 FLAIR sequence which are  slightly more prominent as compared to prior examination.     The axial T2 FLAIR sequence demonstrates increased signal intensity  involving the sulci overlying the left frontal lobe laterally which is  new versus the MRI examination of 01/23/2018.     There is mild prominence of the lateral ventricles, similar in  appearance compared to prior examination.     The axial T1 precontrast sequence demonstrates a mild degree of  increased signal intensity involving the right frontal lobe laterally.  This may be related to the contrast which was administered on  01/22/2018. Petechial hemorrhage could also be considered but is less  likely. There is no evidence of signal loss on the susceptibility  weighted imaging at this location.     After contrast administration there was increased signal intensity  involving the cortex as well as sulci of the right frontal lobe  laterally and involving the left temporal lobe laterally to a  lesser  extent. There is faint enhancement involving sulci in the left temporal  region laterally. There is enhancement involving the inferior aspect of  the basal ganglia on the right (approximately 6 mm) as well as  enhancement involving the left cerebellar hemisphere centrally (4-5 mm  in size). This cerebellar enhancement was not present previously. The  enhancement involving the basal ganglia on the right inferiorly was not  present previously.       Impression:       Complex and unusual presentation with multifocal areas of  sulcal T2 FLAIR hyperintensity as well as areas of T2 and T2 FLAIR  hyperintensity involving the right frontal lobe laterally, left temporal  lobe laterally, basal ganglia inferiorly and left cerebellar hemisphere  centrally. These areas are more prominent as compared to the prior  examination and there is enhancement involving the left cerebral  hemisphere, basal ganglia on the right inferiorly and sulcal  hyperintensity evident on the postcontrast sequence as well. The  multivascular distribution suggests a multifocal process with breakdown  of the blood-brain barrier. This may be secondary to an inflammatory  infectious process such as meningitis or encephalitis. However no  convincing areas of T2 FLAIR hyperintensity are noted within the  dependent portions of the ventricular system or involving the basilar  cisterns. A vasculitis could also be considered. There is mild  prominence of the lateral ventricles, unchanged. There is no evidence of  restricted diffusion to suggest acute infarction.     The above information was called to and discussed with Dr. Welch.     This report was finalized on 1/25/2018 6:50 AM by Dr. Deandre Van MD.       IR Lumbar Puncture Diagnosis [[id number]] Collected:  01/25/18 1129     Updated:  01/25/18 1150    Narrative:       FLUOROSCOPIC GUIDED LUMBAR PUNCTURE  - 01/25/2018.     HISTORY: Altered mental status.     After signed informed consent was  obtained, the patient was prepped and  draped in the prone position. Lidocaine was used for local anesthesia.     An 18-gauge needle was introduced into the thecal sac at a midlumbar  level by Dr. Tejeda. Only a very tiny amount of slightly bloody CSF  was visualized. The needle was repositioned into the thecal sac and  still no significant fluid could be returned. Two additional upper to  mid lumbar levels were attempted and only approximately 1 mL to 2 mL of  slightly bloody CSF could be sampled.     Opening pressure is less than 1 cm to 2 cm.       Impression:       Limited CSF could be sampled during the lumbar puncture as  discussed above. Findings were discussed with Dr. Welch.      Fluoroscopy time 4 minutes 39 seconds. 4 images.        XR Chest 1 View [[id number]] Collected:  01/25/18 1832     Updated:  01/25/18 1837    Narrative:       PORTABLE CHEST 1/25/2018 AT 1820 HOURS.     CLINICAL HISTORY: Evaluate ET tube placement.     Compared to the chest x-ray performed earlier today 1415 hours.     In the interval, an endotracheal tube has been inserted and appears in  satisfactory position with its tip couple centimeters above the mary.  A PICC line in the right upper extremity remains in satisfactory  position without change. The lungs are better inflated. There is minimal  increased density in the lingula of the left upper lobe producing  partial obscuration of the left heart border that appears unchanged. The  right lung remains clear. The heart is normal in size.     This report was finalized on 1/25/2018 6:34 PM by Dr. Tim Christie MD.       XR Chest 1 View [[id number]] Collected:  01/25/18 1455     Updated:  01/25/18 2118    Narrative:       PORTABLE CHEST X-RAY     HISTORY: Shortness of breath with exertion.     Portable chest x-ray is provided and correlated with CT chest dated  01/20/2018 and chest x-ray 07/25/2013.     FINDINGS: There is a right PICC line with its tip in the mid to  lower  portion of the superior vena cava. The cardiomediastinal silhouette  appears normal. Vascular volume is normal. There may be tiny pleural  effusions blunting the costophrenic angles. The lungs appear clear  except for some subtle increased density laterally in the left lung base  which corresponds to the infiltrate seen in the lingula on the recent  CT. There is no pneumothorax. Advanced degenerative change is observed  at each shoulder. There also appear to be chronic full-thickness rotator  cuff tears.       Impression:       PICC line has its tip in the superior vena cava. Some subtle  density in the lingula corresponds to infiltrate seen in that area on CT  5 days ago. The lungs are otherwise clear. There appear to be small  pleural effusions bilaterally.     This report was finalized on 1/25/2018 9:15 PM by Dr. Raad Toth MD.       XR Abdomen KUB [[id number]] Collected:  01/25/18 2322     Updated:  01/25/18 2322    Narrative:       X-RAY ABDOMEN ONE VIEW KUB.     HISTORY:  Cortrak placement.     COMPARISON:  No prior studies for comparison.     FINDINGS:   Bowel gas pattern is unremarkable. No abnormal calcifications are seen.     No free air in the abdomen. Tip of the Cortrak tube is in the stomach.       Impression:       No acute findings in the abdomen. Tip of the Cortrak tube is in the  stomach.                       I personally viewed and interpreted the patient's imaging studies:Has evidence of left-sided pleural effusion with some linear atelectasis on the right base based on the CAT scan from 1/20, the chest x-ray done today showed cardiomegaly, persistent and likely enlarging pleural effusion, and possible new onset trace effusion on the right side.        Medication Review:     amLODIPine 5 mg Oral Q24H   cycloSPORINE 1 drop Both Eyes BID   estradiol 1 mg Oral Daily   Ferrex 150 forte plus 1 capsule Oral Daily With Breakfast   ferrous sulfate 325 mg Oral BID With Meals   gabapentin 200  mg Oral Q12H   methylPREDNISolone sodium succinate 1,000 mg Intravenous Q24H   pantoprazole 40 mg Intravenous BID AC   piperacillin-tazobactam 3.375 g Intravenous Q8H   potassium chloride 40 mEq Oral Once   sodium chloride 10 mL Intracatheter Q12H         diltiaZEM 5-15 mg/hr Last Rate: 5 mg/hr (01/25/18 1433)   hold 1 each    propofol 5-50 mcg/kg/min Last Rate: 35 mcg/kg/min (01/26/18 0351)   sodium chloride 75 mL/hr Last Rate: 75 mL/hr (01/24/18 7079)       ASSESSMENT:   1. Progressive encephalopathy with no obvious etiology with finding on the MRI suggestive of possible encephalitis versus vasculitis, serology workup is suggestive of lupus component  2. Fever of unknown origin and immunocompromised host on rheumatoid arthritis treatment  3. Progressive respiratory failure with worsening pleural effusion and possible aspiration pneumonia with an inability to protect airway because of the encephalopathy  4. Acute kidney injury with improvement in creatinine  5. Lower extremity cellulitis on presentation that had improved  6. Abnormal liver imaging suggestive of possible hemangioma  7. Thrombocytopenia , no heparin products on board  8. Respiratory failure, on the ventilator mainly for airway protection with no difficulty oxygenating or ventilating  9. Elevated double-stranded DNA, RNP antibodies, Smith Antibodies, and Anti-chromatin , and positive rheumatoid factor with history of rheumatoid arthritis previously on Xeljanz her current testing is more suggestive of possible lupus  10. Elevated creatinine, may be from the transient hypotension from the tachycardia, we'll continue to follow-up on that.    PLAN:  Check immature platelet function and avoid any heparin products at this point  Continue ventilator support for airway protection  Arrange for transfer to referral Center for further management of her unusual neurological problem  Continue with the steroids per neurology recommendation and cover any steroid  induced hyperglycemia with sliding scale  Continue Zosyn per infectious disease recommendation  We'll try sedation vacation this morning and reassess her neuro status  Stress ulcer prophylaxis while on the high-dose steroid, will avoid Pepcid or any H2 blockers given the thrombocytopenia  Continue feeding as tolerated  Hold on the estradiol given the high risk of thrombosis at this point given her acute illness and immobility  Discontinue IV fluid once the patient is tolerating tube feeding at goal rate   Continue with acyclovir  Discussed with her rheumatologist and they'll try to get the old records to see if she had any previous double-stranded DNA but with the current pattern is suggestive of possible acute lupus and they were more in support of the steroid trial for now  Follow-up on platelet count and we will have hematology consultation if she has any evidence of reduced immature platelet fraction, will evaluate for possible ITP  Patient was on the acyclovir and had 2 negative HSV PCR and they decided to stop the acyclovir today    Discussed with son at the bedside, will discuss with her rheumatologist later today and we'll arrange for the transfer ASAP     Addendum: Case was discussed with neurology and the MRI film were reviewed together, discussed with [facility] accepting physician and they accepted the patient for transfer once the bed is available, discussed with Dr. Sosa her covering rheumatologist while Dr. Chowdhury is out of town and discussed with the family and updated with the process, will continue with the steroids as discussed earlier and we will have hematology evaluate for possible ITP versus lupus related thrombocytopenia.  The plan for possible cerebral angiogram versus brain biopsy will be deferred to the accepting facility at this point given the complexity of the case.  Disposition:     Claudette Villegas MD  01/26/18  7:47 AM      Time: Critical care 79 min    EMR Dragon/Transcription:    Much of this encounter note is an electronic transcription/translation of spoken language to printed text. The electronic translation of spoken language may permit erroneous, or at times, nonsensical words or phrases to be inadvertently transcribed; Although I have reviewed the note for such errors, some may still exist.

## 2019-11-25 NOTE — DISCHARGE NOTE PROVIDER - NSDCMRMEDTOKEN_GEN_ALL_CORE_FT
acetaminophen 325 mg oral tablet: 2 tab(s) orally every 6 hours, As needed, Mild Pain (1 - 3)  ascorbic acid 500 mg oral tablet: 1 tab(s) orally once a day  aspirin 81 mg oral tablet: 1 tab(s) orally once a day  atorvastatin 40 mg oral tablet: 1 tab(s) orally once a day (at bedtime)  carvedilol 6.25 mg oral tablet: 1 tab(s) orally every 12 hours  Coumadin 1 mg oral tablet: 1 tab(s) orally once a day   FeroSul 325 mg (65 mg elemental iron) oral tablet: 1 tab(s) orally 3 times a day  Flonase 50 mcg/inh nasal spray: 1 spray(s) nasal once a day  Flovent  mcg/inh inhalation aerosol: 2 puff(s) inhaled 2 times a day, As Needed  folic acid 1 mg oral tablet: 1 tab(s) orally once a day  furosemide 20 mg oral tablet: 1 tab(s) orally once a day  metFORMIN 500 mg oral tablet: 1 tab(s) orally 2 times a day   pantoprazole 40 mg oral delayed release tablet: 1 tab(s) orally once a day (before a meal)  polyethylene glycol 3350 oral powder for reconstitution: 17 gram(s) orally once a day  potassium chloride 10 mEq oral capsule, extended release: 1 cap(s) orally once a day  Symbicort 160 mcg-4.5 mcg/inh inhalation aerosol: 2 puff(s) inhaled 2 times a day  Ventolin HFA 90 mcg/inh inhalation aerosol: 2 puff(s) inhaled 4 times a day, As Needed

## 2019-11-25 NOTE — DISCHARGE NOTE PROVIDER - NSDCFUSCHEDAPPT_GEN_ALL_CORE_FT
INOCENCIA ARMSTRONG ; 12/09/2019 ; NPP [facility] S[facility] [address] INOCENCIA Caraballo ; 01/16/2020 ; NPP PulmMed [id number]

## 2019-11-25 NOTE — DISCHARGE NOTE PROVIDER - PROVIDER TOKENS
PROVIDER:[TOKEN:[[id number]]],PROVIDER:[TOKEN:[[id number]],ESTABLISHEDPATIENT:[T]],FREE:[LAST:[Gina],FIRST:[Timo],PHONE:[phone]],FAX:[(   )    -],ESTABLISHEDPATIENT:[T]]

## 2019-11-25 NOTE — DISCHARGE NOTE PROVIDER - CARE PROVIDER_API CALL
Felipe Phillips)  Surgery; Thoracic and Cardiac Surgery  [address] [place] [address]  Phone: [phone]  Fax: [phone]  Follow Up Time:     Cesar Kign)  Cardiovascular Disease; Internal Medicine  [address] [facility] [address]  [place] [address]  Phone: [phone]  Fax: [phone]  Established Patient  Follow Up Time:     Timo Fuentes  Phone: [phone]  Fax: (   )    -  Established Patient  Follow Up Time:

## 2019-11-25 NOTE — DISCHARGE NOTE PROVIDER - NSDCCPCAREPLAN_GEN_ALL_CORE_FT
PRINCIPAL DISCHARGE DIAGNOSIS  Diagnosis: CAD (coronary artery disease)  Assessment and Plan of Treatment: Please follow up with Dr. Carrington on 12/9 at 230pm  Please do not drive untill cleared by surgeon  Please do not lift anything greater than 5 pounds  Walk daily  Follow the Do's and Dont's of cardiac surgery  Continue the use of incentive spirometry      SECONDARY DISCHARGE DIAGNOSES  Diagnosis: Diabetes  Assessment and Plan of Treatment: Continue with your metformin  Please follow up with your endocrinologist/primary doctor for management of your diabetes    Diagnosis: Afib  Assessment and Plan of Treatment: You are on coumadin for afib. you will need to follow up with Dr. Parsons on Wednes day to have your levels checked. Please call the office on Tuesday to arrange for your levels to be checked on Wednesday-11/27

## 2019-11-25 NOTE — PROGRESS NOTE ADULT - PROBLEM SELECTOR PLAN 4
Suggest to continue medications, monitoring, FU primary team recommendations.
l ct removed  med ct x 1
l ct removed  med ct x 1

## 2019-11-25 NOTE — PROGRESS NOTE ADULT - SUBJECTIVE AND OBJECTIVE BOX
Chief complaint  Patient is a 74y old  Male who presents with a chief complaint of sp  CABG (21 Nov 2019 09:28)   Review of systems  Patient in bed, looks comfortable, no fever, no hypoglycemia.    Labs and Fingersticks  CAPILLARY BLOOD GLUCOSE      POCT Blood Glucose.: 100 mg/dL (25 Nov 2019 11:55)  POCT Blood Glucose.: 110 mg/dL (25 Nov 2019 07:39)  POCT Blood Glucose.: 196 mg/dL (24 Nov 2019 21:25)  POCT Blood Glucose.: 118 mg/dL (24 Nov 2019 17:23)      Anion Gap, Serum: 11 (11-25 @ 05:32)  Anion Gap, Serum: 11 (11-24 @ 06:37)      Calcium, Total Serum: 8.7 (11-25 @ 05:32)  Calcium, Total Serum: 8.6 (11-24 @ 06:37)  Albumin, Serum: 2.8 <L> (11-25 @ 05:32)  Albumin, Serum: 3.0 <L> (11-24 @ 06:37)    Alanine Aminotransferase (ALT/SGPT): 29 (11-25 @ 05:32)  Alanine Aminotransferase (ALT/SGPT): 29 (11-24 @ 06:37)  Alkaline Phosphatase, Serum: 51 (11-25 @ 05:32)  Alkaline Phosphatase, Serum: 50 (11-24 @ 06:37)  Aspartate Aminotransferase (AST/SGOT): 14 (11-25 @ 05:32)  Aspartate Aminotransferase (AST/SGOT): 14 (11-24 @ 06:37)        11-25    140  |  107  |  15  ----------------------------<  100<H>  3.9   |  22  |  1.01    Ca    8.7      25 Nov 2019 05:32    TPro  5.7<L>  /  Alb  2.8<L>  /  TBili  1.0  /  DBili  x   /  AST  14  /  ALT  29  /  AlkPhos  51  11-25                        8.2    9.84  )-----------( 217      ( 25 Nov 2019 05:32 )             25.8     Medications  MEDICATIONS  (STANDING):  ascorbic acid 500 milliGRAM(s) Oral daily  aspirin enteric coated 81 milliGRAM(s) Oral daily  atorvastatin 40 milliGRAM(s) Oral at bedtime  buDESOnide    Inhalation Suspension 0.5 milliGRAM(s) Inhalation two times a day  budesonide 160 MICROgram(s)/formoterol 4.5 MICROgram(s) Inhaler 2 Puff(s) Inhalation two times a day  carvedilol 6.25 milliGRAM(s) Oral every 12 hours  chlorhexidine 2% Cloths 1 Application(s) Topical <User Schedule>  chlorhexidine 2% Cloths 1 Application(s) Topical <User Schedule>  dextrose 5%. 1000 milliLiter(s) (50 mL/Hr) IV Continuous <Continuous>  dextrose 50% Injectable 50 milliLiter(s) IV Push every 15 minutes  dextrose 50% Injectable 25 milliLiter(s) IV Push every 15 minutes  enoxaparin Injectable 40 milliGRAM(s) SubCutaneous daily  ferrous    sulfate 325 milliGRAM(s) Oral daily  fluticasone propionate 50 MICROgram(s)/spray Nasal Spray 1 Spray(s) Both Nostrils two times a day  folic acid 1 milliGRAM(s) Oral daily  furosemide    Tablet 20 milliGRAM(s) Oral daily  insulin glargine Injectable (LANTUS) 12 Unit(s) SubCutaneous at bedtime  insulin lispro (HumaLOG) corrective regimen sliding scale   SubCutaneous three times a day before meals  insulin lispro (HumaLOG) corrective regimen sliding scale   SubCutaneous at bedtime  insulin lispro Injectable (HumaLOG) 4 Unit(s) SubCutaneous three times a day before meals  pantoprazole    Tablet 40 milliGRAM(s) Oral before breakfast  polyethylene glycol 3350 17 Gram(s) Oral daily  potassium chloride    Tablet ER 20 milliEquivalent(s) Oral daily  potassium chloride  10 mEq/50 mL IVPB 10 milliEquivalent(s) IV Intermittent every 1 hour  potassium chloride  10 mEq/50 mL IVPB 10 milliEquivalent(s) IV Intermittent every 1 hour  potassium chloride  10 mEq/50 mL IVPB 10 milliEquivalent(s) IV Intermittent every 1 hour  sodium chloride 0.9%. 1000 milliLiter(s) (10 mL/Hr) IV Continuous <Continuous>      Physical Exam  General: Patient comfortable in bed  Vital Signs Last 12 Hrs  T(F): 98.4 (11-25-19 @ 05:13), Max: 98.4 (11-25-19 @ 05:13)  HR: 79 (11-25-19 @ 06:57) (75 - 79)  BP: 105/59 (11-25-19 @ 07:12) (105/59 - 143/71)  BP(mean): 74 (11-25-19 @ 07:12) (74 - 76)  RR: 17 (11-25-19 @ 05:13) (17 - 17)  SpO2: 97% (11-25-19 @ 06:57) (97% - 97%)  Neck: No palpable thyroid nodules.  CVS: S1S2, No murmurs  Respiratory: No wheezing, no crepitations  GI: Abdomen soft, bowel sounds positive  Musculoskeletal:  edema lower extremities.   Skin: No skin rashes, no ecchymosis    Diagnostics    Hemoglobin A1C, Whole Blood: Routine (11-13 @ 11:44)  Cortisol AM, Serum: Routine (11-13 @ 11:44)  Aldosterone, Serum: Routine (11-13 @ 11:44)  Metanephrine, Plasma: Routine (11-13 @ 11:44)  Thyroid Stimulating Hormone, Serum: AM Sched. Collection: 14-Nov-2019 06:00 (11-13 @ 11:44)  Free Thyroxine, Serum: AM Sched. Collection: 14-Nov-2019 06:00 (11-13 @ 11:44) Chief complaint  Patient is a 74y old  Male who presents with a chief complaint of sp  CABG (21 Nov 2019 09:28)   Review of systems  Patient in bed, looks comfortable, no fever,  no hypoglycemia.    Labs and Fingersticks  CAPILLARY BLOOD GLUCOSE      POCT Blood Glucose.: 100 mg/dL (25 Nov 2019 11:55)  POCT Blood Glucose.: 110 mg/dL (25 Nov 2019 07:39)  POCT Blood Glucose.: 196 mg/dL (24 Nov 2019 21:25)  POCT Blood Glucose.: 118 mg/dL (24 Nov 2019 17:23)      Anion Gap, Serum: 11 (11-25 @ 05:32)  Anion Gap, Serum: 11 (11-24 @ 06:37)      Calcium, Total Serum: 8.7 (11-25 @ 05:32)  Calcium, Total Serum: 8.6 (11-24 @ 06:37)  Albumin, Serum: 2.8 <L> (11-25 @ 05:32)  Albumin, Serum: 3.0 <L> (11-24 @ 06:37)    Alanine Aminotransferase (ALT/SGPT): 29 (11-25 @ 05:32)  Alanine Aminotransferase (ALT/SGPT): 29 (11-24 @ 06:37)  Alkaline Phosphatase, Serum: 51 (11-25 @ 05:32)  Alkaline Phosphatase, Serum: 50 (11-24 @ 06:37)  Aspartate Aminotransferase (AST/SGOT): 14 (11-25 @ 05:32)  Aspartate Aminotransferase (AST/SGOT): 14 (11-24 @ 06:37)        11-25    140  |  107  |  15  ----------------------------<  100<H>  3.9   |  22  |  1.01    Ca    8.7      25 Nov 2019 05:32    TPro  5.7<L>  /  Alb  2.8<L>  /  TBili  1.0  /  DBili  x   /  AST  14  /  ALT  29  /  AlkPhos  51  11-25                        8.2    9.84  )-----------( 217      ( 25 Nov 2019 05:32 )             25.8     Medications  MEDICATIONS  (STANDING):  ascorbic acid 500 milliGRAM(s) Oral daily  aspirin enteric coated 81 milliGRAM(s) Oral daily  atorvastatin 40 milliGRAM(s) Oral at bedtime  buDESOnide    Inhalation Suspension 0.5 milliGRAM(s) Inhalation two times a day  budesonide 160 MICROgram(s)/formoterol 4.5 MICROgram(s) Inhaler 2 Puff(s) Inhalation two times a day  carvedilol 6.25 milliGRAM(s) Oral every 12 hours  chlorhexidine 2% Cloths 1 Application(s) Topical <User Schedule>  chlorhexidine 2% Cloths 1 Application(s) Topical <User Schedule>  dextrose 5%. 1000 milliLiter(s) (50 mL/Hr) IV Continuous <Continuous>  dextrose 50% Injectable 50 milliLiter(s) IV Push every 15 minutes  dextrose 50% Injectable 25 milliLiter(s) IV Push every 15 minutes  enoxaparin Injectable 40 milliGRAM(s) SubCutaneous daily  ferrous    sulfate 325 milliGRAM(s) Oral daily  fluticasone propionate 50 MICROgram(s)/spray Nasal Spray 1 Spray(s) Both Nostrils two times a day  folic acid 1 milliGRAM(s) Oral daily  furosemide    Tablet 20 milliGRAM(s) Oral daily  insulin glargine Injectable (LANTUS) 12 Unit(s) SubCutaneous at bedtime  insulin lispro (HumaLOG) corrective regimen sliding scale   SubCutaneous three times a day before meals  insulin lispro (HumaLOG) corrective regimen sliding scale   SubCutaneous at bedtime  insulin lispro Injectable (HumaLOG) 4 Unit(s) SubCutaneous three times a day before meals  pantoprazole    Tablet 40 milliGRAM(s) Oral before breakfast  polyethylene glycol 3350 17 Gram(s) Oral daily  potassium chloride    Tablet ER 20 milliEquivalent(s) Oral daily  potassium chloride  10 mEq/50 mL IVPB 10 milliEquivalent(s) IV Intermittent every 1 hour  potassium chloride  10 mEq/50 mL IVPB 10 milliEquivalent(s) IV Intermittent every 1 hour  potassium chloride  10 mEq/50 mL IVPB 10 milliEquivalent(s) IV Intermittent every 1 hour  sodium chloride 0.9%. 1000 milliLiter(s) (10 mL/Hr) IV Continuous <Continuous>      Physical Exam  General: Patient comfortable in bed  Vital Signs Last 12 Hrs  T(F): 98.4 (11-25-19 @ 05:13), Max: 98.4 (11-25-19 @ 05:13)  HR: 79 (11-25-19 @ 06:57) (75 - 79)  BP: 105/59 (11-25-19 @ 07:12) (105/59 - 143/71)  BP(mean): 74 (11-25-19 @ 07:12) (74 - 76)  RR: 17 (11-25-19 @ 05:13) (17 - 17)  SpO2: 97% (11-25-19 @ 06:57) (97% - 97%)  Neck: No palpable thyroid nodules.  CVS: S1S2, No murmurs  Respiratory: No wheezing, no crepitations  GI: Abdomen soft, bowel sounds positive  Musculoskeletal:  edema lower extremities.   Skin: No skin rashes, no ecchymosis    Diagnostics    Hemoglobin A1C, Whole Blood: Routine (11-13 @ 11:44)  Cortisol AM, Serum: Routine (11-13 @ 11:44)  Aldosterone, Serum: Routine (11-13 @ 11:44)  Metanephrine, Plasma: Routine (11-13 @ 11:44)  Thyroid Stimulating Hormone, Serum: AM Sched. Collection: 14-Nov-2019 06:00 (11-13 @ 11:44)  Free Thyroxine, Serum: AM Sched. Collection: 14-Nov-2019 06:00 (11-13 @ 11:44)

## 2019-11-25 NOTE — DISCHARGE NOTE PROVIDER - HOSPITAL COURSE
74y Male PMH DM A1c 6.2, HTN, asthma, B/L Carotid a. stenosis s/p L CEA, PVD now s/p C4L    POD 1 GEORGES given 150mg Amio bolus and started on PO Amio load. Transferred to SDU POD #1    11/17/19 VSS rounds made w/ dr. agrawal- HCT down to 24 from 32- pt asymptomatic - up in weight- lasix 40mg po daily initiated - will monitor u/o. wbc 22 - inc. cdi afebrile.    l ct removed. mediastinal ct remains in place. gastric bubble -reglan. d/c planning    11/18 Remaining ct d/c.  Pt ambulating, no c/o abd discomfort    Paf overnight, if any further consider anticoagulation with hep/coum. Cont amio    Hct 23.4    May transfer to floor    11/19 to d/c pw, increase ambulation.    Cont amio.  D/c pw    11/21    20 min afib overnight  recent amio ld   will dw  Dr Agrawal ? second load   dc cardizem    start coumadin dosing   plus one pedal edema   neg 2l  on lasix qd   coreq 6.25   per cardiology Dr King    11/22: Episode of vagal like symptoms after strong cough. No changes noted to the monitor pt remains in SR first degree. Pt has had this in the past many years ago. Will Check echo to r/o pericardial effusion.    11/23: right leg more swollen today. Will get dopplers today per Dr. Agrawal. Ace wrap leg    11/24: Still awaiting dopplers of Right lower extremity. Likely discharge on monday 11/25: Stable for discharge. Doppler to lower extrem negative for dvt

## 2019-11-25 NOTE — PROGRESS NOTE ADULT - ASSESSMENT
Cad   s/p cabg  cont asa, statin     post op afib, aflutter   recurrent  in sinus now  cont amio, BB  monitor LFT , TSH  a/c recommended     dm  Monitor finger stick. Insulin coverage.     Cough with cough syncope syndrome   no events on tele   pt follows with pulm as outpt     Lower ext edema  plan for venous doppler

## 2019-11-25 NOTE — PROGRESS NOTE ADULT - PROBLEM SELECTOR PROBLEM 4
Chest tube in place
History of hypertension
Chest tube in place

## 2019-11-25 NOTE — PROGRESS NOTE ADULT - SUBJECTIVE AND OBJECTIVE BOX
Subjective: Patient seen and examined. No new events except as noted.     SUBJECTIVE/ROS:  No chest pain, dyspnea, palpitation, or dizziness.       MEDICATIONS:  MEDICATIONS  (STANDING):  ascorbic acid 500 milliGRAM(s) Oral daily  aspirin enteric coated 81 milliGRAM(s) Oral daily  atorvastatin 40 milliGRAM(s) Oral at bedtime  buDESOnide    Inhalation Suspension 0.5 milliGRAM(s) Inhalation two times a day  budesonide 160 MICROgram(s)/formoterol 4.5 MICROgram(s) Inhaler 2 Puff(s) Inhalation two times a day  carvedilol 6.25 milliGRAM(s) Oral every 12 hours  chlorhexidine 2% Cloths 1 Application(s) Topical <User Schedule>  chlorhexidine 2% Cloths 1 Application(s) Topical <User Schedule>  dextrose 5%. 1000 milliLiter(s) (50 mL/Hr) IV Continuous <Continuous>  dextrose 50% Injectable 50 milliLiter(s) IV Push every 15 minutes  dextrose 50% Injectable 25 milliLiter(s) IV Push every 15 minutes  enoxaparin Injectable 40 milliGRAM(s) SubCutaneous daily  ferrous    sulfate 325 milliGRAM(s) Oral daily  fluticasone propionate 50 MICROgram(s)/spray Nasal Spray 1 Spray(s) Both Nostrils two times a day  folic acid 1 milliGRAM(s) Oral daily  furosemide    Tablet 40 milliGRAM(s) Oral daily  insulin glargine Injectable (LANTUS) 12 Unit(s) SubCutaneous at bedtime  insulin lispro (HumaLOG) corrective regimen sliding scale   SubCutaneous three times a day before meals  insulin lispro (HumaLOG) corrective regimen sliding scale   SubCutaneous at bedtime  insulin lispro Injectable (HumaLOG) 4 Unit(s) SubCutaneous three times a day before meals  pantoprazole    Tablet 40 milliGRAM(s) Oral before breakfast  polyethylene glycol 3350 17 Gram(s) Oral daily  potassium chloride    Tablet ER 20 milliEquivalent(s) Oral daily  potassium chloride  10 mEq/50 mL IVPB 10 milliEquivalent(s) IV Intermittent every 1 hour  potassium chloride  10 mEq/50 mL IVPB 10 milliEquivalent(s) IV Intermittent every 1 hour  potassium chloride  10 mEq/50 mL IVPB 10 milliEquivalent(s) IV Intermittent every 1 hour  sodium chloride 0.9%. 1000 milliLiter(s) (10 mL/Hr) IV Continuous <Continuous>      PHYSICAL EXAM:  T(C): 36.9 (11-25-19 @ 05:13), Max: 36.9 (11-25-19 @ 05:13)  HR: 79 (11-25-19 @ 06:57) (75 - 92)  BP: 105/59 (11-25-19 @ 07:12) (105/59 - 165/70)  RR: 17 (11-25-19 @ 05:13) (17 - 18)  SpO2: 97% (11-25-19 @ 06:57) (96% - 98%)  Wt(kg): --  I&O's Summary    24 Nov 2019 07:01  -  25 Nov 2019 07:00  --------------------------------------------------------  IN: 900 mL / OUT: 3925 mL / NET: -3025 mL    25 Nov 2019 07:01  -  25 Nov 2019 08:40  --------------------------------------------------------  IN: 360 mL / OUT: 625 mL / NET: -265 mL            JVP: Normal  Neck: supple  Lung: clear   CV: S1 S2 , Murmur:  Abd: soft  Ext: No edema  neuro: Awake / alert  Psych: flat affect  Skin: normal``    LABS/DATA:    CARDIAC MARKERS:                                8.2    9.84  )-----------( 217      ( 25 Nov 2019 05:32 )             25.8     11-25    140  |  107  |  15  ----------------------------<  100<H>  3.9   |  22  |  1.01    Ca    8.7      25 Nov 2019 05:32    TPro  5.7<L>  /  Alb  2.8<L>  /  TBili  1.0  /  DBili  x   /  AST  14  /  ALT  29  /  AlkPhos  51  11-25    proBNP:   Lipid Profile:   HgA1c:   TSH:     TELE:  EKG:

## 2019-11-25 NOTE — PROGRESS NOTE ADULT - PROBLEM SELECTOR PLAN 1
Will continue current insulin regimen for now. Will continue monitoring FS, log, will Follow up.  Patient in pre-diabetic range, blood sugars trending at target on low-dose insulin. Suggest to DC home on Metformin 500mg BID and FU endo 4 weeks.  Patient was counseled for compliance with consistent low carb diet and exercise as tolerated. Patient in pre-diabetic range, blood sugars trending at target on low-dose insulin. Suggest to DC home on Metformin 500mg BID and FU endo 4 weeks.  Patient was counseled for compliance with consistent low carb diet and exercise as tolerated.

## 2019-11-25 NOTE — DISCHARGE NOTE PROVIDER - NSDCPNSUBOBJ_GEN_ALL_CORE
VITAL SIGNS        Telemetry:      Vital Signs Last 24 Hrs    T(C): 36.8 (19 @ 14:01), Max: 36.9 (19 @ 05:13)    T(F): 98.3 (19 @ 14:01), Max: 98.4 (19 @ 05:13)    HR: 77 (19 14:01) (75 - 92)    BP: 120/62 (19 14:01) (105/59 - 165/70)    RR: 18 (19 14:01) (17 - 18)    SpO2: 97% (19 14:) (96% - 97%)                 @ 07:01  -   @ 07:00    --------------------------------------------------------    IN: 900 mL / OUT: 3925 mL / NET: -3025 mL         @ 07:01  -   @ 14:10    --------------------------------------------------------    IN: 720 mL / OUT: 1325 mL / NET: -605 mL             Daily       Daily Weight in k.2 (2019 09:13)    Admit Wt: Drug Dosing Weight    Height (cm): 167.6 (15 Nov 2019 08:51)    Weight (kg): 75.4 (2019 07:55)    BMI (kg/m2): 26.8 (2019 07:55)    BSA (m2): 1.85 (2019 07:55)         Daily Weight in k.2 (19 @ 09:13)        [facility]            140  |  107  |  15    ----------------------------<  100<H>    3.9   |  22  |  1.01        Ca    8.7      2019 05:32        TPro  5.7<L>  /  Alb  2.8<L>  /  TBili  1.0  /  DBili  x   /  AST  14  /  ALT  29  /  AlkPhos  51                                         8.2      9.84  )-----------( 217      ( 2019 05:32 )               25.8              PT/INR - ( 2019 05:32 )   PT: 26.2 sec;   INR: 2.22 ratio                             Bilirubin Total, Serum: 1.0 mg/dL ( @ 05:32)        CAPILLARY BLOOD GLUCOSE            POCT Blood Glucose.: 100 mg/dL (2019 11:55)    POCT Blood Glucose.: 110 mg/dL (2019 07:39)    POCT Blood Glucose.: 196 mg/dL (2019 21:25)    POCT Blood Glucose.: 118 mg/dL (2019 17:23)                Drains:     MS       [  ]   [  ]            L Pleural [  ]            R Pleural  [  ]            MANUELA  [  ]           Jacobsen  [  ]        Pacing Wires      [  ]   Settings:                                  Isolated  [  ]                          CXR:            MEDICATIONS    acetaminophen   Tablet .. 650 milliGRAM(s) Oral every 6 hours PRN    ALBUTerol    90 MICROgram(s) HFA Inhaler 2 Puff(s) Inhalation every 6 hours PRN    artificial tears (preservative free) Ophthalmic Solution 1 Drop(s) Both EYES three times a day PRN    ascorbic acid 500 milliGRAM(s) Oral daily    aspirin enteric coated 81 milliGRAM(s) Oral daily    atorvastatin 40 milliGRAM(s) Oral at bedtime    buDESOnide    Inhalation Suspension 0.5 milliGRAM(s) Inhalation two times a day    budesonide 160 MICROgram(s)/formoterol 4.5 MICROgram(s) Inhaler 2 Puff(s) Inhalation two times a day    carvedilol 6.25 milliGRAM(s) Oral every 12 hours    chlorhexidine 2% Cloths 1 Application(s) Topical <User Schedule>    chlorhexidine 2% Cloths 1 Application(s) Topical <User Schedule>    dextrose 40% Gel 15 Gram(s) Oral once PRN    dextrose 5%. 1000 milliLiter(s) IV Continuous <Continuous>    dextrose 50% Injectable 50 milliLiter(s) IV Push every 15 minutes    dextrose 50% Injectable 25 milliLiter(s) IV Push every 15 minutes    enoxaparin Injectable 40 milliGRAM(s) SubCutaneous daily    ferrous    sulfate 325 milliGRAM(s) Oral daily    fluticasone propionate 50 MICROgram(s)/spray Nasal Spray 1 Spray(s) Both Nostrils two times a day    folic acid 1 milliGRAM(s) Oral daily    furosemide    Tablet 20 milliGRAM(s) Oral daily    glucagon  Injectable 1 milliGRAM(s) IntraMuscular once PRN    insulin glargine Injectable (LANTUS) 12 Unit(s) SubCutaneous at bedtime    insulin lispro (HumaLOG) corrective regimen sliding scale   SubCutaneous three times a day before meals    insulin lispro (HumaLOG) corrective regimen sliding scale   SubCutaneous at bedtime    insulin lispro Injectable (HumaLOG) 4 Unit(s) SubCutaneous three times a day before meals    pantoprazole    Tablet 40 milliGRAM(s) Oral before breakfast    polyethylene glycol 3350 17 Gram(s) Oral daily    potassium chloride    Tablet ER 20 milliEquivalent(s) Oral daily    potassium chloride  10 mEq/50 mL IVPB 10 milliEquivalent(s) IV Intermittent every 1 hour    potassium chloride  10 mEq/50 mL IVPB 10 milliEquivalent(s) IV Intermittent every 1 hour    potassium chloride  10 mEq/50 mL IVPB 10 milliEquivalent(s) IV Intermittent every 1 hour    sodium chloride 0.9%. 1000 milliLiter(s) IV Continuous <Continuous>            PHYSICAL EXAM            Neurology: alert and oriented x 3, nonfocal, no gross deficits    CV :S1S2    Sternal Wound :  CDI , Stable    Lungs: cta    Abdomen: soft, nontender, nondistended, positive bowel sounds, last bowel movement     :     voids    Extremities:   warm to touch improved edema                             PAST MEDICAL & SURGICAL HISTORY:    Borderline diabetes    PVD (peripheral vascular disease)    Carotid stenosis, bilateral    History of hypertension    Asthma    H/O sinus surgery    H/O carotid endarterectomy                                 Discussed with Cardiothoracic Team at AM rounds.

## 2019-11-25 NOTE — DISCHARGE NOTE PROVIDER - CARE PROVIDERS DIRECT ADDRESSES
[email or web address],DirectAddress_Unknown

## 2019-11-25 NOTE — PROGRESS NOTE ADULT - ASSESSMENT
Assessment  DMT2: 74y Male with prediabetes, A1C 6.2% , was not on DM meds at home, s/p CABG on low-dose basal bolus insulin, FS in acceptable range Patient is eating full meals, comfortable, ambulating, states he is going home today.  Adrenal Mass: 74y Male with hx adrenal enlargements scheduled for CABG. As per patient's documents, enlargements are benign,  2.4cm, not much change/growth between 2014 to present, followed by his endo. Aldosterone, cortisol, TFT labs within normal limits, cleared to proceed with cardiac surgery.  CAD: S/P CABG, on medications, no chest pain, stable, monitored.  HTN: Controlled,  on antihypertensive medications. Assessment  DMT2: 74y Male with prediabetes, A1C 6.2% , was not on DM meds at home, s/p CABG on low-dose basal bolus insulin, FS in acceptable range  Patient is eating full meals, comfortable, ambulating, states he is going home today.  Adrenal Mass: 74y Male with hx adrenal enlargements scheduled for CABG. As per patient's documents, enlargements are benign,  2.4cm, not much change/growth between 2014 to present, followed by his endo. Aldosterone, cortisol, TFT labs within normal limits, cleared to proceed with cardiac surgery.  CAD: S/P CABG, on medications, no chest pain, stable, monitored.  HTN: Controlled,  on antihypertensive medications.

## 2019-11-25 NOTE — PROGRESS NOTE ADULT - ATTENDING COMMENTS
Advanced care planning was discussed with patient and family.  Risks, benefits and alternatives of the cardiac treatments and medical therapy including procedures were discussed in detail and all questions were answered. Importance of compliance with medical therapy and lifestyle modification to improve cardiovascular health were addressed. Appropriate forms and patient educational materials were reviewed. 30 minutes face to face spent.
Will increase Lantus to 12u at bedtime.  Now that patient is eating meals, will start pre-meal Humalog 4u before each meal and continue sliding scale. Will continue monitoring FS, log, and FU.
Will continue monitoring FS and FU.  Patient counseled for compliance with consistent low carb diet and exercise as tolerated outpatient.
Adrenal mass benign, labs within normal limits.
Patient counseled for compliance with consistent low carb diet and exercise as tolerated outpatient.
Patient counseled for compliance with consistent low carb diet and exercise as tolerated outpatient.
Patient in pre-diabetic range, blood sugars trending at target on low-dose insulin. Suggest to DC home on Metformin 500mg BID and FU endo 4 weeks.
Patient in pre-diabetic range, blood sugars trending at target on low-dose insulin. Suggest to DC home on Metformin 500mg BID and FU endo 4 weeks.  Patient was counseled for compliance with consistent low carb diet and exercise as tolerated.
Will continue current insulin regimen for now. Will continue monitoring FS, log, and FU.
Will continue current insulin regimen for now. Will continue monitoring FS, log, will Follow up.

## 2019-11-27 ENCOUNTER — APPOINTMENT (OUTPATIENT)
Dept: CARDIOTHORACIC SURGERY | Facility: CLINIC | Age: 74
End: 2019-11-27
Payer: MEDICARE

## 2019-11-27 VITALS
TEMPERATURE: 98.4 F | BODY MASS INDEX: 26.52 KG/M2 | OXYGEN SATURATION: 100 % | DIASTOLIC BLOOD PRESSURE: 70 MMHG | HEIGHT: 66 IN | SYSTOLIC BLOOD PRESSURE: 146 MMHG | WEIGHT: 165 LBS | RESPIRATION RATE: 16 BRPM | HEART RATE: 83 BPM

## 2019-11-27 PROCEDURE — 99024 POSTOP FOLLOW-UP VISIT: CPT

## 2019-11-27 RX ORDER — LOSARTAN POTASSIUM 100 MG/1
100 TABLET, FILM COATED ORAL
Qty: 90 | Refills: 3 | Status: COMPLETED | COMMUNITY
End: 2019-11-27

## 2019-11-27 RX ORDER — ASPIRIN 81 MG
81 TABLET, DELAYED RELEASE (ENTERIC COATED) ORAL
Refills: 0 | Status: COMPLETED | COMMUNITY
End: 2019-11-27

## 2019-11-27 RX ORDER — CLOTRIMAZOLE AND BETAMETHASONE DIPROPIONATE 10; .5 MG/G; MG/G
1-0.05 CREAM TOPICAL
Qty: 15 | Refills: 0 | Status: COMPLETED | COMMUNITY
Start: 2017-10-11 | End: 2019-11-27

## 2019-11-27 RX ORDER — MELATONIN 3 MG
TABLET ORAL
Refills: 0 | Status: COMPLETED | COMMUNITY
End: 2019-11-27

## 2019-11-27 RX ORDER — POTASSIUM CHLORIDE 750 MG/1
10 CAPSULE, EXTENDED RELEASE ORAL
Qty: 60 | Refills: 0 | Status: COMPLETED | COMMUNITY
Start: 2018-06-18 | End: 2019-11-27

## 2019-11-27 RX ORDER — POTASSIUM CHLORIDE 750 MG/1
10 TABLET, EXTENDED RELEASE ORAL DAILY
Refills: 2 | Status: COMPLETED | COMMUNITY
Start: 2017-04-19 | End: 2019-11-27

## 2019-11-27 RX ORDER — AMLODIPINE BESYLATE 5 MG/1
5 TABLET ORAL
Refills: 0 | Status: COMPLETED | COMMUNITY
End: 2019-11-27

## 2019-11-27 RX ORDER — ASCORBIC ACID 500 MG
TABLET ORAL
Refills: 0 | Status: COMPLETED | COMMUNITY
End: 2019-11-27

## 2019-11-27 RX ORDER — TRIAMCINOLONE ACETONIDE 1 MG/G
0.1 CREAM TOPICAL
Qty: 454 | Refills: 0 | Status: COMPLETED | COMMUNITY
Start: 2017-11-09 | End: 2019-11-27

## 2019-11-29 ENCOUNTER — APPOINTMENT (OUTPATIENT)
Dept: CARE COORDINATION | Facility: HOME HEALTH | Age: 74
End: 2019-11-29
Payer: MEDICARE

## 2019-11-29 VITALS
SYSTOLIC BLOOD PRESSURE: 130 MMHG | OXYGEN SATURATION: 95 % | DIASTOLIC BLOOD PRESSURE: 70 MMHG | RESPIRATION RATE: 16 BRPM | HEART RATE: 80 BPM

## 2019-11-29 DIAGNOSIS — E11.9 TYPE 2 DIABETES MELLITUS W/OUT COMPLICATIONS: ICD-10-CM

## 2019-11-29 PROCEDURE — 99024 POSTOP FOLLOW-UP VISIT: CPT

## 2019-11-29 RX ORDER — ALBUTEROL SULFATE 90 UG/1
108 (90 BASE) AEROSOL, METERED RESPIRATORY (INHALATION) EVERY 6 HOURS
Qty: 1 | Refills: 2 | Status: ACTIVE | COMMUNITY
Start: 2018-12-03

## 2019-11-29 NOTE — ASSESSMENT
[FreeTextEntry1] : 74y Male PMH DM A1c 6.2, HTN, asthma, B/L Carotid a. stenosis s/p L CEA, PVD \par now s/p C4L with Dr Phillips\par Post op afib- on coumadin\par

## 2019-11-29 NOTE — REASON FOR VISIT
[Post Hospitalization] : a post hospitalization visit [Spouse] : spouse [Family Member] : family member

## 2019-11-29 NOTE — PHYSICAL EXAM
[Sclera] : the sclera and conjunctiva were normal [PERRL With Normal Accommodation] : pupils were equal in size, round, and reactive to light [Neck Appearance] : the appearance of the neck was normal [Neck Cervical Mass (___cm)] : no neck mass was observed [Jugular Venous Distention Increased] : there was no jugular-venous distention [] : no respiratory distress [Scattered Wheezes] : scattered wheezing [Auscultation Breath Sounds / Voice Sounds] : lungs were clear to auscultation bilaterally [Heart Rate And Rhythm] : heart rate was normal and rhythm regular [Heart Sounds] : normal S1 and S2 [Heart Sounds Gallop] : no gallops [Murmurs] : no murmurs [Heart Sounds Pericardial Friction Rub] : no pericardial rub [Diminished Respiratory Excursion] : normal chest expansion [Bowel Sounds] : normal bowel sounds [1+] : left 1+ [Abdomen Soft] : soft [Abdomen Tenderness] : non-tender [Abdomen Mass (___ Cm)] : no abdominal mass palpated [No CVA Tenderness] : no ~M costovertebral angle tenderness [Abnormal Walk] : normal gait [FreeTextEntry1] : MTI approximated-no drainage, RT lower leg harvest-eccymosis, resolving errythema [Skin Color & Pigmentation] : normal skin color and pigmentation [No Focal Deficits] : no focal deficits [Oriented To Time, Place, And Person] : oriented to person, place, and time

## 2019-11-29 NOTE — HISTORY OF PRESENT ILLNESS
[FreeTextEntry1] : FOLLOW YOUR HEART HOME VISIT-"[employer]"\par Home Visit made to Mr. ARMSTRONG ] .   patient of Dr Matthew Phillips S/P CABG on 11/15. PMH DM A1c 6.2, HTN, asthma, B/L Carotid a. stenosis s/p L CEA, PVD \par \par Visiting patient for post discharge transitional care management and post surgical follow up. \par \par Arrived to patient home, he is accompanied by wife and daughter.  He appears well, in no distress.\par He was seen by CT surgeon this past wednesday due to concern for leg harvest site redness and swelling.  Was prescribed keflex for 14 days and diuretic was increased.\par Wife states leg appears better today and swelling has gone down.\par they deny fever/chills\par Patient is also requesting script for glucometer, he wants to start checking his sugars given he is now taking metformin.\par \par

## 2019-11-29 NOTE — REVIEW OF SYSTEMS
[Cough] : cough [SOB on Exertion] : shortness of breath during exertion [As Noted in HPI] : as noted in HPI [Negative] : Endocrine

## 2019-12-04 ENCOUNTER — APPOINTMENT (OUTPATIENT)
Dept: CARDIOTHORACIC SURGERY | Facility: CLINIC | Age: 74
End: 2019-12-04
Payer: MEDICARE

## 2019-12-04 VITALS
TEMPERATURE: 97.5 F | HEART RATE: 85 BPM | BODY MASS INDEX: 26.2 KG/M2 | WEIGHT: 163 LBS | HEIGHT: 66 IN | OXYGEN SATURATION: 97 % | RESPIRATION RATE: 16 BRPM | SYSTOLIC BLOOD PRESSURE: 123 MMHG | DIASTOLIC BLOOD PRESSURE: 66 MMHG

## 2019-12-04 PROCEDURE — 99024 POSTOP FOLLOW-UP VISIT: CPT

## 2019-12-04 RX ORDER — POTASSIUM CHLORIDE 750 MG/1
10 CAPSULE, EXTENDED RELEASE ORAL DAILY
Qty: 60 | Refills: 0 | Status: COMPLETED | COMMUNITY
Start: 2019-11-27 | End: 2019-12-04

## 2019-12-04 RX ORDER — BLOOD SUGAR DIAGNOSTIC
STRIP MISCELLANEOUS
Qty: 100 | Refills: 0 | Status: COMPLETED | COMMUNITY
Start: 2019-12-03 | End: 2019-12-04

## 2019-12-04 RX ORDER — LANCETS
EACH MISCELLANEOUS
Qty: 100 | Refills: 5 | Status: COMPLETED | COMMUNITY
Start: 2019-11-29 | End: 2019-12-04

## 2019-12-04 RX ORDER — BLOOD-GLUCOSE METER
W/DEVICE KIT MISCELLANEOUS
Qty: 1 | Refills: 0 | Status: COMPLETED | COMMUNITY
Start: 2019-11-29 | End: 2019-12-04

## 2019-12-04 RX ORDER — FUROSEMIDE 40 MG/1
40 TABLET ORAL DAILY
Qty: 30 | Refills: 0 | Status: COMPLETED | COMMUNITY
Start: 2019-11-27 | End: 2019-12-04

## 2019-12-13 ENCOUNTER — APPOINTMENT (OUTPATIENT)
Dept: PULMONOLOGY | Facility: CLINIC | Age: 74
End: 2019-12-13
Payer: MEDICARE

## 2019-12-13 VITALS
HEART RATE: 77 BPM | TEMPERATURE: 97.9 F | SYSTOLIC BLOOD PRESSURE: 126 MMHG | WEIGHT: 170 LBS | DIASTOLIC BLOOD PRESSURE: 70 MMHG | RESPIRATION RATE: 16 BRPM | BODY MASS INDEX: 27.44 KG/M2 | OXYGEN SATURATION: 98 %

## 2019-12-13 PROCEDURE — 99214 OFFICE O/P EST MOD 30 MIN: CPT

## 2019-12-13 RX ORDER — CEPHALEXIN 500 MG/1
500 CAPSULE ORAL EVERY 6 HOURS
Qty: 56 | Refills: 0 | Status: DISCONTINUED | COMMUNITY
Start: 2019-11-27 | End: 2019-12-13

## 2019-12-13 RX ORDER — LORATADINE 10 MG
17 TABLET,DISINTEGRATING ORAL DAILY
Qty: 1 | Refills: 1 | Status: DISCONTINUED | COMMUNITY
Start: 2019-11-27 | End: 2019-12-13

## 2019-12-13 RX ORDER — CARVEDILOL 6.25 MG/1
6.25 TABLET, FILM COATED ORAL TWICE DAILY
Qty: 180 | Refills: 0 | Status: COMPLETED | COMMUNITY
Start: 2019-11-27 | End: 2019-12-13

## 2019-12-13 NOTE — PHYSICAL EXAM
[General Appearance - In No Acute Distress] : no acute distress [Heart Sounds] : normal S1 and S2 [Murmurs] : no murmurs present [Bowel Sounds] : normal bowel sounds [Abdomen Soft] : soft [Abdomen Tenderness] : non-tender [Abnormal Walk] : normal gait [Cyanosis, Localized] : no localized cyanosis [No Focal Deficits] : no focal deficits [Oriented To Time, Place, And Person] : oriented to person, place, and time [Impaired Insight] : insight and judgment were intact [Erythema] : no erythema of the pharynx [] : no respiratory distress [FreeTextEntry1] : Mild wheezing

## 2019-12-13 NOTE — REVIEW OF SYSTEMS
[Ear Disturbance] : ear disturbance [Nasal Congestion] : nasal congestion [Postnasal Drip] : postnasal drip [Hypertension] : ~T hypertension [Fever] : no fever [Fatigue] : no fatigue [Sore Throat] : no sore throat [Sinus Problems] : no sinus problems [Dry Mouth] : no dry mouth [Cough] : no cough [Dyspnea] : no dyspnea [Wheezing] : no wheezing [Chest Tightness] : no chest tightness [Chest Discomfort] : no chest discomfort [Edema] : ~T edema was not present [PND] : no PND [Palpitations] : no palpitations [Heartburn] : no heartburn [Dysphagia] : no dysphagia [Nasal Discharge] : no nasal discharge [Back Pain] : ~T no back pain [Nocturia] : no nocturia [Myalgias] : no myalgias [Depression] : no depression [Syncope] : no fainting [Difficulty Initiating Sleep] : no difficulty falling asleep [Difficulty Maintaining Sleep] : no difficulty maintaining sleep [DVT] : no DVT [Snoring] : no snoring

## 2019-12-13 NOTE — DISCUSSION/SUMMARY
[FreeTextEntry1] : He is a 74 year-old man with hypertension, CAD, S/P CABG X 4 11/15/19, asthma and chronic allergic rhinitis. He also has severe obstructive sleep apnea and was placed on CPAP in February 2019. Has been on APAP of 4 to 10 cm of water.  \par \par For his asthma he is to continue with Symbicort and Flovent as noted. He is also on Flonase. \par \par For his RONNIE he is to continue with  APAP of 4 to 10 cm of water. \par \par Follow up in three months.

## 2019-12-16 ENCOUNTER — INPATIENT (INPATIENT)
Facility: HOSPITAL | Age: 74
LOS: 3 days | Discharge: HOME | DRG: 187 | End: 2019-12-20
Attending: THORACIC SURGERY (CARDIOTHORACIC VASCULAR SURGERY) | Admitting: THORACIC SURGERY (CARDIOTHORACIC VASCULAR SURGERY)
Payer: MEDICARE

## 2019-12-16 VITALS
SYSTOLIC BLOOD PRESSURE: 162 MMHG | DIASTOLIC BLOOD PRESSURE: 79 MMHG | WEIGHT: 166.89 LBS | OXYGEN SATURATION: 97 % | RESPIRATION RATE: 20 BRPM | HEART RATE: 85 BPM | TEMPERATURE: 98 F

## 2019-12-16 DIAGNOSIS — Z98.890 OTHER SPECIFIED POSTPROCEDURAL STATES: Chronic | ICD-10-CM

## 2019-12-16 DIAGNOSIS — J90 PLEURAL EFFUSION, NOT ELSEWHERE CLASSIFIED: ICD-10-CM

## 2019-12-16 DIAGNOSIS — Z86.79 PERSONAL HISTORY OF OTHER DISEASES OF THE CIRCULATORY SYSTEM: ICD-10-CM

## 2019-12-16 DIAGNOSIS — J45.909 UNSPECIFIED ASTHMA, UNCOMPLICATED: ICD-10-CM

## 2019-12-16 DIAGNOSIS — Z95.1 PRESENCE OF AORTOCORONARY BYPASS GRAFT: Chronic | ICD-10-CM

## 2019-12-16 DIAGNOSIS — R73.03 PREDIABETES: ICD-10-CM

## 2019-12-16 LAB
ALBUMIN SERPL ELPH-MCNC: 3.4 G/DL — SIGNIFICANT CHANGE UP (ref 3.3–5)
ALP SERPL-CCNC: 82 U/L — SIGNIFICANT CHANGE UP (ref 40–120)
ALT FLD-CCNC: 40 U/L — SIGNIFICANT CHANGE UP (ref 10–45)
ANION GAP SERPL CALC-SCNC: 12 MMOL/L — SIGNIFICANT CHANGE UP (ref 5–17)
APTT BLD: 33.9 SEC — SIGNIFICANT CHANGE UP (ref 27.5–36.3)
AST SERPL-CCNC: 32 U/L — SIGNIFICANT CHANGE UP (ref 10–40)
BASOPHILS # BLD AUTO: 0.04 K/UL — SIGNIFICANT CHANGE UP (ref 0–0.2)
BASOPHILS NFR BLD AUTO: 0.5 % — SIGNIFICANT CHANGE UP (ref 0–2)
BILIRUB SERPL-MCNC: 0.5 MG/DL — SIGNIFICANT CHANGE UP (ref 0.2–1.2)
BUN SERPL-MCNC: 13 MG/DL — SIGNIFICANT CHANGE UP (ref 7–23)
CALCIUM SERPL-MCNC: 9.2 MG/DL — SIGNIFICANT CHANGE UP (ref 8.4–10.5)
CHLORIDE SERPL-SCNC: 105 MMOL/L — SIGNIFICANT CHANGE UP (ref 96–108)
CK MB CFR SERPL CALC: 2.1 NG/ML — SIGNIFICANT CHANGE UP (ref 0–6.7)
CO2 SERPL-SCNC: 21 MMOL/L — LOW (ref 22–31)
CREAT SERPL-MCNC: 0.86 MG/DL — SIGNIFICANT CHANGE UP (ref 0.5–1.3)
EOSINOPHIL # BLD AUTO: 0.38 K/UL — SIGNIFICANT CHANGE UP (ref 0–0.5)
EOSINOPHIL NFR BLD AUTO: 4.9 % — SIGNIFICANT CHANGE UP (ref 0–6)
GAS PNL BLDV: SIGNIFICANT CHANGE UP
GLUCOSE BLDC GLUCOMTR-MCNC: 144 MG/DL — HIGH (ref 70–99)
GLUCOSE SERPL-MCNC: 139 MG/DL — HIGH (ref 70–99)
HCT VFR BLD CALC: 35.1 % — LOW (ref 39–50)
HGB BLD-MCNC: 11 G/DL — LOW (ref 13–17)
IMM GRANULOCYTES NFR BLD AUTO: 0.5 % — SIGNIFICANT CHANGE UP (ref 0–1.5)
INR BLD: 1.29 RATIO — HIGH (ref 0.88–1.16)
LYMPHOCYTES # BLD AUTO: 1.09 K/UL — SIGNIFICANT CHANGE UP (ref 1–3.3)
LYMPHOCYTES # BLD AUTO: 14.1 % — SIGNIFICANT CHANGE UP (ref 13–44)
MCHC RBC-ENTMCNC: 29.8 PG — SIGNIFICANT CHANGE UP (ref 27–34)
MCHC RBC-ENTMCNC: 31.3 GM/DL — LOW (ref 32–36)
MCV RBC AUTO: 95.1 FL — SIGNIFICANT CHANGE UP (ref 80–100)
MONOCYTES # BLD AUTO: 0.71 K/UL — SIGNIFICANT CHANGE UP (ref 0–0.9)
MONOCYTES NFR BLD AUTO: 9.2 % — SIGNIFICANT CHANGE UP (ref 2–14)
NEUTROPHILS # BLD AUTO: 5.45 K/UL — SIGNIFICANT CHANGE UP (ref 1.8–7.4)
NEUTROPHILS NFR BLD AUTO: 70.8 % — SIGNIFICANT CHANGE UP (ref 43–77)
NRBC # BLD: 0 /100 WBCS — SIGNIFICANT CHANGE UP (ref 0–0)
NT-PROBNP SERPL-SCNC: 796 PG/ML — HIGH (ref 0–300)
PLATELET # BLD AUTO: 222 K/UL — SIGNIFICANT CHANGE UP (ref 150–400)
POTASSIUM SERPL-MCNC: 4.2 MMOL/L — SIGNIFICANT CHANGE UP (ref 3.5–5.3)
POTASSIUM SERPL-SCNC: 4.2 MMOL/L — SIGNIFICANT CHANGE UP (ref 3.5–5.3)
PROT SERPL-MCNC: 6.8 G/DL — SIGNIFICANT CHANGE UP (ref 6–8.3)
PROTHROM AB SERPL-ACNC: 15 SEC — HIGH (ref 10–12.9)
RAPID RVP RESULT: SIGNIFICANT CHANGE UP
RBC # BLD: 3.69 M/UL — LOW (ref 4.2–5.8)
RBC # FLD: 14.6 % — HIGH (ref 10.3–14.5)
SODIUM SERPL-SCNC: 138 MMOL/L — SIGNIFICANT CHANGE UP (ref 135–145)
TROPONIN T, HIGH SENSITIVITY RESULT: 23 NG/L — SIGNIFICANT CHANGE UP (ref 0–51)
WBC # BLD: 7.71 K/UL — SIGNIFICANT CHANGE UP (ref 3.8–10.5)
WBC # FLD AUTO: 7.71 K/UL — SIGNIFICANT CHANGE UP (ref 3.8–10.5)

## 2019-12-16 PROCEDURE — 99285 EMERGENCY DEPT VISIT HI MDM: CPT

## 2019-12-16 PROCEDURE — 93010 ELECTROCARDIOGRAM REPORT: CPT

## 2019-12-16 PROCEDURE — 71045 X-RAY EXAM CHEST 1 VIEW: CPT | Mod: 26

## 2019-12-16 RX ORDER — FOLIC ACID 0.8 MG
1 TABLET ORAL DAILY
Refills: 0 | Status: DISCONTINUED | OUTPATIENT
Start: 2019-12-16 | End: 2019-12-20

## 2019-12-16 RX ORDER — DEXTROSE 50 % IN WATER 50 %
15 SYRINGE (ML) INTRAVENOUS ONCE
Refills: 0 | Status: DISCONTINUED | OUTPATIENT
Start: 2019-12-16 | End: 2019-12-20

## 2019-12-16 RX ORDER — SIMETHICONE 80 MG/1
80 TABLET, CHEWABLE ORAL THREE TIMES A DAY
Refills: 0 | Status: DISCONTINUED | OUTPATIENT
Start: 2019-12-16 | End: 2019-12-20

## 2019-12-16 RX ORDER — FUROSEMIDE 40 MG
40 TABLET ORAL DAILY
Refills: 0 | Status: DISCONTINUED | OUTPATIENT
Start: 2019-12-16 | End: 2019-12-19

## 2019-12-16 RX ORDER — ALBUTEROL 90 UG/1
2.5 AEROSOL, METERED ORAL ONCE
Refills: 0 | Status: COMPLETED | OUTPATIENT
Start: 2019-12-16 | End: 2019-12-16

## 2019-12-16 RX ORDER — INSULIN LISPRO 100/ML
VIAL (ML) SUBCUTANEOUS
Refills: 0 | Status: DISCONTINUED | OUTPATIENT
Start: 2019-12-16 | End: 2019-12-20

## 2019-12-16 RX ORDER — ATORVASTATIN CALCIUM 80 MG/1
40 TABLET, FILM COATED ORAL AT BEDTIME
Refills: 0 | Status: DISCONTINUED | OUTPATIENT
Start: 2019-12-16 | End: 2019-12-20

## 2019-12-16 RX ORDER — DEXTROSE 50 % IN WATER 50 %
12.5 SYRINGE (ML) INTRAVENOUS ONCE
Refills: 0 | Status: DISCONTINUED | OUTPATIENT
Start: 2019-12-16 | End: 2019-12-20

## 2019-12-16 RX ORDER — ALBUTEROL 90 UG/1
2 AEROSOL, METERED ORAL EVERY 6 HOURS
Refills: 0 | Status: DISCONTINUED | OUTPATIENT
Start: 2019-12-16 | End: 2019-12-20

## 2019-12-16 RX ORDER — SODIUM CHLORIDE 9 MG/ML
1000 INJECTION, SOLUTION INTRAVENOUS
Refills: 0 | Status: DISCONTINUED | OUTPATIENT
Start: 2019-12-16 | End: 2019-12-20

## 2019-12-16 RX ORDER — GLUCAGON INJECTION, SOLUTION 0.5 MG/.1ML
1 INJECTION, SOLUTION SUBCUTANEOUS ONCE
Refills: 0 | Status: DISCONTINUED | OUTPATIENT
Start: 2019-12-16 | End: 2019-12-20

## 2019-12-16 RX ORDER — PANTOPRAZOLE SODIUM 20 MG/1
40 TABLET, DELAYED RELEASE ORAL
Refills: 0 | Status: DISCONTINUED | OUTPATIENT
Start: 2019-12-16 | End: 2019-12-20

## 2019-12-16 RX ORDER — FERROUS SULFATE 325(65) MG
325 TABLET ORAL DAILY
Refills: 0 | Status: DISCONTINUED | OUTPATIENT
Start: 2019-12-16 | End: 2019-12-20

## 2019-12-16 RX ORDER — POTASSIUM CHLORIDE 20 MEQ
10 PACKET (EA) ORAL DAILY
Refills: 0 | Status: DISCONTINUED | OUTPATIENT
Start: 2019-12-16 | End: 2019-12-19

## 2019-12-16 RX ORDER — ASCORBIC ACID 60 MG
500 TABLET,CHEWABLE ORAL DAILY
Refills: 0 | Status: DISCONTINUED | OUTPATIENT
Start: 2019-12-16 | End: 2019-12-20

## 2019-12-16 RX ORDER — ASPIRIN/CALCIUM CARB/MAGNESIUM 324 MG
81 TABLET ORAL DAILY
Refills: 0 | Status: DISCONTINUED | OUTPATIENT
Start: 2019-12-16 | End: 2019-12-20

## 2019-12-16 RX ORDER — ACETAMINOPHEN 500 MG
650 TABLET ORAL EVERY 6 HOURS
Refills: 0 | Status: DISCONTINUED | OUTPATIENT
Start: 2019-12-16 | End: 2019-12-20

## 2019-12-16 RX ORDER — CARVEDILOL PHOSPHATE 80 MG/1
6.25 CAPSULE, EXTENDED RELEASE ORAL EVERY 12 HOURS
Refills: 0 | Status: DISCONTINUED | OUTPATIENT
Start: 2019-12-16 | End: 2019-12-17

## 2019-12-16 RX ORDER — BUDESONIDE AND FORMOTEROL FUMARATE DIHYDRATE 160; 4.5 UG/1; UG/1
2 AEROSOL RESPIRATORY (INHALATION)
Refills: 0 | Status: DISCONTINUED | OUTPATIENT
Start: 2019-12-16 | End: 2019-12-20

## 2019-12-16 RX ORDER — FLUTICASONE PROPIONATE 50 MCG
1 SPRAY, SUSPENSION NASAL DAILY
Refills: 0 | Status: DISCONTINUED | OUTPATIENT
Start: 2019-12-16 | End: 2019-12-20

## 2019-12-16 RX ADMIN — ALBUTEROL 2.5 MILLIGRAM(S): 90 AEROSOL, METERED ORAL at 17:14

## 2019-12-16 RX ADMIN — Medication 40 MILLIGRAM(S): at 17:50

## 2019-12-16 RX ADMIN — Medication 10 MILLIEQUIVALENT(S): at 22:19

## 2019-12-16 NOTE — H&P ADULT - NSHPLABSRESULTS_GEN_ALL_CORE
Serum Pro-Brain Natriuretic Peptide (12.16.19 @ 16:57)    Serum Pro-Brain Natriuretic Peptide: 796 pg/mL  Blood Gas Venous - Hemoglobin/Hematocrit (12.16.19 @ 16:57)    Total Hemoglobin, Calculated: 11.3 g/dL    Hematocrit, Calculated: 35 %  INR: 1.29: Recommended ranges for therapeutic INR:    2.0-3.0 for most medical and surgical thromboembolic states    2.0-3.0 for atrial fibrillation    2.0-3.0 for bileaflet mechanical valve in aortic position    2.5-3.5 for mechanical heart valves    Chest 2004;126:[id number]  The presence of direct thrombin inhibitors (argatroban, refludan)  may falsely increase results. ratio (12.16.19 @ 16:57)

## 2019-12-16 NOTE — ED PROVIDER NOTE - NS ED ROS FT
GENERAL: denies fever, chills  HEENT: denies headaches, lightheadedness, dizziness, vision changes, congestion, trouble swallowing  CARDIAC: denies chest pain, palpitations  PULM: +SOB; denies cough  GI: +abdomen distension; denies abdominal pain, nausea, vomiting, diarrhea, constipation, melena, hematochezia  : denies dysuria, frequency, incontinence, hematuria  NEURO: denies motor weakness, sensory changes  MSK: +leg swelling; denies joint, muscle pain  SKIN: denies new rashes, hives  HEME: denies active bleeding, bruising

## 2019-12-16 NOTE — ED PROVIDER NOTE - PSH
H/O carotid endarterectomy    H/O sinus surgery H/O carotid endarterectomy    H/O sinus surgery    S/P CABG x 4

## 2019-12-16 NOTE — ED CLERICAL - NS ED CLERK NOTE PRE-ARRIVAL INFORMATION; ADDITIONAL PRE-ARRIVAL INFORMATION
This patient is enrolled in the Follow Your [facility] program and has undergone a cardiac surgery procedure within the last 30 days and has active care navigation. This patient can be followed up by the care navigation team within 24 hours. To arrange close follow-up or to obtain additional clinical information about this patient, please call the contact number above. Please call the cardiac surgery team once patient is registered at [phone] for consultation PRIOR to disposition decision.  The patient recently underwent a cardiac surgery procedure and the team can assist in acute medical management.

## 2019-12-16 NOTE — ED PROVIDER NOTE - ATTENDING CONTRIBUTION TO CARE
Attending MD Prince:  I personally have seen and examined this patient.  Resident note reviewed and agree on plan of care and except where noted.  See HPI, PE, and MDM for details.

## 2019-12-16 NOTE — H&P ADULT - HISTORY OF PRESENT ILLNESS
73 y/o male with PMHx significant for CABGx4 (11/2019), Hypertension, asthma, hypercholesterolemia, RONNIE and Pre-diabetes, who presented to the ED with worsening SOB, abdominal distention and LE edema. Patient states that he has had worsening LE edema and abdominal distention over the past week. Last evening he was SOB when he went upstairs in his home. He called the Service and was advised to take 20mg Furosemide this morning and he has not noted significant improvement.

## 2019-12-16 NOTE — ED ADULT NURSE NOTE - OBJECTIVE STATEMENT
74M comes to ED c/o worsening SOB x2 days. States he is 1 month s/p quadruple bypass. States 4 days ago his MD d/c lasix rx. He is a&ox3 in no distress. States he has SOB at rest and worse with exertion. He also endorses dizziness with position change. Denies chest pain, nausea, vomiting, abdominal pain, fever, chills. On exam, NSR on monitor, abdomen distended, crackles on auscultation, pitting edema to lower extremities, wound to right shin. Patient will remain on tele monitor. Bedrails up for safety. Will continue to monitor.

## 2019-12-16 NOTE — ED PROVIDER NOTE - OBJECTIVE STATEMENT
75y/o M h/o HTN, HLD, RONNIE, prediabetes, s/p CABGx4 (11/2019) p/w SOB. Pt states that he has had increasing SOB for the past 2 weeks a/w worsening lower extremity pitting edema. Pt recently had CABGx4 procedure performed. Pt endorses SOB at rest that worsens with exertion and needs to sleep with 2 pillows upright. Pt also states that he has worsening belly distension. Denies fevers, chills, nausea, vomiting, chest pain, abdominal pain, back pain, dysuria, numbness, tingling.

## 2019-12-16 NOTE — ED PROCEDURE NOTE - PROCEDURE ADDITIONAL DETAILS
New patient. Opioid agreement has been signed by patient. Patient refused a copy. Daughter admitted to lab giving her mother a valium recently. Provider made aware. POCUS: Emergency Department Focused Ultrasound performed at patient's bedside.  The complete report will be available in PACS.

## 2019-12-16 NOTE — ED ADULT TRIAGE NOTE - CHIEF COMPLAINT QUOTE
sob and bilateral leg swelling s/p open heart surgery approx 3-4 weeks ago, d/c on the 25th of November. denies any cp

## 2019-12-16 NOTE — H&P ADULT - NSHPPHYSICALEXAM_GEN_ALL_CORE
General: WDWN 73 y/o man, in no acute distress  Neuro: A&Ox3  Lungs: bilateral coarse sounds with diminished sounds on the left  Chest/Heart: S1S2 present, no murmurs/rubs/gallops noted, RRR; NSR on monitor; healing Mid sternal incision  Abdomen: Distended, +BS present (hypoactive); soft  Extremities: + edema to bilateral LEs; right LE incision site (vein site) with a scab, no evidence of infection. + dorsalis pedis pulses palpated  Mood/Psychiatric: mood and affect appropriate

## 2019-12-16 NOTE — H&P ADULT - ASSESSMENT
73 y/o male with PMHx significant for CABGx4 (11/2019), Hypertension, asthma, hypercholesterolemia, RONNIE and Pre-diabetes, who presented to the ED with worsening SOB, abdominal distention and LE edema. Patient states that he has had worsening LE edema and abdominal distention over the past week. Last evening he was SOB when he went upstairs in his home. He called the Service and was advised to take 20mg Furosemide this morning and he has not noted significant improvement.    12/16 Admit to CT-Surgery Dr. Phillips

## 2019-12-16 NOTE — ED PROVIDER NOTE - CLINICAL SUMMARY MEDICAL DECISION MAKING FREE TEXT BOX
Attending MD Prince: 74M with ho CABG recently, asthma, PAF presenting with ASHTON, abdominal distention and b/l LE edema x 2-3 days. Exam is notable for mild tachypnea, mild expiratory wheezes in all posterior lung fields, b/l LE edema, distended abdomen that is nontender. POCUS with moderate to large pleural effusion on left certainly contributing to patient's symptoms, also consider acute asthma exacerbation given wheezing. Could be cardiac wheezes as well however no significant appreciable B line to suggest acute pulm edema. Plan for labs, CXR, ecg, tele, CT surgery consult and admission. Trial bronchodilators, RVP as well

## 2019-12-16 NOTE — ED PROVIDER NOTE - PMH
Asthma    Borderline diabetes    Carotid stenosis, bilateral    History of hypertension    PVD (peripheral vascular disease)

## 2019-12-16 NOTE — H&P ADULT - NSICDXPASTMEDICALHX_GEN_ALL_CORE_FT
PAST MEDICAL HISTORY:  Asthma     Borderline diabetes     Carotid stenosis, bilateral     History of hypertension     PVD (peripheral vascular disease)

## 2019-12-16 NOTE — ED PROVIDER NOTE - PHYSICAL EXAMINATION
GENERAL: no acute distress, non-toxic appearing, AAOx3  HEAD: normocephalic, atraumatic  HEENT: normal conjunctiva, oral mucosa moist, neck supple  CARDIAC: healing scar midline with no tenderness, regular rate and rhythm, normal S1 and S2, no appreciable murmurs  PULM: end expiratory wheezing b/l   GI: abdomen distended, soft, nontender, no guarding or rebound tenderness  : no CVA tenderness b/l, no suprapubic tenderness  NEURO: no focal motor or sensory deficits  MSK: b/l LE 2+ pitting edema, no calf tenderness b/l, no visible deformities  SKIN: well-perfused, extremities cold, no visible rashes  PSYCH: appropriate mood and affect

## 2019-12-16 NOTE — H&P ADULT - NSHPREVIEWOFSYSTEMS_GEN_ALL_CORE
Skin/Breast: no rashes, no discharge, no ulcers  	  Ophthalmologic: no vision disturbances, no eye discharge	    Respiratory and Thorax: + SOB, + wheezing  	  Cardiovascular: no chest pain, no dizziness	    Gastrointestinal: no diarrhea, no constipation	    Genitourinary: decreased urination, no dysuria, no frequency	    Musculoskeletal: no weakness, no unsteady gait    Neurological: no focal deficits	    Psychiatric: no depression, no anxiety		    Endocrine: +hyperglycemia

## 2019-12-16 NOTE — ED ADULT NURSE NOTE - ED STAT RN HANDOFF DETAILS
report given to ER LIBBY Ornelas. She is aware that patient has bed assignment, but can not go up until RVP results are back.

## 2019-12-17 LAB
ALBUMIN SERPL ELPH-MCNC: 3.3 G/DL — SIGNIFICANT CHANGE UP (ref 3.3–5)
ALP SERPL-CCNC: 76 U/L — SIGNIFICANT CHANGE UP (ref 40–120)
ALT FLD-CCNC: 35 U/L — SIGNIFICANT CHANGE UP (ref 10–45)
ANION GAP SERPL CALC-SCNC: 14 MMOL/L — SIGNIFICANT CHANGE UP (ref 5–17)
AST SERPL-CCNC: 13 U/L — SIGNIFICANT CHANGE UP (ref 10–40)
BILIRUB SERPL-MCNC: 0.7 MG/DL — SIGNIFICANT CHANGE UP (ref 0.2–1.2)
BUN SERPL-MCNC: 11 MG/DL — SIGNIFICANT CHANGE UP (ref 7–23)
CALCIUM SERPL-MCNC: 9.2 MG/DL — SIGNIFICANT CHANGE UP (ref 8.4–10.5)
CHLORIDE SERPL-SCNC: 102 MMOL/L — SIGNIFICANT CHANGE UP (ref 96–108)
CO2 SERPL-SCNC: 25 MMOL/L — SIGNIFICANT CHANGE UP (ref 22–31)
CREAT SERPL-MCNC: 0.93 MG/DL — SIGNIFICANT CHANGE UP (ref 0.5–1.3)
GLUCOSE BLDC GLUCOMTR-MCNC: 115 MG/DL — HIGH (ref 70–99)
GLUCOSE BLDC GLUCOMTR-MCNC: 116 MG/DL — HIGH (ref 70–99)
GLUCOSE BLDC GLUCOMTR-MCNC: 126 MG/DL — HIGH (ref 70–99)
GLUCOSE SERPL-MCNC: 103 MG/DL — HIGH (ref 70–99)
HCT VFR BLD CALC: 36.8 % — LOW (ref 39–50)
HCV AB S/CO SERPL IA: 0.11 S/CO — SIGNIFICANT CHANGE UP (ref 0–0.99)
HCV AB SERPL-IMP: SIGNIFICANT CHANGE UP
HGB BLD-MCNC: 11.2 G/DL — LOW (ref 13–17)
INR BLD: 1.38 RATIO — HIGH (ref 0.88–1.16)
MCHC RBC-ENTMCNC: 28.9 PG — SIGNIFICANT CHANGE UP (ref 27–34)
MCHC RBC-ENTMCNC: 30.4 GM/DL — LOW (ref 32–36)
MCV RBC AUTO: 95.1 FL — SIGNIFICANT CHANGE UP (ref 80–100)
NRBC # BLD: 0 /100 WBCS — SIGNIFICANT CHANGE UP (ref 0–0)
PLATELET # BLD AUTO: 234 K/UL — SIGNIFICANT CHANGE UP (ref 150–400)
POTASSIUM SERPL-MCNC: 3.6 MMOL/L — SIGNIFICANT CHANGE UP (ref 3.5–5.3)
POTASSIUM SERPL-SCNC: 3.6 MMOL/L — SIGNIFICANT CHANGE UP (ref 3.5–5.3)
PROT SERPL-MCNC: 6.6 G/DL — SIGNIFICANT CHANGE UP (ref 6–8.3)
PROTHROM AB SERPL-ACNC: 16 SEC — HIGH (ref 10–12.9)
RBC # BLD: 3.87 M/UL — LOW (ref 4.2–5.8)
RBC # FLD: 14.6 % — HIGH (ref 10.3–14.5)
SODIUM SERPL-SCNC: 141 MMOL/L — SIGNIFICANT CHANGE UP (ref 135–145)
WBC # BLD: 6.69 K/UL — SIGNIFICANT CHANGE UP (ref 3.8–10.5)
WBC # FLD AUTO: 6.69 K/UL — SIGNIFICANT CHANGE UP (ref 3.8–10.5)

## 2019-12-17 PROCEDURE — 74177 CT ABD & PELVIS W/CONTRAST: CPT | Mod: 26

## 2019-12-17 PROCEDURE — 93308 TTE F-UP OR LMTD: CPT | Mod: 26

## 2019-12-17 PROCEDURE — 93306 TTE W/DOPPLER COMPLETE: CPT | Mod: 26

## 2019-12-17 PROCEDURE — 93971 EXTREMITY STUDY: CPT | Mod: 26

## 2019-12-17 PROCEDURE — 74018 RADEX ABDOMEN 1 VIEW: CPT | Mod: 26

## 2019-12-17 PROCEDURE — 71260 CT THORAX DX C+: CPT | Mod: 26

## 2019-12-17 RX ORDER — LABETALOL HCL 100 MG
200 TABLET ORAL EVERY 8 HOURS
Refills: 0 | Status: DISCONTINUED | OUTPATIENT
Start: 2019-12-17 | End: 2019-12-20

## 2019-12-17 RX ADMIN — Medication 500 MILLIGRAM(S): at 12:15

## 2019-12-17 RX ADMIN — Medication 1 SPRAY(S): at 14:14

## 2019-12-17 RX ADMIN — BUDESONIDE AND FORMOTEROL FUMARATE DIHYDRATE 2 PUFF(S): 160; 4.5 AEROSOL RESPIRATORY (INHALATION) at 06:26

## 2019-12-17 RX ADMIN — SIMETHICONE 80 MILLIGRAM(S): 80 TABLET, CHEWABLE ORAL at 00:09

## 2019-12-17 RX ADMIN — Medication 1 MILLIGRAM(S): at 12:15

## 2019-12-17 RX ADMIN — ATORVASTATIN CALCIUM 40 MILLIGRAM(S): 80 TABLET, FILM COATED ORAL at 21:42

## 2019-12-17 RX ADMIN — PANTOPRAZOLE SODIUM 40 MILLIGRAM(S): 20 TABLET, DELAYED RELEASE ORAL at 06:26

## 2019-12-17 RX ADMIN — Medication 40 MILLIGRAM(S): at 06:26

## 2019-12-17 RX ADMIN — Medication 5 MILLIGRAM(S): at 01:32

## 2019-12-17 RX ADMIN — BUDESONIDE AND FORMOTEROL FUMARATE DIHYDRATE 2 PUFF(S): 160; 4.5 AEROSOL RESPIRATORY (INHALATION) at 21:43

## 2019-12-17 RX ADMIN — Medication 10 MILLIEQUIVALENT(S): at 12:15

## 2019-12-17 RX ADMIN — Medication 81 MILLIGRAM(S): at 12:15

## 2019-12-17 RX ADMIN — Medication 200 MILLIGRAM(S): at 21:42

## 2019-12-17 RX ADMIN — Medication 200 MILLIGRAM(S): at 14:14

## 2019-12-17 RX ADMIN — Medication 325 MILLIGRAM(S): at 12:15

## 2019-12-17 NOTE — CONSULT NOTE ADULT - ASSESSMENT
pleural effusion   ? pericardial effusion   obtain CT of chest  possible drainage    post op afib  in sinus  hold a/c as effusion likely bloody     cad s/p cabg  cont current meds    fluid overload  on lasix   obtain echo   would avoid diuresis if pt has significant pericardial effusion     HTN  cont current meds for now   will amend as needed

## 2019-12-17 NOTE — PROGRESS NOTE ADULT - PROBLEM SELECTOR PLAN 1
-IV lasix 40mg daily  -drain when INR normalized  ck Chest ct today w/ abd & pelvis as abd is slightly distended

## 2019-12-17 NOTE — PROGRESS NOTE ADULT - ASSESSMENT
73 y/o male with PMHx significant for CABGx4 (11/2019), Hypertension, asthma, hypercholesterolemia, RONNIE and Pre-diabetes, who presented to the ED with worsening SOB, abdominal distention and LE edema. Patient states that he has had worsening LE edema and abdominal distention over the past week. Last evening he was SOB when he went upstairs in his home. He called the Service and was advised to take 20mg Furosemide this morning and he has not noted significant improvement.    12/16 Admit to CT-Surgery Dr. Agrawal  echo - shows small pericardial effusion w/ left pleural effusion - pt NOT requiring O2.   cxr done- Chest Ct/abd/pelvis CT today- to assess left pleural effusion & pericardial effusion.-abd w/ slight distention will ck ct  EKG SR - ? d/c coumdin  will d/w dr. agrawal

## 2019-12-17 NOTE — CONSULT NOTE ADULT - SUBJECTIVE AND OBJECTIVE BOX
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 650 milliGRAM(s) Oral every 6 hours PRN    bisacodyl 5 milliGRAM(s) Oral every 12 hours PRN  pantoprazole    Tablet 40 milliGRAM(s) Oral before breakfast  simethicone 80 milliGRAM(s) Chew three times a day PRN    atorvastatin 40 milliGRAM(s) Oral at bedtime  dextrose 40% Gel 15 Gram(s) Oral once PRN  dextrose 50% Injectable 12.5 Gram(s) IV Push once  glucagon  Injectable 1 milliGRAM(s) IntraMuscular once PRN  insulin lispro (HumaLOG) corrective regimen sliding scale   SubCutaneous three times a day before meals    ascorbic acid 500 milliGRAM(s) Oral daily  dextrose 5%. 1000 milliLiter(s) IV Continuous <Continuous>  ferrous    sulfate 325 milliGRAM(s) Oral daily  fluticasone propionate 50 MICROgram(s)/spray Nasal Spray 1 Spray(s) Both Nostrils daily  folic acid 1 milliGRAM(s) Oral daily  potassium chloride    Tablet ER 10 milliEquivalent(s) Oral daily      FAMILY HISTORY:  Family history of cancer      Non-contributory    SOCIAL HISTORY:    No tobacco, drugs or etoh    Allergies    No Known Allergies    Intolerances    	    REVIEW OF SYSTEMS:  as above  The rest of the 14 points ROS reviewed and except above they are unremarkable.        PHYSICAL EXAM:  T(C): 36.6 (12-17-19 @ 05:45), Max: 37 (12-16-19 @ 16:52)  HR: 83 (12-17-19 @ 05:45) (74 - 87)  BP: 161/71 (12-17-19 @ 05:45) (119/71 - 163/85)  RR: 18 (12-17-19 @ 05:45) (16 - 22)  SpO2: 96% (12-17-19 @ 05:45) (94% - 97%)  Wt(kg): --  I&O's Summary    16 Dec 2019 07:01  -  17 Dec 2019 07:00  --------------------------------------------------------  IN: 520 mL / OUT: 3450 mL / NET: -2930 mL    17 Dec 2019 07:01  -  17 Dec 2019 09:51  --------------------------------------------------------  IN: 0 mL / OUT: 300 mL / NET: -300 mL        Appearance: Normal	  HEENT:   no gross abnormality   Cardiovascular: Normal S1 S2,    Murmur:   Neck: JVP normal  Respiratory: Lungs clear   Gastrointestinal:  Soft, Non-tender  Skin: normal   Neuro: No gross deficits.   Psychiatry:  Mood & affect flat  Ext: No edema    LABS/DATA:    TELEMETRY: 	    ECG:  	   	  CARDIAC MARKERS:                        23 <<== 12-16-19 @ 16:57                              11.2   6.69  )-----------( 234      ( 17 Dec 2019 06:01 )             36.8     12-17    141  |  102  |  11  ----------------------------<  103<H>  3.6   |  25  |  0.93    Ca    9.2      17 Dec 2019 06:01    TPro  6.6  /  Alb  3.3  /  TBili  0.7  /  DBili  x   /  AST  13  /  ALT  35  /  AlkPhos  76  12-17    proBNP: Serum Pro-Brain Natriuretic Peptide: 796 pg/mL (12-16 @ 16:57)    Lipid Profile:   HgA1c:   TSH:

## 2019-12-17 NOTE — PROGRESS NOTE ADULT - SUBJECTIVE AND OBJECTIVE BOX
VITAL SIGNS-Telemetry: SR 82   Vital Signs Last 24 Hrs  T(C): 36.6 (12-17-19 @ 10:25), Max: 37 (12-16-19 @ 16:52)  T(F): 97.9 (12-17-19 @ 10:25), Max: 98.6 (12-16-19 @ 16:52)  HR: 72 (12-17-19 @ 10:25) (72 - 87)  BP: 137/79 (12-17-19 @ 10:25) (119/71 - 163/85)  RR: 18 (12-17-19 @ 10:25) (16 - 22)  SpO2: 93% (12-17-19 @ 10:25) (93% - 97%)         12-16 @ 07:01  -  12-17 @ 07:00  --------------------------------------------------------  IN: 520 mL / OUT: 3450 mL / NET: -2930 mL    12-17 @ 07:01  -  12-17 @ 13:40  --------------------------------------------------------  IN: 360 mL / OUT: 400 mL / NET: -40 mL    Daily     Daily     CAPILLARY BLOOD GLUCOSE  POCT Blood Glucose.: 116 mg/dL (17 Dec 2019 12:09)  POCT Blood Glucose.: 115 mg/dL (17 Dec 2019 07:29)  POCT Blood Glucose.: 144 mg/dL (16 Dec 2019 22:18)        Coumadin    [ x] YES          [  ]      NO         Reason:     paf but SR on TEle  PHYSICAL EXAM:  Neurology: alert and oriented x 3, nonfocal, no gross deficits  CV : S1S1  Sternal Wound :  CDI , Stable  Lungs: diminished bs lll  Abdomen: soft, nontender, nondistended, positive bowel sounds, last bowel movement +bm        Extremities:     +edema rle  inc CDI. no calf tenderness    acetaminophen   Tablet .. 650 milliGRAM(s) Oral every 6 hours PRN  ALBUTerol    90 MICROgram(s) HFA Inhaler 2 Puff(s) Inhalation every 6 hours PRN  ascorbic acid 500 milliGRAM(s) Oral daily  aspirin enteric coated 81 milliGRAM(s) Oral daily  atorvastatin 40 milliGRAM(s) Oral at bedtime  bisacodyl 5 milliGRAM(s) Oral every 12 hours PRN  budesonide 160 MICROgram(s)/formoterol 4.5 MICROgram(s) Inhaler 2 Puff(s) Inhalation two times a day  dextrose 40% Gel 15 Gram(s) Oral once PRN  dextrose 5%. 1000 milliLiter(s) IV Continuous <Continuous>  dextrose 50% Injectable 12.5 Gram(s) IV Push once  ferrous    sulfate 325 milliGRAM(s) Oral daily  fluticasone propionate 50 MICROgram(s)/spray Nasal Spray 1 Spray(s) Both Nostrils daily  folic acid 1 milliGRAM(s) Oral daily  furosemide   Injectable 40 milliGRAM(s) IV Push daily  glucagon  Injectable 1 milliGRAM(s) IntraMuscular once PRN  insulin lispro (HumaLOG) corrective regimen sliding scale   SubCutaneous three times a day before meals  labetalol 200 milliGRAM(s) Oral every 8 hours  pantoprazole    Tablet 40 milliGRAM(s) Oral before breakfast  potassium chloride    Tablet ER 10 milliEquivalent(s) Oral daily  simethicone 80 milliGRAM(s) Chew three times a day PRN    Physical Therapy Rec:   Home  [  ]   Home w/ PT  [  ]  Rehab  [  ]  Discussed with Cardiothoracic Team at AM rounds.

## 2019-12-17 NOTE — PROGRESS NOTE ADULT - SUBJECTIVE AND OBJECTIVE BOX
VITAL SIGNS-Telemetry:  SR 82  Vital Signs Last 24 Hrs  T(C): 36.6 (12-17-19 @ 10:25), Max: 37 (12-16-19 @ 16:52)  T(F): 97.9 (12-17-19 @ 10:25), Max: 98.6 (12-16-19 @ 16:52)  HR: 72 (12-17-19 @ 10:25) (72 - 87)  BP: 137/79 (12-17-19 @ 10:25) (119/71 - 163/85)  RR: 18 (12-17-19 @ 10:25) (16 - 22)  SpO2: 93% (12-17-19 @ 10:25) (93% - 97%)         12-16 @ 07:01  -  12-17 @ 07:00  --------------------------------------------------------  IN: 520 mL / OUT: 3450 mL / NET: -2930 mL    12-17 @ 07:01  -  12-17 @ 12:45  --------------------------------------------------------  IN: 360 mL / OUT: 300 mL / NET: 60 mL    Daily     Daily     CAPILLARY BLOOD GLUCOSE  POCT Blood Glucose.: 116 mg/dL (17 Dec 2019 12:09)  POCT Blood Glucose.: 115 mg/dL (17 Dec 2019 07:29)  POCT Blood Glucose.: 144 mg/dL (16 Dec 2019 22:18)        Coumadin    [x ] YES          [  ]      NO         Reason:     coumadin  PHYSICAL EXAM:  Neurology: alert and oriented x 3, nonfocal, no gross deficits  CV : S1S2  Sternal Wound :  CDI , Stable  Lungs:   Abdomen: soft, nontender, nondistended, positive bowel sounds, last bowel movement         Extremities:         acetaminophen   Tablet .. 650 milliGRAM(s) Oral every 6 hours PRN  ALBUTerol    90 MICROgram(s) HFA Inhaler 2 Puff(s) Inhalation every 6 hours PRN  ascorbic acid 500 milliGRAM(s) Oral daily  aspirin enteric coated 81 milliGRAM(s) Oral daily  atorvastatin 40 milliGRAM(s) Oral at bedtime  bisacodyl 5 milliGRAM(s) Oral every 12 hours PRN  budesonide 160 MICROgram(s)/formoterol 4.5 MICROgram(s) Inhaler 2 Puff(s) Inhalation two times a day  dextrose 40% Gel 15 Gram(s) Oral once PRN  dextrose 5%. 1000 milliLiter(s) IV Continuous <Continuous>  dextrose 50% Injectable 12.5 Gram(s) IV Push once  ferrous    sulfate 325 milliGRAM(s) Oral daily  fluticasone propionate 50 MICROgram(s)/spray Nasal Spray 1 Spray(s) Both Nostrils daily  folic acid 1 milliGRAM(s) Oral daily  furosemide   Injectable 40 milliGRAM(s) IV Push daily  glucagon  Injectable 1 milliGRAM(s) IntraMuscular once PRN  insulin lispro (HumaLOG) corrective regimen sliding scale   SubCutaneous three times a day before meals  labetalol 200 milliGRAM(s) Oral every 8 hours  pantoprazole    Tablet 40 milliGRAM(s) Oral before breakfast  potassium chloride    Tablet ER 10 milliEquivalent(s) Oral daily  simethicone 80 milliGRAM(s) Chew three times a day PRN    Physical Therapy Rec:   Home  [ x ]   Home w/ PT  [  ]  Rehab  [  ]  Discussed with Cardiothoracic Team at AM rounds.

## 2019-12-17 NOTE — PROGRESS NOTE ADULT - ASSESSMENT
73 y/o male with PMHx significant for CABGx4 (11/2019), Hypertension, asthma, hypercholesterolemia, RONNIE and Pre-diabetes, who presented to the ED with worsening SOB, abdominal distention and LE edema. Patient states that he has had worsening LE edema and abdominal distention over the past week. Last evening he was SOB when he went upstairs in his home. He called the Service and was advised to take 20mg Furosemide this morning and he has not noted significant improvement.    12/16 Admit to CT-Surgery Dr. Phillips   12/17 rounds made w/ Dr. Phillips - pt with abd distention. CT Chest w/ abd/pelvis to assess abdomen, pericardial effusion/left pleural effusion.  coumadin on hold in case pt. needs tap.

## 2019-12-18 ENCOUNTER — APPOINTMENT (OUTPATIENT)
Dept: CARDIOTHORACIC SURGERY | Facility: CLINIC | Age: 74
End: 2019-12-18

## 2019-12-18 LAB
ANION GAP SERPL CALC-SCNC: 13 MMOL/L — SIGNIFICANT CHANGE UP (ref 5–17)
APTT BLD: 33.9 SEC — SIGNIFICANT CHANGE UP (ref 27.5–36.3)
BUN SERPL-MCNC: 11 MG/DL — SIGNIFICANT CHANGE UP (ref 7–23)
CALCIUM SERPL-MCNC: 9.6 MG/DL — SIGNIFICANT CHANGE UP (ref 8.4–10.5)
CHLORIDE SERPL-SCNC: 100 MMOL/L — SIGNIFICANT CHANGE UP (ref 96–108)
CO2 SERPL-SCNC: 26 MMOL/L — SIGNIFICANT CHANGE UP (ref 22–31)
CREAT SERPL-MCNC: 1.05 MG/DL — SIGNIFICANT CHANGE UP (ref 0.5–1.3)
GLUCOSE BLDC GLUCOMTR-MCNC: 119 MG/DL — HIGH (ref 70–99)
GLUCOSE BLDC GLUCOMTR-MCNC: 137 MG/DL — HIGH (ref 70–99)
GLUCOSE BLDC GLUCOMTR-MCNC: 138 MG/DL — HIGH (ref 70–99)
GLUCOSE BLDC GLUCOMTR-MCNC: 156 MG/DL — HIGH (ref 70–99)
GLUCOSE SERPL-MCNC: 115 MG/DL — HIGH (ref 70–99)
HCT VFR BLD CALC: 37.5 % — LOW (ref 39–50)
HGB BLD-MCNC: 11.8 G/DL — LOW (ref 13–17)
INR BLD: 1.24 RATIO — HIGH (ref 0.88–1.16)
MCHC RBC-ENTMCNC: 29.6 PG — SIGNIFICANT CHANGE UP (ref 27–34)
MCHC RBC-ENTMCNC: 31.5 GM/DL — LOW (ref 32–36)
MCV RBC AUTO: 94 FL — SIGNIFICANT CHANGE UP (ref 80–100)
NRBC # BLD: 0 /100 WBCS — SIGNIFICANT CHANGE UP (ref 0–0)
PLATELET # BLD AUTO: 260 K/UL — SIGNIFICANT CHANGE UP (ref 150–400)
POTASSIUM SERPL-MCNC: 3.3 MMOL/L — LOW (ref 3.5–5.3)
POTASSIUM SERPL-SCNC: 3.3 MMOL/L — LOW (ref 3.5–5.3)
PROTHROM AB SERPL-ACNC: 14.2 SEC — HIGH (ref 10–12.9)
RBC # BLD: 3.99 M/UL — LOW (ref 4.2–5.8)
RBC # FLD: 14.5 % — SIGNIFICANT CHANGE UP (ref 10.3–14.5)
SODIUM SERPL-SCNC: 139 MMOL/L — SIGNIFICANT CHANGE UP (ref 135–145)
WBC # BLD: 5.98 K/UL — SIGNIFICANT CHANGE UP (ref 3.8–10.5)
WBC # FLD AUTO: 5.98 K/UL — SIGNIFICANT CHANGE UP (ref 3.8–10.5)

## 2019-12-18 PROCEDURE — 32557 INSERT CATH PLEURA W/ IMAGE: CPT | Mod: 78

## 2019-12-18 PROCEDURE — 71045 X-RAY EXAM CHEST 1 VIEW: CPT | Mod: 26

## 2019-12-18 RX ORDER — ONDANSETRON 8 MG/1
4 TABLET, FILM COATED ORAL ONCE
Refills: 0 | Status: COMPLETED | OUTPATIENT
Start: 2019-12-18 | End: 2019-12-18

## 2019-12-18 RX ORDER — OXYCODONE AND ACETAMINOPHEN 5; 325 MG/1; MG/1
1 TABLET ORAL EVERY 4 HOURS
Refills: 0 | Status: DISCONTINUED | OUTPATIENT
Start: 2019-12-18 | End: 2019-12-19

## 2019-12-18 RX ORDER — OXYCODONE HYDROCHLORIDE 5 MG/1
5 TABLET ORAL EVERY 4 HOURS
Refills: 0 | Status: DISCONTINUED | OUTPATIENT
Start: 2019-12-18 | End: 2019-12-20

## 2019-12-18 RX ORDER — POTASSIUM CHLORIDE 20 MEQ
20 PACKET (EA) ORAL
Refills: 0 | Status: COMPLETED | OUTPATIENT
Start: 2019-12-18 | End: 2019-12-18

## 2019-12-18 RX ADMIN — Medication 325 MILLIGRAM(S): at 08:20

## 2019-12-18 RX ADMIN — Medication 81 MILLIGRAM(S): at 10:59

## 2019-12-18 RX ADMIN — Medication 40 MILLIGRAM(S): at 05:46

## 2019-12-18 RX ADMIN — Medication 10 MILLIEQUIVALENT(S): at 08:20

## 2019-12-18 RX ADMIN — ATORVASTATIN CALCIUM 40 MILLIGRAM(S): 80 TABLET, FILM COATED ORAL at 21:14

## 2019-12-18 RX ADMIN — Medication 1 MILLIGRAM(S): at 08:20

## 2019-12-18 RX ADMIN — SIMETHICONE 80 MILLIGRAM(S): 80 TABLET, CHEWABLE ORAL at 23:38

## 2019-12-18 RX ADMIN — PANTOPRAZOLE SODIUM 40 MILLIGRAM(S): 20 TABLET, DELAYED RELEASE ORAL at 05:46

## 2019-12-18 RX ADMIN — BUDESONIDE AND FORMOTEROL FUMARATE DIHYDRATE 2 PUFF(S): 160; 4.5 AEROSOL RESPIRATORY (INHALATION) at 05:46

## 2019-12-18 RX ADMIN — Medication 20 MILLIEQUIVALENT(S): at 10:59

## 2019-12-18 RX ADMIN — OXYCODONE HYDROCHLORIDE 5 MILLIGRAM(S): 5 TABLET ORAL at 22:15

## 2019-12-18 RX ADMIN — Medication 200 MILLIGRAM(S): at 05:46

## 2019-12-18 RX ADMIN — Medication 650 MILLIGRAM(S): at 15:30

## 2019-12-18 RX ADMIN — Medication 200 MILLIGRAM(S): at 21:14

## 2019-12-18 RX ADMIN — Medication 650 MILLIGRAM(S): at 16:00

## 2019-12-18 RX ADMIN — OXYCODONE AND ACETAMINOPHEN 1 TABLET(S): 5; 325 TABLET ORAL at 20:41

## 2019-12-18 RX ADMIN — Medication 200 MILLIGRAM(S): at 13:55

## 2019-12-18 RX ADMIN — OXYCODONE HYDROCHLORIDE 5 MILLIGRAM(S): 5 TABLET ORAL at 22:45

## 2019-12-18 RX ADMIN — Medication 20 MILLIEQUIVALENT(S): at 13:54

## 2019-12-18 RX ADMIN — OXYCODONE AND ACETAMINOPHEN 1 TABLET(S): 5; 325 TABLET ORAL at 20:11

## 2019-12-18 RX ADMIN — BUDESONIDE AND FORMOTEROL FUMARATE DIHYDRATE 2 PUFF(S): 160; 4.5 AEROSOL RESPIRATORY (INHALATION) at 18:17

## 2019-12-18 RX ADMIN — ONDANSETRON 4 MILLIGRAM(S): 8 TABLET, FILM COATED ORAL at 23:52

## 2019-12-18 RX ADMIN — OXYCODONE HYDROCHLORIDE 5 MILLIGRAM(S): 5 TABLET ORAL at 18:47

## 2019-12-18 RX ADMIN — OXYCODONE HYDROCHLORIDE 5 MILLIGRAM(S): 5 TABLET ORAL at 18:17

## 2019-12-18 RX ADMIN — Medication 500 MILLIGRAM(S): at 08:20

## 2019-12-18 NOTE — PROGRESS NOTE ADULT - SUBJECTIVE AND OBJECTIVE BOX
VITAL SIGNS-Telemetry:  SR 1st 80  Vital Signs Last 24 Hrs  T(C): 36.8 (19 @ 04:50), Max: 36.8 (19 @ 04:50)  T(F): 98.2 (19 @ 04:50), Max: 98.2 (19 @ 04:50)  HR: 97 (19 @ 04:50) (72 - 97)  BP: 154/78 (19 @ 04:50) (137/79 - 154/78)  RR: 18 (19 @ 04:50) (18 - 18)  SpO2: 96% (19 @ 04:50) (93% - 96%)          @ 07:01  -   @ 07:00  --------------------------------------------------------  IN: 1120 mL / OUT: 1650 mL / NET: -530 mL    Daily     Daily Weight in k.6 (18 Dec 2019 07:06)    CAPILLARY BLOOD GLUCOSE  POCT Blood Glucose.: 119 mg/dL (18 Dec 2019 07:40)  POCT Blood Glucose.: 126 mg/dL (17 Dec 2019 16:57)  POCT Blood Glucose.: 116 mg/dL (17 Dec 2019 12:09)        Coumadin    [ ] YES          [ x ]      NO         Reason:     awaiting l tap  PHYSICAL EXAM:  Neurology: alert and oriented x 3, nonfocal, no gross deficits  CV : S1S2  Sternal Wound :  CDI , Stable  Lungs: diminished bs LLL  Abdomen: soft, nontender, nondistended, positive bowel sounds, last bowel movement     +bm    Extremities:     tr. edema RLE- no calf tenderness    acetaminophen   Tablet .. 650 milliGRAM(s) Oral every 6 hours PRN  ALBUTerol    90 MICROgram(s) HFA Inhaler 2 Puff(s) Inhalation every 6 hours PRN  ascorbic acid 500 milliGRAM(s) Oral daily  aspirin enteric coated 81 milliGRAM(s) Oral daily  atorvastatin 40 milliGRAM(s) Oral at bedtime  bisacodyl 5 milliGRAM(s) Oral every 12 hours PRN  budesonide 160 MICROgram(s)/formoterol 4.5 MICROgram(s) Inhaler 2 Puff(s) Inhalation two times a day  dextrose 40% Gel 15 Gram(s) Oral once PRN  dextrose 5%. 1000 milliLiter(s) IV Continuous <Continuous>  dextrose 50% Injectable 12.5 Gram(s) IV Push once  ferrous    sulfate 325 milliGRAM(s) Oral daily  fluticasone propionate 50 MICROgram(s)/spray Nasal Spray 1 Spray(s) Both Nostrils daily  folic acid 1 milliGRAM(s) Oral daily  furosemide   Injectable 40 milliGRAM(s) IV Push daily  glucagon  Injectable 1 milliGRAM(s) IntraMuscular once PRN  insulin lispro (HumaLOG) corrective regimen sliding scale   SubCutaneous three times a day before meals  labetalol 200 milliGRAM(s) Oral every 8 hours  pantoprazole    Tablet 40 milliGRAM(s) Oral before breakfast  potassium chloride    Tablet ER 10 milliEquivalent(s) Oral daily  simethicone 80 milliGRAM(s) Chew three times a day PRN    Physical Therapy Rec:   Home  [  ]   Home w/ PT  [  ]  Rehab  [  ]  Discussed with Cardiothoracic Team at AM rounds.

## 2019-12-18 NOTE — PROGRESS NOTE ADULT - ASSESSMENT
pleural effusion   plan for drainage     post op afib  in sinus  hold a/c as effusion likely bloody     cad s/p cabg  cont current meds    Pericardial effusion   small but ? pre tampondade physiology   would hold off to lasix  off a/c  repeat echo in few weeks     HTN  cont current meds for now   will amend as needed

## 2019-12-18 NOTE — CHART NOTE - NSCHARTNOTEFT_GEN_A_CORE
CC: Called by RN to assess patient for nausea after pain medication    Subjective:     CC:    VITAL SIGNS  T(F): 97.7  HR: 78  BP: 144/85  RR: 18  SpO2: 96%    12-17-19 @ 07:01  -  12-18-19 @ 07:00  --------------------------------------------------------  OUT: 1650 mL / NET: -1650 mL    12-18-19 @ 07:01  -  12-18-19 @ 23:40  --------------------------------------------------------  OUT: 2625 mL / NET: -2625 mL    LAB                        11.8   5.98  )-----------( 260      ( 18 Dec 2019 07:13 )             37.5   139  |  100  |  11  ----------------------------<  115<H>  3.3<L>   |  26  |  1.05    CAPILLARY BLOOD GLUCOSE  POCT Blood Glucose.: 156 mg/dL (18 Dec 2019 21:41)  POCT Blood Glucose.: 138 mg/dL (18 Dec 2019 17:05)  POCT Blood Glucose.: 137 mg/dL (18 Dec 2019 11:26)  POCT Blood Glucose.: 119 mg/dL (18 Dec 2019 07:40)      CT Chest w/ IV Cont (12.17.19 @ 16:09)   LUNGS AND LARGE AIRWAYS: Patent central airways. No pulmonary nodules.    Scattered subsegmental atelectasis.   PLEURA: Moderate left pleural effusion.  VESSELS: Within normal limits.  HEART: Status post sternotomy and CABG. Trace pericardial effusion. Heart   size is normal. Coronary artery calcifications are noted.  MEDIASTINUM AND ANJU: No lymphadenopathy.   CHEST WALL AND LOWER NECK: Within normal limits.     ABDOMEN AND PELVIS:    LIVER: Subcentimeter lesions too small to characterize.   BILE DUCTS: Normal caliber.  GALLBLADDER: Within normal limits.  SPLEEN: Within normal limits.   PANCREAS: Within normal limits.  ADRENALS: Stable adrenal nodules.  KIDNEYS/URETERS: Right parapelvic cyst.    BLADDER: Within normal limits.   REPRODUCTIVE ORGANS: Within normal limits.     BOWEL: No bowel obstruction. The appendix is normal.   PERITONEUM: No ascites.   VESSELS:  Within normal limits.  RETROPERITONEUM/LYMPH NODES: No lymphadenopathy.     ABDOMINAL WALL: Within normal limits.  BONES: Within normal limits.     IMPRESSION: Moderate left pleural effusion.    MEDICATIONS  ascorbic acid 500 milliGRAM(s) Oral daily  aspirin enteric coated 81 milliGRAM(s) Oral daily  atorvastatin 40 milliGRAM(s) Oral at bedtime  budesonide 160 MICROgram(s)/formoterol 4.5 MICROgram(s) Inhaler 2 Puff(s) Inhalation two times a day  dextrose 5%. 1000 milliLiter(s) IV Continuous <Continuous>  dextrose 50% Injectable 12.5 Gram(s) IV Push once  ferrous    sulfate 325 milliGRAM(s) Oral daily  fluticasone propionate 50 MICROgram(s)/spray Nasal Spray 1 Spray(s) Both Nostrils daily  folic acid 1 milliGRAM(s) Oral daily  furosemide   Injectable 40 milliGRAM(s) IV Push daily  insulin lispro (HumaLOG) corrective regimen sliding scale   SubCutaneous three times a day before meals  labetalol 200 milliGRAM(s) Oral every 8 hours  oxyCODONE    IR 5 milliGRAM(s) Oral every 4 hours  pantoprazole    Tablet 40 milliGRAM(s) Oral before breakfast  potassium chloride    Tablet ER 10 milliEquivalent(s) Oral daily      PHYSICAL EXAM  GEN: No apparent distress, examined in   PSYCH: Appropriate, communicative, A+Ox3  NEURO: Non-focal,  A+Ox 3  CARDIAC: S1 S2 RRR without rub or murmur  Edema +1/4  Pacing wires:  Ventricular  setting:  VVI 40/10  PULMONARY:   clear vesicular  Chest tubes: Pleural   Abdomen: Soft flat non-tender, non-distended bowel sounds active x 4 LBM:   : clear yellow urine  Skin:  warm dry intact   sternal incision:   covered, clean, dry intact dressing  ACE wraps:

## 2019-12-18 NOTE — PROCEDURE NOTE - NSPROCDETAILS_GEN_ALL_CORE
ultrasound assessment of fluid (location)/ultrasound assessment of fluid (size)/percutaneous/dressing applied/secured in place/thoracostomy tube placed percutaneously/sterile dressing applied/Seldinger technique

## 2019-12-18 NOTE — PROGRESS NOTE ADULT - ASSESSMENT
75 y/o male with PMHx significant for CABGx4 (11/2019), Hypertension, asthma, hypercholesterolemia, RONNIE and Pre-diabetes, who presented to the ED with worsening SOB, abdominal distention and LE edema. Patient states that he has had worsening LE edema and abdominal distention over the past week. Last evening he was SOB when he went upstairs in his home. He called the Service and was advised to take 20mg Furosemide this morning and he has not noted significant improvement.    12/16 Admit to CT-Surgery Dr. Agrawal  echo - shows small pericardial effusion w/ left pleural effusion - pt NOT requiring O2.   cxr done- Chest Ct/abd/pelvis CT today- to assess left pleural effusion & pericardial effusion.-abd w/ slight distention will ck ct  EKG SR - ? d/c coumdin  will d/w dr. agrawal  12/17-vss inr 1.3- coumadin on hold. for left pleural effusion today- ck lll sono to r/o dvt 75 y/o male with PMHx significant for CABGx4 (11/2019), Hypertension, asthma, hypercholesterolemia, RONNIE and Pre-diabetes, who presented to the ED with worsening SOB, abdominal distention and LE edema. Patient states that he has had worsening LE edema and abdominal distention over the past week. Last evening he was SOB when he went upstairs in his home. He called the Service and was advised to take 20mg Furosemide this morning and he has not noted significant improvement.    12/16 Admit to CT-Surgery Dr. Agrawal  echo - shows small pericardial effusion w/ left pleural effusion - pt NOT requiring O2.   cxr done- Chest Ct/abd/pelvis CT today- to assess left pleural effusion & pericardial effusion.-abd w/ slight distention will ck ct  EKG SR - ? d/c coumdin  will d/w dr. agrawal  12/17-vss inr 1.3- coumadin on hold. U/S chest reveals large left pleural effusion - to be tapped this afternoon by PA- ck lll sono to r/o dvt

## 2019-12-18 NOTE — PROGRESS NOTE ADULT - SUBJECTIVE AND OBJECTIVE BOX
Subjective: Patient seen and examined. No new events except as noted.     SUBJECTIVE/ROS:  feels ok       MEDICATIONS:  MEDICATIONS  (STANDING):  ascorbic acid 500 milliGRAM(s) Oral daily  aspirin enteric coated 81 milliGRAM(s) Oral daily  atorvastatin 40 milliGRAM(s) Oral at bedtime  budesonide 160 MICROgram(s)/formoterol 4.5 MICROgram(s) Inhaler 2 Puff(s) Inhalation two times a day  dextrose 5%. 1000 milliLiter(s) (50 mL/Hr) IV Continuous <Continuous>  dextrose 50% Injectable 12.5 Gram(s) IV Push once  ferrous    sulfate 325 milliGRAM(s) Oral daily  fluticasone propionate 50 MICROgram(s)/spray Nasal Spray 1 Spray(s) Both Nostrils daily  folic acid 1 milliGRAM(s) Oral daily  furosemide   Injectable 40 milliGRAM(s) IV Push daily  insulin lispro (HumaLOG) corrective regimen sliding scale   SubCutaneous three times a day before meals  labetalol 200 milliGRAM(s) Oral every 8 hours  pantoprazole    Tablet 40 milliGRAM(s) Oral before breakfast  potassium chloride    Tablet ER 10 milliEquivalent(s) Oral daily  potassium chloride    Tablet ER 20 milliEquivalent(s) Oral every 2 hours      PHYSICAL EXAM:  T(C): 36.8 (12-18-19 @ 04:50), Max: 36.8 (12-18-19 @ 04:50)  HR: 97 (12-18-19 @ 04:50) (72 - 97)  BP: 154/78 (12-18-19 @ 04:50) (137/79 - 154/78)  RR: 18 (12-18-19 @ 04:50) (18 - 18)  SpO2: 96% (12-18-19 @ 04:50) (93% - 96%)  Wt(kg): --  I&O's Summary    17 Dec 2019 07:01  -  18 Dec 2019 07:00  --------------------------------------------------------  IN: 1120 mL / OUT: 1650 mL / NET: -530 mL    18 Dec 2019 07:01  -  18 Dec 2019 09:55  --------------------------------------------------------  IN: 120 mL / OUT: 1150 mL / NET: -1030 mL            JVP: Normal  Neck: supple  Lung: dec bs on left side   CV: S1 S2 , Murmur:  Abd: soft  Ext: No edema  neuro: Awake / alert  Psych: flat affect  Skin: normal``    LABS/DATA:    CARDIAC MARKERS:  CARDIAC MARKERS ( 16 Dec 2019 16:57 )  x     / x     / x     / x     / 2.1 ng/mL    < from: TTE with Doppler (w/Cont) (12.17.19 @ 08:26) >  Conclusions:  1. Mitral annular calcification, otherwise normal mitral  valve. Minimal mitral regurgitation.  2. Calcified trileaflet aortic valve with normal opening.  No aortic valve regurgitation seen.  3. Endocardial visualization enhanced with intravenous  injection of Ultrasonic Enhancing Agent (Definity). Normal  left ventricular systolic function. Paradoxical septal  motion consistent with prior cardiac surgery.  4. The right ventricle is not well visualized; grossly  normal right ventricular systolic function.  5. Thickened pericardium. Small pericardial effusion  predominantly seen inferior to the RV. The effusion  measures up to approximately 1.0 cm inferior to the RV. The  RV appears slightly underfilled on subcostal images but not  on apical views. This may indicate increased pericardial  pressure. IVC is dilated (diameterabout 2.2 cm) with  greater than 50% respiratory variation in size consistent  with estimated RA pressure about  8 mmHg.  6. Left pleural effusion.  *** Compared with echocardiogram of 11/22/2019, small  pericardial effusion was noted on the prior study.  Subcostal images were technically difficult on the prior  study.  ------------------------------------------------------------------------  Confirmed on  12/17/2019 - 11:31:01 by Juan Vargas M.D.    < end of copied text >                 < from: CT Chest w/ IV Cont (12.17.19 @ 16:09) >  IMPRESSION: Moderate left pleural effusion.    < end of copied text >  < from: VA Duplex Lower Ext Vein Scan, Right (12.17.19 @ 21:14) >  IMPRESSION:    No deep vein thrombosis in the right lower extremity.    < end of copied text >               11.8   5.98  )-----------( 260      ( 18 Dec 2019 07:13 )             37.5     12-18    139  |  100  |  11  ----------------------------<  115<H>  3.3<L>   |  26  |  1.05    Ca    9.6      18 Dec 2019 07:08    TPro  6.6  /  Alb  3.3  /  TBili  0.7  /  DBili  x   /  AST  13  /  ALT  35  /  AlkPhos  76  12-17    proBNP:   Lipid Profile:   HgA1c:   TSH:     TELE:  EKG:

## 2019-12-19 DIAGNOSIS — I31.3 PERICARDIAL EFFUSION (NONINFLAMMATORY): ICD-10-CM

## 2019-12-19 LAB
ALBUMIN SERPL ELPH-MCNC: 3.5 G/DL — SIGNIFICANT CHANGE UP (ref 3.3–5)
ALP SERPL-CCNC: 71 U/L — SIGNIFICANT CHANGE UP (ref 40–120)
ALT FLD-CCNC: 22 U/L — SIGNIFICANT CHANGE UP (ref 10–45)
AMYLASE P1 CFR SERPL: 30 U/L — SIGNIFICANT CHANGE UP (ref 25–125)
ANION GAP SERPL CALC-SCNC: 14 MMOL/L — SIGNIFICANT CHANGE UP (ref 5–17)
AST SERPL-CCNC: 11 U/L — SIGNIFICANT CHANGE UP (ref 10–40)
BILIRUB SERPL-MCNC: 0.8 MG/DL — SIGNIFICANT CHANGE UP (ref 0.2–1.2)
BUN SERPL-MCNC: 10 MG/DL — SIGNIFICANT CHANGE UP (ref 7–23)
CALCIUM SERPL-MCNC: 9.2 MG/DL — SIGNIFICANT CHANGE UP (ref 8.4–10.5)
CHLORIDE SERPL-SCNC: 101 MMOL/L — SIGNIFICANT CHANGE UP (ref 96–108)
CO2 SERPL-SCNC: 25 MMOL/L — SIGNIFICANT CHANGE UP (ref 22–31)
CREAT SERPL-MCNC: 0.9 MG/DL — SIGNIFICANT CHANGE UP (ref 0.5–1.3)
GLUCOSE BLDC GLUCOMTR-MCNC: 123 MG/DL — HIGH (ref 70–99)
GLUCOSE BLDC GLUCOMTR-MCNC: 133 MG/DL — HIGH (ref 70–99)
GLUCOSE BLDC GLUCOMTR-MCNC: 142 MG/DL — HIGH (ref 70–99)
GLUCOSE BLDC GLUCOMTR-MCNC: 147 MG/DL — HIGH (ref 70–99)
GLUCOSE SERPL-MCNC: 121 MG/DL — HIGH (ref 70–99)
HCT VFR BLD CALC: 37.5 % — LOW (ref 39–50)
HGB BLD-MCNC: 11.4 G/DL — LOW (ref 13–17)
INR BLD: 1.27 RATIO — HIGH (ref 0.88–1.16)
LIDOCAIN IGE QN: 18 U/L — SIGNIFICANT CHANGE UP (ref 7–60)
MCHC RBC-ENTMCNC: 28.7 PG — SIGNIFICANT CHANGE UP (ref 27–34)
MCHC RBC-ENTMCNC: 30.4 GM/DL — LOW (ref 32–36)
MCV RBC AUTO: 94.5 FL — SIGNIFICANT CHANGE UP (ref 80–100)
NRBC # BLD: 0 /100 WBCS — SIGNIFICANT CHANGE UP (ref 0–0)
PLATELET # BLD AUTO: 234 K/UL — SIGNIFICANT CHANGE UP (ref 150–400)
POTASSIUM SERPL-MCNC: 3.8 MMOL/L — SIGNIFICANT CHANGE UP (ref 3.5–5.3)
POTASSIUM SERPL-SCNC: 3.8 MMOL/L — SIGNIFICANT CHANGE UP (ref 3.5–5.3)
PROT SERPL-MCNC: 6.2 G/DL — SIGNIFICANT CHANGE UP (ref 6–8.3)
PROTHROM AB SERPL-ACNC: 14.7 SEC — HIGH (ref 10–12.9)
RBC # BLD: 3.97 M/UL — LOW (ref 4.2–5.8)
RBC # FLD: 14.3 % — SIGNIFICANT CHANGE UP (ref 10.3–14.5)
SODIUM SERPL-SCNC: 140 MMOL/L — SIGNIFICANT CHANGE UP (ref 135–145)
WBC # BLD: 7.59 K/UL — SIGNIFICANT CHANGE UP (ref 3.8–10.5)
WBC # FLD AUTO: 7.59 K/UL — SIGNIFICANT CHANGE UP (ref 3.8–10.5)

## 2019-12-19 PROCEDURE — 71046 X-RAY EXAM CHEST 2 VIEWS: CPT | Mod: 26

## 2019-12-19 PROCEDURE — 99231 SBSQ HOSP IP/OBS SF/LOW 25: CPT | Mod: 24

## 2019-12-19 RX ORDER — ONDANSETRON 8 MG/1
4 TABLET, FILM COATED ORAL ONCE
Refills: 0 | Status: COMPLETED | OUTPATIENT
Start: 2019-12-19 | End: 2019-12-19

## 2019-12-19 RX ORDER — POTASSIUM CHLORIDE 20 MEQ
20 PACKET (EA) ORAL ONCE
Refills: 0 | Status: COMPLETED | OUTPATIENT
Start: 2019-12-19 | End: 2019-12-19

## 2019-12-19 RX ORDER — IPRATROPIUM/ALBUTEROL SULFATE 18-103MCG
3 AEROSOL WITH ADAPTER (GRAM) INHALATION ONCE
Refills: 0 | Status: COMPLETED | OUTPATIENT
Start: 2019-12-19 | End: 2019-12-19

## 2019-12-19 RX ADMIN — OXYCODONE HYDROCHLORIDE 5 MILLIGRAM(S): 5 TABLET ORAL at 04:04

## 2019-12-19 RX ADMIN — Medication 40 MILLIGRAM(S): at 06:57

## 2019-12-19 RX ADMIN — ATORVASTATIN CALCIUM 40 MILLIGRAM(S): 80 TABLET, FILM COATED ORAL at 21:30

## 2019-12-19 RX ADMIN — Medication 20 MILLIEQUIVALENT(S): at 11:04

## 2019-12-19 RX ADMIN — Medication 200 MILLIGRAM(S): at 21:30

## 2019-12-19 RX ADMIN — Medication 81 MILLIGRAM(S): at 11:03

## 2019-12-19 RX ADMIN — OXYCODONE HYDROCHLORIDE 5 MILLIGRAM(S): 5 TABLET ORAL at 03:34

## 2019-12-19 RX ADMIN — OXYCODONE HYDROCHLORIDE 5 MILLIGRAM(S): 5 TABLET ORAL at 06:55

## 2019-12-19 RX ADMIN — BUDESONIDE AND FORMOTEROL FUMARATE DIHYDRATE 2 PUFF(S): 160; 4.5 AEROSOL RESPIRATORY (INHALATION) at 06:56

## 2019-12-19 RX ADMIN — ONDANSETRON 4 MILLIGRAM(S): 8 TABLET, FILM COATED ORAL at 08:55

## 2019-12-19 RX ADMIN — PANTOPRAZOLE SODIUM 40 MILLIGRAM(S): 20 TABLET, DELAYED RELEASE ORAL at 06:57

## 2019-12-19 RX ADMIN — BUDESONIDE AND FORMOTEROL FUMARATE DIHYDRATE 2 PUFF(S): 160; 4.5 AEROSOL RESPIRATORY (INHALATION) at 14:10

## 2019-12-19 RX ADMIN — Medication 325 MILLIGRAM(S): at 11:03

## 2019-12-19 RX ADMIN — Medication 200 MILLIGRAM(S): at 13:15

## 2019-12-19 RX ADMIN — OXYCODONE HYDROCHLORIDE 5 MILLIGRAM(S): 5 TABLET ORAL at 07:25

## 2019-12-19 RX ADMIN — Medication 500 MILLIGRAM(S): at 11:03

## 2019-12-19 RX ADMIN — Medication 200 MILLIGRAM(S): at 06:57

## 2019-12-19 RX ADMIN — Medication 1 MILLIGRAM(S): at 11:03

## 2019-12-19 RX ADMIN — Medication 3 MILLILITER(S): at 20:37

## 2019-12-19 RX ADMIN — Medication 1 SPRAY(S): at 11:04

## 2019-12-19 NOTE — PROGRESS NOTE ADULT - ASSESSMENT
pleural effusion   s/p drainage     post op afib  in sinus  hold a/c as effusion likely bloody     cad s/p cabg  cont current meds    Pericardial effusion   small but ? pre tampondade physiology   dc plasix as he is a bit orthostatic   off a/c  repeat echo in few weeks     HTN  cont current meds for now   will amend as needed

## 2019-12-19 NOTE — PROVIDER CONTACT NOTE (OTHER) - BACKGROUND
Pt admitted for L pleural effusion. L chest tube to low wall suction.  received oxycodone 5mg PO at 2210hrs.

## 2019-12-19 NOTE — PROGRESS NOTE ADULT - ASSESSMENT
75 y/o male with PMHx significant for CABGx4 (11/2019), Hypertension, asthma, hypercholesterolemia, RONNIE and Pre-diabetes, who presented to the ED with worsening SOB, abdominal distention and LE edema. Patient states that he has had worsening LE edema and abdominal distention over the past week. Last evening he was SOB when he went upstairs in his home. He called the Service and was advised to take 20mg Furosemide this morning and he has not noted significant improvement.    12/16 Admit to CT-Surgery Dr. Agrawal  echo - shows small pericardial effusion w/ left pleural effusion - pt NOT requiring O2.   cxr done- Chest Ct/abd/pelvis CT today- to assess left pleural effusion & pericardial effusion.-abd w/ slight distention will ck ct  EKG SR - ? d/c coumdin  will d/w dr. agrawal  12/17-vss inr 1.3- coumadin on hold. U/S chest reveals large left pleural effusion - to be tapped this afternoon by PA- ck lll sono to r/o dvt  12/19   removed lt pl pigtail   tolerated well  post tremoval xray  no ptx

## 2019-12-19 NOTE — PROGRESS NOTE ADULT - SUBJECTIVE AND OBJECTIVE BOX
Subjective: Patient seen and examined. No new events except as noted.     SUBJECTIVE/ROS:  feels ok but a bit dizzy with standing       MEDICATIONS:  MEDICATIONS  (STANDING):  ascorbic acid 500 milliGRAM(s) Oral daily  aspirin enteric coated 81 milliGRAM(s) Oral daily  atorvastatin 40 milliGRAM(s) Oral at bedtime  budesonide 160 MICROgram(s)/formoterol 4.5 MICROgram(s) Inhaler 2 Puff(s) Inhalation two times a day  dextrose 5%. 1000 milliLiter(s) (50 mL/Hr) IV Continuous <Continuous>  dextrose 50% Injectable 12.5 Gram(s) IV Push once  ferrous    sulfate 325 milliGRAM(s) Oral daily  fluticasone propionate 50 MICROgram(s)/spray Nasal Spray 1 Spray(s) Both Nostrils daily  folic acid 1 milliGRAM(s) Oral daily  insulin lispro (HumaLOG) corrective regimen sliding scale   SubCutaneous three times a day before meals  labetalol 200 milliGRAM(s) Oral every 8 hours  ondansetron Injectable 4 milliGRAM(s) IV Push once  oxyCODONE    IR 5 milliGRAM(s) Oral every 4 hours  pantoprazole    Tablet 40 milliGRAM(s) Oral before breakfast  potassium chloride    Tablet ER 20 milliEquivalent(s) Oral once      PHYSICAL EXAM:  T(C): 36.7 (12-19-19 @ 06:19), Max: 36.8 (12-18-19 @ 12:15)  HR: 77 (12-19-19 @ 06:19) (71 - 81)  BP: 140/84 (12-19-19 @ 06:19) (104/66 - 151/78)  RR: 18 (12-19-19 @ 06:19) (18 - 19)  SpO2: 96% (12-19-19 @ 06:19) (95% - 98%)  Wt(kg): --  I&O's Summary    18 Dec 2019 07:01  -  19 Dec 2019 07:00  --------------------------------------------------------  IN: 800 mL / OUT: 3225 mL / NET: -2425 mL            JVP: Normal  Neck: supple  Lung: clear   CV: S1 S2 , Murmur:  Abd: soft  Ext: No edema  neuro: Awake / alert  Psych: flat affect  Skin: normal``    LABS/DATA:    CARDIAC MARKERS:  CARDIAC MARKERS ( 16 Dec 2019 16:57 )  x     / x     / x     / x     / 2.1 ng/mL                                11.4   7.59  )-----------( 234      ( 19 Dec 2019 06:28 )             37.5     12-19    140  |  101  |  10  ----------------------------<  121<H>  3.8   |  25  |  0.90    Ca    9.2      19 Dec 2019 06:28    TPro  6.2  /  Alb  3.5  /  TBili  0.8  /  DBili  x   /  AST  11  /  ALT  22  /  AlkPhos  71  12-19    proBNP:   Lipid Profile:   HgA1c:   TSH:     TELE:  EKG:

## 2019-12-19 NOTE — PROGRESS NOTE ADULT - PROBLEM SELECTOR PLAN 2
-insulin sliding scale
raquel Agrawal  need for repeat tte prior to dc  ?  raquel agrawal need for further ac

## 2019-12-20 ENCOUNTER — TRANSCRIPTION ENCOUNTER (OUTPATIENT)
Age: 74
End: 2019-12-20

## 2019-12-20 VITALS — WEIGHT: 156.75 LBS

## 2019-12-20 LAB
ANION GAP SERPL CALC-SCNC: 12 MMOL/L — SIGNIFICANT CHANGE UP (ref 5–17)
APTT BLD: 32 SEC — SIGNIFICANT CHANGE UP (ref 27.5–36.3)
BUN SERPL-MCNC: 12 MG/DL — SIGNIFICANT CHANGE UP (ref 7–23)
CALCIUM SERPL-MCNC: 9 MG/DL — SIGNIFICANT CHANGE UP (ref 8.4–10.5)
CHLORIDE SERPL-SCNC: 100 MMOL/L — SIGNIFICANT CHANGE UP (ref 96–108)
CO2 SERPL-SCNC: 25 MMOL/L — SIGNIFICANT CHANGE UP (ref 22–31)
CREAT SERPL-MCNC: 0.99 MG/DL — SIGNIFICANT CHANGE UP (ref 0.5–1.3)
GLUCOSE SERPL-MCNC: 126 MG/DL — HIGH (ref 70–99)
HCT VFR BLD CALC: 36 % — LOW (ref 39–50)
HGB BLD-MCNC: 11.3 G/DL — LOW (ref 13–17)
INR BLD: 1.25 RATIO — HIGH (ref 0.88–1.16)
MCHC RBC-ENTMCNC: 29.7 PG — SIGNIFICANT CHANGE UP (ref 27–34)
MCHC RBC-ENTMCNC: 31.4 GM/DL — LOW (ref 32–36)
MCV RBC AUTO: 94.5 FL — SIGNIFICANT CHANGE UP (ref 80–100)
NRBC # BLD: 0 /100 WBCS — SIGNIFICANT CHANGE UP (ref 0–0)
PLATELET # BLD AUTO: 219 K/UL — SIGNIFICANT CHANGE UP (ref 150–400)
POTASSIUM SERPL-MCNC: 3.5 MMOL/L — SIGNIFICANT CHANGE UP (ref 3.5–5.3)
POTASSIUM SERPL-SCNC: 3.5 MMOL/L — SIGNIFICANT CHANGE UP (ref 3.5–5.3)
PROTHROM AB SERPL-ACNC: 14.3 SEC — HIGH (ref 10–12.9)
RBC # BLD: 3.81 M/UL — LOW (ref 4.2–5.8)
RBC # FLD: 14.3 % — SIGNIFICANT CHANGE UP (ref 10.3–14.5)
SODIUM SERPL-SCNC: 137 MMOL/L — SIGNIFICANT CHANGE UP (ref 135–145)
WBC # BLD: 7.56 K/UL — SIGNIFICANT CHANGE UP (ref 3.8–10.5)
WBC # FLD AUTO: 7.56 K/UL — SIGNIFICANT CHANGE UP (ref 3.8–10.5)

## 2019-12-20 PROCEDURE — 94640 AIRWAY INHALATION TREATMENT: CPT

## 2019-12-20 PROCEDURE — 84295 ASSAY OF SERUM SODIUM: CPT

## 2019-12-20 PROCEDURE — 74018 RADEX ABDOMEN 1 VIEW: CPT

## 2019-12-20 PROCEDURE — 71046 X-RAY EXAM CHEST 2 VIEWS: CPT

## 2019-12-20 PROCEDURE — 85027 COMPLETE CBC AUTOMATED: CPT

## 2019-12-20 PROCEDURE — 99024 POSTOP FOLLOW-UP VISIT: CPT

## 2019-12-20 PROCEDURE — 83690 ASSAY OF LIPASE: CPT

## 2019-12-20 PROCEDURE — 83880 ASSAY OF NATRIURETIC PEPTIDE: CPT

## 2019-12-20 PROCEDURE — 85610 PROTHROMBIN TIME: CPT

## 2019-12-20 PROCEDURE — 84484 ASSAY OF TROPONIN QUANT: CPT

## 2019-12-20 PROCEDURE — 84132 ASSAY OF SERUM POTASSIUM: CPT

## 2019-12-20 PROCEDURE — 93308 TTE F-UP OR LMTD: CPT

## 2019-12-20 PROCEDURE — 85730 THROMBOPLASTIN TIME PARTIAL: CPT

## 2019-12-20 PROCEDURE — 80053 COMPREHEN METABOLIC PANEL: CPT

## 2019-12-20 PROCEDURE — 99285 EMERGENCY DEPT VISIT HI MDM: CPT | Mod: 25

## 2019-12-20 PROCEDURE — 83605 ASSAY OF LACTIC ACID: CPT

## 2019-12-20 PROCEDURE — 82330 ASSAY OF CALCIUM: CPT

## 2019-12-20 PROCEDURE — 85014 HEMATOCRIT: CPT

## 2019-12-20 PROCEDURE — 93971 EXTREMITY STUDY: CPT

## 2019-12-20 PROCEDURE — 71045 X-RAY EXAM CHEST 1 VIEW: CPT

## 2019-12-20 PROCEDURE — 82150 ASSAY OF AMYLASE: CPT

## 2019-12-20 PROCEDURE — 74177 CT ABD & PELVIS W/CONTRAST: CPT

## 2019-12-20 PROCEDURE — 93005 ELECTROCARDIOGRAM TRACING: CPT

## 2019-12-20 PROCEDURE — 87581 M.PNEUMON DNA AMP PROBE: CPT

## 2019-12-20 PROCEDURE — C8929: CPT

## 2019-12-20 PROCEDURE — 87798 DETECT AGENT NOS DNA AMP: CPT

## 2019-12-20 PROCEDURE — 86803 HEPATITIS C AB TEST: CPT

## 2019-12-20 PROCEDURE — 71260 CT THORAX DX C+: CPT

## 2019-12-20 PROCEDURE — 82803 BLOOD GASES ANY COMBINATION: CPT

## 2019-12-20 PROCEDURE — 82435 ASSAY OF BLOOD CHLORIDE: CPT

## 2019-12-20 PROCEDURE — 82947 ASSAY GLUCOSE BLOOD QUANT: CPT

## 2019-12-20 PROCEDURE — 80048 BASIC METABOLIC PNL TOTAL CA: CPT

## 2019-12-20 PROCEDURE — 87486 CHLMYD PNEUM DNA AMP PROBE: CPT

## 2019-12-20 PROCEDURE — 82962 GLUCOSE BLOOD TEST: CPT

## 2019-12-20 PROCEDURE — 82553 CREATINE MB FRACTION: CPT

## 2019-12-20 PROCEDURE — 87633 RESP VIRUS 12-25 TARGETS: CPT

## 2019-12-20 RX ORDER — SIMETHICONE 80 MG/1
1 TABLET, CHEWABLE ORAL
Qty: 0 | Refills: 0 | DISCHARGE
Start: 2019-12-20

## 2019-12-20 RX ORDER — ACETAMINOPHEN 500 MG
2 TABLET ORAL
Qty: 0 | Refills: 0 | DISCHARGE
Start: 2019-12-20

## 2019-12-20 RX ORDER — POTASSIUM CHLORIDE 20 MEQ
40 PACKET (EA) ORAL ONCE
Refills: 0 | Status: COMPLETED | OUTPATIENT
Start: 2019-12-20 | End: 2019-12-20

## 2019-12-20 RX ORDER — LABETALOL HCL 100 MG
1 TABLET ORAL
Qty: 90 | Refills: 0
Start: 2019-12-20 | End: 2020-01-18

## 2019-12-20 RX ORDER — FUROSEMIDE 40 MG
1 TABLET ORAL
Qty: 30 | Refills: 0
Start: 2019-12-20 | End: 2020-01-18

## 2019-12-20 RX ORDER — POTASSIUM CHLORIDE 20 MEQ
1 PACKET (EA) ORAL
Qty: 30 | Refills: 0
Start: 2019-12-20 | End: 2020-01-18

## 2019-12-20 RX ORDER — ASPIRIN/CALCIUM CARB/MAGNESIUM 324 MG
1 TABLET ORAL
Qty: 0 | Refills: 0 | DISCHARGE
Start: 2019-12-20

## 2019-12-20 RX ORDER — ATORVASTATIN CALCIUM 80 MG/1
1 TABLET, FILM COATED ORAL
Qty: 0 | Refills: 0 | DISCHARGE
Start: 2019-12-20

## 2019-12-20 RX ORDER — OXYCODONE HYDROCHLORIDE 5 MG/1
1 TABLET ORAL
Qty: 20 | Refills: 0
Start: 2019-12-20 | End: 2019-12-24

## 2019-12-20 RX ADMIN — Medication 40 MILLIEQUIVALENT(S): at 09:10

## 2019-12-20 RX ADMIN — PANTOPRAZOLE SODIUM 40 MILLIGRAM(S): 20 TABLET, DELAYED RELEASE ORAL at 05:13

## 2019-12-20 RX ADMIN — Medication 200 MILLIGRAM(S): at 05:13

## 2019-12-20 RX ADMIN — BUDESONIDE AND FORMOTEROL FUMARATE DIHYDRATE 2 PUFF(S): 160; 4.5 AEROSOL RESPIRATORY (INHALATION) at 05:13

## 2019-12-20 NOTE — DISCHARGE NOTE PROVIDER - NSDCFUSCHEDAPPT_GEN_ALL_CORE_FT
INOCENCIA ARMSTRONG ; 12/30/2019 ; NPP CT S[facility] [address] INOCENCIA Caraballo ; 01/16/2020 ; NPP PulmMed [id number]

## 2019-12-20 NOTE — DISCHARGE NOTE PROVIDER - NSDCHHNEEDSERVICE_GEN_ALL_CORE
Medication teaching and assessment/Teaching and training/Wound care and assessment/Observation and assessment

## 2019-12-20 NOTE — PROGRESS NOTE ADULT - REASON FOR ADMISSION
fluid overload
fluid overload    sp lt side pigtail for pl eff
fluid overload

## 2019-12-20 NOTE — PROGRESS NOTE ADULT - SUBJECTIVE AND OBJECTIVE BOX
Subjective: Patient seen and examined. No new events except as noted.     SUBJECTIVE/ROS:  feels ok   No chest pain, dyspnea, palpitation, or dizziness.       MEDICATIONS:  MEDICATIONS  (STANDING):  ascorbic acid 500 milliGRAM(s) Oral daily  aspirin enteric coated 81 milliGRAM(s) Oral daily  atorvastatin 40 milliGRAM(s) Oral at bedtime  budesonide 160 MICROgram(s)/formoterol 4.5 MICROgram(s) Inhaler 2 Puff(s) Inhalation two times a day  dextrose 5%. 1000 milliLiter(s) (50 mL/Hr) IV Continuous <Continuous>  dextrose 50% Injectable 12.5 Gram(s) IV Push once  ferrous    sulfate 325 milliGRAM(s) Oral daily  fluticasone propionate 50 MICROgram(s)/spray Nasal Spray 1 Spray(s) Both Nostrils daily  folic acid 1 milliGRAM(s) Oral daily  insulin lispro (HumaLOG) corrective regimen sliding scale   SubCutaneous three times a day before meals  labetalol 200 milliGRAM(s) Oral every 8 hours  oxyCODONE    IR 5 milliGRAM(s) Oral every 4 hours  pantoprazole    Tablet 40 milliGRAM(s) Oral before breakfast      PHYSICAL EXAM:  T(C): 36.7 (12-20-19 @ 05:40), Max: 36.8 (12-19-19 @ 20:46)  HR: 82 (12-20-19 @ 05:40) (76 - 82)  BP: 131/71 (12-20-19 @ 05:40) (115/70 - 131/71)  RR: 18 (12-20-19 @ 05:40) (18 - 18)  SpO2: 94% (12-20-19 @ 05:40) (94% - 98%)  Wt(kg): --  I&O's Summary    19 Dec 2019 07:01  -  20 Dec 2019 07:00  --------------------------------------------------------  IN: 600 mL / OUT: 1900 mL / NET: -1300 mL            JVP: Normal  Neck: supple  Lung: clear   CV: S1 S2 , Murmur:  Abd: soft  Ext: No edema  neuro: Awake / alert  Psych: flat affect  Skin: normal``    LABS/DATA:    CARDIAC MARKERS:                                11.3   7.56  )-----------( 219      ( 20 Dec 2019 06:47 )             36.0     12-20    137  |  100  |  12  ----------------------------<  126<H>  3.5   |  25  |  0.99    Ca    9.0      20 Dec 2019 06:47    TPro  6.2  /  Alb  3.5  /  TBili  0.8  /  DBili  x   /  AST  11  /  ALT  22  /  AlkPhos  71  12-19    proBNP:   Lipid Profile:   HgA1c:   TSH:     TELE:  EKG:  < from: Xray Chest 2 Views PA/Lat (12.19.19 @ 09:30) >    EXAM:  XR CHEST PA LAT 2V                            PROCEDURE DATE:  12/19/2019            INTERPRETATION:  Clinical information: Shortness of breath.    PA and lateral radiograph's of the chest were obtained and compared to previous study of 12/18/2019.    Heart size cannot be adequately assessed on this portable AP exam. Patient is status post median sternotomy. Lungs are clear. Trace left pleural effusion is noted. Visualized osseous structures are within normal limits.    Impression: Traceleft pleural effusion.    < end of copied text >

## 2019-12-20 NOTE — DISCHARGE NOTE PROVIDER - NSDCFUADDINST_GEN_ALL_CORE_FT
call Dr. Phillips for fever > 101  refer to cardiac surgery do and don't checklist  no driving until cleared by Dr. Phillips   check blood sugar levels before meals and at bedtime- record levels

## 2019-12-20 NOTE — DISCHARGE NOTE PROVIDER - NSDCMRMEDTOKEN_GEN_ALL_CORE_FT
acetaminophen 325 mg oral tablet: 2 tab(s) orally every 6 hours, As needed, Moderate Pain (4 - 6)  aspirin 81 mg oral delayed release tablet: 1 tab(s) orally once a day  atorvastatin 40 mg oral tablet: 1 tab(s) orally once a day (at bedtime)  Flonase 50 mcg/inh nasal spray: 1 spray(s) nasal once a day  Flovent  mcg/inh inhalation aerosol: 2 puff(s) inhaled 2 times a day, As Needed  furosemide 20 mg oral tablet: 1 tab(s) orally once a day  labetalol 200 mg oral tablet: 1 tab(s) orally every 8 hours  Metamucil 3.4 g/3.7 g oral powder for reconstitution: 1 dose(s) orally once a day  metFORMIN 500 mg oral tablet: 1 tab(s) orally 2 times a day   oxyCODONE 5 mg oral tablet: 1 tab(s) orally every 6 hours MDD:4  pantoprazole 40 mg oral delayed release tablet: 1 tab(s) orally once a day (before a meal)  potassium chloride 10 mEq oral capsule, extended release: 1 cap(s) orally once a day   simethicone 80 mg oral tablet, chewable: 1 tab(s) orally 3 times a day, As needed, Gas  Symbicort 160 mcg-4.5 mcg/inh inhalation aerosol: 2 puff(s) inhaled 2 times a day  Ventolin HFA 90 mcg/inh inhalation aerosol: 2 puff(s) inhaled 4 times a day, As Needed

## 2019-12-20 NOTE — DISCHARGE NOTE PROVIDER - HOSPITAL COURSE
73 y/o male with PMHx significant for CABGx4 (11/2019), Hypertension, asthma, hypercholesterolemia, RONNIE and Pre-diabetes, who presented to the ED with worsening SOB, abdominal distention and LE edema. Patient states that he has had worsening LE edema and abdominal distention over the past week. Last evening he was SOB when he went upstairs in his home. He called the Service and was advised to take 20mg Furosemide this morning and he has not noted significant improvement.        12/16 Admit to CT-Surgery Dr. Agrawal    echo - shows small pericardial effusion w/ left pleural effusion - pt NOT requiring O2.     cxr done- Chest Ct/abd/pelvis CT today- to assess left pleural effusion & pericardial effusion.-abd w/ slight distention will ck ct    EKG SR - ? d/c coumdin  will d/w dr. agrawal    12/17-vss inr 1.3- coumadin on hold. U/S chest reveals large left pleural effusion - to be tapped this afternoon by PA- ck lll sono to r/o dvt    12/19   removed lt pl pigtail   tolerated well  post tremoval xray  no ptx; RSR- no further coumadin as per Dr. Agrawal     12/20 discharge pt home today as per Dr. Agrawal;  VSS

## 2019-12-20 NOTE — DISCHARGE NOTE PROVIDER - CARE PROVIDER_API CALL
Felipe Phillips)  Surgery; Thoracic and Cardiac Surgery  [address] [place] [address]  Phone: [phone]  Fax: [phone]  Follow Up Time:     Cesar King)  Cardiovascular Disease; Internal Medicine  [address] [facility] [address]  [place] [address]  Phone: [phone]  Fax: [phone]  Follow Up Time:

## 2019-12-20 NOTE — DISCHARGE NOTE NURSING/CASE MANAGEMENT/SOCIAL WORK - PATIENT PORTAL LINK FT
You can access the FollowMyHealth Patient Portal offered by [facility] by registering at the following website: [email or web address]. By joining [employer]’s FollowMyHealth portal, you will also be able to view your health information using other applications (apps) compatible with our system.

## 2019-12-20 NOTE — DISCHARGE NOTE PROVIDER - NSDCACTIVITY_GEN_ALL_CORE
Sex allowed/Do not drive or operate machinery/Walking - Indoors allowed/Walking - Outdoors allowed/Do not make important decisions/Stairs allowed/No heavy lifting/straining

## 2019-12-20 NOTE — PROGRESS NOTE ADULT - ASSESSMENT
pleural effusion   s/p drainage     post op afib  in sinus  hold a/c as effusion likely bloody     cad s/p cabg  cont current meds    Pericardial effusion   small but ? pre tampondade physiology   off lasix for now   resume low dose in few days   off a/c  repeat echo in few weeks     HTN  stable

## 2019-12-20 NOTE — DISCHARGE NOTE PROVIDER - NSDCPNSUBOBJ_GEN_ALL_CORE
VSS    tele: RSR 1st      midsternal incison cdi [place]- sternum stable    lungs clear, RR easy unlabored    +bs nt nd + bm; neg n/v/d    LE: neg calf tenderness, trace LE edema, ppp bilaterally    rt svg site cdi [place]        Discharge pt home today as per DR. Phillips 12/20

## 2019-12-20 NOTE — DISCHARGE NOTE NURSING/CASE MANAGEMENT/SOCIAL WORK - NSDCPNDISPN_GEN_ALL_CORE
Opioids not applicable/not prescribed/Education provided on the pain management plan of care/Activities of daily living, including home environment that might     exacerbate pain or reduce effectiveness of the pain management plan of care as well as strategies to address these issues

## 2019-12-20 NOTE — DISCHARGE NOTE PROVIDER - NSDCCPCAREPLAN_GEN_ALL_CORE_FT
PRINCIPAL DISCHARGE DIAGNOSIS  Diagnosis: Pleural effusion on left  Assessment and Plan of Treatment: shower daily  weigh yourself daily  continue current prescriptions as ordered  increase activity as tolerated   no added salt; low fat; low cholesterol, low salt diabetic diet.   check blood sugar levels before meals and at bedtime- record levels   follow up with Cardiologist, Dr. King, in 1 week.  Call to schedule appointment.  follow up with cardiac surgeon, DR. Phillips on Dec. 30th at 1:45 pm. CAll to confirm appointment. [phone]

## 2019-12-26 ENCOUNTER — MEDICATION RENEWAL (OUTPATIENT)
Age: 74
End: 2019-12-26

## 2020-01-02 ENCOUNTER — EMERGENCY (EMERGENCY)
Facility: HOSPITAL | Age: 75
LOS: 1 days | Discharge: HOME | End: 2020-01-02
Attending: EMERGENCY MEDICINE
Payer: MEDICARE

## 2020-01-02 VITALS
TEMPERATURE: 98 F | RESPIRATION RATE: 18 BRPM | DIASTOLIC BLOOD PRESSURE: 78 MMHG | OXYGEN SATURATION: 97 % | HEART RATE: 78 BPM | WEIGHT: 160.06 LBS | SYSTOLIC BLOOD PRESSURE: 133 MMHG | HEIGHT: 66 IN

## 2020-01-02 VITALS
OXYGEN SATURATION: 98 % | RESPIRATION RATE: 18 BRPM | HEART RATE: 75 BPM | SYSTOLIC BLOOD PRESSURE: 146 MMHG | DIASTOLIC BLOOD PRESSURE: 78 MMHG | TEMPERATURE: 98 F

## 2020-01-02 DIAGNOSIS — Z98.890 OTHER SPECIFIED POSTPROCEDURAL STATES: Chronic | ICD-10-CM

## 2020-01-02 DIAGNOSIS — Z95.1 PRESENCE OF AORTOCORONARY BYPASS GRAFT: Chronic | ICD-10-CM

## 2020-01-02 LAB
ALBUMIN SERPL ELPH-MCNC: 3.7 G/DL — SIGNIFICANT CHANGE UP (ref 3.3–5)
ALP SERPL-CCNC: 81 U/L — SIGNIFICANT CHANGE UP (ref 40–120)
ALT FLD-CCNC: 24 U/L — SIGNIFICANT CHANGE UP (ref 10–45)
ANION GAP SERPL CALC-SCNC: 15 MMOL/L — SIGNIFICANT CHANGE UP (ref 5–17)
AST SERPL-CCNC: 13 U/L — SIGNIFICANT CHANGE UP (ref 10–40)
BASOPHILS # BLD AUTO: 0.03 K/UL — SIGNIFICANT CHANGE UP (ref 0–0.2)
BASOPHILS NFR BLD AUTO: 0.4 % — SIGNIFICANT CHANGE UP (ref 0–2)
BILIRUB SERPL-MCNC: 0.5 MG/DL — SIGNIFICANT CHANGE UP (ref 0.2–1.2)
BUN SERPL-MCNC: 11 MG/DL — SIGNIFICANT CHANGE UP (ref 7–23)
CALCIUM SERPL-MCNC: 9.4 MG/DL — SIGNIFICANT CHANGE UP (ref 8.4–10.5)
CHLORIDE SERPL-SCNC: 102 MMOL/L — SIGNIFICANT CHANGE UP (ref 96–108)
CO2 SERPL-SCNC: 23 MMOL/L — SIGNIFICANT CHANGE UP (ref 22–31)
CREAT SERPL-MCNC: 0.93 MG/DL — SIGNIFICANT CHANGE UP (ref 0.5–1.3)
EOSINOPHIL # BLD AUTO: 0.38 K/UL — SIGNIFICANT CHANGE UP (ref 0–0.5)
EOSINOPHIL NFR BLD AUTO: 4.6 % — SIGNIFICANT CHANGE UP (ref 0–6)
GLUCOSE SERPL-MCNC: 136 MG/DL — HIGH (ref 70–99)
HCT VFR BLD CALC: 36.7 % — LOW (ref 39–50)
HGB BLD-MCNC: 11.3 G/DL — LOW (ref 13–17)
IMM GRANULOCYTES NFR BLD AUTO: 1.1 % — SIGNIFICANT CHANGE UP (ref 0–1.5)
LYMPHOCYTES # BLD AUTO: 0.9 K/UL — LOW (ref 1–3.3)
LYMPHOCYTES # BLD AUTO: 10.8 % — LOW (ref 13–44)
MCHC RBC-ENTMCNC: 28.3 PG — SIGNIFICANT CHANGE UP (ref 27–34)
MCHC RBC-ENTMCNC: 30.8 GM/DL — LOW (ref 32–36)
MCV RBC AUTO: 92 FL — SIGNIFICANT CHANGE UP (ref 80–100)
MONOCYTES # BLD AUTO: 0.62 K/UL — SIGNIFICANT CHANGE UP (ref 0–0.9)
MONOCYTES NFR BLD AUTO: 7.5 % — SIGNIFICANT CHANGE UP (ref 2–14)
NEUTROPHILS # BLD AUTO: 6.3 K/UL — SIGNIFICANT CHANGE UP (ref 1.8–7.4)
NEUTROPHILS NFR BLD AUTO: 75.6 % — SIGNIFICANT CHANGE UP (ref 43–77)
NRBC # BLD: 0 /100 WBCS — SIGNIFICANT CHANGE UP (ref 0–0)
NT-PROBNP SERPL-SCNC: 744 PG/ML — HIGH (ref 0–300)
PLATELET # BLD AUTO: 212 K/UL — SIGNIFICANT CHANGE UP (ref 150–400)
POTASSIUM SERPL-MCNC: 3.9 MMOL/L — SIGNIFICANT CHANGE UP (ref 3.5–5.3)
POTASSIUM SERPL-SCNC: 3.9 MMOL/L — SIGNIFICANT CHANGE UP (ref 3.5–5.3)
PROT SERPL-MCNC: 6.9 G/DL — SIGNIFICANT CHANGE UP (ref 6–8.3)
RBC # BLD: 3.99 M/UL — LOW (ref 4.2–5.8)
RBC # FLD: 13.6 % — SIGNIFICANT CHANGE UP (ref 10.3–14.5)
SODIUM SERPL-SCNC: 140 MMOL/L — SIGNIFICANT CHANGE UP (ref 135–145)
TROPONIN T, HIGH SENSITIVITY RESULT: 17 NG/L — SIGNIFICANT CHANGE UP (ref 0–51)
TROPONIN T, HIGH SENSITIVITY RESULT: 18 NG/L — SIGNIFICANT CHANGE UP (ref 0–51)
WBC # BLD: 8.32 K/UL — SIGNIFICANT CHANGE UP (ref 3.8–10.5)
WBC # FLD AUTO: 8.32 K/UL — SIGNIFICANT CHANGE UP (ref 3.8–10.5)

## 2020-01-02 PROCEDURE — 94640 AIRWAY INHALATION TREATMENT: CPT

## 2020-01-02 PROCEDURE — 99285 EMERGENCY DEPT VISIT HI MDM: CPT

## 2020-01-02 PROCEDURE — 71046 X-RAY EXAM CHEST 2 VIEWS: CPT | Mod: 26

## 2020-01-02 PROCEDURE — 80053 COMPREHEN METABOLIC PANEL: CPT

## 2020-01-02 PROCEDURE — 84484 ASSAY OF TROPONIN QUANT: CPT

## 2020-01-02 PROCEDURE — 85027 COMPLETE CBC AUTOMATED: CPT

## 2020-01-02 PROCEDURE — 99285 EMERGENCY DEPT VISIT HI MDM: CPT | Mod: 25

## 2020-01-02 PROCEDURE — 93010 ELECTROCARDIOGRAM REPORT: CPT

## 2020-01-02 PROCEDURE — 93005 ELECTROCARDIOGRAM TRACING: CPT

## 2020-01-02 PROCEDURE — 83880 ASSAY OF NATRIURETIC PEPTIDE: CPT

## 2020-01-02 PROCEDURE — 71046 X-RAY EXAM CHEST 2 VIEWS: CPT

## 2020-01-02 RX ORDER — IPRATROPIUM/ALBUTEROL SULFATE 18-103MCG
3 AEROSOL WITH ADAPTER (GRAM) INHALATION ONCE
Refills: 0 | Status: COMPLETED | OUTPATIENT
Start: 2020-01-02 | End: 2020-01-02

## 2020-01-02 RX ORDER — WARFARIN SODIUM 1 MG/1
1 TABLET ORAL DAILY
Refills: 0 | Status: COMPLETED | COMMUNITY
Start: 2019-11-27 | End: 2020-01-02

## 2020-01-02 RX ADMIN — Medication 3 MILLILITER(S): at 16:32

## 2020-01-02 NOTE — ED PROVIDER NOTE - PROGRESS NOTE DETAILS
Danial Waggoner PGY2  spoke to NP from CT surgery, who evaluated patient at bedside, recommending outpatient f/u tomorrow with Dr. Phillips, patient understands and agrees with plan. Patient HD and clinically stable for discharge

## 2020-01-02 NOTE — ED ADULT NURSE REASSESSMENT NOTE - NS ED NURSE REASSESS COMMENT FT1
Report reiceived from LIBBY Alcantar. Patient a&ox3, no acute distress, resp nonlabored, resting comfortably in bed with family at bedside. Denies headache, dizziness, chest pain, palpitations, SOB, abd pain, N/V/D, urinary symptoms, fevers, chills, weakness at this time. Patient awaiting possible d/c. Pt placed in position of comfort. Pt educated on call bell system and provided call bell. Non-skid socks on, bed in lowest position, wheels locked, appropriate side rails raised. Pt denies needs at this time.

## 2020-01-02 NOTE — ED PROVIDER NOTE - PATIENT PORTAL LINK FT
You can access the FollowMyHealth Patient Portal offered by [facility] by registering at the following website: [email or web address]. By joining [employer]’s FollowMyHealth portal, you will also be able to view your health information using other applications (apps) compatible with our system.

## 2020-01-02 NOTE — ED ADULT NURSE NOTE - OBJECTIVE STATEMENT
73 yo male ambulated to the ED c/o SOB and cough x 3 days. A&Ox4 VSS Afebrile. Ambulates independently. PMH asthma, HTN, DM2, left pleural effusion, chest tube placed Dec 16,2019 and removed dec 21, 2019. Nov 15 patient had quadruple bypass surgery. Pt is not working to breath, pt states SOB happens when he's walking around and gets better at rest. Pt O2 sat 95 % RA. IV 20 g placed LAC. Safety and comfort measures in place. Bed in lowst position, Wife at bedside. Will continue to monitor. 73 yo male ambulated to the ED c/o SOB and cough x 3 days. A&Ox4 VSS Afebrile. Ambulates independently. PMH asthma, HTN, DM2, left pleural effusion, chest tube placed Dec 16, 2019 and removed Dec 21, 2019. Nov 15 patient had quadruple bypass surgery. Pt is not working to breath, pt states SOB happens when he's walking around and gets better at rest. Pt O2 sat 95 % RA. IV 20 g placed LAC. Safety and comfort measures in place. Bed in lowest position, Wife at bedside. Will continue to monitor.

## 2020-01-02 NOTE — ED PROVIDER NOTE - NSFOLLOWUPINSTRUCTIONS_ED_ALL_ED_FT
Activities as tolerated. Please encourage good oral and fluid intake. For pain, please take Tylenol 650mg every 6 hours as needed.    Please see your primary care doctor within 24-48 hours for further management of your symptoms.  Please follow up with Dr. Felipe Phillips    Please seek emergent medical management if you have any worsening signs or symptoms, such as chest pain, difficulty breathing, loss of consciousness, or persistent vomiting.

## 2020-01-02 NOTE — ED PROVIDER NOTE - ATTENDING CONTRIBUTION TO CARE
74y M hx asthma, HTN, PVD, recent CABG Nov 2019, L sided chest tube in Dec 2019 which was discontinued after 2 days, presenting from his cardiologist office for evaluation. Pt is co [place] x2 days. Cough w yellow sputum. No fever. No SOB at rest. No CP. Has BL LE edema which is chronic. Left hospital at 157 lbs and is now 160lbs. No orthopnea. On exam well appearing comfortable no resp distress, RRR, lungs with dec BS at L base, and end exp wheezes BL. No inc WOB. Abd soft ntnd, Ext wwp, w palp pulses,  pitting edema, neuro intact. Mild chf exac vs asthma exac. Possible reaccumulation of effusion, though not in any distress. Less likley an anginal equivalent. Will check labs, CXR, EKG, Reach out to cardio.

## 2020-01-02 NOTE — ED ADULT NURSE NOTE - NSIMPLEMENTINTERV_GEN_ALL_ED
Implemented All Universal Safety Interventions:  [place] to call system. Call bell, personal items and telephone within reach. Instruct patient to call for assistance. Room bathroom lighting operational. Non-slip footwear when patient is off stretcher. Physically safe environment: no spills, clutter or unnecessary equipment. Stretcher in lowest position, wheels locked, appropriate side rails in place.

## 2020-01-03 ENCOUNTER — APPOINTMENT (OUTPATIENT)
Dept: CARDIOTHORACIC SURGERY | Facility: CLINIC | Age: 75
End: 2020-01-03
Payer: MEDICARE

## 2020-01-03 VITALS
HEIGHT: 66 IN | TEMPERATURE: 97.9 F | SYSTOLIC BLOOD PRESSURE: 129 MMHG | OXYGEN SATURATION: 95 % | HEART RATE: 76 BPM | RESPIRATION RATE: 16 BRPM | BODY MASS INDEX: 25.71 KG/M2 | DIASTOLIC BLOOD PRESSURE: 65 MMHG | WEIGHT: 160 LBS

## 2020-01-03 DIAGNOSIS — Z95.1 PRESENCE OF AORTOCORONARY BYPASS GRAFT: ICD-10-CM

## 2020-01-03 DIAGNOSIS — Z09 ENCOUNTER FOR FOLLOW-UP EXAMINATION AFTER COMPLETED TREATMENT FOR CONDITIONS OTHER THAN MALIGNANT NEOPLASM: ICD-10-CM

## 2020-01-03 PROCEDURE — 99024 POSTOP FOLLOW-UP VISIT: CPT

## 2020-01-03 RX ORDER — FOLIC ACID 1 MG/1
1 TABLET ORAL
Qty: 30 | Refills: 3 | Status: COMPLETED | COMMUNITY
Start: 2019-11-27 | End: 2020-01-03

## 2020-01-03 RX ORDER — POTASSIUM CHLORIDE 750 MG/1
10 TABLET, EXTENDED RELEASE ORAL
Qty: 90 | Refills: 0 | Status: ACTIVE | COMMUNITY
Start: 2020-01-03

## 2020-01-03 RX ORDER — CHLORHEXIDINE GLUCONATE 4 %
325 (65 FE) LIQUID (ML) TOPICAL 3 TIMES DAILY
Qty: 9 | Refills: 5 | Status: COMPLETED | COMMUNITY
Start: 2019-11-27 | End: 2020-01-03

## 2020-01-03 RX ORDER — IPRATROPIUM BROMIDE 0.5 MG/2.5ML
0.02 SOLUTION RESPIRATORY (INHALATION)
Refills: 0 | Status: COMPLETED | COMMUNITY
End: 2020-01-03

## 2020-01-16 ENCOUNTER — APPOINTMENT (OUTPATIENT)
Dept: PULMONOLOGY | Facility: CLINIC | Age: 75
End: 2020-01-16
Payer: MEDICARE

## 2020-01-16 VITALS
HEART RATE: 78 BPM | HEIGHT: 66 IN | BODY MASS INDEX: 25.71 KG/M2 | DIASTOLIC BLOOD PRESSURE: 88 MMHG | OXYGEN SATURATION: 94 % | RESPIRATION RATE: 16 BRPM | WEIGHT: 160 LBS | SYSTOLIC BLOOD PRESSURE: 148 MMHG

## 2020-01-16 PROCEDURE — 36415 COLL VENOUS BLD VENIPUNCTURE: CPT

## 2020-01-16 PROCEDURE — 99215 OFFICE O/P EST HI 40 MIN: CPT | Mod: 25

## 2020-01-16 NOTE — REVIEW OF SYSTEMS
[Nasal Congestion] : nasal congestion [Postnasal Drip] : postnasal drip [Hypertension] : ~T hypertension [Fever] : no fever [Fatigue] : no fatigue [Sinus Problems] : no sinus problems [Sore Throat] : no sore throat [Dry Mouth] : no dry mouth [Cough] : cough [Sputum] : not coughing up ~M sputum [Dyspnea] : no dyspnea [Chest Tightness] : no chest tightness [Wheezing] : no wheezing [Pleuritic Pain] : no pleuritic pain [Chest Discomfort] : no chest discomfort [PND] : no PND [Palpitations] : no palpitations [Edema] : ~T edema was not present [Nasal Discharge] : no nasal discharge [Heartburn] : no heartburn [Dysphagia] : no dysphagia [Nocturia] : no nocturia [Back Pain] : ~T no back pain [Myalgias] : no myalgias [Syncope] : no fainting [Depression] : no depression [Difficulty Initiating Sleep] : no difficulty falling asleep [DVT] : no DVT [Difficulty Maintaining Sleep] : no difficulty maintaining sleep [Snoring] : no snoring

## 2020-01-16 NOTE — HISTORY OF PRESENT ILLNESS
[FreeTextEntry1] : Complains of coughing and wheezing. The cough is mostly non-productive. He is on Symbicort and Flovent. \par \par He is S/P CABG 11/15/19. Had left pleural effusion in December of 2019 which was drained. \par \par He has been on CPAP. Has been using it every night.

## 2020-01-16 NOTE — DISCUSSION/SUMMARY
[FreeTextEntry1] : He is a 74 year-old man with hypertension, CAD, S/P CABG X 4 11/15/19, S/P left thoracentesis 12/19, elevated IgE asthma and chronic allergic rhinitis. He also has severe obstructive sleep apnea and was placed on CPAP in February 2019. Has been on APAP of 4 to 10 cm of water.  \par \par His asthma is not well controlled. Budesonide was added. To continue with Symbicort. IgE level repeated.  \par \par For his RONNIE he is to continue with  APAP of 4 to 10 cm of water. \par \par Follow up in one month.

## 2020-01-16 NOTE — PHYSICAL EXAM
[General Appearance - In No Acute Distress] : no acute distress [Murmurs] : no murmurs present [Heart Sounds] : normal S1 and S2 [Bowel Sounds] : normal bowel sounds [Abdomen Tenderness] : non-tender [Abdomen Soft] : soft [Abnormal Walk] : normal gait [Cyanosis, Localized] : no localized cyanosis [] : no rash [No Focal Deficits] : no focal deficits [Oriented To Time, Place, And Person] : oriented to person, place, and time [Impaired Insight] : insight and judgment were intact [Erythema] : no erythema of the pharynx [Neck Cervical Mass (___cm)] : no neck mass was observed [FreeTextEntry1] : Mild wheezing noted

## 2020-01-22 ENCOUNTER — TRANSCRIPTION ENCOUNTER (OUTPATIENT)
Age: 75
End: 2020-01-22

## 2020-01-22 NOTE — DISCHARGE NOTE NURSING/CASE MANAGEMENT/SOCIAL WORK - NSDCPETBCESMAN_GEN_ALL_CORE
If you are a smoker, it is important for your health to stop smoking. Please be aware that second hand smoke is also harmful. no chills/no nausea/no diarrhea/no abdominal distension/no blood in stool/no fever/no vomiting

## 2020-01-23 LAB
ALBUMIN SERPL ELPH-MCNC: 3.9 G/DL
ALP BLD-CCNC: 80 U/L
ALT SERPL-CCNC: 18 U/L
ANION GAP SERPL CALC-SCNC: 12 MMOL/L
AST SERPL-CCNC: 14 U/L
BASOPHILS # BLD AUTO: 0.04 K/UL
BASOPHILS NFR BLD AUTO: 0.5 %
BILIRUB SERPL-MCNC: 0.4 MG/DL
BUN SERPL-MCNC: 14 MG/DL
CALCIUM SERPL-MCNC: 9.2 MG/DL
CHLORIDE SERPL-SCNC: 104 MMOL/L
CO2 SERPL-SCNC: 26 MMOL/L
CREAT SERPL-MCNC: 1.01 MG/DL
EOSINOPHIL # BLD AUTO: 0.5 K/UL
EOSINOPHIL NFR BLD AUTO: 6.7 %
GLUCOSE SERPL-MCNC: 130 MG/DL
HCT VFR BLD CALC: 37.5 %
HGB BLD-MCNC: 11.7 G/DL
IGE SER-MCNC: 2473 KU/L
IMM GRANULOCYTES NFR BLD AUTO: 0.4 %
LYMPHOCYTES # BLD AUTO: 1.03 K/UL
LYMPHOCYTES NFR BLD AUTO: 13.8 %
MAN DIFF?: NORMAL
MCHC RBC-ENTMCNC: 28.2 PG
MCHC RBC-ENTMCNC: 31.2 GM/DL
MCV RBC AUTO: 90.4 FL
MONOCYTES # BLD AUTO: 0.61 K/UL
MONOCYTES NFR BLD AUTO: 8.1 %
NEUTROPHILS # BLD AUTO: 5.28 K/UL
NEUTROPHILS NFR BLD AUTO: 70.5 %
PLATELET # BLD AUTO: 264 K/UL
POTASSIUM SERPL-SCNC: 4.1 MMOL/L
PROT SERPL-MCNC: 6.7 G/DL
RBC # BLD: 4.15 M/UL
RBC # FLD: 13.7 %
SODIUM SERPL-SCNC: 141 MMOL/L
WBC # FLD AUTO: 7.49 K/UL

## 2020-02-20 ENCOUNTER — APPOINTMENT (OUTPATIENT)
Dept: PULMONOLOGY | Facility: CLINIC | Age: 75
End: 2020-02-20
Payer: MEDICARE

## 2020-02-20 VITALS
DIASTOLIC BLOOD PRESSURE: 74 MMHG | RESPIRATION RATE: 16 BRPM | HEART RATE: 79 BPM | TEMPERATURE: 97.8 F | WEIGHT: 158 LBS | SYSTOLIC BLOOD PRESSURE: 150 MMHG | HEIGHT: 66 IN | BODY MASS INDEX: 25.39 KG/M2 | OXYGEN SATURATION: 95 %

## 2020-02-20 VITALS — SYSTOLIC BLOOD PRESSURE: 142 MMHG | DIASTOLIC BLOOD PRESSURE: 74 MMHG

## 2020-02-20 PROCEDURE — 99214 OFFICE O/P EST MOD 30 MIN: CPT

## 2020-02-20 RX ORDER — NIFEDIPINE 30 MG/1
30 TABLET, EXTENDED RELEASE ORAL
Refills: 0 | Status: ACTIVE | COMMUNITY

## 2020-02-20 RX ORDER — LABETALOL HYDROCHLORIDE 200 MG/1
200 TABLET, FILM COATED ORAL 3 TIMES DAILY
Qty: 270 | Refills: 1 | Status: COMPLETED | COMMUNITY
End: 2020-02-20

## 2020-02-20 NOTE — DISCUSSION/SUMMARY
[FreeTextEntry1] : He is a 74 year-old man with hypertension, CAD, S/P CABG X 4 11/15/19, S/P left thoracentesis 12/19, elevated IgE asthma and chronic allergic rhinitis. He also has severe obstructive sleep apnea and was placed on CPAP in February 2019. Has been on APAP of 4 to 10 cm of water.  \par \par Was ly followed by an Allergist. No longer doing so. Was considering Xolair. Never started. He did not want it. Advised him to consider Dupixent. He does not want this either. He read about the side effects. Has a visiting nurse who suggest he get Fasenra. The RN referred him to another doctor for this. \par \par For his RONNIE he is to continue with  APAP of 4 to 10 cm of water. \par \par F/U in three months. \par

## 2020-02-20 NOTE — PROCEDURE
[FreeTextEntry1] : Home sleep study 1/18/19 was suspicious for moderate obstructive sleep apnea. \par \par Split sleep study performed on February 12, 2019 demonstrated severe obstructive sleep apnea.  The AHI was 42.8. The CPAP titration component indicated a therapeutic pressure of 5 cm of water.\par \par PFT 11/20/17: Mild obstructive airways disease. Significant improvement was noted after administration of bronchodilator. Mild restriction was present. Diffusion capacity was normal. FEV1 was 1.77 L.\par \par PFT 1/10/19: Spirometry demonstrated mild obstructive airways disease. No significant response after bronchodilator. \par \par A chest radiograph was performed on August 14, 2015. The report indicates no acute chest findings.

## 2020-02-20 NOTE — HISTORY OF PRESENT ILLNESS
[FreeTextEntry1] : He was having coughing and wheezing again. He was seen by his primary care physician.Has been on prednisone 20 mg a few days given to him by his PCP. \par \par He has not been using his nebulizers.\par \par He is S/P CABG 11/15/19. Had left pleural effusion in December of 2019 which was drained. \par \par He has been on CPAP. Has been using it every night.

## 2020-02-20 NOTE — REVIEW OF SYSTEMS
[Nasal Congestion] : nasal congestion [Postnasal Drip] : postnasal drip [Cough] : cough [Hypertension] : ~T hypertension [Fever] : no fever [Fatigue] : no fatigue [Sore Throat] : no sore throat [Sinus Problems] : no sinus problems [Dry Mouth] : no dry mouth [Dyspnea] : dyspnea [Sputum] : not coughing up ~M sputum [Hemoptysis] : no hemoptysis [Pleuritic Pain] : no pleuritic pain [Wheezing] : wheezing [Chest Tightness] : no chest tightness [Palpitations] : no palpitations [Chest Discomfort] : no chest discomfort [PND] : no PND [Nasal Discharge] : no nasal discharge [Edema] : ~T edema was present [Heartburn] : no heartburn [Dysphagia] : no dysphagia [Back Pain] : ~T no back pain [Nocturia] : no nocturia [Syncope] : no fainting [Depression] : no depression [Myalgias] : no myalgias [DVT] : no DVT [Difficulty Initiating Sleep] : no difficulty falling asleep [Difficulty Maintaining Sleep] : no difficulty maintaining sleep [Snoring] : no snoring

## 2020-02-20 NOTE — PHYSICAL EXAM
[General Appearance - In No Acute Distress] : no acute distress [Neck Cervical Mass (___cm)] : no neck mass was observed [Heart Sounds] : normal S1 and S2 [Murmurs] : no murmurs present [Abdomen Tenderness] : non-tender [Abnormal Walk] : normal gait [Cyanosis, Localized] : no localized cyanosis [] : no rash [No Focal Deficits] : no focal deficits [Oriented To Time, Place, And Person] : oriented to person, place, and time [Impaired Insight] : insight and judgment were intact [Normal Oropharynx] : normal oropharynx [Normal Appearance] : normal appearance [Erythema] : no erythema of the pharynx [FreeTextEntry1] : Sternotomy incision healed [Auscultation Breath Sounds / Voice Sounds] : lungs were clear to auscultation bilaterally

## 2020-04-10 ENCOUNTER — APPOINTMENT (OUTPATIENT)
Dept: VASCULAR SURGERY | Facility: CLINIC | Age: 75
End: 2020-04-10

## 2020-07-14 ENCOUNTER — APPOINTMENT (OUTPATIENT)
Dept: PULMONOLOGY | Facility: CLINIC | Age: 75
End: 2020-07-14
Payer: MEDICARE

## 2020-07-14 PROCEDURE — 99204 OFFICE O/P NEW MOD 45 MIN: CPT | Mod: 95

## 2020-07-14 NOTE — REASON FOR VISIT
[Initial] : an initial visit [TextBox_44] : video call- eosinophilic and IgE mediated asthma, chronic sinus issues, RONNIE, GERD

## 2020-07-14 NOTE — ASSESSMENT
[FreeTextEntry1] : Mr. ARMSTRONG is a 75 year old male with a history of CAD, s/p CABG x 4 11.15.2019, DM, Asthma, HTN, carotid stenosis, RONNIE who video calls into the office today for pulmonary evaluation. SOB\par \par \par The patient's shortness of breath is multifactorial due to:\par -pulmonary disease \par      -eosinophilic and IgE mediated asthma, chronic sinus issues, RONNIE, GERD\par -poor breathing mechanics \par -out of shape\par -?cardiac disease (s/p CABG, Dr. King)\par \par Problem 1: Severe Persistent Asthma (uncontrolled)\par -continue Symbicort 160 2 inhalations BID\par -off Singulair 10 mg before bed (failed)\par -Add Xyflo CR\par -Add Ventolin rescue inhaler 2 inhalations before exercise, Q6H \par -Asthma is believed to be caused by inherited (genetic) and environmental factor, but its exact cause is unknown. Asthma may be triggered by allergens, lung infections, or irritants in the air. Asthma triggers are different for each person \par \par Problem 2:Eosinophilic Asthma\par -Re-check CBC\par -set up Fasenra over nucala\par \par Problem 3: IgE Elevated \par -Candidate for Xolair\par -Xolair is a recombinant DNA- derived humanized IgG1K monoclonal antibody that selectively binds to human immunoglobulin E (IgE). Xolair is produced by a Chinese hamster ovary cell suspension culture in nutrient medium containing the antibiotic gentamicin. Gentamicin is not detectable in the final product. Xolair is a sterile, white, preservative free, lyophilized powder contained in a single use vial that is reconstituted with sterile water for suspension. Side effects include: wheezing, tightness of the chest, trouble breathing, hives, skin rash, feeling anxious or light-headed, fainting, warmth or tingling under skin, or swelling of face, lips, or tongue \par \par Problem 4:Sinuses /Allergies\par -Add Olopatadine 0.6% at 1 sniff/nostril BID \par -Add Flonase 1 sniff each nostril BID \par -recommended Navage\par Environmental measures for allergies were encouraged including mattress and pillow cover, air purifier, and environmental controls. \par \par Problem 5: GERD \par -No medications Recommended at this time\par -Rule of 2s: avoid eating too much, eating too fast, eating too late, eating too spicy, eating too lousy, eating two hours before bed.\par -Things to avoid including overeating, spicy foods, tight clothing, eating within three hours of bed, this list is not all inclusive. \par -For treatment of reflux, possible options discussed including diet control, H2 blockers, PPIs, as well as coating motility agents discussed as treatment options. Timing of meals and proximity of last meal to sleep were discussed. If symptoms persist, a formal gastrointestinal evaluation is needed.\par \par Problem 6: RONNIE  (APAP 4 to 10 Cm of water)\par -continue CPAP Use (compliant and tolerates well)\par Sleep apnea is associated with adverse clinical consequences which an affect most organ systems. Cardiovascular disease risk includes arrhythmias, atrial fibrillation, hypertension, coronary artery disease, and stroke. Metabolic disorders include diabetes type 2, non-alcoholic fatty liver disease. Mood disorder especially depression; and cognitive decline especially in the elderly. Associations with chronic reflux/Thurston’s esophagus some but not all inclusive. \par -Reasons include arousal consistent with hypopnea; respiratory events most prominent in REM sleep or supine position; therefore sleep staging and body position are important for accurate diagnosis and estimation of AHI. \par \par Problem 7: Poor Mechanics of Breathing\par - Proper breathing techniques were reviewed with an emphasis of exhalation. Patient instructed to breath in for 1 second and out for four seconds. Patient was encouraged to not talk while walking. \par \par Problem 8: Out of Shape\par -exercise, and diet control were discussed and are highly encouraged. Treatment options were given such as, aqua therapy, and contacting a nutritionist. Recommended to use the elliptical, stationary bike, less use of treadmill. Mindful eating was explained to the patient Obesity is associated with worsening asthma, shortness of breath, and potential for cardiac disease, diabetes, and other underlying medical conditions. \par \par Problem 9: Cardiac (s/p CABG)\par -recommended to follow up with a cardiologist\par \par Problem 10: Health Maintenance/COVID19 Precautions:\par - Clean your hands often. Wash your hands often with soap and water for at least 20 seconds, especially after blowing your nose, coughing, or sneezing, or having been in a public place.\par - If soap and water are not available, use a hand  that contains at least 60% alcohol.\par - To the extent possible, avoid touching high-touch surfaces in public places - elevator buttons, door handles, handrails, handshaking with people, etc. Use a tissue or your sleeve to cover your hand or finger if you must touch something.\par - Wash your hands after touching surfaces in public places.\par - Avoid touching your face, nose, eyes, etc.\par - Clean and disinfect your home to remove germs: practice routine cleaning of frequently touched surfaces (for example: tables, doorknobs, light switches, handles, desks, toilets, faucets, sinks & cell phones)\par - Avoid crowds, especially in poorly ventilated spaces. Your risk of exposure to respiratory viruses like COVID-19 may increase in crowded, closed-in settings with little air circulation if there are people in the crowd who are sick. All patients are recommended to practice social distancing and stay at least 6 feet away from others.\par - Avoid all non-essential travel including plane trips, and especially avoid embarking on cruise ships.\par -If COVID-19 is spreading in your community, take extra measures to put distance between yourself and other people to further reduce your risk of being exposed to this new virus.\par -Stay home as much as possible.\par - Consider ways of getting food brought to your house through family, social, or commercial networks\par -Be aware that the virus has been known to live in the air up to 3 hours post exposure, cardboard up to 24 hours post exposure, copper up to 4 hours post exposure, steel and plastic up to 2-3 days post exposure. Risk of transmission from these surfaces are affected by many variables.\par Immune Support Recommendations:\par -OTC Vitamin C 500mg BID \par -OTC Quercetin 250-500mg BID \par -OTC Zinc 75-100mg per day \par -OTC Melatonin 1 or 2 mg a night \par -OTC Vitamin D 1-4000mg per day \par -OTC Tonic Water 8oz per day\par Asthma and COVID19:\par You need to make sure your asthma is under control. This often requires the use of inhaled corticosteroids (and sometimes oral corticosteroids). Inhaled corticosteroids do not likely reduce your immune system’s ability to fight infections, but oral corticosteroids may. It is important to use the steps above to protect yourself to limit your exposure to any respiratory virus. \par \par Problem 11: health maintenance\par -s/p influenza vaccine\par -recommended strep pneumonia vaccines after age 65: Prevnar-13 vaccine, followed by Pneumo vaccine 23 one year following\par -recommended early intervention for URIs\par -recommended regular osteoporosis evaluations\par -recommended early dermatological evaluations\par -recommended after the age of 50 to the age of 70, colonoscopy every 5 years \par \par  Follow up in 6-8 weeks\par -he  is recommended to call with any changes, questions, or concerns.

## 2020-07-14 NOTE — HISTORY OF PRESENT ILLNESS
[Home] : at home, [unfilled] , at the time of the visit. [Medical Office: ([facility])___] : at the medical office located in  [Verbal consent obtained from patient] : the patient, [unfilled] [TextBox_4] : Mr. ARMSTRONG is a 75 year old male video calling to the office today for initial pulmonary evaluation. His chief complaint is\par \par -he notes Hx os Asthma from mid 30's\par -he notes Sx of cough, mild SOB, wheezing all day\par -he notes asthma triggered by sinuses\par -he notes no known allergies, but recently seafood exacerbates rashes \par -he notes hx of heartburn but now quiet\par -he notes intermittent itchy eyes\par -he notes hx of snoring and observed apneic episodes\par -he notes CPAP compliance every night and tolerates well\par -he notes weight 158 lbs down from 180 lbs\par -he notes height 5'6"\par -He notes His bowels are regular \par \par -he denies any chest pain, chest pressure, diarrhea, constipation, dysphagia, dizziness, leg swelling, leg pain, itchy ears, heartburn, reflux, sour taste in the mouth, myalgias or arthralgias.

## 2020-07-14 NOTE — PHYSICAL EXAM
[No Acute Distress] : no acute distress [Well Nourished] : well nourished [No Deformities] : no deformities [Well Developed] : well developed [TextBox_2] : 5'6", 158

## 2020-07-14 NOTE — ADDENDUM
[FreeTextEntry1] : Documented by Marcus Mccord acting as a scribe for Dr. Escobar Ryder on 07/14/2020.\par \par All medical record entries made by the Scribe were at my, Dr. Escobar Ryder's, direction and personally dictated by me on 07/14/2020. I have reviewed the chart and agree that the record accurately reflects my personal performance of the history, physical exam, assessment and plan. I have also personally directed, reviewed, and agree with the discharge instructions.

## 2020-08-03 ENCOUNTER — APPOINTMENT (OUTPATIENT)
Dept: VASCULAR SURGERY | Facility: CLINIC | Age: 75
End: 2020-08-03
Payer: MEDICARE

## 2020-08-03 VITALS — TEMPERATURE: 96.6 F

## 2020-08-03 PROCEDURE — 99213 OFFICE O/P EST LOW 20 MIN: CPT

## 2020-08-03 PROCEDURE — 93990 DOPPLER FLOW TESTING: CPT

## 2020-08-03 NOTE — ASSESSMENT
[FreeTextEntry1] : 74 yo male with history of carotid stenosis s/p left cea, cad s/p cabg, afib now on a/c, htn, presents for follow up without any complaints \par \par duplex shows severe right sided carotid stenosis 80-99% \par will arrange for right sided cea \par pt to schedule appointment with cardiology for clearance

## 2020-08-03 NOTE — PHYSICAL EXAM
[No Rash or Lesion] : No rash or lesion [Alert] : alert [Calm] : calm [Normal Breath Sounds] : Normal breath sounds [JVD] : no jugular venous distention  [Normal Heart Sounds] : normal heart sounds [Right Carotid Bruit] : right carotid bruit heard [] : not present [Ankle Swelling (On Exam)] : not present [Varicose Veins Of Lower Extremities] : not present [2+] : left 2+ [de-identified] : appears well  [Skin Ulcer] : no ulcer

## 2020-08-03 NOTE — HISTORY OF PRESENT ILLNESS
[FreeTextEntry1] : 76 yo male with history of carotid stenosis s/p left cea, cad s/p cabg, afib now on a/c, htn, presents for follow up without any complaints

## 2020-08-04 DIAGNOSIS — Z01.812 ENCOUNTER FOR PREPROCEDURAL LABORATORY EXAMINATION: ICD-10-CM

## 2020-08-10 ENCOUNTER — NON-APPOINTMENT (OUTPATIENT)
Age: 75
End: 2020-08-10

## 2020-08-10 ENCOUNTER — APPOINTMENT (OUTPATIENT)
Dept: ELECTROPHYSIOLOGY | Facility: CLINIC | Age: 75
End: 2020-08-10
Payer: MEDICARE

## 2020-08-10 VITALS
HEIGHT: 66 IN | SYSTOLIC BLOOD PRESSURE: 173 MMHG | WEIGHT: 155 LBS | BODY MASS INDEX: 24.91 KG/M2 | OXYGEN SATURATION: 97 % | TEMPERATURE: 97.2 F | DIASTOLIC BLOOD PRESSURE: 94 MMHG | RESPIRATION RATE: 16 BRPM | HEART RATE: 68 BPM

## 2020-08-10 DIAGNOSIS — I48.4 ATYPICAL ATRIAL FLUTTER: ICD-10-CM

## 2020-08-10 DIAGNOSIS — R06.00 DYSPNEA, UNSPECIFIED: ICD-10-CM

## 2020-08-10 PROCEDURE — 99205 OFFICE O/P NEW HI 60 MIN: CPT

## 2020-08-10 PROCEDURE — 93000 ELECTROCARDIOGRAM COMPLETE: CPT

## 2020-08-10 RX ORDER — PANTOPRAZOLE 40 MG/1
40 TABLET, DELAYED RELEASE ORAL DAILY
Qty: 30 | Refills: 1 | Status: DISCONTINUED | COMMUNITY
Start: 2019-11-27 | End: 2020-08-10

## 2020-08-10 RX ORDER — ALBUTEROL SULFATE 0.63 MG/3ML
0.63 SOLUTION RESPIRATORY (INHALATION) 4 TIMES DAILY
Qty: 4 | Refills: 2 | Status: DISCONTINUED | COMMUNITY
Start: 2020-01-16 | End: 2020-08-10

## 2020-08-10 RX ORDER — ACETAMINOPHEN 325 MG/1
325 TABLET ORAL EVERY 6 HOURS
Refills: 0 | Status: DISCONTINUED | COMMUNITY
Start: 2019-11-27 | End: 2020-08-10

## 2020-08-10 RX ORDER — BUDESONIDE 0.5 MG/2ML
0.5 INHALANT ORAL TWICE DAILY
Qty: 2 | Refills: 2 | Status: DISCONTINUED | COMMUNITY
Start: 2020-01-16 | End: 2020-08-10

## 2020-08-10 RX ORDER — FLUTICASONE FUROATE 100 UG/1
100 POWDER RESPIRATORY (INHALATION) DAILY
Qty: 3 | Refills: 0 | Status: DISCONTINUED | COMMUNITY
Start: 2020-02-15 | End: 2020-08-10

## 2020-08-10 RX ORDER — IRBESARTAN 150 MG/1
150 TABLET ORAL
Refills: 0 | Status: DISCONTINUED | COMMUNITY
End: 2020-08-10

## 2020-08-10 NOTE — HISTORY OF PRESENT ILLNESS
[FreeTextEntry1] : Cesar King MD\par \par Jonh Marcus is a 74y/o man with Hx of HTN, HLD, asthma, all of which are stable, CAD s/p CABG x4 (11/2019,  LIMA to LAD, SVG to OM1, OM2, RPDA), carotid stenosis s/p left carotid endarterectomy (2017), RONNIE on CPAP, and new onset atypical atrial flutter who presents today for initial evaluation. Admits experiencing dyspnea on exertion, thought to be secondary to asthma. Was noted to be in new atrial flutter by Cardiologist last week. Initiated on AC at that time. Denies chest pain, palpitations, SOB at rest, syncope or near syncope.\par

## 2020-08-10 NOTE — DISCUSSION/SUMMARY
[FreeTextEntry1] : oJnh Marcus is a 74y/o man with Hx of HTN, HLD, asthma, all of which are stable, CAD s/p CABG x4 (11/2019,  LIMA to LAD, SVG to OM1, OM2, RPDA), carotid stenosis s/p left carotid endarterectomy (2017), RONNIE on CPAP, and new onset atypical atrial flutter who presents today for initial evaluation. \par \par Impression:\par \par 1. Typical atrial flutter: EKG today shows typical atrial flutter. On metoprolol and Eliquis. Recommend undergoing atrial flutter ablation. Risks, benefits, and alternatives to procedure discussed at length. Risks including that of bleeding, infection, stroke, and cardiac tamponade discussed and he verbalizes understanding of all. Also discussed ILR placement for long term monitoring of possible afib and to allow for safe discontinuation of Eliquis in the future. \par \par 2. HTN: resume oral antihypertensives as prescribed. Encouraged heart healthy diet, sodium restriction, and weight loss. Continue regular f/u with Cardiologist for further HTN management.\par \par 3. HLD: resume statin therapy as prescribed and regular f/u with Cardiologist for routine lipid monitoring and management.\par \par 4. RONNIE: resume compliance with RONNIE management to prevent future sinus node dysfunction and atrial fibrillation. Encouraged weight loss.\par \par Plan for atrial flutter ablation and ILR placement.

## 2020-08-10 NOTE — PHYSICAL EXAM
[General Appearance - Well Developed] : well developed [Normal Appearance] : normal appearance [No Deformities] : no deformities [General Appearance - Well Nourished] : well nourished [Well Groomed] : well groomed [Normal Conjunctiva] : the conjunctiva exhibited no abnormalities [Eyelids - No Xanthelasma] : the eyelids demonstrated no xanthelasmas [General Appearance - In No Acute Distress] : no acute distress [No Oral Pallor] : no oral pallor [Normal Oral Mucosa] : normal oral mucosa [Normal Jugular Venous A Waves Present] : normal jugular venous A waves present [No Oral Cyanosis] : no oral cyanosis [Heart Rate And Rhythm] : heart rate and rhythm were normal [No Jugular Venous Young A Waves] : no jugular venous young A waves [Normal Jugular Venous V Waves Present] : normal jugular venous V waves present [Murmurs] : no murmurs present [Heart Sounds] : normal S1 and S2 [Respiration, Rhythm And Depth] : normal respiratory rhythm and effort [Abdomen Soft] : soft [Exaggerated Use Of Accessory Muscles For Inspiration] : no accessory muscle use [Auscultation Breath Sounds / Voice Sounds] : lungs were clear to auscultation bilaterally [Abdomen Mass (___ Cm)] : no abdominal mass palpated [Abdomen Tenderness] : non-tender [Nail Clubbing] : no clubbing of the fingernails [Cyanosis, Localized] : no localized cyanosis [Petechial Hemorrhages (___cm)] : no petechial hemorrhages [Abnormal Walk] : normal gait [Gait - Sufficient For Exercise Testing] : the gait was sufficient for exercise testing [Skin Color & Pigmentation] : normal skin color and pigmentation [] : no rash [No Venous Stasis] : no venous stasis [Skin Lesions] : no skin lesions [No Skin Ulcers] : no skin ulcer [No Xanthoma] : no  xanthoma was observed [Oriented To Time, Place, And Person] : oriented to person, place, and time [Affect] : the affect was normal [Mood] : the mood was normal [No Anxiety] : not feeling anxious

## 2020-08-17 ENCOUNTER — APPOINTMENT (OUTPATIENT)
Dept: PULMONOLOGY | Facility: CLINIC | Age: 75
End: 2020-08-17
Payer: MEDICARE

## 2020-08-17 ENCOUNTER — APPOINTMENT (OUTPATIENT)
Dept: PULMONOLOGY | Facility: CLINIC | Age: 75
End: 2020-08-17

## 2020-08-17 VITALS
HEART RATE: 67 BPM | SYSTOLIC BLOOD PRESSURE: 120 MMHG | TEMPERATURE: 97.2 F | OXYGEN SATURATION: 97 % | DIASTOLIC BLOOD PRESSURE: 94 MMHG | HEIGHT: 66 IN | RESPIRATION RATE: 17 BRPM | WEIGHT: 155 LBS | BODY MASS INDEX: 24.91 KG/M2

## 2020-08-17 PROCEDURE — 99214 OFFICE O/P EST MOD 30 MIN: CPT | Mod: 25

## 2020-08-17 PROCEDURE — 95012 NITRIC OXIDE EXP GAS DETER: CPT

## 2020-08-17 PROCEDURE — 71046 X-RAY EXAM CHEST 2 VIEWS: CPT

## 2020-08-17 RX ORDER — NITROGLYCERIN 0.4 MG/1
0.4 TABLET SUBLINGUAL
Qty: 100 | Refills: 0 | Status: ACTIVE | COMMUNITY
Start: 2020-04-02

## 2020-08-17 RX ORDER — MUCUS CLEARING DEVICE
EACH MISCELLANEOUS
Qty: 1 | Refills: 0 | Status: ACTIVE | COMMUNITY
Start: 2020-08-17 | End: 1900-01-01

## 2020-08-17 NOTE — REASON FOR VISIT
[Follow-Up] : a follow-up visit [TextBox_44] :  eosinophilic and IgE mediated asthma, chronic sinus issues, RONNIE, GERD

## 2020-08-17 NOTE — HISTORY OF PRESENT ILLNESS
[TextBox_4] : Mr. ARMSTRONG is a 75 year old male presenting to the office today for initial pulmonary evaluation. His chief complaint is\par - he has not taken the medication he was given, he did the olopatadine because his cardiologist said that he had a irregular heart beat and he was to do an atrial flutter. He wants to make sure his medications are safe for him to take. \par - he is still wheezing \par - he has some itchy eyes / ears \par - he has a minimal edema on his ankles \par - energy level is good, about 7-8 out of 10\par - his sleep has been okay, he sleeps late around 2am and wakes up at 10am. He cant sleep early. \par - his cardiovascular doctor wants to do a carotid surgery on him \par - he has been taking Eliquis \par - he is not sure if he feels palpitations \par - he has been exercising, he wakes 40 mins a day, sometimes he does the treadmill. \par - his weight has been stable \par - denies any reflux symptoms \par - \par -he denies any headaches, nausea, vomiting, fever, chills, sweats, chest pain, chest pressure, diarrhea, constipation, dysphagia, dizziness, sour taste in the mouth, leg swelling, leg pain.\par

## 2020-08-17 NOTE — PROCEDURE
[FreeTextEntry1] : CXR reveals a normal sized heart; s/p median sternotomy c/w prior open heart surgery no evidence of infiltrate or effusion.\par \par blood work (june/2020) : COVID neg, HGB 6.7, WBC 6.9, HGB 13.9, PLT 190K, EOS 4.7 = 320. HLD 39, IRON SAT 18%. VIT D 125=57.6 \par \par FENO was 45 ; a normal value being less than 25\par Fractional exhaled nitric oxide (FENO) is regarded as a simple, noninvasive method for assessing eosinophilic airway inflammation. Produced by a variety of cells within the lung, nitric oxide (NO) concentrations are generally low in healthy individuals. However, high concentrations of NO appear to be involved in nonspecific host defense mechanisms and chronic inflammatory diseases such as asthma. The American Thoracic Society (ATS) therefore has recommended using FENO to aid in the diagnosis and monitoring of eosinophilic airway inflammation and asthma, and for identifying steroid responsive individuals whose chronic respiratory symptoms may be caused by airway inflammation.\par

## 2020-08-17 NOTE — HISTORY OF PRESENT ILLNESS
[TextBox_4] : Mr. ARMSTRONG is a 75 year old male presenting to the office today for initial pulmonary evaluation. His chief complaint is\par \par -he denies any headaches, nausea, vomiting, fever, chills, sweats, chest pain, chest pressure, diarrhea, constipation, dysphagia, dizziness, sour taste in the mouth, leg swelling, leg pain, itchy eyes, itchy ears, heartburn, reflux, myalgias or arthralgias.\par

## 2020-08-17 NOTE — ASSESSMENT
[FreeTextEntry1] : Mr. ARMSTRONG is a 75 year old male with a history of CAD, s/p CABG x 4 11.15.2019, DM, Asthma, Allergies (severe/ Eosinophilia), HTN, carotid stenosis, RONNIE who presents into the office today for pulmonary evaluation. SOB (NC with medications)\par \par \par The patient's shortness of breath is multifactorial due to:\par -pulmonary disease \par      -eosinophilic and IgE mediated asthma, chronic sinus issues, RONNIE, GERD\par -poor breathing mechanics \par -out of shape\par -?cardiac disease (s/p CABG, Dr. King)\par \par Problem 1: Severe Persistent Asthma (uncontrolled - NC)\par -continue Symbicort 160 2 inhalations BID\par -off Singulair 10 mg before bed (failed)\par -Add Xyflo CR - start\par -continue Ventolin rescue inhaler 2 inhalations before exercise, Q6H \par - Add Aerobika to bring up mucous \par -Asthma is believed to be caused by inherited (genetic) and environmental factor, but its exact cause is unknown. Asthma may be triggered by allergens, lung infections, or irritants in the air. Asthma triggers are different for each person \par \par Problem 2:Eosinophilic Asthma (re-discussed) \par -s/p CBC (+)\par -set up Fasenra over nucala\par \par \par Problem 3: IgE Elevated \par -Candidate for Xolair\par -Xolair is a recombinant DNA- derived humanized IgG1K monoclonal antibody that selectively binds to human immunoglobulin E (IgE). Xolair is produced by a Chinese hamster ovary cell suspension culture in nutrient medium containing the antibiotic gentamicin. Gentamicin is not detectable in the final product. Xolair is a sterile, white, preservative free, lyophilized powder contained in a single use vial that is reconstituted with sterile water for suspension. Side effects include: wheezing, tightness of the chest, trouble breathing, hives, skin rash, feeling anxious or light-headed, fainting, warmth or tingling under skin, or swelling of face, lips, or tongue \par \par Problem 4:Sinuses /Allergies\par -continue Olopatadine 0.6% at 1 sniff/nostril BID \par -continue Flonase 1 sniff each nostril BID \par -recommended Navage\par Environmental measures for allergies were encouraged including mattress and pillow cover, air purifier, and environmental controls. \par \par Problem 5: GERD \par -No medications Recommended at this time\par -Rule of 2s: avoid eating too much, eating too fast, eating too late, eating too spicy, eating too lousy, eating two hours before bed.\par -Things to avoid including overeating, spicy foods, tight clothing, eating within three hours of bed, this list is not all inclusive. \par -For treatment of reflux, possible options discussed including diet control, H2 blockers, PPIs, as well as coating motility agents discussed as treatment options. Timing of meals and proximity of last meal to sleep were discussed. If symptoms persist, a formal gastrointestinal evaluation is needed.\par \par Problem 6: RONNIE  (APAP 4 to 10 Cm of water)\par -continue CPAP Use (compliant and tolerates well)\par Sleep apnea is associated with adverse clinical consequences which an affect most organ systems. Cardiovascular disease risk includes arrhythmias, atrial fibrillation, hypertension, coronary artery disease, and stroke. Metabolic disorders include diabetes type 2, non-alcoholic fatty liver disease. Mood disorder especially depression; and cognitive decline especially in the elderly. Associations with chronic reflux/Thurston’s esophagus some but not all inclusive. \par -Reasons include arousal consistent with hypopnea; respiratory events most prominent in REM sleep or supine position; therefore sleep staging and body position are important for accurate diagnosis and estimation of AHI. \par \par Problem 7: Poor Mechanics of Breathing\par - Proper breathing techniques were reviewed with an emphasis of exhalation. Patient instructed to breath in for 1 second and out for four seconds. Patient was encouraged to not talk while walking. \par \par Problem 8: Out of Shape\par -exercise, and diet control were discussed and are highly encouraged. Treatment options were given such as, aqua therapy, and contacting a nutritionist. Recommended to use the elliptical, stationary bike, less use of treadmill. Mindful eating was explained to the patient Obesity is associated with worsening asthma, shortness of breath, and potential for cardiac disease, diabetes, and other underlying medical conditions. \par \par Problem 9: Cardiac (s/p CABG)- Afib\par -recommended to follow up with a cardiologist  for Ablation. \par \par Problem 10: Health Maintenance/COVID19 Precautions:\par - Clean your hands often. Wash your hands often with soap and water for at least 20 seconds, especially after blowing your nose, coughing, or sneezing, or having been in a public place.\par - If soap and water are not available, use a hand  that contains at least 60% alcohol.\par - To the extent possible, avoid touching high-touch surfaces in public places - elevator buttons, door handles, handrails, handshaking with people, etc. Use a tissue or your sleeve to cover your hand or finger if you must touch something.\par - Wash your hands after touching surfaces in public places.\par - Avoid touching your face, nose, eyes, etc.\par - Clean and disinfect your home to remove germs: practice routine cleaning of frequently touched surfaces (for example: tables, doorknobs, light switches, handles, desks, toilets, faucets, sinks & cell phones)\par - Avoid crowds, especially in poorly ventilated spaces. Your risk of exposure to respiratory viruses like COVID-19 may increase in crowded, closed-in settings with little air circulation if there are people in the crowd who are sick. All patients are recommended to practice social distancing and stay at least 6 feet away from others.\par - Avoid all non-essential travel including plane trips, and especially avoid embarking on cruise ships.\par -If COVID-19 is spreading in your community, take extra measures to put distance between yourself and other people to further reduce your risk of being exposed to this new virus.\par -Stay home as much as possible.\par - Consider ways of getting food brought to your house through family, social, or commercial networks\par -Be aware that the virus has been known to live in the air up to 3 hours post exposure, cardboard up to 24 hours post exposure, copper up to 4 hours post exposure, steel and plastic up to 2-3 days post exposure. Risk of transmission from these surfaces are affected by many variables.\par Immune Support Recommendations:\par -OTC Vitamin C 500mg BID \par -OTC Quercetin 250-500mg BID \par -OTC Zinc 75-100mg per day \par -OTC Melatonin 1 or 2 mg a night \par -OTC Vitamin D 1-4000mg per day \par -OTC Tonic Water 8oz per day\par Asthma and COVID19:\par You need to make sure your asthma is under control. This often requires the use of inhaled corticosteroids (and sometimes oral corticosteroids). Inhaled corticosteroids do not likely reduce your immune system’s ability to fight infections, but oral corticosteroids may. It is important to use the steps above to protect yourself to limit your exposure to any respiratory virus. \par \par Problem 11: health maintenance\par -s/p influenza vaccine\par -recommended strep pneumonia vaccines after age 65: Prevnar-13 vaccine, followed by Pneumo vaccine 23 one year following\par -recommended early intervention for URIs\par -recommended regular osteoporosis evaluations\par -recommended early dermatological evaluations\par -recommended after the age of 50 to the age of 70, colonoscopy every 5 years \par \par  Follow up in 6-8 weeks\par -he  is recommended to call with any changes, questions, or concerns.

## 2020-08-17 NOTE — PHYSICAL EXAM
[No Acute Distress] : no acute distress [Well Nourished] : well nourished [No Deformities] : no deformities [Well Developed] : well developed [Normal Oropharynx] : normal oropharynx [Normal Appearance] : normal appearance [No Neck Mass] : no neck mass [Normal Rate/Rhythm] : normal rate/rhythm [Normal S1, S2] : normal s1, s2 [No Murmurs] : no murmurs [No Resp Distress] : no resp distress [Clear to Auscultation Bilaterally] : clear to auscultation bilaterally [No Abnormalities] : no abnormalities [Benign] : benign [Normal Gait] : normal gait [No Clubbing] : no clubbing [No Cyanosis] : no cyanosis [No Edema] : no edema [FROM] : FROM [Normal Color/ Pigmentation] : normal color/ pigmentation [No Focal Deficits] : no focal deficits [Oriented x3] : oriented x3 [Normal Affect] : normal affect

## 2020-08-17 NOTE — ASSESSMENT
[FreeTextEntry1] : Mr. ARMSTRONG is a 75 year old male with a history of CAD, s/p CABG x 4 11.15.2019, DM, Asthma, HTN, carotid stenosis, RONNIE who presents into the office today for pulmonary evaluation. SOB\par \par \par The patient's shortness of breath is multifactorial due to:\par -pulmonary disease \par      -eosinophilic and IgE mediated asthma, chronic sinus issues, RONNIE, GERD\par -poor breathing mechanics \par -out of shape\par -?cardiac disease (s/p CABG, Dr. King)\par \par Problem 1: Severe Persistent Asthma (uncontrolled)\par -continue Symbicort 160 2 inhalations BID\par -off Singulair 10 mg before bed (failed)\par -Add Xyflo CR\par -Add Ventolin rescue inhaler 2 inhalations before exercise, Q6H \par -Asthma is believed to be caused by inherited (genetic) and environmental factor, but its exact cause is unknown. Asthma may be triggered by allergens, lung infections, or irritants in the air. Asthma triggers are different for each person \par \par Problem 2:Eosinophilic Asthma\par -Re-check CBC\par -set up Fasenra over nucala\par \par Problem 3: IgE Elevated \par -Candidate for Xolair\par -Xolair is a recombinant DNA- derived humanized IgG1K monoclonal antibody that selectively binds to human immunoglobulin E (IgE). Xolair is produced by a Chinese hamster ovary cell suspension culture in nutrient medium containing the antibiotic gentamicin. Gentamicin is not detectable in the final product. Xolair is a sterile, white, preservative free, lyophilized powder contained in a single use vial that is reconstituted with sterile water for suspension. Side effects include: wheezing, tightness of the chest, trouble breathing, hives, skin rash, feeling anxious or light-headed, fainting, warmth or tingling under skin, or swelling of face, lips, or tongue \par \par Problem 4:Sinuses /Allergies\par -Add Olopatadine 0.6% at 1 sniff/nostril BID \par -Add Flonase 1 sniff each nostril BID \par -recommended Navage\par Environmental measures for allergies were encouraged including mattress and pillow cover, air purifier, and environmental controls. \par \par Problem 5: GERD \par -No medications Recommended at this time\par -Rule of 2s: avoid eating too much, eating too fast, eating too late, eating too spicy, eating too lousy, eating two hours before bed.\par -Things to avoid including overeating, spicy foods, tight clothing, eating within three hours of bed, this list is not all inclusive. \par -For treatment of reflux, possible options discussed including diet control, H2 blockers, PPIs, as well as coating motility agents discussed as treatment options. Timing of meals and proximity of last meal to sleep were discussed. If symptoms persist, a formal gastrointestinal evaluation is needed.\par \par Problem 6: RONNIE  (APAP 4 to 10 Cm of water)\par -continue CPAP Use (compliant and tolerates well)\par Sleep apnea is associated with adverse clinical consequences which an affect most organ systems. Cardiovascular disease risk includes arrhythmias, atrial fibrillation, hypertension, coronary artery disease, and stroke. Metabolic disorders include diabetes type 2, non-alcoholic fatty liver disease. Mood disorder especially depression; and cognitive decline especially in the elderly. Associations with chronic reflux/Thurston’s esophagus some but not all inclusive. \par -Reasons include arousal consistent with hypopnea; respiratory events most prominent in REM sleep or supine position; therefore sleep staging and body position are important for accurate diagnosis and estimation of AHI. \par \par Problem 7: Poor Mechanics of Breathing\par - Proper breathing techniques were reviewed with an emphasis of exhalation. Patient instructed to breath in for 1 second and out for four seconds. Patient was encouraged to not talk while walking. \par \par Problem 8: Out of Shape\par -exercise, and diet control were discussed and are highly encouraged. Treatment options were given such as, aqua therapy, and contacting a nutritionist. Recommended to use the elliptical, stationary bike, less use of treadmill. Mindful eating was explained to the patient Obesity is associated with worsening asthma, shortness of breath, and potential for cardiac disease, diabetes, and other underlying medical conditions. \par \par Problem 9: Cardiac (s/p CABG)\par -recommended to follow up with a cardiologist\par \par Problem 10: Health Maintenance/COVID19 Precautions:\par - Clean your hands often. Wash your hands often with soap and water for at least 20 seconds, especially after blowing your nose, coughing, or sneezing, or having been in a public place.\par - If soap and water are not available, use a hand  that contains at least 60% alcohol.\par - To the extent possible, avoid touching high-touch surfaces in public places - elevator buttons, door handles, handrails, handshaking with people, etc. Use a tissue or your sleeve to cover your hand or finger if you must touch something.\par - Wash your hands after touching surfaces in public places.\par - Avoid touching your face, nose, eyes, etc.\par - Clean and disinfect your home to remove germs: practice routine cleaning of frequently touched surfaces (for example: tables, doorknobs, light switches, handles, desks, toilets, faucets, sinks & cell phones)\par - Avoid crowds, especially in poorly ventilated spaces. Your risk of exposure to respiratory viruses like COVID-19 may increase in crowded, closed-in settings with little air circulation if there are people in the crowd who are sick. All patients are recommended to practice social distancing and stay at least 6 feet away from others.\par - Avoid all non-essential travel including plane trips, and especially avoid embarking on cruise ships.\par -If COVID-19 is spreading in your community, take extra measures to put distance between yourself and other people to further reduce your risk of being exposed to this new virus.\par -Stay home as much as possible.\par - Consider ways of getting food brought to your house through family, social, or commercial networks\par -Be aware that the virus has been known to live in the air up to 3 hours post exposure, cardboard up to 24 hours post exposure, copper up to 4 hours post exposure, steel and plastic up to 2-3 days post exposure. Risk of transmission from these surfaces are affected by many variables.\par Immune Support Recommendations:\par -OTC Vitamin C 500mg BID \par -OTC Quercetin 250-500mg BID \par -OTC Zinc 75-100mg per day \par -OTC Melatonin 1 or 2 mg a night \par -OTC Vitamin D 1-4000mg per day \par -OTC Tonic Water 8oz per day\par Asthma and COVID19:\par You need to make sure your asthma is under control. This often requires the use of inhaled corticosteroids (and sometimes oral corticosteroids). Inhaled corticosteroids do not likely reduce your immune system’s ability to fight infections, but oral corticosteroids may. It is important to use the steps above to protect yourself to limit your exposure to any respiratory virus. \par \par Problem 11: health maintenance\par -s/p influenza vaccine\par -recommended strep pneumonia vaccines after age 65: Prevnar-13 vaccine, followed by Pneumo vaccine 23 one year following\par -recommended early intervention for URIs\par -recommended regular osteoporosis evaluations\par -recommended early dermatological evaluations\par -recommended after the age of 50 to the age of 70, colonoscopy every 5 years \par \par  Follow up in 6-8 weeks\par -he  is recommended to call with any changes, questions, or concerns.

## 2020-08-17 NOTE — ADDENDUM
[FreeTextEntry1] : Documented by Macie Dugan acting as a scribe for Dr. Escobar Ryder on 08/17/2020.\par \par All medical record entries made by the Scribe were at my, Dr. Escobar Ryder's, direction and personally dictated by me on 08/17/2020. I have reviewed the chart and agree that the record accurately reflects my personal performance of the history, physical exam, assessment and plan. I have also personally directed, reviewed, and agree with the discharge instructions.

## 2020-08-18 DIAGNOSIS — Z01.812 ENCOUNTER FOR PREPROCEDURAL LABORATORY EXAMINATION: ICD-10-CM

## 2020-08-25 ENCOUNTER — APPOINTMENT (OUTPATIENT)
Dept: PULMONOLOGY | Facility: CLINIC | Age: 75
End: 2020-08-25

## 2020-09-01 ENCOUNTER — OUTPATIENT (OUTPATIENT)
Dept: OUTPATIENT SERVICES | Facility: HOSPITAL | Age: 75
LOS: 1 days | End: 2020-09-01
Payer: MEDICARE

## 2020-09-01 VITALS
HEIGHT: 66 IN | OXYGEN SATURATION: 97 % | DIASTOLIC BLOOD PRESSURE: 89 MMHG | RESPIRATION RATE: 16 BRPM | WEIGHT: 160.94 LBS | TEMPERATURE: 97 F | SYSTOLIC BLOOD PRESSURE: 161 MMHG | HEART RATE: 68 BPM

## 2020-09-01 DIAGNOSIS — Z11.59 ENCOUNTER FOR SCREENING FOR OTHER VIRAL DISEASES: ICD-10-CM

## 2020-09-01 DIAGNOSIS — Z98.890 OTHER SPECIFIED POSTPROCEDURAL STATES: Chronic | ICD-10-CM

## 2020-09-01 DIAGNOSIS — E11.9 TYPE 2 DIABETES MELLITUS WITHOUT COMPLICATIONS: ICD-10-CM

## 2020-09-01 DIAGNOSIS — J45.909 UNSPECIFIED ASTHMA, UNCOMPLICATED: ICD-10-CM

## 2020-09-01 DIAGNOSIS — Z95.1 PRESENCE OF AORTOCORONARY BYPASS GRAFT: Chronic | ICD-10-CM

## 2020-09-01 DIAGNOSIS — I48.3 TYPICAL ATRIAL FLUTTER: ICD-10-CM

## 2020-09-01 DIAGNOSIS — I10 ESSENTIAL (PRIMARY) HYPERTENSION: ICD-10-CM

## 2020-09-01 DIAGNOSIS — I48.4 ATYPICAL ATRIAL FLUTTER: ICD-10-CM

## 2020-09-01 LAB
ALBUMIN SERPL ELPH-MCNC: 4.5 G/DL — SIGNIFICANT CHANGE UP (ref 3.3–5)
ALP SERPL-CCNC: 90 U/L — SIGNIFICANT CHANGE UP (ref 40–120)
ALT FLD-CCNC: 25 U/L — SIGNIFICANT CHANGE UP (ref 4–41)
ANION GAP SERPL CALC-SCNC: 13 MMO/L — SIGNIFICANT CHANGE UP (ref 7–14)
AST SERPL-CCNC: 22 U/L — SIGNIFICANT CHANGE UP (ref 4–40)
BILIRUB SERPL-MCNC: 1.1 MG/DL — SIGNIFICANT CHANGE UP (ref 0.2–1.2)
BLD GP AB SCN SERPL QL: NEGATIVE — SIGNIFICANT CHANGE UP
BUN SERPL-MCNC: 12 MG/DL — SIGNIFICANT CHANGE UP (ref 7–23)
CALCIUM SERPL-MCNC: 9.6 MG/DL — SIGNIFICANT CHANGE UP (ref 8.4–10.5)
CHLORIDE SERPL-SCNC: 99 MMOL/L — SIGNIFICANT CHANGE UP (ref 98–107)
CO2 SERPL-SCNC: 29 MMOL/L — SIGNIFICANT CHANGE UP (ref 22–31)
CREAT SERPL-MCNC: 0.95 MG/DL — SIGNIFICANT CHANGE UP (ref 0.5–1.3)
GLUCOSE SERPL-MCNC: 105 MG/DL — HIGH (ref 70–99)
POTASSIUM SERPL-MCNC: 3.5 MMOL/L — SIGNIFICANT CHANGE UP (ref 3.5–5.3)
POTASSIUM SERPL-SCNC: 3.5 MMOL/L — SIGNIFICANT CHANGE UP (ref 3.5–5.3)
PROT SERPL-MCNC: 7.4 G/DL — SIGNIFICANT CHANGE UP (ref 6–8.3)
RH IG SCN BLD-IMP: POSITIVE — SIGNIFICANT CHANGE UP
SODIUM SERPL-SCNC: 141 MMOL/L — SIGNIFICANT CHANGE UP (ref 135–145)

## 2020-09-01 RX ORDER — PSYLLIUM SEED (WITH DEXTROSE)
1 POWDER (GRAM) ORAL
Qty: 0 | Refills: 0 | DISCHARGE

## 2020-09-01 NOTE — H&P PST ADULT - NSICDXPROBLEM_GEN_ALL_CORE_FT
PROBLEM DIAGNOSES  Problem: Type 2 diabetes mellitus  Assessment and Plan:     Problem: Hypertension  Assessment and Plan:     Problem: Atypical atrial flutter  Assessment and Plan:     Problem: Encounter for laboratory testing for COVID-19 virus  Assessment and Plan: PROBLEM DIAGNOSES  Problem: Type 2 diabetes mellitus  Assessment and Plan: Fs on admit  Pt  instructed last dose  metformin on 9/7/2020  morning dose     Problem: Hypertension  Assessment and Plan: Pt instructed to takelisinopril  on morning of surgery.      Problem: Atypical atrial flutter  Assessment and Plan: Pt instructed to take  metoprolol & nifedipine on morning of surgery.  Aspirin & Eliquis preop as per  Dr Kendall instruction       Problem: Encounter for laboratory testing for COVID-19 virus  Assessment and Plan: scheduled on 9/6/2020 per pt PROBLEM DIAGNOSES  Problem: Type 2 diabetes mellitus  Assessment and Plan: Fs on admit  Pt  instructed last dose  metformin on 9/7/2020  morning dose     Problem: Hypertension  Assessment and Plan: Pt instructed to take lisinopril  on morning of surgery.      Problem: Atypical atrial flutter  Assessment and Plan: Pt instructed to take  metoprolol & nifedipine on morning of surgery.  Aspirin & Eliquis preop as per  Dr Kendall instruction   copy of cardiac cath  & echo report in chart      Problem: Encounter for laboratory testing for COVID-19 virus  Assessment and Plan: scheduled on 9/6/2020 per pt PROBLEM DIAGNOSES  Problem: Asthma  Assessment and Plan: Instructed to use scheduled inhaler     Problem: Type 2 diabetes mellitus  Assessment and Plan: Fs on admit  Pt  instructed last dose  metformin on 9/7/2020  morning dose     Problem: Hypertension  Assessment and Plan: Pt instructed to take lisinopril  on morning of surgery.      Problem: Atypical atrial flutter  Assessment and Plan: Pt instructed to take  metoprolol & nifedipine on morning of surgery.  Aspirin & Eliquis preop as per  Dr Kendall instruction   copy of cardiac cath  & echo report in chart      Problem: Encounter for laboratory testing for COVID-19 virus  Assessment and Plan: scheduled on 9/6/2020 per pt

## 2020-09-01 NOTE — H&P PST ADULT - HISTORY OF PRESENT ILLNESS
76y/o male with h/o CAD, s/p CABG X4 in 11/2019, H/o left carotid stenosis h/o left carotid endarterectomy, RONNIE precautions recommended.  OR booking notified via fax. on CPAP & new onset Atrial flutter scheduled for complex ablation on 9/8/2020. 74y/o male with h/o CAD, s/p CABG X4 in 11/2019, H/o left carotid stenosis h/o left carotid endarterectomy, RONNIE on CPAP.  Pt with onset Atrial flutter scheduled for complex ablation on 9/8/2020.

## 2020-09-06 ENCOUNTER — APPOINTMENT (OUTPATIENT)
Dept: DISASTER EMERGENCY | Facility: CLINIC | Age: 75
End: 2020-09-06

## 2020-09-06 DIAGNOSIS — Z01.818 ENCOUNTER FOR OTHER PREPROCEDURAL EXAMINATION: ICD-10-CM

## 2020-09-07 LAB — SARS-COV-2 N GENE NPH QL NAA+PROBE: NOT DETECTED

## 2020-09-08 ENCOUNTER — INPATIENT (INPATIENT)
Facility: HOSPITAL | Age: 75
LOS: 0 days | Discharge: HOME | End: 2020-09-09
Attending: STUDENT IN AN ORGANIZED HEALTH CARE EDUCATION/TRAINING PROGRAM | Admitting: STUDENT IN AN ORGANIZED HEALTH CARE EDUCATION/TRAINING PROGRAM
Payer: MEDICARE

## 2020-09-08 VITALS — WEIGHT: 158.07 LBS | HEIGHT: 66 IN

## 2020-09-08 DIAGNOSIS — I48.3 TYPICAL ATRIAL FLUTTER: ICD-10-CM

## 2020-09-08 DIAGNOSIS — Z98.890 OTHER SPECIFIED POSTPROCEDURAL STATES: Chronic | ICD-10-CM

## 2020-09-08 DIAGNOSIS — Z95.1 PRESENCE OF AORTOCORONARY BYPASS GRAFT: Chronic | ICD-10-CM

## 2020-09-08 LAB
GLUCOSE BLDC GLUCOMTR-MCNC: 112 MG/DL — HIGH (ref 70–99)
GLUCOSE BLDC GLUCOMTR-MCNC: 122 MG/DL — HIGH (ref 70–99)
GLUCOSE BLDC GLUCOMTR-MCNC: 150 MG/DL — HIGH (ref 70–99)
RH IG SCN BLD-IMP: POSITIVE — SIGNIFICANT CHANGE UP

## 2020-09-08 PROCEDURE — 93613 INTRACARDIAC EPHYS 3D MAPG: CPT

## 2020-09-08 PROCEDURE — 33285 INSJ SUBQ CAR RHYTHM MNTR: CPT

## 2020-09-08 PROCEDURE — 93653 COMPRE EP EVAL TX SVT: CPT

## 2020-09-08 PROCEDURE — 93010 ELECTROCARDIOGRAM REPORT: CPT

## 2020-09-08 RX ORDER — GLUCAGON INJECTION, SOLUTION 0.5 MG/.1ML
1 INJECTION, SOLUTION SUBCUTANEOUS ONCE
Refills: 0 | Status: DISCONTINUED | OUTPATIENT
Start: 2020-09-08 | End: 2020-09-09

## 2020-09-08 RX ORDER — APIXABAN 2.5 MG/1
5 TABLET, FILM COATED ORAL EVERY 12 HOURS
Refills: 0 | Status: DISCONTINUED | OUTPATIENT
Start: 2020-09-08 | End: 2020-09-09

## 2020-09-08 RX ORDER — APIXABAN 2.5 MG/1
1 TABLET, FILM COATED ORAL
Qty: 0 | Refills: 0 | DISCHARGE

## 2020-09-08 RX ORDER — FUROSEMIDE 40 MG
2 TABLET ORAL
Qty: 0 | Refills: 0 | DISCHARGE

## 2020-09-08 RX ORDER — INSULIN LISPRO 100/ML
VIAL (ML) SUBCUTANEOUS
Refills: 0 | Status: DISCONTINUED | OUTPATIENT
Start: 2020-09-08 | End: 2020-09-09

## 2020-09-08 RX ORDER — LISINOPRIL 2.5 MG/1
1 TABLET ORAL
Qty: 0 | Refills: 0 | DISCHARGE

## 2020-09-08 RX ORDER — BUDESONIDE AND FORMOTEROL FUMARATE DIHYDRATE 160; 4.5 UG/1; UG/1
2 AEROSOL RESPIRATORY (INHALATION)
Qty: 0 | Refills: 0 | DISCHARGE

## 2020-09-08 RX ORDER — CHOLECALCIFEROL (VITAMIN D3) 125 MCG
1 CAPSULE ORAL
Qty: 0 | Refills: 0 | DISCHARGE

## 2020-09-08 RX ORDER — ALBUTEROL 90 UG/1
2 AEROSOL, METERED ORAL EVERY 6 HOURS
Refills: 0 | Status: DISCONTINUED | OUTPATIENT
Start: 2020-09-08 | End: 2020-09-09

## 2020-09-08 RX ORDER — DEXTROSE 50 % IN WATER 50 %
15 SYRINGE (ML) INTRAVENOUS ONCE
Refills: 0 | Status: DISCONTINUED | OUTPATIENT
Start: 2020-09-08 | End: 2020-09-09

## 2020-09-08 RX ORDER — PANTOPRAZOLE SODIUM 20 MG/1
40 TABLET, DELAYED RELEASE ORAL
Refills: 0 | Status: DISCONTINUED | OUTPATIENT
Start: 2020-09-08 | End: 2020-09-09

## 2020-09-08 RX ORDER — ASCORBIC ACID 60 MG
1 TABLET,CHEWABLE ORAL
Qty: 0 | Refills: 0 | DISCHARGE

## 2020-09-08 RX ORDER — INSULIN LISPRO 100/ML
VIAL (ML) SUBCUTANEOUS AT BEDTIME
Refills: 0 | Status: DISCONTINUED | OUTPATIENT
Start: 2020-09-08 | End: 2020-09-09

## 2020-09-08 RX ORDER — FLUTICASONE PROPIONATE 50 MCG
2 SPRAY, SUSPENSION NASAL
Qty: 0 | Refills: 0 | DISCHARGE

## 2020-09-08 RX ORDER — DEXTROSE 50 % IN WATER 50 %
25 SYRINGE (ML) INTRAVENOUS ONCE
Refills: 0 | Status: DISCONTINUED | OUTPATIENT
Start: 2020-09-08 | End: 2020-09-09

## 2020-09-08 RX ORDER — METOPROLOL TARTRATE 50 MG
1 TABLET ORAL
Qty: 0 | Refills: 0 | DISCHARGE

## 2020-09-08 RX ORDER — ALBUTEROL 90 UG/1
2 AEROSOL, METERED ORAL
Qty: 0 | Refills: 0 | DISCHARGE

## 2020-09-08 RX ORDER — DEXTROSE 50 % IN WATER 50 %
12.5 SYRINGE (ML) INTRAVENOUS ONCE
Refills: 0 | Status: DISCONTINUED | OUTPATIENT
Start: 2020-09-08 | End: 2020-09-09

## 2020-09-08 RX ORDER — CHOLECALCIFEROL (VITAMIN D3) 125 MCG
2000 CAPSULE ORAL DAILY
Refills: 0 | Status: DISCONTINUED | OUTPATIENT
Start: 2020-09-08 | End: 2020-09-09

## 2020-09-08 RX ORDER — POTASSIUM BICARBONATE 978 MG/1
1 TABLET, EFFERVESCENT ORAL
Qty: 0 | Refills: 0 | DISCHARGE

## 2020-09-08 RX ORDER — BUDESONIDE AND FORMOTEROL FUMARATE DIHYDRATE 160; 4.5 UG/1; UG/1
2 AEROSOL RESPIRATORY (INHALATION)
Refills: 0 | Status: DISCONTINUED | OUTPATIENT
Start: 2020-09-08 | End: 2020-09-09

## 2020-09-08 RX ORDER — ATORVASTATIN CALCIUM 80 MG/1
1 TABLET, FILM COATED ORAL
Qty: 0 | Refills: 0 | DISCHARGE

## 2020-09-08 RX ORDER — ZINC SULFATE TAB 220 MG (50 MG ZINC EQUIVALENT) 220 (50 ZN) MG
1 TAB ORAL
Qty: 0 | Refills: 0 | DISCHARGE

## 2020-09-08 RX ORDER — FUROSEMIDE 40 MG
1 TABLET ORAL
Qty: 0 | Refills: 0 | DISCHARGE

## 2020-09-08 RX ORDER — OLOPATADINE HYDROCHLORIDE 665 UG/1
1 SPRAY, METERED NASAL
Qty: 0 | Refills: 0 | DISCHARGE

## 2020-09-08 RX ORDER — ASPIRIN/CALCIUM CARB/MAGNESIUM 324 MG
81 TABLET ORAL DAILY
Refills: 0 | Status: DISCONTINUED | OUTPATIENT
Start: 2020-09-08 | End: 2020-09-09

## 2020-09-08 RX ORDER — METOPROLOL TARTRATE 50 MG
50 TABLET ORAL DAILY
Refills: 0 | Status: DISCONTINUED | OUTPATIENT
Start: 2020-09-08 | End: 2020-09-09

## 2020-09-08 RX ORDER — ASPIRIN/CALCIUM CARB/MAGNESIUM 324 MG
1 TABLET ORAL
Qty: 0 | Refills: 0 | DISCHARGE

## 2020-09-08 RX ORDER — SODIUM CHLORIDE 9 MG/ML
1000 INJECTION, SOLUTION INTRAVENOUS
Refills: 0 | Status: DISCONTINUED | OUTPATIENT
Start: 2020-09-08 | End: 2020-09-09

## 2020-09-08 RX ORDER — NIFEDIPINE 30 MG
1 TABLET, EXTENDED RELEASE 24 HR ORAL
Qty: 0 | Refills: 0 | DISCHARGE

## 2020-09-08 RX ORDER — INFLUENZA VIRUS VACCINE 15; 15; 15; 15 UG/.5ML; UG/.5ML; UG/.5ML; UG/.5ML
0.5 SUSPENSION INTRAMUSCULAR ONCE
Refills: 0 | Status: DISCONTINUED | OUTPATIENT
Start: 2020-09-08 | End: 2020-09-09

## 2020-09-08 RX ORDER — LISINOPRIL 2.5 MG/1
2.5 TABLET ORAL DAILY
Refills: 0 | Status: DISCONTINUED | OUTPATIENT
Start: 2020-09-09 | End: 2020-09-09

## 2020-09-08 RX ORDER — FUROSEMIDE 40 MG
40 TABLET ORAL DAILY
Refills: 0 | Status: DISCONTINUED | OUTPATIENT
Start: 2020-09-09 | End: 2020-09-09

## 2020-09-08 RX ORDER — METFORMIN HYDROCHLORIDE 850 MG/1
1 TABLET ORAL
Qty: 0 | Refills: 0 | DISCHARGE

## 2020-09-08 RX ORDER — ATORVASTATIN CALCIUM 80 MG/1
20 TABLET, FILM COATED ORAL AT BEDTIME
Refills: 0 | Status: DISCONTINUED | OUTPATIENT
Start: 2020-09-08 | End: 2020-09-09

## 2020-09-08 RX ORDER — SODIUM CHLORIDE 9 MG/ML
3 INJECTION INTRAMUSCULAR; INTRAVENOUS; SUBCUTANEOUS EVERY 8 HOURS
Refills: 0 | Status: DISCONTINUED | OUTPATIENT
Start: 2020-09-08 | End: 2020-09-09

## 2020-09-08 RX ORDER — NIFEDIPINE 30 MG
30 TABLET, EXTENDED RELEASE 24 HR ORAL DAILY
Refills: 0 | Status: DISCONTINUED | OUTPATIENT
Start: 2020-09-09 | End: 2020-09-09

## 2020-09-08 RX ORDER — ASCORBIC ACID 60 MG
1000 TABLET,CHEWABLE ORAL DAILY
Refills: 0 | Status: DISCONTINUED | OUTPATIENT
Start: 2020-09-08 | End: 2020-09-09

## 2020-09-08 RX ADMIN — BUDESONIDE AND FORMOTEROL FUMARATE DIHYDRATE 2 PUFF(S): 160; 4.5 AEROSOL RESPIRATORY (INHALATION) at 22:13

## 2020-09-08 RX ADMIN — SODIUM CHLORIDE 3 MILLILITER(S): 9 INJECTION INTRAMUSCULAR; INTRAVENOUS; SUBCUTANEOUS at 18:58

## 2020-09-08 RX ADMIN — APIXABAN 5 MILLIGRAM(S): 2.5 TABLET, FILM COATED ORAL at 19:10

## 2020-09-08 RX ADMIN — Medication 2000 UNIT(S): at 22:14

## 2020-09-08 RX ADMIN — ATORVASTATIN CALCIUM 20 MILLIGRAM(S): 80 TABLET, FILM COATED ORAL at 22:14

## 2020-09-08 RX ADMIN — PANTOPRAZOLE SODIUM 40 MILLIGRAM(S): 20 TABLET, DELAYED RELEASE ORAL at 19:10

## 2020-09-08 RX ADMIN — SODIUM CHLORIDE 3 MILLILITER(S): 9 INJECTION INTRAMUSCULAR; INTRAVENOUS; SUBCUTANEOUS at 22:20

## 2020-09-08 RX ADMIN — Medication 81 MILLIGRAM(S): at 19:10

## 2020-09-08 NOTE — H&P CARDIOLOGY - NEUROLOGICAL DETAILS
alert and oriented x 3/sensation intact/responds to verbal commands/normal strength/cranial nerves intact/responds to pain

## 2020-09-08 NOTE — H&P CARDIOLOGY - FAMILY HISTORY
Sibling  Still living? Yes, Estimated age: Age Unknown  Family history of atrial fibrillation, Age at diagnosis: Age Unknown

## 2020-09-08 NOTE — CHART NOTE - NSCHARTNOTEFT_GEN_A_CORE
INOCENCIA ARMSTRONG 75yis s/p cardiac cath via right femoral access.  Site is stable with no hematoma, active bleed or swelling.  Dressing with minimal dried blood/dry/intact.  DP pulse is palpable.  Patient denies pain, numbness, tingling, CP, SOB. VSS. Will continue to monitor.     T(C): 36.7 (09-08-20 @ 21:04), Max: 36.7 (09-08-20 @ 21:04)  HR: --  BP: 137/84 (09-08-20 @ 21:04) (137/84 - 137/84)  RR: 18 (09-08-20 @ 21:04) (18 - 18)  SpO2: 95% (09-08-20 @ 21:04) (95% - 95%)

## 2020-09-08 NOTE — H&P CARDIOLOGY - PMH
Asthma    Atrial flutter    CAD (coronary artery disease)  s/p 4V CABG (LIMA to LAD, SVG to OM1, OM2, RPDA)  Carotid stenosis, bilateral  s/p left carotid endarterectomy  DM (diabetes mellitus)    GERD (gastroesophageal reflux disease)    HLD (hyperlipidemia)    HTN (hypertension)    RONNIE (obstructive sleep apnea)  CPAP QHS  PVD (peripheral vascular disease)

## 2020-09-08 NOTE — H&P CARDIOLOGY - NEGATIVE CARDIOVASCULAR SYMPTOMS
no chest pain/no claudication/no palpitations/no peripheral edema/no orthopnea/no paroxysmal nocturnal dyspnea

## 2020-09-08 NOTE — H&P CARDIOLOGY - RS GEN PE MLT RESP DETAILS PC
good air movement/respirations non-labored/breath sounds equal/no wheezes/no rhonchi/no chest wall tenderness/no intercostal retractions/no rales/airway patent/clear to auscultation bilaterally

## 2020-09-08 NOTE — H&P CARDIOLOGY - PSH
S/P CABG x 4  November 2019 (LIMA to LAD, SVG to OM1, OM2, RPDA)  S/P carotid endarterectomy  November 2019 (left)  S/P sinus surgery

## 2020-09-08 NOTE — CHART NOTE - NSCHARTNOTEFT_GEN_A_CORE
Type of Procedure: AFlutter ablation  Licensed independent practitioner: Erin Kendall MD  Assistant: None  Description of procedure: Written informed consent was obtained from the patient after a full explanation of the risks and benefits of  the procedure. The patient was brought to the lab in the fasting state. Continuous electrocardiographic and  hemodynamic monitoring was initiated. The patient was prepped and draped in the usual sterile fashion.  Local anesthesia was infiltrated subcutaneously at the access site. Propofol sedation was used. Anesthesia  staff monitored the patient during the case.  The patient entered the lab in Aflutter. Catheters/wires were advanced to the heart using the [employer] CARTO3 3D electroanatomical mapping system was used to define atrial geometry.  Intracardiac electrograms were recorded from the right atrium, CS, and right ventricle. Pacing was performed  from the right atrium and coronary sinus.   Surface electrograms and a baseline EP study yielded the likely diagnosis of counterclockwise isthmus dependent  atrial flutter. Using the electroanatomical mapping system, cavotricuspid isthmus ablation  was performed from the tricuspid annulus to the inferior vena cava during CS pacing. Electroanatomical  mapping demonstrated bidirectional block. Following completion of the CTI ablation line, bidirectional block  was confirmed. Bidirectional block was confirmed based on the following: the lateral to medial transisthmus  time was 150 ms, the medial to lateral transisthmus time was 150 ms, there is  differential pacing medial to lateral and there is differential pacing lateral to medial. We confirmed  bidirectional block after a 30 minute observation period.  The patient was prepped for ILR implant to monitor for possible AF.  During the procedure a rep from [employer] and [employer] were present to manage a complex mapping system and sophisticated .  Findings of procedure: As above  Estimated blood loss: <10cc  Specimen removed: N/A  Preoperative Dx: AFlutter  Postoperative Dx: Aflutter  Complications: None  Anesthesia type: Conscious.

## 2020-09-08 NOTE — H&P CARDIOLOGY - HISTORY OF PRESENT ILLNESS
76 y/o male with a PMHx of CAD s/p 4V CABG in November 2019 (LIMA to LAD, SVG to OM1, OM2, RPDA), atrial flutter on Eliquis (last dose 9/7 AM), carotid stenosis s/p left carotid endarterectomy, HTN, HLD, DM, PVD, GERD, asthma (well controlled), and RONNIE on CPAP QHS presents for elective catheter ablation of atrial flutter. Pt follows with Dr. Cesar King (cardiology) and had been experiencing mild dyspnea on exertion which he thought was related to his asthma. However, pt was found to be in new onset atrial flutter and was initiated on anticoagulation. Pt was referred to Dr. Kendall and recommended for atrial flutter ablation and ILR placement for long term monitoring. Pt denies fever, chills, recent travel, headache, dizziness, visual deficits, chest pain, orthopnea, palpitations, abdominal pain, N/V/D/C, hematochezia, melena, dysuria, hematuria, LOC, syncope, peripheral edema. See hard copy H&P from [employer] in chart.     COVID: Negative 9/6

## 2020-09-09 ENCOUNTER — TRANSCRIPTION ENCOUNTER (OUTPATIENT)
Age: 75
End: 2020-09-09

## 2020-09-09 VITALS
RESPIRATION RATE: 16 BRPM | SYSTOLIC BLOOD PRESSURE: 116 MMHG | TEMPERATURE: 98 F | DIASTOLIC BLOOD PRESSURE: 62 MMHG | HEART RATE: 60 BPM | OXYGEN SATURATION: 97 %

## 2020-09-09 PROBLEM — E11.9 TYPE 2 DIABETES MELLITUS WITHOUT COMPLICATIONS: Chronic | Status: ACTIVE | Noted: 2020-09-08

## 2020-09-09 PROBLEM — K21.9 GASTRO-ESOPHAGEAL REFLUX DISEASE WITHOUT ESOPHAGITIS: Chronic | Status: ACTIVE | Noted: 2020-09-08

## 2020-09-09 PROBLEM — I10 ESSENTIAL (PRIMARY) HYPERTENSION: Chronic | Status: ACTIVE | Noted: 2020-09-08

## 2020-09-09 PROBLEM — I25.10 ATHEROSCLEROTIC HEART DISEASE OF NATIVE CORONARY ARTERY WITHOUT ANGINA PECTORIS: Chronic | Status: ACTIVE | Noted: 2020-09-08

## 2020-09-09 PROBLEM — E78.5 HYPERLIPIDEMIA, UNSPECIFIED: Chronic | Status: ACTIVE | Noted: 2020-09-08

## 2020-09-09 PROBLEM — G47.33 OBSTRUCTIVE SLEEP APNEA (ADULT) (PEDIATRIC): Chronic | Status: ACTIVE | Noted: 2020-09-08

## 2020-09-09 PROBLEM — I65.23 OCCLUSION AND STENOSIS OF BILATERAL CAROTID ARTERIES: Chronic | Status: ACTIVE | Noted: 2019-11-12

## 2020-09-09 LAB
ALBUMIN SERPL ELPH-MCNC: 3.4 G/DL — SIGNIFICANT CHANGE UP (ref 3.3–5)
ALP SERPL-CCNC: 67 U/L — SIGNIFICANT CHANGE UP (ref 40–120)
ALT FLD-CCNC: 20 U/L — SIGNIFICANT CHANGE UP (ref 4–41)
ANION GAP SERPL CALC-SCNC: 13 MMO/L — SIGNIFICANT CHANGE UP (ref 7–14)
AST SERPL-CCNC: 15 U/L — SIGNIFICANT CHANGE UP (ref 4–40)
BASOPHILS # BLD AUTO: 0.01 K/UL — SIGNIFICANT CHANGE UP (ref 0–0.2)
BASOPHILS NFR BLD AUTO: 0.1 % — SIGNIFICANT CHANGE UP (ref 0–2)
BILIRUB SERPL-MCNC: 1.1 MG/DL — SIGNIFICANT CHANGE UP (ref 0.2–1.2)
BUN SERPL-MCNC: 20 MG/DL — SIGNIFICANT CHANGE UP (ref 7–23)
CALCIUM SERPL-MCNC: 9 MG/DL — SIGNIFICANT CHANGE UP (ref 8.4–10.5)
CHLORIDE SERPL-SCNC: 100 MMOL/L — SIGNIFICANT CHANGE UP (ref 98–107)
CO2 SERPL-SCNC: 22 MMOL/L — SIGNIFICANT CHANGE UP (ref 22–31)
CREAT SERPL-MCNC: 0.99 MG/DL — SIGNIFICANT CHANGE UP (ref 0.5–1.3)
EOSINOPHIL # BLD AUTO: 0.01 K/UL — SIGNIFICANT CHANGE UP (ref 0–0.5)
EOSINOPHIL NFR BLD AUTO: 0.1 % — SIGNIFICANT CHANGE UP (ref 0–6)
GLUCOSE BLDC GLUCOMTR-MCNC: 107 MG/DL — HIGH (ref 70–99)
GLUCOSE BLDC GLUCOMTR-MCNC: 142 MG/DL — HIGH (ref 70–99)
GLUCOSE SERPL-MCNC: 114 MG/DL — HIGH (ref 70–99)
HCT VFR BLD CALC: 37.4 % — LOW (ref 39–50)
HGB BLD-MCNC: 12.4 G/DL — LOW (ref 13–17)
IMM GRANULOCYTES NFR BLD AUTO: 0.4 % — SIGNIFICANT CHANGE UP (ref 0–1.5)
LYMPHOCYTES # BLD AUTO: 0.67 K/UL — LOW (ref 1–3.3)
LYMPHOCYTES # BLD AUTO: 8.9 % — LOW (ref 13–44)
MAGNESIUM SERPL-MCNC: 1.8 MG/DL — SIGNIFICANT CHANGE UP (ref 1.6–2.6)
MCHC RBC-ENTMCNC: 29.5 PG — SIGNIFICANT CHANGE UP (ref 27–34)
MCHC RBC-ENTMCNC: 33.2 % — SIGNIFICANT CHANGE UP (ref 32–36)
MCV RBC AUTO: 89 FL — SIGNIFICANT CHANGE UP (ref 80–100)
MONOCYTES # BLD AUTO: 0.68 K/UL — SIGNIFICANT CHANGE UP (ref 0–0.9)
MONOCYTES NFR BLD AUTO: 9 % — SIGNIFICANT CHANGE UP (ref 2–14)
NEUTROPHILS # BLD AUTO: 6.16 K/UL — SIGNIFICANT CHANGE UP (ref 1.8–7.4)
NEUTROPHILS NFR BLD AUTO: 81.5 % — HIGH (ref 43–77)
NRBC # FLD: 0 K/UL — SIGNIFICANT CHANGE UP (ref 0–0)
PHOSPHATE SERPL-MCNC: 3.2 MG/DL — SIGNIFICANT CHANGE UP (ref 2.5–4.5)
PLATELET # BLD AUTO: 153 K/UL — SIGNIFICANT CHANGE UP (ref 150–400)
PMV BLD: 9.7 FL — SIGNIFICANT CHANGE UP (ref 7–13)
POTASSIUM SERPL-MCNC: 3.4 MMOL/L — LOW (ref 3.5–5.3)
POTASSIUM SERPL-SCNC: 3.4 MMOL/L — LOW (ref 3.5–5.3)
PROT SERPL-MCNC: 6.1 G/DL — SIGNIFICANT CHANGE UP (ref 6–8.3)
RBC # BLD: 4.2 M/UL — SIGNIFICANT CHANGE UP (ref 4.2–5.8)
RBC # FLD: 13 % — SIGNIFICANT CHANGE UP (ref 10.3–14.5)
SODIUM SERPL-SCNC: 135 MMOL/L — SIGNIFICANT CHANGE UP (ref 135–145)
WBC # BLD: 7.56 K/UL — SIGNIFICANT CHANGE UP (ref 3.8–10.5)
WBC # FLD AUTO: 7.56 K/UL — SIGNIFICANT CHANGE UP (ref 3.8–10.5)

## 2020-09-09 PROCEDURE — 99232 SBSQ HOSP IP/OBS MODERATE 35: CPT

## 2020-09-09 RX ORDER — PANTOPRAZOLE SODIUM 20 MG/1
1 TABLET, DELAYED RELEASE ORAL
Qty: 30 | Refills: 0
Start: 2020-09-09 | End: 2020-10-08

## 2020-09-09 RX ORDER — POTASSIUM CHLORIDE 20 MEQ
1 PACKET (EA) ORAL
Qty: 0 | Refills: 0 | DISCHARGE

## 2020-09-09 RX ORDER — METOPROLOL TARTRATE 50 MG
1 TABLET ORAL
Qty: 0 | Refills: 0 | DISCHARGE

## 2020-09-09 RX ORDER — POTASSIUM CHLORIDE 20 MEQ
40 PACKET (EA) ORAL EVERY 4 HOURS
Refills: 0 | Status: DISCONTINUED | OUTPATIENT
Start: 2020-09-09 | End: 2020-09-09

## 2020-09-09 RX ADMIN — SODIUM CHLORIDE 3 MILLILITER(S): 9 INJECTION INTRAMUSCULAR; INTRAVENOUS; SUBCUTANEOUS at 06:04

## 2020-09-09 RX ADMIN — Medication 50 MILLIGRAM(S): at 06:03

## 2020-09-09 RX ADMIN — APIXABAN 5 MILLIGRAM(S): 2.5 TABLET, FILM COATED ORAL at 06:03

## 2020-09-09 RX ADMIN — Medication 40 MILLIGRAM(S): at 06:03

## 2020-09-09 RX ADMIN — Medication 40 MILLIEQUIVALENT(S): at 10:39

## 2020-09-09 RX ADMIN — Medication 1000 MILLIGRAM(S): at 11:36

## 2020-09-09 RX ADMIN — Medication 81 MILLIGRAM(S): at 11:36

## 2020-09-09 RX ADMIN — LISINOPRIL 2.5 MILLIGRAM(S): 2.5 TABLET ORAL at 06:03

## 2020-09-09 RX ADMIN — PANTOPRAZOLE SODIUM 40 MILLIGRAM(S): 20 TABLET, DELAYED RELEASE ORAL at 06:03

## 2020-09-09 RX ADMIN — Medication 2000 UNIT(S): at 11:38

## 2020-09-09 RX ADMIN — BUDESONIDE AND FORMOTEROL FUMARATE DIHYDRATE 2 PUFF(S): 160; 4.5 AEROSOL RESPIRATORY (INHALATION) at 11:06

## 2020-09-09 RX ADMIN — Medication 30 MILLIGRAM(S): at 06:03

## 2020-09-09 NOTE — DISCHARGE NOTE PROVIDER - NSDCACTIVITY_GEN_ALL_CORE
Walking - Indoors allowed/No heavy lifting/straining/Do not make important decisions/Do not drive or operate machinery/Showering allowed

## 2020-09-09 NOTE — CONSULT NOTE ADULT - SUBJECTIVE AND OBJECTIVE BOX
CHIEF COMPLAINT:Patient is a 75y old  Male who presents with a chief complaint of     HISTORY OF PRESENT ILLNESS:    75 male with history as below newly discovered aflutter s/p ablation / ILR placement  No chest pain, dyspnea, palpitation, or dizziness.     PAST MEDICAL & SURGICAL HISTORY:  GERD (gastroesophageal reflux disease)  RONNIE (obstructive sleep apnea): CPAP QHS  Atrial flutter  DM (diabetes mellitus)  HLD (hyperlipidemia)  HTN (hypertension)  CAD (coronary artery disease): s/p 4V CABG (LIMA to LAD, SVG to OM1, OM2, RPDA)  PVD (peripheral vascular disease)  Carotid stenosis, bilateral: s/p left carotid endarterectomy  Asthma  S/P sinus surgery  S/P carotid endarterectomy: November 2019 (left)  S/P CABG x 4: November 2019 (LIMA to LAD, SVG to OM1, OM2, RPDA)          MEDICATIONS:  apixaban 5 milliGRAM(s) Oral every 12 hours  aspirin enteric coated 81 milliGRAM(s) Oral daily  furosemide    Tablet 40 milliGRAM(s) Oral daily  lisinopril 2.5 milliGRAM(s) Oral daily  metoprolol succinate ER 50 milliGRAM(s) Oral daily  NIFEdipine XL 30 milliGRAM(s) Oral daily      ALBUTerol    90 MICROgram(s) HFA Inhaler 2 Puff(s) Inhalation every 6 hours PRN  budesonide 160 MICROgram(s)/formoterol 4.5 MICROgram(s) Inhaler 2 Puff(s) Inhalation two times a day      pantoprazole    Tablet 40 milliGRAM(s) Oral before breakfast    atorvastatin 20 milliGRAM(s) Oral at bedtime  dextrose 40% Gel 15 Gram(s) Oral once PRN  dextrose 50% Injectable 25 Gram(s) IV Push once  dextrose 50% Injectable 25 Gram(s) IV Push once  dextrose 50% Injectable 12.5 Gram(s) IV Push once  glucagon  Injectable 1 milliGRAM(s) IntraMuscular once PRN  insulin lispro (HumaLOG) corrective regimen sliding scale   SubCutaneous at bedtime  insulin lispro (HumaLOG) corrective regimen sliding scale   SubCutaneous three times a day before meals    ascorbic acid 1000 milliGRAM(s) Oral daily  cholecalciferol 2000 Unit(s) Oral daily  dextrose 5%. 1000 milliLiter(s) IV Continuous <Continuous>  influenza   Vaccine 0.5 milliLiter(s) IntraMuscular once  sodium chloride 0.9% lock flush 3 milliLiter(s) IV Push every 8 hours      FAMILY HISTORY:  Family history of atrial fibrillation (Sibling)      Non-contributory    SOCIAL HISTORY:    No tobacco, drugs or etoh    Allergies    No Known Allergies    Intolerances    	    REVIEW OF SYSTEMS:  as above  The rest of the 14 points ROS reviewed and except above they are unremarkable.        PHYSICAL EXAM:  T(C): 36.5 (09-09-20 @ 06:01), Max: 36.7 (09-08-20 @ 21:04)  HR: 91 (09-09-20 @ 06:01) (82 - 91)  BP: 134/83 (09-09-20 @ 06:01) (134/83 - 137/84)  RR: 18 (09-09-20 @ 06:01) (18 - 18)  SpO2: 96% (09-09-20 @ 06:01) (95% - 96%)  Wt(kg): --  I&O's Summary    JVP: Normal  Neck: supple  Lung: clear   CV: S1 S2 , Murmur:  Abd: soft  Ext: No edema  neuro: Awake / alert  Psych: flat affect  Skin: normal    LABS/DATA:    TELEMETRY: 	  sinus  ECG:  	   	  CARDIAC MARKERS:                                      12.4   7.56  )-----------( 153      ( 09 Sep 2020 07:31 )             37.4           proBNP:   Lipid Profile:   HgA1c:   TSH:

## 2020-09-09 NOTE — DISCHARGE NOTE NURSING/CASE MANAGEMENT/SOCIAL WORK - PATIENT PORTAL LINK FT
You can access the FollowMyHealth Patient Portal offered by [facility] by registering at the following website: [email or web address]. By joining [employer]’s FollowMyHealth portal, you will also be able to view your health information using other applications (apps) compatible with our system.

## 2020-09-09 NOTE — PROGRESS NOTE ADULT - ASSESSMENT
74y/o male with h/o CAD, s/p CABG X4 in 11/2019, H/o left carotid stenosis h/o left carotid endarterectomy, RONNIE on CPAP and AFlutter-on Apixaban admitted for elective AFlutter ablation and loop recorder implantation on 9/8.  s/p successful Cavotricuspid-Isthmus dependent AFL ablation and implantable loop recorder.   Post ablation and loop implantation teaching with written instructions provided.    - Discussed with Dr. Kendall regarding the alexandro with 1 st degree AVB findings noted on telemetry, will d/c Metoprolol for now  -Replete K to keep K >4, continue home Furosemide with K supplement  -Dry dressing applied to R groin incision site for one day, instructed to remove dressing tomorrow  -F/u with Dr. Kendall on Oct 22 at 11:00am; tele follow up for loop recorder on 9/24 at 9:45am  -Stable for discharge from EP standpoint

## 2020-09-09 NOTE — DISCHARGE NOTE PROVIDER - NSDCFUSCHEDAPPT_GEN_ALL_CORE_FT
INOCENCIA ARMSTRONG ; 10/03/2020 ; [facility] Cardio [address] INOCENCIA Caraballo ; 10/22/2020 ; NPP Cardio Electro [address]  INOCENCIA ARMSTRONG ; 10/28/2020 ; NPP PulmMed [address] [facility] INOCENCIA ARMSTRONG RECIENTTINY ; 09/24/2020 ; NPP Cardio Electro [address]  INOCENCIA ARMSTRONG RECIENTES ; 10/03/2020 ; NPP Cardio [address] INOCENCIA CaraballoIENTTINY ; 10/22/2020 ; NPP Cardio Electro [address]  INOCENCIA ARMSTRONGIENTES ; 10/28/2020 ; NPP PulmMed [address] [facility] INOCENCIA ARMSTRONG RECIENTTINY ; 09/24/2020 ; NPP Cardio Electro [address]  INOCENCIA ARMSTRONG RECIENTES ; 10/03/2020 ; NPP Cardio [address] INOCENCIA CaraballoIENTTINY ; 10/22/2020 ; NPP Cardio Electro [address]  INOCENCIA ARMSTRONGIENTES ; 10/28/2020 ; NPP PulmMed [address] [facility] INOCENCIA ARMSTRONG RECIENTTINY ; 09/24/2020 ; NPP Cardio Electro [address]  INOCENCIA ARMSTRONG RECIENTES ; 10/03/2020 ; NPP Cardio [address] INOCENCIA CaraballoIENTTINY ; 10/22/2020 ; NPP Cardio Electro [address]  INOCENCIA ARMSTRONGIENTES ; 10/28/2020 ; NPP PulmMed [address] [facility] INOCENCIA ARMSTRONG RECIENTTINY ; 09/24/2020 ; NPP Cardio Electro [address]  INOCENCIA ARMSTRONG RECIENTES ; 10/03/2020 ; NPP Cardio [address] INOCENCIA CaraballoIENTTINY ; 10/22/2020 ; NPP Cardio Electro [address]  INOCENCIA ARMSTRONGIENTES ; 10/28/2020 ; NPP PulmMed [address] [facility]

## 2020-09-09 NOTE — DISCHARGE NOTE PROVIDER - CARE PROVIDER_API CALL
Erin Kendall  CARDIAC ELECTROPHYSIOLOGY  [address] [address] [place] [address]  Phone: [phone]  Fax: [phone]  Follow Up Time:

## 2020-09-09 NOTE — DISCHARGE NOTE PROVIDER - NSDCMRMEDTOKEN_GEN_ALL_CORE_FT
aspirin 81 mg oral delayed release tablet: 1 tab(s) orally once a day  atorvastatin 20 mg oral tablet: 1 tab(s) orally once a day (at bedtime)  Eliquis 5 mg oral tablet: 1 tab(s) orally 2 times a day  fluticasone 50 mcg/inh nasal spray: 1 spray(s) nasal once a day  Lasix 20 mg oral tablet: 2 tab(s) orally once a day  lisinopril 2.5 mg oral tablet: 1 tab(s) orally once a day  metFORMIN 500 mg oral tablet: 1 tab(s) orally 2 times a day  metoprolol succinate 50 mg oral capsule, extended release: 1 cap(s) orally once a day  NIFEdipine 30 mg oral tablet, extended release: 1 tab(s) orally once a day  potassium chloride 10 mEq oral capsule, extended release: 1 cap(s) orally once a day  Symbicort 160 mcg-4.5 mcg/inh inhalation aerosol: 2 puff(s) inhaled once a day  Ventolin HFA 90 mcg/inh inhalation aerosol: 2 puff(s) inhaled every 6 hours, As Needed  Vitamin C 1000 mg oral tablet: 1 tab(s) orally once a day  Vitamin D3 2000 intl units (50 mcg) oral tablet: 1 tab(s) orally once a day  Zinc 50 mg Pink oral capsule: 1 cap(s) orally once a day aspirin 81 mg oral delayed release tablet: 1 tab(s) orally once a day  atorvastatin 20 mg oral tablet: 1 tab(s) orally once a day (at bedtime)  Eliquis 5 mg oral tablet: 1 tab(s) orally 2 times a day  fluticasone 50 mcg/inh nasal spray: 1 spray(s) nasal once a day  Lasix 20 mg oral tablet: 2 tab(s) orally once a day  lisinopril 2.5 mg oral tablet: 1 tab(s) orally once a day  metFORMIN 500 mg oral tablet: 1 tab(s) orally 2 times a day  NIFEdipine 30 mg oral tablet, extended release: 1 tab(s) orally once a day  potassium chloride 10 mEq oral capsule, extended release: 1 cap(s) orally once a day  Protonix 40 mg oral delayed release tablet: 1 tab(s) orally once a day   Symbicort 160 mcg-4.5 mcg/inh inhalation aerosol: 2 puff(s) inhaled once a day  Ventolin HFA 90 mcg/inh inhalation aerosol: 2 puff(s) inhaled every 6 hours, As Needed  Vitamin C 1000 mg oral tablet: 1 tab(s) orally once a day  Vitamin D3 2000 intl units (50 mcg) oral tablet: 1 tab(s) orally once a day  Zinc 50 mg Pink oral capsule: 1 cap(s) orally once a day

## 2020-09-09 NOTE — DISCHARGE NOTE PROVIDER - NSDCCPCAREPLAN_GEN_ALL_CORE_FT
PRINCIPAL DISCHARGE DIAGNOSIS  Diagnosis: S/P ablation of atrial flutter  Assessment and Plan of Treatment: Follow up with Dr Kendall as per your appointment letter in the EP clinic  Continue eliquis.  Continue metoprolol  low salt, low fat, low cholesterol         SECONDARY DISCHARGE DIAGNOSES  Diagnosis: Asthma  Assessment and Plan of Treatment: Continue symbicort, albuterol  Follow up with your pulmonologist as previously arranged 10/28/2020    Diagnosis: GERD (gastroesophageal reflux disease)  Assessment and Plan of Treatment: Start protoninx    Diagnosis: Hyperlipidemia  Assessment and Plan of Treatment: lipitor    Diagnosis: Hypertension  Assessment and Plan of Treatment: Continue lisinopril, lasix, nifedepine    Diagnosis: DM (diabetes mellitus)  Assessment and Plan of Treatment: 1800 calorie diabetic diet/ low salt, low fat , low cholesterol   continue metfoprmin    Diagnosis: CAD in native artery  Assessment and Plan of Treatment: Continue aspirin, metoprolol, lipitor, lisinopril                                               low salt, low fat, low cholesterol       Follow up with your general cardiologist as previous appointment 10/3/2020 PRINCIPAL DISCHARGE DIAGNOSIS  Diagnosis: S/P ablation of atrial flutter  Assessment and Plan of Treatment: Follow up with Dr Kendall as per your appointment letter in the EP clinic  Continue eliquis.  Continue metoprolol  low salt, low fat, low cholesterol   No heavy lifting x one week. No strenuous activity x 3 weeks. Monitor site of procedure and notify your doctor for any redness, swelling, discharge. No driving x 24 hours. You may shower but no baths or swimming x one week.         SECONDARY DISCHARGE DIAGNOSES  Diagnosis: Asthma  Assessment and Plan of Treatment: Continue symbicort, albuterol  Follow up with your pulmonologist as previously arranged 10/28/2020    Diagnosis: GERD (gastroesophageal reflux disease)  Assessment and Plan of Treatment: Start protoninx    Diagnosis: Hyperlipidemia  Assessment and Plan of Treatment: lipitor    Diagnosis: Hypertension  Assessment and Plan of Treatment: Continue lisinopril, lasix, nifedepine    Diagnosis: DM (diabetes mellitus)  Assessment and Plan of Treatment: 1800 calorie diabetic diet/ low salt, low fat , low cholesterol   continue metfoprmin    Diagnosis: CAD in native artery  Assessment and Plan of Treatment: Continue aspirin, metoprolol, lipitor, lisinopril                                               low salt, low fat, low cholesterol       Follow up with your general cardiologist as previous appointment 10/3/2020 PRINCIPAL DISCHARGE DIAGNOSIS  Diagnosis: S/P ablation of atrial flutter  Assessment and Plan of Treatment: Follow up with Dr Kendall as per your appointment letter in the EP clinic  Continue eliquis.  STOP METOPROLOL  low salt, low fat, low cholesterol   No heavy lifting x one week. No strenuous activity x 3 weeks. Monitor site of procedure and notify your doctor for any redness, swelling, discharge. No driving x 24 hours. You may shower but no baths or swimming x one week.         SECONDARY DISCHARGE DIAGNOSES  Diagnosis: Asthma  Assessment and Plan of Treatment: Continue symbicort, albuterol  Follow up with your pulmonologist as previously arranged 10/28/2020    Diagnosis: GERD (gastroesophageal reflux disease)  Assessment and Plan of Treatment: Start protoninx    Diagnosis: Hyperlipidemia  Assessment and Plan of Treatment: lipitor    Diagnosis: Hypertension  Assessment and Plan of Treatment: Continue lisinopril, lasix, nifedepine    Diagnosis: DM (diabetes mellitus)  Assessment and Plan of Treatment: 1800 calorie diabetic diet/ low salt, low fat , low cholesterol   continue metfoprmin    Diagnosis: CAD in native artery  Assessment and Plan of Treatment: Continue aspirin, metoprolol, lipitor, lisinopril                                               low salt, low fat, low cholesterol       Follow up with your general cardiologist as previous appointment 10/3/2020 PRINCIPAL DISCHARGE DIAGNOSIS  Diagnosis: S/P ablation of atrial flutter  Assessment and Plan of Treatment: Follow up with Dr Kendall 10/22 11AM, loop record 9/24 9:45AM  Continue eliquis.  STOP METOPROLOL  low salt, low fat, low cholesterol   No heavy lifting x one week. No strenuous activity x 3 weeks. Monitor site of procedure and notify your doctor for any redness, swelling, discharge. No driving x 24 hours. You may shower but no baths or swimming x one week.         SECONDARY DISCHARGE DIAGNOSES  Diagnosis: Asthma  Assessment and Plan of Treatment: Continue symbicort, albuterol  Follow up with your pulmonologist as previously arranged 10/28/2020    Diagnosis: GERD (gastroesophageal reflux disease)  Assessment and Plan of Treatment: Start protoninx    Diagnosis: Hyperlipidemia  Assessment and Plan of Treatment: lipitor    Diagnosis: Hypertension  Assessment and Plan of Treatment: Continue lisinopril, lasix, nifedepine    Diagnosis: DM (diabetes mellitus)  Assessment and Plan of Treatment: 1800 calorie diabetic diet/ low salt, low fat , low cholesterol   continue metfoprmin    Diagnosis: CAD in native artery  Assessment and Plan of Treatment: Continue aspirin, metoprolol, lipitor, lisinopril                                               low salt, low fat, low cholesterol       Follow up with your general cardiologist as previous appointment 10/3/2020

## 2020-09-09 NOTE — DISCHARGE NOTE PROVIDER - HOSPITAL COURSE
74 y/o male with a PMHx of CAD s/p 4V CABG in November 2019 (LIMA to LAD, SVG to OM1, OM2, RPDA), atrial flutter on Eliquis (last dose 9/7 AM), carotid stenosis s/p left carotid endarterectomy, HTN, HLD, DM, PVD, GERD, asthma (well controlled), and RONNIE on CPAP QHS presents for elective catheter ablation of atrial flutter.        - Aflutter s/p ablation patient stable for discharge as per DR van . Follow up in EP clnic in 2 weeks    RFV/LFV sites no bleeding hematoma pain, swelling 76 y/o male with a PMHx of CAD s/p 4V CABG in November 2019 (LIMA to LAD, SVG to OM1, OM2, RPDA), atrial flutter on Eliquis (last dose 9/7 AM), carotid stenosis s/p left carotid endarterectomy, HTN, HLD, DM, PVD, GERD, asthma (well controlled), and RONNIE on CPAP QHS presents for elective catheter ablation of atrial flutter.        Hosptial course        Pt s/p elective aflutter ablation via right femoral vein. Site stable with no hematoma. Stable for discharge as per DR van . Follow up in EP clnic in 2 weeks 74 y/o male with a PMHx of CAD s/p 4V CABG in November 2019 (LIMA to LAD, SVG to OM1, OM2, RPDA), atrial flutter on Eliquis (last dose 9/7 AM), carotid stenosis s/p left carotid endarterectomy, HTN, HLD, DM, PVD, GERD, asthma (well controlled), and RONNIE on CPAP QHS presents for elective catheter ablation of atrial flutter.        Hosptial course        Pt s/p elective aflutter ablation via right femoral vein. Site stable with no hematoma. Mild oozing of blood at site, dressing reinforced by EP. Telemetry noted episode of 1st degree AV block. Metoprolol discontined. Stable for discharge as per Dr Kendall . Follow up in with Dr Kendall 10/22 11AM, loop record eval 9/24 9:45AM.

## 2020-09-09 NOTE — PROGRESS NOTE ADULT - SUBJECTIVE AND OBJECTIVE BOX
Patient is a 75y old  Male who presents with a chief complaint of elective ablation (09 Sep 2020 08:01)  Patient denies CP, SOB, palpitations or dizziness.  Ambulating slowing without c/o.      PAST MEDICAL & SURGICAL HISTORY:  GERD (gastroesophageal reflux disease)  RONNIE (obstructive sleep apnea): CPAP QHS  Atrial flutter  DM (diabetes mellitus)  HLD (hyperlipidemia)  HTN (hypertension)  CAD (coronary artery disease): s/p 4V CABG (LIMA to LAD, SVG to OM1, OM2, RPDA)  Borderline diabetes  PVD (peripheral vascular disease)  Carotid stenosis, bilateral: s/p left carotid endarterectomy  History of hypertension  Asthma  S/P sinus surgery  S/P carotid endarterectomy: November 2019 (left)  S/P CABG x 4: November 2019 (LIMA to LAD, SVG to OM1, OM2, RPDA)  H/O sinus surgery  H/O carotid endarterectomy  No significant past surgical history      MEDICATIONS  (STANDING):  apixaban 5 milliGRAM(s) Oral every 12 hours  ascorbic acid 1000 milliGRAM(s) Oral daily  aspirin enteric coated 81 milliGRAM(s) Oral daily  atorvastatin 20 milliGRAM(s) Oral at bedtime  budesonide 160 MICROgram(s)/formoterol 4.5 MICROgram(s) Inhaler 2 Puff(s) Inhalation two times a day  cholecalciferol 2000 Unit(s) Oral daily  dextrose 5%. 1000 milliLiter(s) (50 mL/Hr) IV Continuous <Continuous>  dextrose 50% Injectable 25 Gram(s) IV Push once  dextrose 50% Injectable 25 Gram(s) IV Push once  dextrose 50% Injectable 12.5 Gram(s) IV Push once  furosemide    Tablet 40 milliGRAM(s) Oral daily  influenza   Vaccine 0.5 milliLiter(s) IntraMuscular once  insulin lispro (HumaLOG) corrective regimen sliding scale   SubCutaneous at bedtime  insulin lispro (HumaLOG) corrective regimen sliding scale   SubCutaneous three times a day before meals  lisinopril 2.5 milliGRAM(s) Oral daily  metoprolol succinate ER 50 milliGRAM(s) Oral daily  NIFEdipine XL 30 milliGRAM(s) Oral daily  pantoprazole    Tablet 40 milliGRAM(s) Oral before breakfast  potassium chloride   Powder 40 milliEquivalent(s) Oral every 4 hours  sodium chloride 0.9% lock flush 3 milliLiter(s) IV Push every 8 hours    MEDICATIONS  (PRN):  ALBUTerol    90 MICROgram(s) HFA Inhaler 2 Puff(s) Inhalation every 6 hours PRN Shortness of Breath and/or Wheezing  dextrose 40% Gel 15 Gram(s) Oral once PRN Blood Glucose LESS THAN 70 milliGRAM(s)/deciliter  glucagon  Injectable 1 milliGRAM(s) IntraMuscular once PRN Glucose LESS THAN 70 milligrams/deciliter            Vital Signs Last 24 Hrs  T(C): 36.5 (09 Sep 2020 06:01), Max: 36.7 (08 Sep 2020 21:04)  T(F): 97.7 (09 Sep 2020 06:01), Max: 98 (08 Sep 2020 21:04)  HR: 91 (09 Sep 2020 06:01) (82 - 91)  BP: 134/83 (09 Sep 2020 06:01) (134/83 - 137/84)  BP(mean): --  RR: 18 (09 Sep 2020 06:01) (18 - 18)  SpO2: 96% (09 Sep 2020 06:01) (95% - 96%)            INTERPRETATION OF TELEMETRY:  SR with 1 st degree AVB at 60-70's; episodes of sinus arrhythmia  with alexandro down to 40's associated with sleep    ECG:        LABS:                        12.4   7.56  )-----------( 153      ( 09 Sep 2020 07:31 )             37.4     09-09    135  |  100  |  20  ----------------------------<  114<H>  3.4<L>   |  22  |  0.99    Ca    9.0      09 Sep 2020 07:31  Phos  3.2     09-09  Mg     1.8     09-09    TPro  6.1  /  Alb  3.4  /  TBili  1.1  /  DBili  x   /  AST  15  /  ALT  20  /  AlkPhos  67  09-09              BNP  RADIOLOGY & ADDITIONAL STUDIES:      PHYSICAL EXAM:    GENERAL: In no apparent distress, well nourished, and hydrated.  NECK: Supple and normal thyroid.  No JVD or carotid bruit.  Carotid pulse is 2+ bilaterally.  HEART: Regular rate and rhythm; No murmurs, rubs, or gallops.  R groin site with small blood oozing noted, no hematoma.  ACW loop recorder with dermabond dry intact no bleeding seen  PULMONARY: Clear to auscultation and perfusion.  No rales, wheezing, or rhonchi bilaterally.  ABDOMEN: Soft, Nontender, Nondistended; Bowel sounds present  EXTREMITIES:  2+ Peripheral Pulses, No clubbing, cyanosis, or edema  NEUROLOGICAL: Grossly nonfocal

## 2020-09-16 ENCOUNTER — APPOINTMENT (OUTPATIENT)
Dept: PULMONOLOGY | Facility: CLINIC | Age: 75
End: 2020-09-16
Payer: MEDICARE

## 2020-09-16 VITALS
HEIGHT: 66 IN | RESPIRATION RATE: 17 BRPM | TEMPERATURE: 98.5 F | SYSTOLIC BLOOD PRESSURE: 150 MMHG | WEIGHT: 157.5 LBS | DIASTOLIC BLOOD PRESSURE: 74 MMHG | HEART RATE: 94 BPM | OXYGEN SATURATION: 97 % | BODY MASS INDEX: 25.31 KG/M2

## 2020-09-16 PROCEDURE — 96372 THER/PROPH/DIAG INJ SC/IM: CPT

## 2020-09-16 RX ORDER — BENRALIZUMAB 30 MG/ML
30 INJECTION, SOLUTION SUBCUTANEOUS
Qty: 0 | Refills: 0 | Status: COMPLETED | OUTPATIENT
Start: 2020-09-16

## 2020-09-24 ENCOUNTER — APPOINTMENT (OUTPATIENT)
Dept: ELECTROPHYSIOLOGY | Facility: CLINIC | Age: 75
End: 2020-09-24
Payer: MEDICARE

## 2020-09-24 ENCOUNTER — NON-APPOINTMENT (OUTPATIENT)
Age: 75
End: 2020-09-24

## 2020-09-24 VITALS — HEART RATE: 85 BPM | DIASTOLIC BLOOD PRESSURE: 88 MMHG | SYSTOLIC BLOOD PRESSURE: 146 MMHG

## 2020-09-24 VITALS
SYSTOLIC BLOOD PRESSURE: 162 MMHG | BODY MASS INDEX: 25.34 KG/M2 | HEIGHT: 66 IN | HEART RATE: 66 BPM | DIASTOLIC BLOOD PRESSURE: 90 MMHG | OXYGEN SATURATION: 98 % | TEMPERATURE: 98 F

## 2020-09-24 VITALS — WEIGHT: 157 LBS | BODY MASS INDEX: 25.34 KG/M2

## 2020-09-24 DIAGNOSIS — I44.1 ATRIOVENTRICULAR BLOCK, SECOND DEGREE: ICD-10-CM

## 2020-09-24 DIAGNOSIS — E78.5 HYPERLIPIDEMIA, UNSPECIFIED: ICD-10-CM

## 2020-09-24 PROCEDURE — 93000 ELECTROCARDIOGRAM COMPLETE: CPT | Mod: 59

## 2020-09-24 PROCEDURE — 99215 OFFICE O/P EST HI 40 MIN: CPT

## 2020-09-24 PROCEDURE — 93285 PRGRMG DEV EVAL SCRMS IP: CPT

## 2020-09-24 RX ORDER — ATORVASTATIN CALCIUM 40 MG/1
40 TABLET, FILM COATED ORAL
Qty: 90 | Refills: 0 | Status: DISCONTINUED | COMMUNITY
Start: 2020-03-20 | End: 2020-09-24

## 2020-09-24 RX ORDER — NIFEDIPINE 60 MG/1
60 TABLET, FILM COATED, EXTENDED RELEASE ORAL
Qty: 90 | Refills: 0 | Status: DISCONTINUED | COMMUNITY
Start: 2020-04-06 | End: 2020-09-24

## 2020-09-24 RX ORDER — NIFEDIPINE 60 MG/1
60 TABLET, EXTENDED RELEASE ORAL
Qty: 30 | Refills: 0 | Status: DISCONTINUED | COMMUNITY
Start: 2020-03-11 | End: 2020-09-24

## 2020-09-24 RX ORDER — METOPROLOL SUCCINATE 50 MG/1
50 TABLET, EXTENDED RELEASE ORAL
Refills: 0 | Status: DISCONTINUED | COMMUNITY
End: 2020-09-24

## 2020-09-24 NOTE — HISTORY OF PRESENT ILLNESS
[FreeTextEntry1] : Cesar King MD\par \par Jonh Marcus is a 74y/o man with Hx of HTN, HLD, asthma, all of which are stable, CAD s/p CABG x4 (11/2019,  LIMA to LAD, SVG to OM1, OM2, RPDA), carotid stenosis s/p left carotid endarterectomy (2017), RONNIE on CPAP, and new onset atypical atrial flutter s/p CTI ablation on 9/8/2020 who presents today for routine f/u. Post ablation, noted to have first and second degree AV delay. Stopped beta blockers at that time. Has been feeling well. Denies chest pain, palpitations, SOB, syncope or near syncope. ILR with no evidence of aflutter recurrence, noted sinus tach and apc's. No afib.

## 2020-09-24 NOTE — PHYSICAL EXAM
[General Appearance - Well Developed] : well developed [Normal Appearance] : normal appearance [Well Groomed] : well groomed [General Appearance - Well Nourished] : well nourished [No Deformities] : no deformities [General Appearance - In No Acute Distress] : no acute distress [Normal Conjunctiva] : the conjunctiva exhibited no abnormalities [Eyelids - No Xanthelasma] : the eyelids demonstrated no xanthelasmas [Normal Oral Mucosa] : normal oral mucosa [No Oral Pallor] : no oral pallor [No Oral Cyanosis] : no oral cyanosis [Normal Jugular Venous A Waves Present] : normal jugular venous A waves present [Normal Jugular Venous V Waves Present] : normal jugular venous V waves present [No Jugular Venous Young A Waves] : no jugular venous young A waves [Respiration, Rhythm And Depth] : normal respiratory rhythm and effort [Exaggerated Use Of Accessory Muscles For Inspiration] : no accessory muscle use [Auscultation Breath Sounds / Voice Sounds] : lungs were clear to auscultation bilaterally [Heart Rate And Rhythm] : heart rate and rhythm were normal [Heart Sounds] : normal S1 and S2 [Murmurs] : no murmurs present [Abdomen Soft] : soft [Abdomen Tenderness] : non-tender [Abdomen Mass (___ Cm)] : no abdominal mass palpated [Abnormal Walk] : normal gait [Gait - Sufficient For Exercise Testing] : the gait was sufficient for exercise testing [Nail Clubbing] : no clubbing of the fingernails [Cyanosis, Localized] : no localized cyanosis [Petechial Hemorrhages (___cm)] : no petechial hemorrhages [Skin Color & Pigmentation] : normal skin color and pigmentation [] : no rash [No Venous Stasis] : no venous stasis [Skin Lesions] : no skin lesions [No Skin Ulcers] : no skin ulcer [No Xanthoma] : no  xanthoma was observed [Oriented To Time, Place, And Person] : oriented to person, place, and time [Affect] : the affect was normal [Mood] : the mood was normal [No Anxiety] : not feeling anxious

## 2020-09-24 NOTE — DISCUSSION/SUMMARY
[FreeTextEntry1] : Jonh Marcus is a 76y/o man with Hx of HTN, HLD, asthma, all of which are stable, CAD s/p CABG x4 (11/2019,  LIMA to LAD, SVG to OM1, OM2, RPDA), carotid stenosis s/p left carotid endarterectomy (2017), RONNIE on CPAP, and new onset atypical atrial flutter s/p CTI ablation on 9/8/2020 who presents today for routine f/u. \par \par Impression:\par \par 1. Typical atrial flutter: s/p CTI ablation on 9/8/2020. EKG today NSR with second degree type I AV block. ILR with no recurrence of aflutter and no evidence of afib. Frequent apc's noted. Recommend initiating low dose beta blockers, take at night, for improved apc/rate control management. May discontinue AC as long as no evidence of afib noted. If marked bradyarrhythmias or symptoms of feeling dizzy, lightheaded, syncope or near syncope occur, will stop beta blockers at that time. Resume routine remote monitoring of ILR. \par \par 2. HTN: resume oral antihypertensives as prescribed. Encouraged heart healthy diet, sodium restriction, and weight loss. Continue regular f/u with Cardiologist for further HTN management.\par \par 3. HLD: resume statin therapy as prescribed and regular f/u with Cardiologist for routine lipid monitoring and management.\par \par 4. RONNIE: resume compliance with RONNIE management to prevent future sinus node dysfunction and atrial fibrillation. Encouraged weight loss.\par \par Will continue f/u with Cardiologist and may RTO as needed or if any new or worsening symptoms or findings occur.\par

## 2020-10-03 ENCOUNTER — NON-APPOINTMENT (OUTPATIENT)
Age: 75
End: 2020-10-03

## 2020-10-03 ENCOUNTER — APPOINTMENT (OUTPATIENT)
Dept: CARDIOLOGY | Facility: CLINIC | Age: 75
End: 2020-10-03
Payer: MEDICARE

## 2020-10-03 VITALS
DIASTOLIC BLOOD PRESSURE: 77 MMHG | SYSTOLIC BLOOD PRESSURE: 164 MMHG | OXYGEN SATURATION: 97 % | RESPIRATION RATE: 12 BRPM | HEART RATE: 80 BPM | HEIGHT: 66 IN | WEIGHT: 160 LBS | BODY MASS INDEX: 25.71 KG/M2

## 2020-10-03 DIAGNOSIS — I65.21 OCCLUSION AND STENOSIS OF RIGHT CAROTID ARTERY: ICD-10-CM

## 2020-10-03 PROCEDURE — 99214 OFFICE O/P EST MOD 30 MIN: CPT

## 2020-10-03 PROCEDURE — 93000 ELECTROCARDIOGRAM COMPLETE: CPT

## 2020-10-05 PROBLEM — I65.21 STENOSIS OF RIGHT CAROTID ARTERY: Status: ACTIVE | Noted: 2020-10-05

## 2020-10-05 NOTE — PHYSICAL EXAM
[General Appearance - Well Developed] : well developed [Normal Appearance] : normal appearance [Well Groomed] : well groomed [General Appearance - Well Nourished] : well nourished [No Deformities] : no deformities [General Appearance - In No Acute Distress] : no acute distress [Normal Conjunctiva] : the conjunctiva exhibited no abnormalities [Eyelids - No Xanthelasma] : the eyelids demonstrated no xanthelasmas [Normal Oral Mucosa] : normal oral mucosa [No Oral Pallor] : no oral pallor [No Oral Cyanosis] : no oral cyanosis [Normal Jugular Venous A Waves Present] : normal jugular venous A waves present [Normal Jugular Venous V Waves Present] : normal jugular venous V waves present [No Jugular Venous Young A Waves] : no jugular venous young A waves [Heart Rate And Rhythm] : heart rate and rhythm were normal [Heart Sounds] : normal S1 and S2 [Murmurs] : no murmurs present [Edema] : no peripheral edema present [Respiration, Rhythm And Depth] : normal respiratory rhythm and effort [Exaggerated Use Of Accessory Muscles For Inspiration] : no accessory muscle use [Auscultation Breath Sounds / Voice Sounds] : lungs were clear to auscultation bilaterally [Abdomen Soft] : soft [Abdomen Tenderness] : non-tender [Abdomen Mass (___ Cm)] : no abdominal mass palpated [Nail Clubbing] : no clubbing of the fingernails [Cyanosis, Localized] : no localized cyanosis [Petechial Hemorrhages (___cm)] : no petechial hemorrhages [Skin Color & Pigmentation] : normal skin color and pigmentation [] : no rash [No Venous Stasis] : no venous stasis [Skin Lesions] : no skin lesions [No Skin Ulcers] : no skin ulcer [No Xanthoma] : no  xanthoma was observed [Oriented To Time, Place, And Person] : oriented to person, place, and time [Affect] : the affect was normal [Mood] : the mood was normal [No Anxiety] : not feeling anxious

## 2020-10-05 NOTE — HISTORY OF PRESENT ILLNESS
[FreeTextEntry1] : Jonh is here for an eval re: recent US of his carotid suggesting severe stenosis\par He is asymptomatic\par No CP\par No syncope\par No LE edema\par No orthopnea\par

## 2020-10-14 ENCOUNTER — APPOINTMENT (OUTPATIENT)
Dept: PULMONOLOGY | Facility: CLINIC | Age: 75
End: 2020-10-14
Payer: MEDICARE

## 2020-10-14 VITALS
HEIGHT: 66 IN | TEMPERATURE: 98.2 F | BODY MASS INDEX: 26.03 KG/M2 | OXYGEN SATURATION: 96 % | RESPIRATION RATE: 14 BRPM | WEIGHT: 162 LBS | SYSTOLIC BLOOD PRESSURE: 128 MMHG | HEART RATE: 70 BPM | DIASTOLIC BLOOD PRESSURE: 80 MMHG

## 2020-10-14 PROCEDURE — 96372 THER/PROPH/DIAG INJ SC/IM: CPT

## 2020-10-14 RX ORDER — BENRALIZUMAB 30 MG/ML
30 INJECTION, SOLUTION SUBCUTANEOUS
Qty: 0 | Refills: 0 | Status: COMPLETED | OUTPATIENT
Start: 2020-10-14

## 2020-10-28 ENCOUNTER — APPOINTMENT (OUTPATIENT)
Dept: PULMONOLOGY | Facility: CLINIC | Age: 75
End: 2020-10-28
Payer: MEDICARE

## 2020-10-28 VITALS
SYSTOLIC BLOOD PRESSURE: 136 MMHG | OXYGEN SATURATION: 96 % | BODY MASS INDEX: 26.36 KG/M2 | HEIGHT: 66 IN | WEIGHT: 164 LBS | TEMPERATURE: 97.6 F | DIASTOLIC BLOOD PRESSURE: 78 MMHG | HEART RATE: 72 BPM

## 2020-10-28 PROCEDURE — 99072 ADDL SUPL MATRL&STAF TM PHE: CPT

## 2020-10-28 PROCEDURE — 99214 OFFICE O/P EST MOD 30 MIN: CPT

## 2020-10-28 NOTE — ADDENDUM
[FreeTextEntry1] : Documented by Mekhi Charles acting as a scribe for Dr. Escobar Ryder on 10/28/2020 \par \par All medical record entries made by the Scribe were at my, Dr. Escobar Ryder's, direction and personally dictated by me on 10/28/2020 . I have reviewed the chart and agree that the record accurately reflects my personal performance of the history, physical exam, assessment and plan. I have also personally directed, reviewed, and agree with the discharge instructions.

## 2020-10-28 NOTE — PHYSICAL EXAM
[No Acute Distress] : no acute distress [Well Nourished] : well nourished [No Deformities] : no deformities [Well Developed] : well developed [Normal Oropharynx] : normal oropharynx [III] : Mallampati Class: III [Normal Appearance] : normal appearance [No Neck Mass] : no neck mass [Normal Rate/Rhythm] : normal rate/rhythm [Normal S1, S2] : normal s1, s2 [No Murmurs] : no murmurs [No Resp Distress] : no resp distress [Clear to Auscultation Bilaterally] : clear to auscultation bilaterally [No Abnormalities] : no abnormalities [Benign] : benign [Normal Gait] : normal gait [No Clubbing] : no clubbing [No Cyanosis] : no cyanosis [No Edema] : no edema [FROM] : FROM [Normal Color/ Pigmentation] : normal color/ pigmentation [No Focal Deficits] : no focal deficits [Oriented x3] : oriented x3 [Normal Affect] : normal affect [TextBox_68] : I:E 1:3, mild end expiratory wheeze negative

## 2020-10-28 NOTE — HISTORY OF PRESENT ILLNESS
[TextBox_4] : Mr. ARMSTRONG is a 75 year old male with a history of  eosinophilic and IgE mediated asthma, chronic sinus issues, RONNIE, GERD presenting to the office today for f/u pulmonary evaluation. His chief complaint is knee pain and excess mucus production. \par -reports that he fees well. \par -reports some constipation. \par -has been sleeping well. sleeps for about 6 hours a night. \par -has been getting enough sleep. \par -sleep is restful. \par -denies palpitations. \par -had a heart flutter but he had a procedure done now everything is resolved. \par -walks as much as he could everyday. \par -reports some pain in the knee. \par -reports a lot of mucus production everyday. \par -has been using his sinus sprays. \par -he is no longer wheezing. \par -his PCP gave him Abx and Medrol Dose Pack for the sinuses which helped with the mucus production. \par -he believes that he is breathing much better. \par -denies much SOB when walking. \par -reports SOB if he runs. \par -does not run due to his knee. \par -He reports that He is not using any new medication, vitamins, or supplements. \par -He reports that He is taking their prescribed medications regularly. \par -he is currently taking Vitamin D3, Vitamin C, Potassium, and Zinc. \par -he thinks his sinuses are the issue with the mucus production. \par \par -He denies any chest pain, chest pressure, diarrhea, dysphagia, dizziness, sour taste in the mouth, leg swelling, itchy eyes, itchy ears, heartburn, reflux.

## 2020-10-28 NOTE — ASSESSMENT
[FreeTextEntry1] : Mr. ARMSTRONG is a 75 year old male with a history of CAD, s/p CABG x 4 11.15.2019, DM, Asthma, Allergies (severe/ Eosinophilia), HTN, carotid stenosis, RONNIE who presents into the office today for a f/u pulmonary evaluation. SOB (NC with medications) - s/p Fasenra initiation - improved.  \par \par \par The patient's shortness of breath is multifactorial due to:\par -pulmonary disease \par      -eosinophilic and IgE mediated asthma, chronic sinus issues, RONNIE, GERD\par -poor breathing mechanics \par -out of shape\par -?cardiac disease (s/p CABG, Dr. King)\par \par Problem 1: Severe Persistent Asthma (uncontrolled - NC)\par -continue Symbicort 160 2 inhalations BID\par -cotninue Xyflo CR - 1200 BID\par -continue Ventolin rescue inhaler 2 inhalations before exercise, Q6H \par - Add Aerobika to bring up mucous \par -Asthma is believed to be caused by inherited (genetic) and environmental factor, but its exact cause is unknown. Asthma may be triggered by allergens, lung infections, or irritants in the air. Asthma triggers are different for each person \par \par Problem 2:Eosinophilic Asthma (re-discussed) \par -s/p CBC (+)\par -Fasenra on board.\par \par \par Problem 3: IgE Elevated \par -Candidate for Xolair\par -Xolair is a recombinant DNA- derived humanized IgG1K monoclonal antibody that selectively binds to human immunoglobulin E (IgE). Xolair is produced by a Chinese hamster ovary cell suspension culture in nutrient medium containing the antibiotic gentamicin. Gentamicin is not detectable in the final product. Xolair is a sterile, white, preservative free, lyophilized powder contained in a single use vial that is reconstituted with sterile water for suspension. Side effects include: wheezing, tightness of the chest, trouble breathing, hives, skin rash, feeling anxious or light-headed, fainting, warmth or tingling under skin, or swelling of face, lips, or tongue \par \par Problem 4:Sinuses /Allergies\par -continue Olopatadine 0.6% at 1 sniff/nostril BID \par -continue Flonase 1 sniff each nostril BID \par -recommended Navage\par Environmental measures for allergies were encouraged including mattress and pillow cover, air purifier, and environmental controls. \par \par Problem 5: GERD \par -No medications Recommended at this time\par -Rule of 2s: avoid eating too much, eating too fast, eating too late, eating too spicy, eating too lousy, eating two hours before bed.\par -Things to avoid including overeating, spicy foods, tight clothing, eating within three hours of bed, this list is not all inclusive. \par -For treatment of reflux, possible options discussed including diet control, H2 blockers, PPIs, as well as coating motility agents discussed as treatment options. Timing of meals and proximity of last meal to sleep were discussed. If symptoms persist, a formal gastrointestinal evaluation is needed.\par \par Problem 6: RONNIE  (APAP 4 to 10 Cm of water)\par -continue CPAP Use (compliant and tolerates well)\par Sleep apnea is associated with adverse clinical consequences which an affect most organ systems. Cardiovascular disease risk includes arrhythmias, atrial fibrillation, hypertension, coronary artery disease, and stroke. Metabolic disorders include diabetes type 2, non-alcoholic fatty liver disease. Mood disorder especially depression; and cognitive decline especially in the elderly. Associations with chronic reflux/Thurston’s esophagus some but not all inclusive. \par -Reasons include arousal consistent with hypopnea; respiratory events most prominent in REM sleep or supine position; therefore sleep staging and body position are important for accurate diagnosis and estimation of AHI. \par \par Problem 7: Poor Mechanics of Breathing\par - Proper breathing techniques were reviewed with an emphasis of exhalation. Patient instructed to breath in for 1 second and out for four seconds. Patient was encouraged to not talk while walking. \par \par Problem 8: Out of Shape\par -exercise, and diet control were discussed and are highly encouraged. Treatment options were given such as, aqua therapy, and contacting a nutritionist. Recommended to use the elliptical, stationary bike, less use of treadmill. Mindful eating was explained to the patient Obesity is associated with worsening asthma, shortness of breath, and potential for cardiac disease, diabetes, and other underlying medical conditions. \par \par Problem 9: Cardiac (s/p CABG)- Afib\par -recommended to follow up with a cardiologist  for [facility]. \par \par Problem 10: Health Maintenance/COVID19 Precautions:\par - Clean your hands often. Wash your hands often with soap and water for at least 20 seconds, especially after blowing your nose, coughing, or sneezing, or having been in a public place.\par - If soap and water are not available, use a hand  that contains at least 60% alcohol.\par - To the extent possible, avoid touching high-touch surfaces in public places - elevator buttons, door handles, handrails, handshaking with people, etc. Use a tissue or your sleeve to cover your hand or finger if you must touch something.\par - Wash your hands after touching surfaces in public places.\par - Avoid touching your face, nose, eyes, etc.\par - Clean and disinfect your home to remove germs: practice routine cleaning of frequently touched surfaces (for example: tables, doorknobs, light switches, handles, desks, toilets, faucets, sinks & cell phones)\par - Avoid crowds, especially in poorly ventilated spaces. Your risk of exposure to respiratory viruses like COVID-19 may increase in crowded, closed-in settings with little air circulation if there are people in the crowd who are sick. All patients are recommended to practice social distancing and stay at least 6 feet away from others.\par - Avoid all non-essential travel including plane trips, and especially avoid embarking on cruise ships.\par -If COVID-19 is spreading in your community, take extra measures to put distance between yourself and other people to further reduce your risk of being exposed to this new virus.\par -Stay home as much as possible.\par - Consider ways of getting food brought to your house through family, social, or commercial networks\par -Be aware that the virus has been known to live in the air up to 3 hours post exposure, cardboard up to 24 hours post exposure, copper up to 4 hours post exposure, steel and plastic up to 2-3 days post exposure. Risk of transmission from these surfaces are affected by many variables.\par Immune Support Recommendations:\par -OTC Vitamin C 500mg BID \par -OTC Quercetin 250-500mg BID \par -OTC Zinc 75-100mg per day \par -OTC Melatonin 1 or 2 mg a night \par -OTC Vitamin D 1-4000mg per day \par -OTC Tonic Water 8oz per day\par Asthma and COVID19:\par You need to make sure your asthma is under control. This often requires the use of inhaled corticosteroids (and sometimes oral corticosteroids). Inhaled corticosteroids do not likely reduce your immune system’s ability to fight infections, but oral corticosteroids may. It is important to use the steps above to protect yourself to limit your exposure to any respiratory virus. \par \par Problem 11: health maintenance\par -s/p influenza vaccine 2020\par -recommended strep pneumonia vaccines after age 65: Prevnar-13 vaccine, followed by Pneumo vaccine 23 one year following (Completed) \par -recommended early intervention for URIs\par -recommended regular osteoporosis evaluations\par -recommended early dermatological evaluations\par -recommended after the age of 50 to the age of 70, colonoscopy every 5 years \par \par  Follow up in 6-8 weeks\par -he  is recommended to call with any changes, questions, or concerns.

## 2020-11-11 ENCOUNTER — APPOINTMENT (OUTPATIENT)
Dept: PULMONOLOGY | Facility: CLINIC | Age: 75
End: 2020-11-11
Payer: MEDICARE

## 2020-11-11 VITALS
DIASTOLIC BLOOD PRESSURE: 70 MMHG | BODY MASS INDEX: 26.03 KG/M2 | SYSTOLIC BLOOD PRESSURE: 128 MMHG | HEART RATE: 71 BPM | RESPIRATION RATE: 14 BRPM | TEMPERATURE: 96.8 F | OXYGEN SATURATION: 96 % | WEIGHT: 162 LBS | HEIGHT: 66 IN

## 2020-11-11 PROCEDURE — 96372 THER/PROPH/DIAG INJ SC/IM: CPT

## 2020-11-11 RX ORDER — BENRALIZUMAB 30 MG/ML
30 INJECTION, SOLUTION SUBCUTANEOUS
Qty: 0 | Refills: 0 | Status: COMPLETED | OUTPATIENT
Start: 2020-11-11

## 2020-11-12 ENCOUNTER — APPOINTMENT (OUTPATIENT)
Dept: ELECTROPHYSIOLOGY | Facility: CLINIC | Age: 75
End: 2020-11-12
Payer: MEDICARE

## 2020-11-12 PROCEDURE — 93298 REM INTERROG DEV EVAL SCRMS: CPT

## 2020-11-12 PROCEDURE — G2066: CPT

## 2020-12-14 ENCOUNTER — APPOINTMENT (OUTPATIENT)
Dept: ELECTROPHYSIOLOGY | Facility: CLINIC | Age: 75
End: 2020-12-14
Payer: MEDICARE

## 2020-12-14 PROCEDURE — 93298 REM INTERROG DEV EVAL SCRMS: CPT

## 2020-12-14 PROCEDURE — G2066: CPT

## 2020-12-21 ENCOUNTER — RX RENEWAL (OUTPATIENT)
Age: 75
End: 2020-12-21

## 2021-01-06 ENCOUNTER — APPOINTMENT (OUTPATIENT)
Dept: PULMONOLOGY | Facility: CLINIC | Age: 76
End: 2021-01-06
Payer: MEDICARE

## 2021-01-06 ENCOUNTER — NON-APPOINTMENT (OUTPATIENT)
Age: 76
End: 2021-01-06

## 2021-01-06 VITALS
HEIGHT: 66 IN | BODY MASS INDEX: 26.03 KG/M2 | HEART RATE: 72 BPM | WEIGHT: 162 LBS | TEMPERATURE: 97.3 F | DIASTOLIC BLOOD PRESSURE: 70 MMHG | SYSTOLIC BLOOD PRESSURE: 134 MMHG | RESPIRATION RATE: 16 BRPM | OXYGEN SATURATION: 97 %

## 2021-01-06 PROCEDURE — 99072 ADDL SUPL MATRL&STAF TM PHE: CPT

## 2021-01-06 PROCEDURE — 96372 THER/PROPH/DIAG INJ SC/IM: CPT

## 2021-01-19 ENCOUNTER — APPOINTMENT (OUTPATIENT)
Dept: ELECTROPHYSIOLOGY | Facility: CLINIC | Age: 76
End: 2021-01-19
Payer: MEDICARE

## 2021-01-19 PROCEDURE — 93298 REM INTERROG DEV EVAL SCRMS: CPT

## 2021-01-19 PROCEDURE — G2066: CPT

## 2021-01-27 ENCOUNTER — APPOINTMENT (OUTPATIENT)
Dept: PULMONOLOGY | Facility: CLINIC | Age: 76
End: 2021-01-27
Payer: MEDICARE

## 2021-01-27 VITALS
RESPIRATION RATE: 16 BRPM | TEMPERATURE: 97.2 F | BODY MASS INDEX: 27.16 KG/M2 | HEIGHT: 65 IN | WEIGHT: 163 LBS | HEART RATE: 89 BPM | OXYGEN SATURATION: 97 % | DIASTOLIC BLOOD PRESSURE: 64 MMHG | SYSTOLIC BLOOD PRESSURE: 140 MMHG

## 2021-01-27 PROCEDURE — 99214 OFFICE O/P EST MOD 30 MIN: CPT | Mod: 25

## 2021-01-27 PROCEDURE — 99072 ADDL SUPL MATRL&STAF TM PHE: CPT

## 2021-01-27 PROCEDURE — 94618 PULMONARY STRESS TESTING: CPT

## 2021-01-27 RX ORDER — TRAZODONE HYDROCHLORIDE 50 MG/1
50 TABLET ORAL
Qty: 30 | Refills: 5 | Status: ACTIVE | COMMUNITY
Start: 2021-01-27 | End: 1900-01-01

## 2021-01-27 NOTE — PHYSICAL EXAM
[No Acute Distress] : no acute distress [Well Nourished] : well nourished [No Deformities] : no deformities [Well Developed] : well developed [Normal Oropharynx] : normal oropharynx [III] : Mallampati Class: III [Normal Appearance] : normal appearance [No Neck Mass] : no neck mass [Normal Rate/Rhythm] : normal rate/rhythm [Normal S1, S2] : normal s1, s2 [No Murmurs] : no murmurs [No Resp Distress] : no resp distress [Clear to Auscultation Bilaterally] : clear to auscultation bilaterally [No Abnormalities] : no abnormalities [Benign] : benign [Normal Gait] : normal gait [No Clubbing] : no clubbing [No Cyanosis] : no cyanosis [No Edema] : no edema [FROM] : FROM [Normal Color/ Pigmentation] : normal color/ pigmentation [No Focal Deficits] : no focal deficits [Oriented x3] : oriented x3 [Normal Affect] : normal affect [TextBox_68] : I:E 1:3, mild expiratory wheeze

## 2021-01-27 NOTE — ASSESSMENT
[FreeTextEntry1] : Mr. ARMSTRONG is a 75 year old male with a history of CAD, s/p CABG x 4 11.15.2019, DM, Asthma, Allergies (severe/ Eosinophilia), HTN, carotid stenosis, RONNIE who presents into the office today for a f/u pulmonary evaluation. SOB (NC with medications) - s/p Fasenra initiation - improved.  \par \par The patient's shortness of breath is multifactorial due to:\par -pulmonary disease \par      -eosinophilic and IgE mediated asthma, chronic sinus issues, RONNIE, GERD\par -poor breathing mechanics \par -out of shape\par -?cardiac disease (s/p CABG, Dr. King)\par \par Problem 1: Severe Persistent Asthma (uncontrolled - NC)\par -continue Symbicort 160 2 inhalations BID\par -cotninue Xyflo CR - 1200 BID\par -continue Ventolin rescue inhaler 2 inhalations before exercise, Q6H \par - Add Aerobika to bring up mucous \par -Asthma is believed to be caused by inherited (genetic) and environmental factor, but its exact cause is unknown. Asthma may be triggered by allergens, lung infections, or irritants in the air. Asthma triggers are different for each person \par \par Problem 2:Eosinophilic Asthma (re-discussed) \par -s/p CBC (+)\par -Fasenra [id number]\par -The safety and efficacy of Nucala was established in three double-blind, randomized, placebo-controlled trials in patients with severe asthma. Compared to a placebo, patients with severe asthma receiving Nucala had fewer exacerbation requiring hospitalization and/or emergency department visits, and a longer time to first exacerbation. In addition, patients with severe asthma receiving Nucala or Fasenra experienced greater reductions in their daily maintenance oral corticosteroid dose, while maintaining asthma control compared with patients receiving placebo. Treatment with Nucala did not result in a significant improvement in lung function, as measured by the volume of air exhaled by patients in one second. The most common side effects include: headache, injection site reactions, back pain, weakness, and fatigue; hypersensitivity reactions can occur within hours or days including swelling of the face, mouth, and tongue, fainting, dizziness, hives, breathing problems, and rash; herpes zoster infections have occurred. The drug is a monoclonal antibody that inhibits interleukin-5 which helps regular eosinophils, a type of white blood cell that contributes to asthma. The over-production of eosinophils can cause inflammation in the lungs, increasing the frequency of asthma attacks. Patients must also take other medications, including high dose inhaled corticosteroids and at least one additional asthma drug.\par \par Problem 3: IgE Elevated \par -Candidate for Xolair\par -Xolair is a recombinant DNA- derived humanized IgG1K monoclonal antibody that selectively binds to human immunoglobulin E (IgE). Xolair is produced by a Chinese hamster ovary cell suspension culture in nutrient medium containing the antibiotic gentamicin. Gentamicin is not detectable in the final product. Xolair is a sterile, white, preservative free, lyophilized powder contained in a single use vial that is reconstituted with sterile water for suspension. Side effects include: wheezing, tightness of the chest, trouble breathing, hives, skin rash, feeling anxious or light-headed, fainting, warmth or tingling under skin, or swelling of face, lips, or tongue \par \par Problem 4:Sinuses /Allergies\par -continue Olopatadine 0.6% at 1 sniff/nostril BID \par -continue Flonase 1 sniff each nostril BID \par -recommended Navage\par Environmental measures for allergies were encouraged including mattress and pillow cover, air purifier, and environmental controls. \par \par Problem 5: GERD \par -No medications Recommended at this time\par -Rule of 2s: avoid eating too much, eating too fast, eating too late, eating too spicy, eating too lousy, eating two hours before bed.\par -Things to avoid including overeating, spicy foods, tight clothing, eating within three hours of bed, this list is not all inclusive. \par -For treatment of reflux, possible options discussed including diet control, H2 blockers, PPIs, as well as coating motility agents discussed as treatment options. Timing of meals and proximity of last meal to sleep were discussed. If symptoms persist, a formal gastrointestinal evaluation is needed.\par \par Problem 6: RONNIE  (APAP 4 to 10 Cm of water)\par -Add trial of Trazodone 50 mg QHS\par -continue CPAP Use (compliant and tolerates well)\par Sleep apnea is associated with adverse clinical consequences which an affect most organ systems. Cardiovascular disease risk includes arrhythmias, atrial fibrillation, hypertension, coronary artery disease, and stroke. Metabolic disorders include diabetes type 2, non-alcoholic fatty liver disease. Mood disorder especially depression; and cognitive decline especially in the elderly. Associations with chronic reflux/Thurston’s esophagus some but not all inclusive. \par -Reasons include arousal consistent with hypopnea; respiratory events most prominent in REM sleep or supine position; therefore sleep staging and body position are important for accurate diagnosis and estimation of AHI. \par \par Problem 7: Poor Mechanics of Breathing\par - Proper breathing techniques were reviewed with an emphasis of exhalation. Patient instructed to breath in for 1 second and out for four seconds. Patient was encouraged to not talk while walking. \par \par Problem 8: Out of Shape\par -exercise, and diet control were discussed and are highly encouraged. Treatment options were given such as, aqua therapy, and contacting a nutritionist. Recommended to use the elliptical, stationary bike, less use of treadmill. Mindful eating was explained to the patient Obesity is associated with worsening asthma, shortness of breath, and potential for cardiac disease, diabetes, and other underlying medical conditions. \par \par Problem 9: Cardiac (s/p CABG)- Afib\par -recommended to follow up with a cardiologist  for Ablation. \par \par Problem 10: Health Maintenance/COVID19 Precautions:\par - Clean your hands often. Wash your hands often with soap and water for at least 20 seconds, especially after blowing your nose, coughing, or sneezing, or having been in a public place.\par - If soap and water are not available, use a hand  that contains at least 60% alcohol.\par - To the extent possible, avoid touching high-touch surfaces in public places - elevator buttons, door handles, handrails, handshaking with people, etc. Use a tissue or your sleeve to cover your hand or finger if you must touch something.\par - Wash your hands after touching surfaces in public places.\par - Avoid touching your face, nose, eyes, etc.\par - Clean and disinfect your home to remove germs: practice routine cleaning of frequently touched surfaces (for example: tables, doorknobs, light switches, handles, desks, toilets, faucets, sinks & cell phones)\par - Avoid crowds, especially in poorly ventilated spaces. Your risk of exposure to respiratory viruses like COVID-19 may increase in crowded, closed-in settings with little air circulation if there are people in the crowd who are sick. All patients are recommended to practice social distancing and stay at least 6 feet away from others.\par - Avoid all non-essential travel including plane trips, and especially avoid embarking on cruise ships.\par -If COVID-19 is spreading in your community, take extra measures to put distance between yourself and other people to further reduce your risk of being exposed to this new virus.\par -Stay home as much as possible.\par - Consider ways of getting food brought to your house through family, social, or commercial networks\par -Be aware that the virus has been known to live in the air up to 3 hours post exposure, cardboard up to 24 hours post exposure, copper up to 4 hours post exposure, steel and plastic up to 2-3 days post exposure. Risk of transmission from these surfaces are affected by many variables.\par Immune Support Recommendations:\par -OTC Vitamin C 500mg BID \par -OTC Quercetin 250-500mg BID \par -OTC Zinc 75-100mg per day \par -OTC Melatonin 1 or 2 mg a night \par -OTC Vitamin D 1-4000mg per day \par -OTC Tonic Water 8oz per day\par Asthma and COVID19:\par You need to make sure your asthma is under control. This often requires the use of inhaled corticosteroids (and sometimes oral corticosteroids). Inhaled corticosteroids do not likely reduce your immune system’s ability to fight infections, but oral corticosteroids may. It is important to use the steps above to protect yourself to limit your exposure to any respiratory virus. \par \par Problem 11: health maintenance\par -s/p influenza vaccine 2020\par -recommended strep pneumonia vaccines after age 65: Prevnar-13 vaccine, followed by Pneumo vaccine 23 one year following (Completed) \par -recommended early intervention for URIs\par -recommended regular osteoporosis evaluations\par -recommended early dermatological evaluations\par -recommended after the age of 50 to the age of 70, colonoscopy every 5 years \par \par  Follow up in 6-8 weeks\par -he  is recommended to call with any changes, questions, or concerns.

## 2021-01-27 NOTE — REVIEW OF SYSTEMS
[Negative] : Endocrine [EDS] : eds [Wheezing] : wheezing [Chest Discomfort] : no chest discomfort [GERD] : no gerd [Diarrhea] : no diarrhea [Constipation] : no constipation [Dysphagia] : no dysphagia

## 2021-01-27 NOTE — HISTORY OF PRESENT ILLNESS
[TextBox_4] : Mr. ARMSTRONG is a 75 year old male with a history of  eosinophilic and IgE mediated asthma, chronic sinus issues, RONNIE, GERD presenting to the office today for f/u pulmonary evaluation. His chief complaint is poor sleep\par -he reports feeling generally well\par -he notes his asthma is usually bad during the winter but he is doing well this year\par .bow\par -he reports he is sleeping well on some nights, but not well on others. He is not getting enough sleep. He used to fall asleep at 12 and wake up at 7-8 AM, but not anymore. He has difficulty initiating sleep, and on some days it may take him 3-4 hours to fall asleep\par -he states his weight is stable\par -he reports his sinuses are clear with use of nasal spray\par -he states his A1C is 6.1\par -he notes he is wheezing while on Symbicort, but not on Flovent\par -he denies any palpitations, chest pain, chest pressure, diarrhea, constipation, dysphagia, dizziness, sour taste in the mouth, leg swelling, leg pain, itchy eyes, itchy ears, heartburn, reflux, myalgias or arthralgias.

## 2021-01-27 NOTE — ADDENDUM
[FreeTextEntry1] : Documented by Giles Boyd acting as a scribe for Dr. Escobar Ryder on 01/27/2021.\par \par All medical record entries made by the Scribe were at my, Dr. Escobar Ryder's, direction and personally dictated by me on 01/27/2021. I have reviewed the chart and agree that the record accurately reflects my personal performance of the history, physical exam, assessment and plan. I have also personally directed, reviewed, and agree with the discharge instructions.

## 2021-01-27 NOTE — PROCEDURE
[FreeTextEntry1] : 6 minute walk test reveals a low saturation of 93% with mild dyspnea; walked 423.8 meters

## 2021-02-02 RX ORDER — APIXABAN 5 MG/1
5 TABLET, FILM COATED ORAL
Qty: 30 | Refills: 1 | Status: DISCONTINUED | COMMUNITY
Start: 2020-08-10 | End: 2021-02-02

## 2021-02-12 ENCOUNTER — APPOINTMENT (OUTPATIENT)
Dept: CT IMAGING | Facility: CLINIC | Age: 76
End: 2021-02-12

## 2021-02-19 ENCOUNTER — APPOINTMENT (OUTPATIENT)
Dept: CT IMAGING | Facility: CLINIC | Age: 76
End: 2021-02-19
Payer: MEDICARE

## 2021-02-19 ENCOUNTER — OUTPATIENT (OUTPATIENT)
Dept: OUTPATIENT SERVICES | Facility: HOSPITAL | Age: 76
LOS: 1 days | End: 2021-02-19
Payer: MEDICARE

## 2021-02-19 DIAGNOSIS — Z00.8 ENCOUNTER FOR OTHER GENERAL EXAMINATION: ICD-10-CM

## 2021-02-19 DIAGNOSIS — Z98.890 OTHER SPECIFIED POSTPROCEDURAL STATES: Chronic | ICD-10-CM

## 2021-02-19 DIAGNOSIS — Z95.1 PRESENCE OF AORTOCORONARY BYPASS GRAFT: Chronic | ICD-10-CM

## 2021-02-19 PROCEDURE — 70498 CT ANGIOGRAPHY NECK: CPT

## 2021-02-19 PROCEDURE — 70498 CT ANGIOGRAPHY NECK: CPT | Mod: 26

## 2021-02-19 PROCEDURE — 82565 ASSAY OF CREATININE: CPT

## 2021-02-22 ENCOUNTER — APPOINTMENT (OUTPATIENT)
Dept: ELECTROPHYSIOLOGY | Facility: CLINIC | Age: 76
End: 2021-02-22
Payer: MEDICARE

## 2021-02-22 ENCOUNTER — NON-APPOINTMENT (OUTPATIENT)
Age: 76
End: 2021-02-22

## 2021-02-22 PROCEDURE — 93298 REM INTERROG DEV EVAL SCRMS: CPT

## 2021-02-22 PROCEDURE — G2066: CPT

## 2021-03-03 ENCOUNTER — APPOINTMENT (OUTPATIENT)
Dept: PULMONOLOGY | Facility: CLINIC | Age: 76
End: 2021-03-03
Payer: MEDICARE

## 2021-03-03 VITALS
TEMPERATURE: 97.9 F | HEIGHT: 65 IN | HEART RATE: 73 BPM | SYSTOLIC BLOOD PRESSURE: 140 MMHG | BODY MASS INDEX: 27.49 KG/M2 | RESPIRATION RATE: 17 BRPM | DIASTOLIC BLOOD PRESSURE: 60 MMHG | OXYGEN SATURATION: 97 % | WEIGHT: 165 LBS

## 2021-03-03 PROCEDURE — 96372 THER/PROPH/DIAG INJ SC/IM: CPT

## 2021-03-03 PROCEDURE — 99072 ADDL SUPL MATRL&STAF TM PHE: CPT

## 2021-03-03 RX ORDER — BENRALIZUMAB 30 MG/ML
30 INJECTION, SOLUTION SUBCUTANEOUS
Qty: 0 | Refills: 0 | Status: COMPLETED | OUTPATIENT
Start: 2021-03-03

## 2021-03-22 ENCOUNTER — LABORATORY RESULT (OUTPATIENT)
Age: 76
End: 2021-03-22

## 2021-03-26 ENCOUNTER — APPOINTMENT (OUTPATIENT)
Dept: PULMONOLOGY | Facility: CLINIC | Age: 76
End: 2021-03-26
Payer: MEDICARE

## 2021-03-26 ENCOUNTER — NON-APPOINTMENT (OUTPATIENT)
Age: 76
End: 2021-03-26

## 2021-03-26 VITALS
SYSTOLIC BLOOD PRESSURE: 120 MMHG | HEART RATE: 71 BPM | DIASTOLIC BLOOD PRESSURE: 60 MMHG | WEIGHT: 164 LBS | HEIGHT: 65 IN | RESPIRATION RATE: 16 BRPM | OXYGEN SATURATION: 97 % | TEMPERATURE: 97 F | BODY MASS INDEX: 27.32 KG/M2

## 2021-03-26 PROCEDURE — ZZZZZ: CPT

## 2021-03-26 PROCEDURE — 94010 BREATHING CAPACITY TEST: CPT

## 2021-03-26 PROCEDURE — 95012 NITRIC OXIDE EXP GAS DETER: CPT

## 2021-03-26 PROCEDURE — 99072 ADDL SUPL MATRL&STAF TM PHE: CPT

## 2021-03-26 PROCEDURE — 99214 OFFICE O/P EST MOD 30 MIN: CPT | Mod: 25

## 2021-03-26 NOTE — PHYSICAL EXAM
[No Acute Distress] : no acute distress [Well Nourished] : well nourished [No Deformities] : no deformities [Well Developed] : well developed [Normal Oropharynx] : normal oropharynx [III] : Mallampati Class: III [Normal Appearance] : normal appearance [No Neck Mass] : no neck mass [Normal Rate/Rhythm] : normal rate/rhythm [Normal S1, S2] : normal s1, s2 [No Murmurs] : no murmurs [No Resp Distress] : no resp distress [Clear to Auscultation Bilaterally] : clear to auscultation bilaterally [No Abnormalities] : no abnormalities [Benign] : benign [Normal Gait] : normal gait [No Clubbing] : no clubbing [No Cyanosis] : no cyanosis [No Edema] : no edema [FROM] : FROM [Normal Color/ Pigmentation] : normal color/ pigmentation [No Focal Deficits] : no focal deficits [Oriented x3] : oriented x3 [Normal Affect] : normal affect [TextBox_68] : I:E 1:3, mild forced expiratory wheeze

## 2021-03-26 NOTE — HISTORY OF PRESENT ILLNESS
[TextBox_4] : Mr. ARMSTRONG is a 75 year old male with a history of  eosinophilic and IgE mediated asthma, chronic sinus issues, RONNIE, GERD presenting to the office today for f/u pulmonary evaluation. His chief complaint is\par - his energy levels have been good \par - he notes in the last 3 days he did 10 thousand steps \par - bowel movements are regular \par - no dizziness \par - no swallowing issues \par - no palpitations\par - he notes sometimes he feels some pain in his eyes that comes and goes \par - he notes he tries to sleep at least 7 hours a day \par - he notes he does not sleep until 2am in the morning. \par - he notes his weight has been good \par - his energy level has been okay \par - he notes his sinuses have been good, he irrigates it everyday\par - he has been taking Vitamin B12 \par - he blood sugar has been okay \par - he is s/p COVID-19 vaccine Pfizer \par - he notes he has a 79% blockage in the right paroted\par - he has been on Fasenra and tolerating it well  \par - He  denies any visual issues, headaches, nausea, vomiting, fever, chills, sweats, chest pains, chest pressure, diarrhea, constipation, dysphagia, myalgia, dizziness, leg swelling, leg pain, itchy eyes, itchy ears, heartburn, reflux, or sour taste in the mouth.\par

## 2021-03-26 NOTE — ASSESSMENT
[FreeTextEntry1] : Mr. ARMSTRONG is a 75 year old male with a history of CAD, s/p CABG x 4 11.15.2019, DM, Asthma, Allergies (severe/ Eosinophilia), HTN, carotid stenosis, RONNIE who presents into the office today for a f/u pulmonary evaluation. SOB (NC with medications) - s/p Fasenra initiation - improved.  (Still NC w/ other meds)\par \par The patient's shortness of breath is multifactorial due to:\par -pulmonary disease \par      -eosinophilic and IgE mediated asthma, chronic sinus issues, RONNIE, GERD\par -poor breathing mechanics \par -out of shape\par -?cardiac disease (s/p CABG, Dr. King)\par \par Problem 1: Severe Persistent Asthma (uncontrolled - NC)\par -continue Symbicort 160 2 inhalations BID\par -cotninue Xyflo CR - 1200 BID\par -continue Ventolin rescue inhaler 2 inhalations before exercise, Q6H \par - Add Aerobika to bring up mucous \par -Asthma is believed to be caused by inherited (genetic) and environmental factor, but its exact cause is unknown. Asthma may be triggered by allergens, lung infections, or irritants in the air. Asthma triggers are different for each person \par \par Problem 2:Eosinophilic Asthma (re-discussed) - controlled \par -s/p CBC (+)\par -Fasenra [id number] (since 9/2020)\par -The safety and efficacy of Nucala was established in three double-blind, randomized, placebo-controlled trials in patients with severe asthma. Compared to a placebo, patients with severe asthma receiving Nucala had fewer exacerbation requiring hospitalization and/or emergency department visits, and a longer time to first exacerbation. In addition, patients with severe asthma receiving Nucala or Fasenra experienced greater reductions in their daily maintenance oral corticosteroid dose, while maintaining asthma control compared with patients receiving placebo. Treatment with Nucala did not result in a significant improvement in lung function, as measured by the volume of air exhaled by patients in one second. The most common side effects include: headache, injection site reactions, back pain, weakness, and fatigue; hypersensitivity reactions can occur within hours or days including swelling of the face, mouth, and tongue, fainting, dizziness, hives, breathing problems, and rash; herpes zoster infections have occurred. The drug is a monoclonal antibody that inhibits interleukin-5 which helps regular eosinophils, a type of white blood cell that contributes to asthma. The over-production of eosinophils can cause inflammation in the lungs, increasing the frequency of asthma attacks. Patients must also take other medications, including high dose inhaled corticosteroids and at least one additional asthma drug.\par \par Problem 3: IgE Elevated \par -Candidate for Xolair\par -Xolair is a recombinant DNA- derived humanized IgG1K monoclonal antibody that selectively binds to human immunoglobulin E (IgE). Xolair is produced by a Chinese hamster ovary cell suspension culture in nutrient medium containing the antibiotic gentamicin. Gentamicin is not detectable in the final product. Xolair is a sterile, white, preservative free, lyophilized powder contained in a single use vial that is reconstituted with sterile water for suspension. Side effects include: wheezing, tightness of the chest, trouble breathing, hives, skin rash, feeling anxious or light-headed, fainting, warmth or tingling under skin, or swelling of face, lips, or tongue \par \par Problem 4:Sinuses /Allergies\par -continue Olopatadine 0.6% at 1 sniff/nostril BID \par -continue Flonase 1 sniff each nostril BID \par -recommended Navage\par Environmental measures for allergies were encouraged including mattress and pillow cover, air purifier, and environmental controls. \par \par Problem 5: GERD \par -No medications Recommended at this time\par -Rule of 2s: avoid eating too much, eating too fast, eating too late, eating too spicy, eating too lousy, eating two hours before bed.\par -Things to avoid including overeating, spicy foods, tight clothing, eating within three hours of bed, this list is not all inclusive. \par -For treatment of reflux, possible options discussed including diet control, H2 blockers, PPIs, as well as coating motility agents discussed as treatment options. Timing of meals and proximity of last meal to sleep were discussed. If symptoms persist, a formal gastrointestinal evaluation is needed.\par \par Problem 6: RONNIE  (APAP 4 to 10 Cm of water)\par -continue  Trazodone 50 mg QHS\par -continue CPAP Use (compliant and tolerates well)\par Sleep apnea is associated with adverse clinical consequences which an affect most organ systems. Cardiovascular disease risk includes arrhythmias, atrial fibrillation, hypertension, coronary artery disease, and stroke. Metabolic disorders include diabetes type 2, non-alcoholic fatty liver disease. Mood disorder especially depression; and cognitive decline especially in the elderly. Associations with chronic reflux/Thurston’s esophagus some but not all inclusive. \par -Reasons include arousal consistent with hypopnea; respiratory events most prominent in REM sleep or supine position; therefore sleep staging and body position are important for accurate diagnosis and estimation of AHI. \par \par Problem 7: Poor Mechanics of Breathing\par - Proper breathing techniques were reviewed with an emphasis of exhalation. Patient instructed to breath in for 1 second and out for four seconds. Patient was encouraged to not talk while walking. \par \par Problem 8: Out of Shape\par -exercise, and diet control were discussed and are highly encouraged. Treatment options were given such as, aqua therapy, and contacting a nutritionist. Recommended to use the elliptical, stationary bike, less use of treadmill. Mindful eating was explained to the patient Obesity is associated with worsening asthma, shortness of breath, and potential for cardiac disease, diabetes, and other underlying medical conditions. \par \par Problem 9: Cardiac (s/p CABG)- Afib\par -recommended to follow up with a cardiologist  for Ablation. \par \par Problem 10: Health Maintenance/COVID19 Precautions:- s/p both COVID-19 vaccines (Pfizer)\par - Clean your hands often. Wash your hands often with soap and water for at least 20 seconds, especially after blowing your nose, coughing, or sneezing, or having been in a public place.\par - If soap and water are not available, use a hand  that contains at least 60% alcohol.\par - To the extent possible, avoid touching high-touch surfaces in public places - elevator buttons, door handles, handrails, handshaking with people, etc. Use a tissue or your sleeve to cover your hand or finger if you must touch something.\par - Wash your hands after touching surfaces in public places.\par - Avoid touching your face, nose, eyes, etc.\par - Clean and disinfect your home to remove germs: practice routine cleaning of frequently touched surfaces (for example: tables, doorknobs, light switches, handles, desks, toilets, faucets, sinks & cell phones)\par - Avoid crowds, especially in poorly ventilated spaces. Your risk of exposure to respiratory viruses like COVID-19 may increase in crowded, closed-in settings with little air circulation if there are people in the crowd who are sick. All patients are recommended to practice social distancing and stay at least 6 feet away from others.\par - Avoid all non-essential travel including plane trips, and especially avoid embarking on cruise ships.\par -If COVID-19 is spreading in your community, take extra measures to put distance between yourself and other people to further reduce your risk of being exposed to this new virus.\par -Stay home as much as possible.\par - Consider ways of getting food brought to your house through family, social, or commercial networks\par -Be aware that the virus has been known to live in the air up to 3 hours post exposure, cardboard up to 24 hours post exposure, copper up to 4 hours post exposure, steel and plastic up to 2-3 days post exposure. Risk of transmission from these surfaces are affected by many variables.\par Immune Support Recommendations:\par -OTC Vitamin C 500mg BID \par -OTC Quercetin 250-500mg BID \par -OTC Zinc 75-100mg per day \par -OTC Melatonin 1 or 2 mg a night \par -OTC Vitamin D 1-4000mg per day \par -OTC Tonic Water 8oz per day\par Asthma and COVID19:\par You need to make sure your asthma is under control. This often requires the use of inhaled corticosteroids (and sometimes oral corticosteroids). Inhaled corticosteroids do not likely reduce your immune system’s ability to fight infections, but oral corticosteroids may. It is important to use the steps above to protect yourself to limit your exposure to any respiratory virus. \par \par Problem 11: health maintenance\par -s/p influenza vaccine 2020\par -recommended strep pneumonia vaccines after age 65: Prevnar-13 vaccine, followed by Pneumo vaccine 23 one year following (Completed) \par -recommended early intervention for URIs\par -recommended regular osteoporosis evaluations\par -recommended early dermatological evaluations\par -recommended after the age of 50 to the age of 70, colonoscopy every 5 years \par \par  Follow up in 6-8 weeks\par -he  is recommended to call with any changes, questions, or concerns.

## 2021-03-26 NOTE — PROCEDURE
[FreeTextEntry1] : CT angio neck (2/23/2021) reveals severe stenosis is seen involving the proximal right internal carotid artery. Mild narrowing of the distal right common carotid artery seen. \par \par PFT revealed normal flows, with a FEV1 of 1.58L, which is 75% of predicted, with a normal flow volume loop\par \par  FENO was 25 ; normal value being less than 25\par Fractional exhaled nitric oxide (FENO) is regarded as a simple, noninvasive method for assessing eosinophilic airway inflammation. Produced by a variety of cells within the lung, nitric oxide (NO) concentrations are generally low in healthy individuals. However, high concentrations of NO appear to be involved in nonspecific host defense mechanisms and chronic inflammatory diseases such as asthma. The American Thor[employer] Society (ATS) therefore has recommended using FENO to aid in the diagnosis and monitoring of eosinophilic airway inflammation and asthma, and for identifying steroid responsive individuals whose chronic respiratory symptoms may be airway inflammation.\par

## 2021-03-26 NOTE — ADDENDUM
[FreeTextEntry1] : Documented by Macie Dugan acting as a scribe for Dr. Escobar Ryder on 03/26/2021 \par \par All medical record entries made by the Scribe were at my, Dr. Escobar Ryder's, direction and personally dictated by me on 03/26/2021 . I have reviewed the chart and agree that the record accurately reflects my personal performance of the history, physical exam, assessment and plan. I have also personally directed, reviewed, and agree with the discharge instructions.

## 2021-03-29 ENCOUNTER — NON-APPOINTMENT (OUTPATIENT)
Age: 76
End: 2021-03-29

## 2021-03-29 ENCOUNTER — APPOINTMENT (OUTPATIENT)
Dept: ELECTROPHYSIOLOGY | Facility: CLINIC | Age: 76
End: 2021-03-29
Payer: MEDICARE

## 2021-03-29 PROCEDURE — G2066: CPT

## 2021-03-29 PROCEDURE — 93298 REM INTERROG DEV EVAL SCRMS: CPT

## 2021-04-06 NOTE — DISCHARGE NOTE ADULT - PROVIDER TOKENS
Assessment and Plan:   Presents in office for follow up acute issues and medicare wellness     Problem List Items Addressed This Visit        Other    Personal history of colonic polyps    Relevant Orders    Ambulatory referral to Gastroenterology    Autism spectrum      Other Visit Diagnoses     Increased BMI    -  Primary    Relevant Orders    Comprehensive metabolic panel    TSH, 3rd generation    Lipid panel    UA (URINE) with reflex to Scope    CBC and differential    Mixed hyperlipidemia        Relevant Orders    Comprehensive metabolic panel    TSH, 3rd generation    Lipid panel    UA (URINE) with reflex to Scope    Smoker        Relevant Orders    Comprehensive metabolic panel    TSH, 3rd generation    Lipid panel    UA (URINE) with reflex to Scope    CBC and differential    Ambulatory referral to Gastroenterology    Skin infection        recurrent nasal cavity skin infections   Will update TDAP   last infection 2 weeks ago     Relevant Orders    TDAP Vaccine greater than or equal to 8yo    Encounter for immunization        Relevant Orders    TDAP Vaccine greater than or equal to 8yo    PNEUMOCOCCAL POLYSACCHARIDE VACCINE 23-VALENT =>3YO SQ IM    Medicare annual wellness visit, subsequent            BMI Counseling: Body mass index is 22 88 kg/m²  The BMI is above normal  Nutrition recommendations include decreasing portion sizes, encouraging healthy choices of fruits and vegetables, decreasing fast food intake, consuming healthier snacks, limiting drinks that contain sugar, moderation in carbohydrate intake, increasing intake of lean protein, reducing intake of saturated and trans fat and reducing intake of cholesterol  Exercise recommendations include vigorous physical activity 75 minutes/week, exercising 3-5 times per week and strength training exercises  No pharmacotherapy was ordered   Patient referred to PCP BMI 22 88      Depression Screening and Follow-up Plan: Patient's depression screening was positive with a PHQ-2 score of 2  Clincally patient does not have depression  No treatment is required  Tobacco Cessation Counseling: Tobacco cessation counseling was provided  The patient is sincerely urged to quit consumption of tobacco  He is not ready to quit tobacco  Medication options discussed  Side effects of medication not discussed  Patient agreed to medication  Will address once ready to quit     Preventive health issues were discussed with patient, and age appropriate screening tests were ordered as noted in patient's After Visit Summary  Personalized health advice and appropriate referrals for health education or preventive services given if needed, as noted in patient's After Visit Summary  History of Present Illness:     Patient presents for Medicare Annual Wellness visit    Patient Care Team:  Huang Segura DO as PCP - General (Family Medicine)  Coreen Tee MD (Colon and Rectal Surgery)  Coreen Tee MD as Endoscopist     Problem List:     Patient Active Problem List   Diagnosis    Rectal prolapse    Rectal bleed    Personal history of colonic polyps    Environmental and seasonal allergies    Autism spectrum      Past Medical and Surgical History:     Past Medical History:   Diagnosis Date    Asperger's syndrome     GERD (gastroesophageal reflux disease)     Rectal prolapse     Rectal prolapse 5/9/2018     Past Surgical History:   Procedure Laterality Date    HERNIA REPAIR      AZ COLONOSCOPY FLX DX W/COLLJ SPEC WHEN PFRMD N/A 5/7/2018    Procedure: COLONOSCOPY;  Surgeon: Coreen Tee MD;  [facility]: [facility];   Service: Colorectal    AZ LAP, SURG PROCTOPEXY W/SIG RESECT N/A 5/8/2018    Procedure: LAPAROSCOPIC HAND-ASSIST REPAIR OF RECTAL PROLAPSE BY SACRAL  Darliss Massing;  Surgeon: Coreen Tee MD;  [facility];  Service: Colorectal    AZ SIGMOIDOSCOPY FLX DX W/COLLJ SPEC BR/WA IF PFRMD N/A 6/19/2018    Procedure: SIGMOIDOSCOPY FLEXIBLE; Surgeon: Lior Jackson MD;  Location:  GI LAB;   Service: Colorectal    SURGERY SCROTAL / TESTICULAR        Family History:     Family History   Family history unknown: Yes      Social History:     E-Cigarette/Vaping    E-Cigarette Use Never User      E-Cigarette/Vaping Substances    Nicotine No     THC No     CBD No     Flavoring No     Other No     Unknown No      Social History     Socioeconomic History    Marital status: Single     Spouse name: None    Number of children: None    Years of education: None    Highest education level: None   Occupational History    None   Social Needs    Financial resource strain: None    Food insecurity     Worry: None     Inability: None    Transportation needs     Medical: None     Non-medical: None   Tobacco Use    Smoking status: Current Every Day Smoker     Packs/day: 0 50    Smokeless tobacco: Never Used   Substance and Sexual Activity    Alcohol use: No    Drug use: No    Sexual activity: None   Lifestyle    Physical activity     Days per week: None     Minutes per session: None    Stress: None   Relationships    Social connections     Talks on phone: None     Gets together: None     Attends Mandaen service: None     Active member of club or organization: None     Attends meetings of clubs or organizations: None     Relationship status: None    Intimate partner violence     Fear of current or ex partner: None     Emotionally abused: None     Physically abused: None     Forced sexual activity: None   Other Topics Concern    None   Social History Narrative    · Most recent tobacco use screenin2019       Medications and Allergies:     Current Outpatient Medications   Medication Sig Dispense Refill    diphenhydrAMINE HCl (ELIAN-SELTZER PLUS ALLERGY PO) Take by mouth      DM-Doxylamine-Acetaminophen (NYQUIL COLD & FLU PO) Take by mouth      mupirocin (BACTROBAN) 2 % ointment Apply topically 3 (three) times a day 22 g 0    nystatin (MYCOSTATIN) ointment Apply topically 2 (two) times a day 30 g 0     No current facility-administered medications for this visit  No Known Allergies   Immunizations:     Immunization History   Administered Date(s) Administered    INFLUENZA 02/16/2010, 11/12/2019      Health Maintenance:         Topic Date Due    Colonoscopy Surveillance  05/24/2020    HIV Screening  Completed    Hepatitis C Screening  Completed         Topic Date Due    Pneumococcal Vaccine: Pediatrics (0 to 5 Years) and At-Risk Patients (6 to 59 Years) (1 of 1 - PPSV23) Never done    COVID-19 Vaccine (1) Never done    DTaP,Tdap,and Td Vaccines (1 - Tdap) Never done      Medicare Health Risk Assessment:     /68 (BP Location: Left arm, Patient Position: Sitting, Cuff Size: Standard)   Pulse 59   Temp (!) 97 1 °F (36 2 °C) (Temporal)   Resp 16   Ht 6' 2" (1 88 m)   Wt 80 8 kg (178 lb 3 2 oz)   SpO2 98%   BMI 22 88 kg/m²      Magy Pena is here for his Subsequent Wellness visit  Health Risk Assessment:   Patient rates overall health as excellent  Patient feels that their physical health rating is much better  Patient is very satisfied with their life  Eyesight was rated as same  Hearing was rated as same  Patient feels that their emotional and mental health rating is same  Patients states they are never, rarely angry  Patient states they are never, rarely unusually tired/fatigued  Pain experienced in the last 7 days has been none  Patient states that he has experienced weight loss or gain in last 6 months  Depression Screening:   PHQ-2 Score: 2      Fall Risk Screening: In the past year, patient has experienced: no history of falling in past year      Home Safety:  Patient does not have trouble with stairs inside or outside of their home  Patient has working smoke alarms and has no working carbon monoxide detector  Home safety hazards include: none  Nutrition:   Current diet is Regular       Medications:   Patient is not currently taking any over-the-counter supplements  Patient is able to manage medications  Activities of Daily Living (ADLs)/Instrumental Activities of Daily Living (IADLs):   Walk and transfer into and out of bed and chair?: Yes  Dress and groom yourself?: Yes    Bathe or shower yourself?: Yes    Feed yourself? Yes  Do your laundry/housekeeping?: Yes  Manage your money, pay your bills and track your expenses?: Yes  Make your own meals?: Yes    Do your own shopping?: Yes    Previous Hospitalizations:   Any hospitalizations or ED visits within the last 12 months?: Yes    How many hospitalizations have you had in the last year?: 1-2    Advance Care Planning:   Living will: No    Advanced directive counseling given: Yes    Five wishes given: Yes      Cognitive Screening:   Provider or family/friend/caregiver concerned regarding cognition?: No    PREVENTIVE SCREENINGS      Cardiovascular Screening:    General: Screening Not Indicated and History Lipid Disorder      Diabetes Screening:     General: Screening Current      Prostate Cancer Screening:    General: Screening Not Indicated      Abdominal Aortic Aneurysm (AAA) Screening:    Risk factors include: tobacco use        Lung Cancer Screening:     General: Screening Not Indicated      Hepatitis C Screening:    General: Screening Current    Screening, Brief Intervention, and Referral to Treatment (SBIRT)    Screening  Typical number of drinks in a day: 0  Typical number of drinks in a week: 0  Interpretation: Low risk drinking behavior  Single Item Drug Screening:  How often have you used an illegal drug (including marijuana) or a prescription medication for non-medical reasons in the past year? never    Single Item Drug Screen Score: 0  Interpretation: Negative screen for possible drug use disorder    Other Counseling Topics:   Car/seat belt/driving safety, skin self-exam, sunscreen and calcium and vitamin D intake and regular weightbearing exercise  Melisa Boast, CRNP TOKLEONEL:'[id number]'

## 2021-04-28 ENCOUNTER — APPOINTMENT (OUTPATIENT)
Dept: PULMONOLOGY | Facility: CLINIC | Age: 76
End: 2021-04-28
Payer: MEDICARE

## 2021-04-28 VITALS
BODY MASS INDEX: 27.49 KG/M2 | DIASTOLIC BLOOD PRESSURE: 60 MMHG | WEIGHT: 165 LBS | RESPIRATION RATE: 16 BRPM | TEMPERATURE: 97 F | HEART RATE: 67 BPM | SYSTOLIC BLOOD PRESSURE: 112 MMHG | HEIGHT: 65 IN

## 2021-04-28 PROCEDURE — 96372 THER/PROPH/DIAG INJ SC/IM: CPT

## 2021-04-28 PROCEDURE — 99072 ADDL SUPL MATRL&STAF TM PHE: CPT

## 2021-04-28 RX ORDER — BENRALIZUMAB 30 MG/ML
30 INJECTION, SOLUTION SUBCUTANEOUS
Qty: 0 | Refills: 0 | Status: COMPLETED | OUTPATIENT
Start: 2021-04-28

## 2021-05-03 ENCOUNTER — APPOINTMENT (OUTPATIENT)
Dept: ELECTROPHYSIOLOGY | Facility: CLINIC | Age: 76
End: 2021-05-03
Payer: MEDICARE

## 2021-05-03 ENCOUNTER — NON-APPOINTMENT (OUTPATIENT)
Age: 76
End: 2021-05-03

## 2021-05-03 PROCEDURE — G2066: CPT

## 2021-05-03 PROCEDURE — 93298 REM INTERROG DEV EVAL SCRMS: CPT

## 2021-06-07 ENCOUNTER — NON-APPOINTMENT (OUTPATIENT)
Age: 76
End: 2021-06-07

## 2021-06-07 ENCOUNTER — APPOINTMENT (OUTPATIENT)
Dept: ELECTROPHYSIOLOGY | Facility: CLINIC | Age: 76
End: 2021-06-07
Payer: MEDICARE

## 2021-06-07 PROCEDURE — G2066: CPT

## 2021-06-07 PROCEDURE — 93298 REM INTERROG DEV EVAL SCRMS: CPT

## 2021-06-23 ENCOUNTER — APPOINTMENT (OUTPATIENT)
Dept: PULMONOLOGY | Facility: CLINIC | Age: 76
End: 2021-06-23

## 2021-06-23 ENCOUNTER — APPOINTMENT (OUTPATIENT)
Dept: PULMONOLOGY | Facility: CLINIC | Age: 76
End: 2021-06-23
Payer: MEDICARE

## 2021-06-23 VITALS
WEIGHT: 165 LBS | SYSTOLIC BLOOD PRESSURE: 140 MMHG | DIASTOLIC BLOOD PRESSURE: 70 MMHG | RESPIRATION RATE: 16 BRPM | OXYGEN SATURATION: 97 % | HEART RATE: 78 BPM | BODY MASS INDEX: 27.49 KG/M2 | TEMPERATURE: 97.6 F | HEIGHT: 65 IN

## 2021-06-23 PROCEDURE — 96372 THER/PROPH/DIAG INJ SC/IM: CPT

## 2021-06-23 PROCEDURE — 99072 ADDL SUPL MATRL&STAF TM PHE: CPT

## 2021-06-23 RX ORDER — BENRALIZUMAB 30 MG/ML
30 INJECTION, SOLUTION SUBCUTANEOUS
Qty: 0 | Refills: 0 | Status: COMPLETED | OUTPATIENT
Start: 2021-06-23

## 2021-07-12 ENCOUNTER — APPOINTMENT (OUTPATIENT)
Dept: ELECTROPHYSIOLOGY | Facility: CLINIC | Age: 76
End: 2021-07-12
Payer: MEDICARE

## 2021-07-12 ENCOUNTER — NON-APPOINTMENT (OUTPATIENT)
Age: 76
End: 2021-07-12

## 2021-07-12 PROCEDURE — 93298 REM INTERROG DEV EVAL SCRMS: CPT

## 2021-07-12 PROCEDURE — G2066: CPT

## 2021-07-21 ENCOUNTER — RX CHANGE (OUTPATIENT)
Age: 76
End: 2021-07-21

## 2021-07-22 ENCOUNTER — RX CHANGE (OUTPATIENT)
Age: 76
End: 2021-07-22

## 2021-07-28 RX ORDER — ZILEUTON 600 MG/1
600 TABLET, FILM COATED, EXTENDED RELEASE ORAL
Qty: 360 | Refills: 1 | Status: DISCONTINUED | COMMUNITY
Start: 2020-07-14 | End: 2021-07-28

## 2021-08-02 ENCOUNTER — APPOINTMENT (OUTPATIENT)
Dept: VASCULAR SURGERY | Facility: CLINIC | Age: 76
End: 2021-08-02
Payer: MEDICARE

## 2021-08-02 PROCEDURE — 93880 EXTRACRANIAL BILAT STUDY: CPT

## 2021-08-02 PROCEDURE — 99212 OFFICE O/P EST SF 10 MIN: CPT

## 2021-08-02 NOTE — ASSESSMENT
[FreeTextEntry1] : 77 yo male with history of carotid stenosis s/p left cea, cad s/p cabg, afib now on a/c, htn, presents for follow up without any complaints \par \par carotid duplex shows severe right sided carotid stenosis ranging 80-99% \par pt was lost to follow up and went to cardiology for second opinion cta was completed in october and also showed severe stenosis documented as 79% \par \par pt was advised to schedule carotid endarterectomy during last visit and recommendation was reiterated today\par pt is aware of his risk of stroke if stenosis is left untreated \par will add plavix to asa and statin \par pt schedule to follow up with dr hoyos next week to further discuss surgical intervention

## 2021-08-02 NOTE — PHYSICAL EXAM
[JVD] : no jugular venous distention  [Ankle Swelling (On Exam)] : not present [No Rash or Lesion] : No rash or lesion [Skin Ulcer] : no ulcer [Alert] : alert [Calm] : calm [de-identified] : appears well

## 2021-08-02 NOTE — HISTORY OF PRESENT ILLNESS
[FreeTextEntry1] : 77 yo male with history of carotid stenosis s/p left cea, cad s/p cabg, afib now on a/c, htn, presents for follow up without any complaints \par pt denies any stroke like symptoms

## 2021-08-09 ENCOUNTER — APPOINTMENT (OUTPATIENT)
Dept: VASCULAR SURGERY | Facility: CLINIC | Age: 76
End: 2021-08-09
Payer: MEDICARE

## 2021-08-09 VITALS — DIASTOLIC BLOOD PRESSURE: 65 MMHG | HEART RATE: 72 BPM | SYSTOLIC BLOOD PRESSURE: 127 MMHG

## 2021-08-09 DIAGNOSIS — I65.23 OCCLUSION AND STENOSIS OF BILATERAL CAROTID ARTERIES: ICD-10-CM

## 2021-08-09 PROCEDURE — 99213 OFFICE O/P EST LOW 20 MIN: CPT

## 2021-08-09 RX ORDER — POTASSIUM CHLORIDE 1500 MG/1
20 TABLET, FILM COATED, EXTENDED RELEASE ORAL
Qty: 90 | Refills: 0 | Status: ACTIVE | COMMUNITY
Start: 2021-06-11

## 2021-08-09 RX ORDER — BETAMETHASONE DIPROPIONATE 0.5 MG/G
0.05 CREAM, AUGMENTED TOPICAL
Qty: 50 | Refills: 0 | Status: ACTIVE | COMMUNITY
Start: 2021-05-22

## 2021-08-09 NOTE — ASSESSMENT
[FreeTextEntry1] : Patient with asymptomatic severe right carotid stenosis.  Recommend right carotid endarterectomy.  I had a long conversation with the patient regarding risks benefits and alternative modes of treatment.  He agrees with the procedure.  We will proceed.

## 2021-08-09 NOTE — HISTORY OF PRESENT ILLNESS
[FreeTextEntry1] : Patient with severe bilateral carotid stenosis, status post left carotid endarterectomy.  Patient presenting to us with worsening stenosis of the right carotid.  No CVA or TIA.

## 2021-08-09 NOTE — PHYSICAL EXAM
[JVD] : no jugular venous distention  [Normal Breath Sounds] : Normal breath sounds [Normal Heart Sounds] : normal heart sounds [Right Carotid Bruit] : right carotid bruit heard [2+] : left 2+ [Ankle Swelling (On Exam)] : not present [] : not present [Varicose Veins Of Lower Extremities] : not present [No Rash or Lesion] : No rash or lesion [Skin Ulcer] : no ulcer [Alert] : alert [Calm] : calm [de-identified] : appears well

## 2021-08-17 ENCOUNTER — NON-APPOINTMENT (OUTPATIENT)
Age: 76
End: 2021-08-17

## 2021-08-17 ENCOUNTER — APPOINTMENT (OUTPATIENT)
Dept: ELECTROPHYSIOLOGY | Facility: CLINIC | Age: 76
End: 2021-08-17
Payer: MEDICARE

## 2021-08-17 PROCEDURE — G2066: CPT

## 2021-08-17 PROCEDURE — 93298 REM INTERROG DEV EVAL SCRMS: CPT

## 2021-08-18 ENCOUNTER — APPOINTMENT (OUTPATIENT)
Dept: PULMONOLOGY | Facility: CLINIC | Age: 76
End: 2021-08-18
Payer: MEDICARE

## 2021-08-18 VITALS
OXYGEN SATURATION: 98 % | TEMPERATURE: 97.3 F | SYSTOLIC BLOOD PRESSURE: 120 MMHG | DIASTOLIC BLOOD PRESSURE: 60 MMHG | HEART RATE: 68 BPM | HEIGHT: 65 IN | BODY MASS INDEX: 26.99 KG/M2 | RESPIRATION RATE: 16 BRPM | WEIGHT: 162 LBS

## 2021-08-18 PROCEDURE — 96372 THER/PROPH/DIAG INJ SC/IM: CPT

## 2021-08-18 RX ORDER — BENRALIZUMAB 30 MG/ML
30 INJECTION, SOLUTION SUBCUTANEOUS
Qty: 0 | Refills: 0 | Status: COMPLETED | OUTPATIENT
Start: 2021-08-18

## 2021-08-19 ENCOUNTER — OUTPATIENT (OUTPATIENT)
Dept: OUTPATIENT SERVICES | Facility: HOSPITAL | Age: 76
LOS: 1 days | End: 2021-08-19
Payer: MEDICARE

## 2021-08-19 VITALS
DIASTOLIC BLOOD PRESSURE: 75 MMHG | WEIGHT: 162.04 LBS | HEIGHT: 66 IN | OXYGEN SATURATION: 96 % | SYSTOLIC BLOOD PRESSURE: 136 MMHG | RESPIRATION RATE: 18 BRPM | HEART RATE: 69 BPM | TEMPERATURE: 98 F

## 2021-08-19 DIAGNOSIS — Z29.9 ENCOUNTER FOR PROPHYLACTIC MEASURES, UNSPECIFIED: ICD-10-CM

## 2021-08-19 DIAGNOSIS — Z01.818 ENCOUNTER FOR OTHER PREPROCEDURAL EXAMINATION: ICD-10-CM

## 2021-08-19 DIAGNOSIS — I65.23 OCCLUSION AND STENOSIS OF BILATERAL CAROTID ARTERIES: ICD-10-CM

## 2021-08-19 DIAGNOSIS — Z98.890 OTHER SPECIFIED POSTPROCEDURAL STATES: Chronic | ICD-10-CM

## 2021-08-19 DIAGNOSIS — G47.33 OBSTRUCTIVE SLEEP APNEA (ADULT) (PEDIATRIC): ICD-10-CM

## 2021-08-19 DIAGNOSIS — I65.21 OCCLUSION AND STENOSIS OF RIGHT CAROTID ARTERY: ICD-10-CM

## 2021-08-19 DIAGNOSIS — Z95.1 PRESENCE OF AORTOCORONARY BYPASS GRAFT: Chronic | ICD-10-CM

## 2021-08-19 LAB
A1C WITH ESTIMATED AVERAGE GLUCOSE RESULT: 6.1 % — HIGH (ref 4–5.6)
ANION GAP SERPL CALC-SCNC: 14 MMOL/L — SIGNIFICANT CHANGE UP (ref 5–17)
BLD GP AB SCN SERPL QL: NEGATIVE — SIGNIFICANT CHANGE UP
BUN SERPL-MCNC: 13 MG/DL — SIGNIFICANT CHANGE UP (ref 7–23)
CALCIUM SERPL-MCNC: 9.8 MG/DL — SIGNIFICANT CHANGE UP (ref 8.4–10.5)
CHLORIDE SERPL-SCNC: 103 MMOL/L — SIGNIFICANT CHANGE UP (ref 96–108)
CO2 SERPL-SCNC: 22 MMOL/L — SIGNIFICANT CHANGE UP (ref 22–31)
CREAT SERPL-MCNC: 0.9 MG/DL — SIGNIFICANT CHANGE UP (ref 0.5–1.3)
ESTIMATED AVERAGE GLUCOSE: 128 MG/DL — HIGH (ref 68–114)
GLUCOSE SERPL-MCNC: 119 MG/DL — HIGH (ref 70–99)
HCT VFR BLD CALC: 48 % — SIGNIFICANT CHANGE UP (ref 39–50)
HGB BLD-MCNC: 15.9 G/DL — SIGNIFICANT CHANGE UP (ref 13–17)
MCHC RBC-ENTMCNC: 30.3 PG — SIGNIFICANT CHANGE UP (ref 27–34)
MCHC RBC-ENTMCNC: 33.1 GM/DL — SIGNIFICANT CHANGE UP (ref 32–36)
MCV RBC AUTO: 91.4 FL — SIGNIFICANT CHANGE UP (ref 80–100)
NRBC # BLD: 0 /100 WBCS — SIGNIFICANT CHANGE UP (ref 0–0)
PLATELET # BLD AUTO: 163 K/UL — SIGNIFICANT CHANGE UP (ref 150–400)
POTASSIUM SERPL-MCNC: 4.2 MMOL/L — SIGNIFICANT CHANGE UP (ref 3.5–5.3)
POTASSIUM SERPL-SCNC: 4.2 MMOL/L — SIGNIFICANT CHANGE UP (ref 3.5–5.3)
RBC # BLD: 5.25 M/UL — SIGNIFICANT CHANGE UP (ref 4.2–5.8)
RBC # FLD: 12.1 % — SIGNIFICANT CHANGE UP (ref 10.3–14.5)
RH IG SCN BLD-IMP: POSITIVE — SIGNIFICANT CHANGE UP
SODIUM SERPL-SCNC: 139 MMOL/L — SIGNIFICANT CHANGE UP (ref 135–145)
WBC # BLD: 6.45 K/UL — SIGNIFICANT CHANGE UP (ref 3.8–10.5)
WBC # FLD AUTO: 6.45 K/UL — SIGNIFICANT CHANGE UP (ref 3.8–10.5)

## 2021-08-19 PROCEDURE — 86900 BLOOD TYPING SEROLOGIC ABO: CPT

## 2021-08-19 PROCEDURE — 85027 COMPLETE CBC AUTOMATED: CPT

## 2021-08-19 PROCEDURE — 86901 BLOOD TYPING SEROLOGIC RH(D): CPT

## 2021-08-19 PROCEDURE — 83036 HEMOGLOBIN GLYCOSYLATED A1C: CPT

## 2021-08-19 PROCEDURE — 80048 BASIC METABOLIC PNL TOTAL CA: CPT

## 2021-08-19 PROCEDURE — 86850 RBC ANTIBODY SCREEN: CPT

## 2021-08-19 PROCEDURE — G0463: CPT

## 2021-08-19 RX ORDER — METFORMIN HYDROCHLORIDE 850 MG/1
1 TABLET ORAL
Qty: 0 | Refills: 0 | DISCHARGE

## 2021-08-19 RX ORDER — APIXABAN 2.5 MG/1
1 TABLET, FILM COATED ORAL
Qty: 0 | Refills: 0 | DISCHARGE

## 2021-08-19 NOTE — H&P PST ADULT - PROBLEM SELECTOR PLAN 1
- Right Carotid Endareterectomy W/EEG monitoring on 9/2/21  -Pre-Op Instructions discussed   -labs sent   -covid test 8/30/21  -recent ECHo will get from  RONNIE precautions   OR booking notified

## 2021-08-19 NOTE — H&P PST ADULT - PROBLEM SELECTOR PLAN 3
The Caprini score indicates that this patient is at high risk for a VTE event (score 6 or greater). Surgical patients in this group will benefit from both pharmacologic prophylaxis and intermittent compression devices.  The surgical team will determine the balance between VTE risk and bleeding risk, and other clinical considerations

## 2021-08-19 NOTE — H&P PST ADULT - HISTORY OF PRESENT ILLNESS
74 y/o male with PMH of  asthma controlled with meds , HTN, HLD, CAD, s/p CABG X 4 in 11/2019, carotid stenosis s/p left carotid stenosis in 2017 h/o left carotid endarterectomy, RONNIE on CPAP,  Atrial flutter s/p complex ablation in 9/8/2020 and loop recorder . pt was followed by cardiology  dx asymptomatic right carotid stenosis followed vascular planned for Right Carotid Endarterectomy W EEG on 9/2/21.  Coviod 8/31/21 at [place]  74 y/o male with PMH of asthma controlled with meds, HTN, HLD, CAD, s/p CABG X 4 in 11/2019, bilateral carotid stenosis s/p left carotid endarterectomy ( 2017), RONNIE on CPAP,  Atrial flutter s/p complex ablation in 9/8/2020 and loop recorder . Pt was followed by cardiology  dx asymptomatic right carotid stenosis followed vascular planned for Right Carotid Endarterectomy W EEG on 9/2/21.  Coviod 8/31/21 at [place]

## 2021-08-19 NOTE — H&P PST ADULT - PROBLEM SELECTOR PLAN 2
The Caprini score indicates that this patient is at high risk for a VTE event (score 6 or greater). Surgical patients in this group will benefit from both pharmacologic prophylaxis and intermittent compression devices.  The surgical team will determine the balance between VTE risk and bleeding risk, and other clinical considerations - Right Carotid Endarterectomy  W/EEG monitoring on 9/2/21  -Pre-Op Instructions discussed   -labs sent   -covid test 8/30/21  -recent ECHO will get from   pt will take HTN meds the DOS   pt will continue aspirin

## 2021-08-19 NOTE — H&P PST ADULT - NSICDXPASTMEDICALHX_GEN_ALL_CORE_FT
PAST MEDICAL HISTORY:  Asthma on meds    Atrial flutter s/p ablation , loop recorder SR    CAD (coronary artery disease) s/p 4V CABG (LIMA to LAD, SVG to OM1, OM2, RPDA)    Carotid stenosis, bilateral s/p left carotid endarterectomy    DM (diabetes mellitus)     GERD (gastroesophageal reflux disease)     HLD (hyperlipidemia)     HTN (hypertension)     RONNIE (obstructive sleep apnea) CPAP QHS    PVD (peripheral vascular disease)

## 2021-08-19 NOTE — H&P PST ADULT - ASSESSMENT
CAPRINI SCORE [CLOT updated 18]    AGE RELATED RISK FACTORS                                                       MOBILITY RELATED FACTORS  [ ] Age 41-60 years                                            (1 Point)                    [ ] Bed rest                                                        (1 Point)  [ ] Age: 61-74 years                                           (2 Points)                  [ ] Plaster cast                                                   (2 Points)  [x ] Age= 75 years                                              (3 Points)                    [ ] Bed bound for more than 72 hours                 (2 Points)    DISEASE RELATED RISK FACTORS                                               GENDER SPECIFIC FACTORS  [ ] Edema in the lower extremities                       (1 Point)              [ ] Pregnancy                                                     (1 Point)  [ ] Varicose veins                                               (1 Point)                     [ ] Post-partum < 6 weeks                                   (1 Point)             [x ] BMI > 25 Kg/m2                                            (1 Point)                     [ ] Hormonal therapy  or oral contraception          (1 Point)                 [ ] Sepsis (in the previous month)                        (1 Point)               [ ] History of pregnancy complications                 (1 point)  [ ] Pneumonia or serious lung disease                                               [ ] Unexplained or recurrent                     (1 Point)           (in the previous month)                               (1 Point)  [ ] Abnormal pulmonary function test                     (1 Point)                 SURGERY RELATED RISK FACTORS  [ ] Acute myocardial infarction                              (1 Point)               [ ]  Section                                             (1 Point)  [ ] Congestive heart failure (in the previous month)  (1 Point)      [ ] Minor surgery                                                  (1 Point)   [ ] Inflammatory bowel disease                             (1 Point)               [ ] Arthroscopic surgery                                        (2 Points)  [ ] Central venous access                                      (2 Points)                [x ] General surgery lasting more than 45 minutes (2 points)  [ ] Present or previous malignancy                     (2 Points)                [ ] Elective arthroplasty                                         (5 points)    [ ] Stroke (in the previous month)                          (5 Points)                                                                                                                                                           HEMATOLOGY RELATED FACTORS                                                 TRAUMA RELATED RISK FACTORS  [ ] Prior episodes of VTE                                     (3 Points)                [ ] Fracture of the hip, pelvis, or leg                       (5 Points)  [ ] Positive family history for VTE                         (3 Points)             [ ] Acute spinal cord injury (in the previous month)  (5 Points)  [ ] Prothrombin 79673 A                                     (3 Points)               [ ] Paralysis  (less than 1 month)                             (5 Points)  [ ] Factor V Leiden                                             (3 Points)                  [ ] Multiple Trauma within 1 month                        (5 Points)  [ ] Lupus anticoagulants                                     (3 Points)                                                           [ ] Anticardiolipin antibodies                               (3 Points)                                                       [ ] High homocysteine in the blood                      (3 Points)                                             [ ] Other congenital or acquired thrombophilia      (3 Points)                                                [ ] Heparin induced thrombocytopenia                  (3 Points)                                     Total Score [   6       ]

## 2021-08-19 NOTE — H&P PST ADULT - NSICDXPASTSURGICALHX_GEN_ALL_CORE_FT
PAST SURGICAL HISTORY:  H/O cardiac radiofrequency ablation 9/2020    S/P CABG x 4 November 2019 (LIMA to LAD, SVG to OM1, OM2, RPDA)    S/P carotid endarterectomy November 2017 (left)    S/P sinus surgery

## 2021-08-24 ENCOUNTER — NON-APPOINTMENT (OUTPATIENT)
Age: 76
End: 2021-08-24

## 2021-08-24 ENCOUNTER — APPOINTMENT (OUTPATIENT)
Dept: PULMONOLOGY | Facility: CLINIC | Age: 76
End: 2021-08-24
Payer: MEDICARE

## 2021-08-24 VITALS
HEIGHT: 65 IN | RESPIRATION RATE: 16 BRPM | DIASTOLIC BLOOD PRESSURE: 60 MMHG | OXYGEN SATURATION: 97 % | TEMPERATURE: 97.7 F | SYSTOLIC BLOOD PRESSURE: 123 MMHG | HEART RATE: 63 BPM | BODY MASS INDEX: 27.32 KG/M2 | WEIGHT: 164 LBS

## 2021-08-24 DIAGNOSIS — Z01.811 ENCOUNTER FOR PREPROCEDURAL RESPIRATORY EXAMINATION: ICD-10-CM

## 2021-08-24 LAB — SARS-COV-2 N GENE NPH QL NAA+PROBE: NOT DETECTED

## 2021-08-24 PROCEDURE — 94010 BREATHING CAPACITY TEST: CPT

## 2021-08-24 PROCEDURE — 95012 NITRIC OXIDE EXP GAS DETER: CPT

## 2021-08-24 PROCEDURE — 99214 OFFICE O/P EST MOD 30 MIN: CPT | Mod: 25

## 2021-08-24 PROCEDURE — 94729 DIFFUSING CAPACITY: CPT

## 2021-08-24 PROCEDURE — 94618 PULMONARY STRESS TESTING: CPT

## 2021-08-24 NOTE — ASSESSMENT
[FreeTextEntry1] : Mr. ARMSTRONG is a 76 year old male with a history of CAD, s/p CABG x 4 11.15.2019, DM, Asthma, Allergies (severe/ Eosinophilia), HTN, carotid stenosis, RONNIE who presents into the office today for a f/u pulmonary evaluation. SOB (NC with medications) - s/p Fasenra initiation - improved.  - preop for carotid artery surgery\par \par ******************************************************Pre-op Clearance*************************************************************** \par \par The patient's shortness of breath is multifactorial due to:\par -pulmonary disease \par      -eosinophilic and IgE mediated asthma, chronic sinus issues, RONNIE, GERD\par -poor breathing mechanics \par -out of shape\par -?cardiac disease (s/p CABG, Dr. King)\par \par Problem 1A: Pre-op for Carotid Artery Surgery (Dr. Zepeda)\par -at this point in time there are no absolute pulmonary contraindications to go forward with the planned procedure \par -at the time of surgery s/he should have optimal pain control, incentive spirometry, early ambulation, DVT and GI prophylaxis.\par \par Problem 1: Severe Persistent Asthma (uncontrolled - NC)\par -continue Symbicort 160 2 inhalations BID\par -cotninue Xyflo CR - 1200 BID\par -continue Ventolin rescue inhaler 2 inhalations before exercise, Q6H \par - Add Aerobika to bring up mucous \par -Asthma is believed to be caused by inherited (genetic) and environmental factor, but its exact cause is unknown. Asthma may be triggered by allergens, lung infections, or irritants in the air. Asthma triggers are different for each person \par \par Problem 2:Eosinophilic Asthma (re-discussed) - controlled \par -s/p CBC (+)\par -Fasenra [id number] (since 9/2020)\par -The safety and efficacy of Nucala was established in three double-blind, randomized, placebo-controlled trials in patients with severe asthma. Compared to a placebo, patients with severe asthma receiving Nucala had fewer exacerbation requiring hospitalization and/or emergency department visits, and a longer time to first exacerbation. In addition, patients with severe asthma receiving Nucala or Fasenra experienced greater reductions in their daily maintenance oral corticosteroid dose, while maintaining asthma control compared with patients receiving placebo. Treatment with Nucala did not result in a significant improvement in lung function, as measured by the volume of air exhaled by patients in one second. The most common side effects include: headache, injection site reactions, back pain, weakness, and fatigue; hypersensitivity reactions can occur within hours or days including swelling of the face, mouth, and tongue, fainting, dizziness, hives, breathing problems, and rash; herpes zoster infections have occurred. The drug is a monoclonal antibody that inhibits interleukin-5 which helps regular eosinophils, a type of white blood cell that contributes to asthma. The over-production of eosinophils can cause inflammation in the lungs, increasing the frequency of asthma attacks. Patients must also take other medications, including high dose inhaled corticosteroids and at least one additional asthma drug.\par \par Problem 3: IgE Elevated \par -Candidate for Xolair\par -Xolair is a recombinant DNA- derived humanized IgG1K monoclonal antibody that selectively binds to human immunoglobulin E (IgE). Xolair is produced by a Chinese hamster ovary cell suspension culture in nutrient medium containing the antibiotic gentamicin. Gentamicin is not detectable in the final product. Xolair is a sterile, white, preservative free, lyophilized powder contained in a single use vial that is reconstituted with sterile water for suspension. Side effects include: wheezing, tightness of the chest, trouble breathing, hives, skin rash, feeling anxious or light-headed, fainting, warmth or tingling under skin, or swelling of face, lips, or tongue \par \par Problem 4:Sinuses /Allergies\par -continue Olopatadine 0.6% at 1 sniff/nostril BID \par -continue Flonase 1 sniff each nostril BID \par -recommended Navage\par Environmental measures for allergies were encouraged including mattress and pillow cover, air purifier, and environmental controls. \par \par Problem 5: GERD \par -No medications Recommended at this time\par -Rule of 2s: avoid eating too much, eating too fast, eating too late, eating too spicy, eating too lousy, eating two hours before bed.\par -Things to avoid including overeating, spicy foods, tight clothing, eating within three hours of bed, this list is not all inclusive. \par -For treatment of reflux, possible options discussed including diet control, H2 blockers, PPIs, as well as coating motility agents discussed as treatment options. Timing of meals and proximity of last meal to sleep were discussed. If symptoms persist, a formal gastrointestinal evaluation is needed.\par \par Problem 6: RONNIE  (APAP 4 to 10 Cm of water)\par -continue  Trazodone 50 mg QHS\par -continue CPAP Use (compliant and tolerates well)\par Sleep apnea is associated with adverse clinical consequences which an affect most organ systems. Cardiovascular disease risk includes arrhythmias, atrial fibrillation, hypertension, coronary artery disease, and stroke. Metabolic disorders include diabetes type 2, non-alcoholic fatty liver disease. Mood disorder especially depression; and cognitive decline especially in the elderly. Associations with chronic reflux/Thurston’s esophagus some but not all inclusive. \par -Reasons include arousal consistent with hypopnea; respiratory events most prominent in REM sleep or supine position; therefore sleep staging and body position are important for accurate diagnosis and estimation of AHI. \par \par Problem 7: Poor Mechanics of Breathing\par - Proper breathing techniques were reviewed with an emphasis of exhalation. Patient instructed to breath in for 1 second and out for four seconds. Patient was encouraged to not talk while walking. \par \par Problem 8: Out of Shape\par -exercise, and diet control were discussed and are highly encouraged. Treatment options were given such as, aqua therapy, and contacting a nutritionist. Recommended to use the elliptical, stationary bike, less use of treadmill. Mindful eating was explained to the patient Obesity is associated with worsening asthma, shortness of breath, and potential for cardiac disease, diabetes, and other underlying medical conditions. \par \par Problem 9: Cardiac (s/p CABG)- Afib\par -recommended to follow up with a cardiologist  for Ablation. \par \par Problem 10: Health Maintenance/COVID19 Precautions:- s/p both COVID-19 vaccines (Pfizer)\par - Clean your hands often. Wash your hands often with soap and water for at least 20 seconds, especially after blowing your nose, coughing, or sneezing, or having been in a public place.\par - If soap and water are not available, use a hand  that contains at least 60% alcohol.\par - To the extent possible, avoid touching high-touch surfaces in public places - elevator buttons, door handles, handrails, handshaking with people, etc. Use a tissue or your sleeve to cover your hand or finger if you must touch something.\par - Wash your hands after touching surfaces in public places.\par - Avoid touching your face, nose, eyes, etc.\par - Clean and disinfect your home to remove germs: practice routine cleaning of frequently touched surfaces (for example: tables, doorknobs, light switches, handles, desks, toilets, faucets, sinks & cell phones)\par - Avoid crowds, especially in poorly ventilated spaces. Your risk of exposure to respiratory viruses like COVID-19 may increase in crowded, closed-in settings with little air circulation if there are people in the crowd who are sick. All patients are recommended to practice social distancing and stay at least 6 feet away from others.\par - Avoid all non-essential travel including plane trips, and especially avoid embarking on cruise ships.\par -If COVID-19 is spreading in your community, take extra measures to put distance between yourself and other people to further reduce your risk of being exposed to this new virus.\par -Stay home as much as possible.\par - Consider ways of getting food brought to your house through family, social, or commercial networks\par -Be aware that the virus has been known to live in the air up to 3 hours post exposure, cardboard up to 24 hours post exposure, copper up to 4 hours post exposure, steel and plastic up to 2-3 days post exposure. Risk of transmission from these surfaces are affected by many variables.\par Immune Support Recommendations:\par -OTC Vitamin C 500mg BID \par -OTC Quercetin 250-500mg BID \par -OTC Zinc 75-100mg per day \par -OTC Melatonin 1 or 2 mg a night \par -OTC Vitamin D 1-4000mg per day \par -OTC Tonic Water 8oz per day\par Asthma and COVID19:\par You need to make sure your asthma is under control. This often requires the use of inhaled corticosteroids (and sometimes oral corticosteroids). Inhaled corticosteroids do not likely reduce your immune system’s ability to fight infections, but oral corticosteroids may. It is important to use the steps above to protect yourself to limit your exposure to any respiratory virus. \par \par Problem 11: health maintenance\par -s/p influenza vaccine 2020\par -recommended strep pneumonia vaccines after age 65: Prevnar-13 vaccine, followed by Pneumo vaccine 23 one year following (Completed) \par -recommended early intervention for URIs\par -recommended regular osteoporosis evaluations\par -recommended early dermatological evaluations\par -recommended after the age of 50 to the age of 70, colonoscopy every 5 years \par \par  Follow up in 6-8 weeks\par -he  is recommended to call with any changes, questions, or concerns.

## 2021-08-24 NOTE — PROCEDURE
[FreeTextEntry1] : Feno was 36; a normal value being less than 25. Fractional exhaled nitric oxide (FENO) is regarded as a simple, noninvasive method for assessing eosinophilic airway inflammation. Produced by a variety of cells within the lung, nitric oxide (NO) concentrations are generally low in healthy individuals. However, high concentrations of NO appear to be involved in nonspecific host defense mechanisms and chronic inflammatory  diseases such as asthma. The American Thoracic Society (ATS) therefore recommended using FENO to aid in the diagnosis and monitoring of eosinophilic airway inflammation and asthma, and for identifying steroid responsive individuals whose chronic respiratory symptoms may be caused by airway inflammation \par \par PFT revealed normal flows, with a FEV1 of 1.65L, which is 79% of predicted, with a mildly irregular inspiratory limb\par \par Full PFT revealed mild restrictive and mild obstructive dysfunction, with a FEV1 of 1.63L, which is 66% of predicted, normal lung volumes, and a diffusion of 15.7, which is 91% of predicted, with a normal flow volume loop \par  \par 6 minute walk test reveals a low saturation of 94% with moderate dyspnea or fatigue; walked 456.4 meters\par  \par -Images and procedures reviewed in detail and discussed with patient.

## 2021-08-24 NOTE — ADDENDUM
[FreeTextEntry1] : Documented by Giles Abad acting as a scribe for Dr. Escobar Ryder on (08/24/2021).\par \par All medical record entries made by the Scribe were at my, Dr. Escobar Ryder's, direction and personally dictated by me on (08/24/2021). I have reviewed the chart and agree that the record accurately reflects my personal performance of the history, physical exam, assessment and plan. I have also personally directed, reviewed, and agree with the discharge instructions.\par

## 2021-08-24 NOTE — HISTORY OF PRESENT ILLNESS
[TextBox_4] : Mr. ARMSTRONG is a 76 year old male with a history of  eosinophilic and IgE mediated asthma, chronic sinus issues, RONNIE, GERD presenting to the office today for f/u pulmonary evaluation. His chief complaint is\par -he notes feeling good in general\par -he notes feeling lightheaded sometimes\par -he notes some chest pain when he coughs sometimes\par -he notes getting 6-7 hours of sleep per night which is enough for him\par -he notes losing a few pounds recently \par -he notes feeling that his breathing is under control\par -he notes upcoming carotid artery surgery for Dr. Zepeda\par -he notes bringing up yellow phlegm which he thinks is from the sinuses\par -he notes irrigating his nose and sees phlegm coming out\par -he notes an energy level of 7-8/10\par -he notes using the Aerobika when he is coughing to get the phlegm out\par -he notes his PCP reduced his metformin\par -he notes his Vitamin D Rx was increased\par -he notes taking all his Rx\par -he notes he has been getting the Fasenra shots\par \par patient denies any headaches, nausea, vomiting, fever, chills, sweats, chest pressure, palpitations, wheezing, fatigue, diarrhea, constipation, dysphagia, myalgias, dizziness, leg swelling, leg pain, itchy eyes, itchy ears, heartburn, reflux or sour taste in the mouth

## 2021-08-25 PROBLEM — I48.92 UNSPECIFIED ATRIAL FLUTTER: Chronic | Status: ACTIVE | Noted: 2020-09-08

## 2021-08-30 ENCOUNTER — RX RENEWAL (OUTPATIENT)
Age: 76
End: 2021-08-30

## 2021-08-30 ENCOUNTER — APPOINTMENT (OUTPATIENT)
Dept: DISASTER EMERGENCY | Facility: CLINIC | Age: 76
End: 2021-08-30

## 2021-09-02 ENCOUNTER — APPOINTMENT (OUTPATIENT)
Dept: VASCULAR SURGERY | Facility: HOSPITAL | Age: 76
End: 2021-09-02

## 2021-09-13 ENCOUNTER — APPOINTMENT (OUTPATIENT)
Dept: VASCULAR SURGERY | Facility: CLINIC | Age: 76
End: 2021-09-13

## 2021-09-20 ENCOUNTER — RX RENEWAL (OUTPATIENT)
Age: 76
End: 2021-09-20

## 2021-09-20 ENCOUNTER — APPOINTMENT (OUTPATIENT)
Dept: ELECTROPHYSIOLOGY | Facility: CLINIC | Age: 76
End: 2021-09-20
Payer: MEDICARE

## 2021-09-20 ENCOUNTER — NON-APPOINTMENT (OUTPATIENT)
Age: 76
End: 2021-09-20

## 2021-09-20 PROCEDURE — 93298 REM INTERROG DEV EVAL SCRMS: CPT

## 2021-09-20 PROCEDURE — G2066: CPT

## 2021-09-23 ENCOUNTER — APPOINTMENT (OUTPATIENT)
Dept: ELECTROPHYSIOLOGY | Facility: CLINIC | Age: 76
End: 2021-09-23
Payer: MEDICARE

## 2021-09-23 DIAGNOSIS — Z86.79 OTHER SPECIFIED POSTPROCEDURAL STATES: ICD-10-CM

## 2021-09-23 DIAGNOSIS — Z98.890 OTHER SPECIFIED POSTPROCEDURAL STATES: ICD-10-CM

## 2021-09-23 PROCEDURE — 93285 PRGRMG DEV EVAL SCRMS IP: CPT

## 2021-09-23 RX ORDER — ATORVASTATIN CALCIUM 80 MG/1
80 TABLET, FILM COATED ORAL DAILY
Refills: 0 | Status: ACTIVE | COMMUNITY
Start: 2019-11-27

## 2021-09-23 RX ORDER — CLINDAMYCIN PHOSPHATE 1 G/10ML
1 GEL TOPICAL
Qty: 60 | Refills: 0 | Status: DISCONTINUED | COMMUNITY
Start: 2021-03-09 | End: 2021-09-23

## 2021-09-23 RX ORDER — IBUPROFEN 600 MG
1000 TABLET ORAL DAILY
Refills: 0 | Status: ACTIVE | COMMUNITY
Start: 2019-11-27

## 2021-09-23 RX ORDER — METFORMIN HYDROCHLORIDE 500 MG/1
500 TABLET, COATED ORAL TWICE DAILY
Qty: 180 | Refills: 0 | Status: DISCONTINUED | COMMUNITY
Start: 2019-11-27 | End: 2021-09-23

## 2021-09-23 RX ORDER — FUROSEMIDE 40 MG/1
40 TABLET ORAL EVERY MORNING
Refills: 0 | Status: ACTIVE | COMMUNITY
Start: 2020-01-03

## 2021-09-23 RX ORDER — METOPROLOL SUCCINATE 25 MG/1
25 TABLET, EXTENDED RELEASE ORAL
Qty: 90 | Refills: 3 | Status: DISCONTINUED | COMMUNITY
Start: 2020-09-24 | End: 2021-09-23

## 2021-09-23 RX ORDER — LISINOPRIL 5 MG/1
5 TABLET ORAL DAILY
Refills: 0 | Status: ACTIVE | COMMUNITY
Start: 2020-08-10

## 2021-09-23 RX ORDER — FUROSEMIDE 20 MG/1
20 TABLET ORAL AT BEDTIME
Refills: 0 | Status: ACTIVE | COMMUNITY
Start: 2021-02-12

## 2021-09-23 RX ORDER — METFORMIN HYDROCHLORIDE 500 MG/1
500 TABLET, COATED ORAL DAILY
Refills: 0 | Status: ACTIVE | COMMUNITY

## 2021-09-23 RX ORDER — FLUTICASONE PROPIONATE 110 UG/1
110 AEROSOL, METERED RESPIRATORY (INHALATION)
Qty: 1 | Refills: 5 | Status: DISCONTINUED | COMMUNITY
Start: 2019-04-18 | End: 2021-09-23

## 2021-09-30 PROBLEM — Z98.890 S/P ABLATION OF ATRIAL FLUTTER: Status: ACTIVE | Noted: 2020-09-24

## 2021-10-13 ENCOUNTER — APPOINTMENT (OUTPATIENT)
Dept: PULMONOLOGY | Facility: CLINIC | Age: 76
End: 2021-10-13
Payer: MEDICARE

## 2021-10-13 VITALS
DIASTOLIC BLOOD PRESSURE: 60 MMHG | RESPIRATION RATE: 16 BRPM | HEART RATE: 58 BPM | TEMPERATURE: 97.6 F | WEIGHT: 161 LBS | OXYGEN SATURATION: 97 % | SYSTOLIC BLOOD PRESSURE: 130 MMHG | HEIGHT: 65 IN | BODY MASS INDEX: 26.82 KG/M2

## 2021-10-13 PROCEDURE — 96372 THER/PROPH/DIAG INJ SC/IM: CPT

## 2021-10-13 RX ORDER — BENRALIZUMAB 30 MG/ML
30 INJECTION, SOLUTION SUBCUTANEOUS
Qty: 0 | Refills: 0 | Status: COMPLETED | OUTPATIENT
Start: 2021-10-13

## 2021-10-13 RX ORDER — EPINEPHRINE 0.3 MG/.3ML
0.3 INJECTION INTRAMUSCULAR
Qty: 1 | Refills: 0 | Status: ACTIVE | COMMUNITY
Start: 2020-09-11 | End: 1900-01-01

## 2021-10-21 ENCOUNTER — APPOINTMENT (OUTPATIENT)
Dept: ELECTROPHYSIOLOGY | Facility: CLINIC | Age: 76
End: 2021-10-21

## 2021-10-25 ENCOUNTER — APPOINTMENT (OUTPATIENT)
Dept: ELECTROPHYSIOLOGY | Facility: CLINIC | Age: 76
End: 2021-10-25
Payer: MEDICARE

## 2021-10-25 ENCOUNTER — NON-APPOINTMENT (OUTPATIENT)
Age: 76
End: 2021-10-25

## 2021-10-25 PROCEDURE — G2066: CPT

## 2021-10-25 PROCEDURE — 93298 REM INTERROG DEV EVAL SCRMS: CPT

## 2021-11-29 ENCOUNTER — APPOINTMENT (OUTPATIENT)
Dept: ELECTROPHYSIOLOGY | Facility: CLINIC | Age: 76
End: 2021-11-29
Payer: MEDICARE

## 2021-11-29 ENCOUNTER — NON-APPOINTMENT (OUTPATIENT)
Age: 76
End: 2021-11-29

## 2021-11-29 PROCEDURE — 93298 REM INTERROG DEV EVAL SCRMS: CPT | Mod: NC

## 2021-11-29 PROCEDURE — G2066: CPT | Mod: NC

## 2021-12-08 ENCOUNTER — APPOINTMENT (OUTPATIENT)
Dept: PULMONOLOGY | Facility: CLINIC | Age: 76
End: 2021-12-08
Payer: MEDICARE

## 2021-12-08 VITALS
HEART RATE: 60 BPM | RESPIRATION RATE: 16 BRPM | SYSTOLIC BLOOD PRESSURE: 110 MMHG | HEIGHT: 65 IN | BODY MASS INDEX: 26.99 KG/M2 | OXYGEN SATURATION: 97 % | WEIGHT: 162 LBS | DIASTOLIC BLOOD PRESSURE: 60 MMHG | TEMPERATURE: 97.6 F

## 2021-12-08 PROCEDURE — 96372 THER/PROPH/DIAG INJ SC/IM: CPT

## 2021-12-08 RX ORDER — BENRALIZUMAB 30 MG/ML
30 INJECTION, SOLUTION SUBCUTANEOUS
Qty: 0 | Refills: 0 | Status: COMPLETED | OUTPATIENT
Start: 2021-12-08

## 2021-12-10 ENCOUNTER — NON-APPOINTMENT (OUTPATIENT)
Age: 76
End: 2021-12-10

## 2021-12-10 RX ORDER — ACLIDINIUM BROMIDE 400 UG/1
400 POWDER, METERED RESPIRATORY (INHALATION) TWICE DAILY
Qty: 1 | Refills: 1 | Status: ACTIVE | COMMUNITY
Start: 2021-12-10 | End: 1900-01-01

## 2022-01-03 ENCOUNTER — APPOINTMENT (OUTPATIENT)
Dept: ELECTROPHYSIOLOGY | Facility: CLINIC | Age: 77
End: 2022-01-03
Payer: MEDICARE

## 2022-01-03 ENCOUNTER — NON-APPOINTMENT (OUTPATIENT)
Age: 77
End: 2022-01-03

## 2022-01-03 PROCEDURE — G2066: CPT

## 2022-01-03 PROCEDURE — 93298 REM INTERROG DEV EVAL SCRMS: CPT

## 2022-01-04 ENCOUNTER — APPOINTMENT (OUTPATIENT)
Dept: PULMONOLOGY | Facility: CLINIC | Age: 77
End: 2022-01-04

## 2022-01-16 ENCOUNTER — RX RENEWAL (OUTPATIENT)
Age: 77
End: 2022-01-16

## 2022-01-18 ENCOUNTER — RX RENEWAL (OUTPATIENT)
Age: 77
End: 2022-01-18

## 2022-02-01 ENCOUNTER — RX RENEWAL (OUTPATIENT)
Age: 77
End: 2022-02-01

## 2022-02-01 RX ORDER — ZAFIRLUKAST 20 MG/1
20 TABLET, COATED ORAL TWICE DAILY
Qty: 180 | Refills: 1 | Status: ACTIVE | COMMUNITY
Start: 2021-12-10 | End: 1900-01-01

## 2022-02-02 ENCOUNTER — NON-APPOINTMENT (OUTPATIENT)
Age: 77
End: 2022-02-02

## 2022-02-02 ENCOUNTER — APPOINTMENT (OUTPATIENT)
Dept: PULMONOLOGY | Facility: CLINIC | Age: 77
End: 2022-02-02
Payer: COMMERCIAL

## 2022-02-02 VITALS
HEIGHT: 65 IN | HEART RATE: 68 BPM | TEMPERATURE: 97.6 F | DIASTOLIC BLOOD PRESSURE: 60 MMHG | RESPIRATION RATE: 16 BRPM | OXYGEN SATURATION: 97 % | SYSTOLIC BLOOD PRESSURE: 110 MMHG | BODY MASS INDEX: 27.32 KG/M2 | WEIGHT: 164 LBS

## 2022-02-02 DIAGNOSIS — J45.901 UNSPECIFIED ASTHMA WITH (ACUTE) EXACERBATION: ICD-10-CM

## 2022-02-02 PROCEDURE — 99214 OFFICE O/P EST MOD 30 MIN: CPT | Mod: 25

## 2022-02-02 PROCEDURE — 94010 BREATHING CAPACITY TEST: CPT

## 2022-02-02 PROCEDURE — 96372 THER/PROPH/DIAG INJ SC/IM: CPT

## 2022-02-02 PROCEDURE — 95012 NITRIC OXIDE EXP GAS DETER: CPT

## 2022-02-02 RX ORDER — ZILEUTON 600 MG/1
600 TABLET, FILM COATED, EXTENDED RELEASE ORAL
Qty: 360 | Refills: 1 | Status: ACTIVE | COMMUNITY
Start: 2022-02-02 | End: 1900-01-01

## 2022-02-02 RX ORDER — ACLIDINIUM BROMIDE 400 UG/1
400 POWDER, METERED RESPIRATORY (INHALATION) TWICE DAILY
Qty: 3 | Refills: 1 | Status: ACTIVE | COMMUNITY
Start: 2022-02-02 | End: 1900-01-01

## 2022-02-02 RX ORDER — BENRALIZUMAB 30 MG/ML
30 INJECTION, SOLUTION SUBCUTANEOUS
Qty: 0 | Refills: 0 | Status: COMPLETED | OUTPATIENT
Start: 2022-02-02

## 2022-02-02 RX ADMIN — BENRALIZUMAB 30 MG/ML: 30 INJECTION, SOLUTION SUBCUTANEOUS at 00:00

## 2022-02-02 NOTE — HISTORY OF PRESENT ILLNESS
[TextBox_4] : Mr. ARMSTRONG is a 76 year old male with a history of  eosinophilic and IgE mediated asthma, chronic sinus issues, RONNIE, GERD presenting to the office today for f/u pulmonary evaluation. His chief complaint is\par -he notes the montelukast was not working for him so he started the Zafirlukast\par -he notes he wants to stop Zafirlukast\par -he decided not to get carotid artery surgery so his cardiologist gave him Rx instead\par -he notes he has a lot of phlegm for 2-3 months\par -he notes he stopped using the CPAP because it was recalled\par -he notes he does not see Dr. King anymore\par -he notes he has also been experiencing itching for 5 years\par -s/p COVID-19 vaccine x3\par \par -patient denies any headaches, nausea, vomiting, fever, chills, sweats, chest pain, chest pressure, palpitations, coughing, wheezing, fatigue, diarrhea, constipation, dysphagia, myalgias, dizziness, leg swelling, leg pain, itchy eyes, itchy ears, heartburn, reflux or sour taste in the mouth

## 2022-02-02 NOTE — PROCEDURE
1-2 cups/cans per day
[FreeTextEntry1] : FENO was 39; a normal value being less than 25. Fractional exhaled nitric oxide (FENO) is regarded as a simple, noninvasive method for assessing eosinophilic airway inflammation. Produced by a variety of cells within the lung, nitric oxide (NO) concentrations are generally low in healthy individuals. However, high concentrations of NO appear to be involved in nonspecific host defense mechanisms and chronic inflammatory  diseases such as asthma. The American Thoracic Society (ATS) therefore recommended using FENO to aid in the diagnosis and monitoring of eosinophilic airway inflammation and asthma, and for identifying steroid responsive individuals whose chronic respiratory symptoms may be caused by airway inflammation \par \par PFT revealed mild-moderate obstructive dysfunction, with a FEV1 of 1.53L, which is 74% of predicted, with an abnormal inspiratory limb

## 2022-02-02 NOTE — ADDENDUM
[FreeTextEntry1] : Documented by Giles Abad acting as a scribe for Dr. Escobar Ryder on 02/02/2022\par \par All medical record entries made by the scribe were at my, Dr. Escobar Ryder's, direction and personally dictated by me on 02/02/2022. I have reviewed the chart and agree that the record accurately reflects my personal performance of the history, physical exam, assessment, and plan. I have also personally directed, reviewed, and agree with the discharge instructions.

## 2022-02-02 NOTE — PHYSICAL EXAM
[No Acute Distress] : no acute distress [Well Nourished] : well nourished [No Deformities] : no deformities [Well Developed] : well developed [Normal Oropharynx] : normal oropharynx [III] : Mallampati Class: III [Normal Appearance] : normal appearance [No Neck Mass] : no neck mass [Normal Rate/Rhythm] : normal rate/rhythm [Normal S1, S2] : normal s1, s2 [No Murmurs] : no murmurs [No Resp Distress] : no resp distress [Clear to Auscultation Bilaterally] : clear to auscultation bilaterally [No Abnormalities] : no abnormalities [Benign] : benign [Normal Gait] : normal gait [No Clubbing] : no clubbing [No Cyanosis] : no cyanosis [No Edema] : no edema [FROM] : FROM [Normal Color/ Pigmentation] : normal color/ pigmentation [No Focal Deficits] : no focal deficits [Oriented x3] : oriented x3 [Normal Affect] : normal affect [TextBox_2] : OW [TextBox_68] : I:E 1:3, mild forced expiratory wheeze

## 2022-02-02 NOTE — ASSESSMENT
[FreeTextEntry1] : Mr. ARMSTRONG is a 76 year old male with a history of CAD, s/p CABG x 4 11.15.2019, DM, Asthma, Allergies (severe/ Eosinophilia), HTN, carotid stenosis, RONNIE who presents into the office today for a f/u pulmonary evaluation. SOB (NC with medications) - s/p Fasenra initiation - improved.  - aborted for carotid artery surgery - active asthma / untreated OSAS\par \par \par The patient's shortness of breath is multifactorial due to:\par -pulmonary disease \par      -eosinophilic and IgE mediated asthma, chronic sinus issues, RONNIE, GERD\par -poor breathing mechanics \par -out of shape\par -?cardiac disease (s/p CABG, Dr. King)\par \par Problem 1: Severe Persistent Asthma (uncontrolled - ?Beta Blocker)\par -continue Symbicort 160 2 inhalations BID\par -change Zyflo CR - 1200 BID to Accolate 20 BID \par -Add Singulair 10 mg QHS \par -Add Tudorza 1 puff BID \par -continue Ventolin rescue inhaler 2 inhalations before exercise, Q6H \par - Add Aerobika to bring up mucous \par -Asthma is believed to be caused by inherited (genetic) and environmental factor, but its exact cause is unknown. Asthma may be triggered by allergens, lung infections, or irritants in the air. Asthma triggers are different for each person \par \par Problem 2:Eosinophilic Asthma (re-discussed) - controlled \par -s/p CBC (+)\par -Fasenra [id number] (since 9/2020)\par -The safety and efficacy of Nucala was established in three double-blind, randomized, placebo-controlled trials in patients with severe asthma. Compared to a placebo, patients with severe asthma receiving Nucala had fewer exacerbation requiring hospitalization and/or emergency department visits, and a longer time to first exacerbation. In addition, patients with severe asthma receiving Nucala or Fasenra experienced greater reductions in their daily maintenance oral corticosteroid dose, while maintaining asthma control compared with patients receiving placebo. Treatment with Nucala did not result in a significant improvement in lung function, as measured by the volume of air exhaled by patients in one second. The most common side effects include: headache, injection site reactions, back pain, weakness, and fatigue; hypersensitivity reactions can occur within hours or days including swelling of the face, mouth, and tongue, fainting, dizziness, hives, breathing problems, and rash; herpes zoster infections have occurred. The drug is a monoclonal antibody that inhibits interleukin-5 which helps regular eosinophils, a type of white blood cell that contributes to asthma. The over-production of eosinophils can cause inflammation in the lungs, increasing the frequency of asthma attacks. Patients must also take other medications, including high dose inhaled corticosteroids and at least one additional asthma drug.\par \par Problem 3: IgE Elevated \par -Candidate for Xolair\par -Xolair is a recombinant DNA- derived humanized IgG1K monoclonal antibody that selectively binds to human immunoglobulin E (IgE). Xolair is produced by a Chinese hamster ovary cell suspension culture in nutrient medium containing the antibiotic gentamicin. Gentamicin is not detectable in the final product. Xolair is a sterile, white, preservative free, lyophilized powder contained in a single use vial that is reconstituted with sterile water for suspension. Side effects include: wheezing, tightness of the chest, trouble breathing, hives, skin rash, feeling anxious or light-headed, fainting, warmth or tingling under skin, or swelling of face, lips, or tongue \par \par Problem 4:Sinuses /Allergies\par -continue Olopatadine 0.6% at 1 sniff/nostril BID \par -continue Flonase 1 sniff each nostril BID \par -recommended Navage\par Environmental measures for allergies were encouraged including mattress and pillow cover, air purifier, and environmental controls. \par \par Problem 5: GERD \par -No medications Recommended at this time\par -Rule of 2s: avoid eating too much, eating too fast, eating too late, eating too spicy, eating too lousy, eating two hours before bed.\par -Things to avoid including overeating, spicy foods, tight clothing, eating within three hours of bed, this list is not all inclusive. \par -For treatment of reflux, possible options discussed including diet control, H2 blockers, PPIs, as well as coating motility agents discussed as treatment options. Timing of meals and proximity of last meal to sleep were discussed. If symptoms persist, a formal gastrointestinal evaluation is needed.\par \par Problem 6: RONNIE  (APAP 4 to 10 Cm of water)\par -continue  Trazodone 50 mg QHS\par -continue CPAP Use (compliant and tolerates well) - restart until recall of Dream Station\par Sleep apnea is associated with adverse clinical consequences which an affect most organ systems. Cardiovascular disease risk includes arrhythmias, atrial fibrillation, hypertension, coronary artery disease, and stroke. Metabolic disorders include diabetes type 2, non-alcoholic fatty liver disease. Mood disorder especially depression; and cognitive decline especially in the elderly. Associations with chronic reflux/Thurston’s esophagus some but not all inclusive. \par -Reasons include arousal consistent with hypopnea; respiratory events most prominent in REM sleep or supine position; therefore sleep staging and body position are important for accurate diagnosis and estimation of AHI. \par \par Problem 7: Poor Mechanics of Breathing\par - Proper breathing techniques were reviewed with an emphasis of exhalation. Patient instructed to breath in for 1 second and out for four seconds. Patient was encouraged to not talk while walking. \par \par Problem 8: Out of Shape\par -exercise, and diet control were discussed and are highly encouraged. Treatment options were given such as, aqua therapy, and contacting a nutritionist. Recommended to use the elliptical, stationary bike, less use of treadmill. Mindful eating was explained to the patient Obesity is associated with worsening asthma, shortness of breath, and potential for cardiac disease, diabetes, and other underlying medical conditions. \par \par Problem 9: Cardiac (s/p CABG)- Afib\par -recommended to follow up with a cardiologist  for Ablation. \par \par Problem 10: Health Maintenance/COVID19 Precautions:- s/p x3 COVID-19 vaccines (Pfizer)\par - Clean your hands often. Wash your hands often with soap and water for at least 20 seconds, especially after blowing your nose, coughing, or sneezing, or having been in a public place.\par - If soap and water are not available, use a hand  that contains at least 60% alcohol.\par - To the extent possible, avoid touching high-touch surfaces in public places - elevator buttons, door handles, handrails, handshaking with people, etc. Use a tissue or your sleeve to cover your hand or finger if you must touch something.\par - Wash your hands after touching surfaces in public places.\par - Avoid touching your face, nose, eyes, etc.\par - Clean and disinfect your home to remove germs: practice routine cleaning of frequently touched surfaces (for example: tables, doorknobs, light switches, handles, desks, toilets, faucets, sinks & cell phones)\par - Avoid crowds, especially in poorly ventilated spaces. Your risk of exposure to respiratory viruses like COVID-19 may increase in crowded, closed-in settings with little air circulation if there are people in the crowd who are sick. All patients are recommended to practice social distancing and stay at least 6 feet away from others.\par - Avoid all non-essential travel including plane trips, and especially avoid embarking on cruise ships.\par -If COVID-19 is spreading in your community, take extra measures to put distance between yourself and other people to further reduce your risk of being exposed to this new virus.\par -Stay home as much as possible.\par - Consider ways of getting food brought to your house through family, social, or commercial networks\par -Be aware that the virus has been known to live in the air up to 3 hours post exposure, cardboard up to 24 hours post exposure, copper up to 4 hours post exposure, steel and plastic up to 2-3 days post exposure. Risk of transmission from these surfaces are affected by many variables.\par Immune Support Recommendations:\par -OTC Vitamin C 500mg BID \par -OTC Quercetin 250-500mg BID \par -OTC Zinc 75-100mg per day \par -OTC Melatonin 1 or 2 mg a night \par -OTC Vitamin D 1-4000mg per day \par -OTC Tonic Water 8oz per day\par Asthma and COVID19:\par You need to make sure your asthma is under control. This often requires the use of inhaled corticosteroids (and sometimes oral corticosteroids). Inhaled corticosteroids do not likely reduce your immune system’s ability to fight infections, but oral corticosteroids may. It is important to use the steps above to protect yourself to limit your exposure to any respiratory virus. \par \par Problem 11: health maintenance\par -s/p influenza vaccine 2021\par -recommended strep pneumonia vaccines after age 65: Prevnar-13 vaccine, followed by Pneumo vaccine 23 one year following (Completed) \par -recommended early intervention for URIs\par -recommended regular osteoporosis evaluations\par -recommended early dermatological evaluations\par -recommended after the age of 50 to the age of 70, colonoscopy every 5 years \par \par  Follow up in 6-8 weeks\par -he  is recommended to call with any changes, questions, or concerns.

## 2022-02-07 ENCOUNTER — APPOINTMENT (OUTPATIENT)
Dept: ELECTROPHYSIOLOGY | Facility: CLINIC | Age: 77
End: 2022-02-07
Payer: MEDICARE

## 2022-02-07 ENCOUNTER — NON-APPOINTMENT (OUTPATIENT)
Age: 77
End: 2022-02-07

## 2022-02-07 PROCEDURE — G2066: CPT

## 2022-02-07 PROCEDURE — 93298 REM INTERROG DEV EVAL SCRMS: CPT

## 2022-03-08 ENCOUNTER — OUTPATIENT (OUTPATIENT)
Dept: OUTPATIENT SERVICES | Facility: HOSPITAL | Age: 77
LOS: 1 days | Discharge: HOME | End: 2022-03-08
Payer: MEDICARE

## 2022-03-08 DIAGNOSIS — Z95.1 PRESENCE OF AORTOCORONARY BYPASS GRAFT: Chronic | ICD-10-CM

## 2022-03-08 DIAGNOSIS — Z98.890 OTHER SPECIFIED POSTPROCEDURAL STATES: Chronic | ICD-10-CM

## 2022-03-08 PROCEDURE — 33286 RMVL SUBQ CAR RHYTHM MNTR: CPT

## 2022-03-08 RX ORDER — ATORVASTATIN CALCIUM 80 MG/1
1 TABLET, FILM COATED ORAL
Qty: 0 | Refills: 0 | DISCHARGE

## 2022-03-08 NOTE — H&P CARDIOLOGY - HISTORY OF PRESENT ILLNESS
76 year old male with HTN, hyperlipidemia, asthma, known CAD with CABG 2019, left carotid endarterectomy, RONNIE, atrial flutter CTI ablation 9/8/2020 with subsequent loop recorder implant with no evidence of recurrence reported. Without complaints.   presents for loop recorder explant.     COVID PCR not detected on 3/4/2022

## 2022-03-08 NOTE — CHART NOTE - NSCHARTNOTEFT_GEN_A_CORE
Physical Exam:  General: Alert, NAD, lying in bed  ILR explant site was covered with a  clear with a gauge and clear Tegaderm. It was clean, dry, and intact. No bleeding, drainage hematoma, or ecchymosis appreciated.        Plan:   s/p ILR explant  Post-op ILR explant instruction has been verbally explained and given to the patient. Patient was given ID card, Booklet and home monitor. Patient expressed understanding and all questions were answered. A carbon copy is located in the patient chart  Patient is schedule for an appointment on 3/23/22 @ 2:20pm  You can return to normal activities 24hours after the procedure   No scrubbing the incision site   No lotion, ointment, powder or direct sunlight to the incision site for 2 weeks   No swimming pool, Jacuzzi, or bath for 7 days.   Patient can shower 24 hours after procedure. Pat the area dry  Pt was instructed to call [phone] if the following occurs:      - fever with temperature > 101F      - swelling, drainage or bleeding at the site incision

## 2022-03-08 NOTE — H&P CARDIOLOGY - NSICDXFAMILYHX_GEN_ALL_CORE_FT
FAMILY HISTORY:  Sibling  Still living? Yes, Estimated age: Age Unknown  Family history of atrial fibrillation, Age at diagnosis: Age Unknown

## 2022-03-14 ENCOUNTER — NON-APPOINTMENT (OUTPATIENT)
Age: 77
End: 2022-03-14

## 2022-03-14 ENCOUNTER — APPOINTMENT (OUTPATIENT)
Dept: ELECTROPHYSIOLOGY | Facility: CLINIC | Age: 77
End: 2022-03-14
Payer: MEDICARE

## 2022-03-14 PROCEDURE — 93298 REM INTERROG DEV EVAL SCRMS: CPT

## 2022-03-14 PROCEDURE — G2066: CPT

## 2022-03-23 ENCOUNTER — APPOINTMENT (OUTPATIENT)
Dept: ELECTROPHYSIOLOGY | Facility: CLINIC | Age: 77
End: 2022-03-23

## 2022-03-23 VITALS — DIASTOLIC BLOOD PRESSURE: 59 MMHG | SYSTOLIC BLOOD PRESSURE: 119 MMHG

## 2022-03-23 DIAGNOSIS — I48.3 TYPICAL ATRIAL FLUTTER: ICD-10-CM

## 2022-03-23 RX ORDER — RIVAROXABAN 2.5 MG/1
2.5 TABLET, FILM COATED ORAL
Refills: 0 | Status: ACTIVE | COMMUNITY

## 2022-03-23 RX ORDER — CLOPIDOGREL BISULFATE 75 MG/1
75 TABLET, FILM COATED ORAL DAILY
Qty: 90 | Refills: 3 | Status: DISCONTINUED | COMMUNITY
Start: 2021-08-02 | End: 2022-03-23

## 2022-03-30 ENCOUNTER — APPOINTMENT (OUTPATIENT)
Dept: PULMONOLOGY | Facility: CLINIC | Age: 77
End: 2022-03-30
Payer: MEDICARE

## 2022-03-30 VITALS
SYSTOLIC BLOOD PRESSURE: 110 MMHG | BODY MASS INDEX: 25.33 KG/M2 | RESPIRATION RATE: 16 BRPM | HEIGHT: 65 IN | WEIGHT: 152 LBS | OXYGEN SATURATION: 97 % | TEMPERATURE: 97.9 F | DIASTOLIC BLOOD PRESSURE: 60 MMHG | HEART RATE: 66 BPM

## 2022-03-30 PROCEDURE — 96372 THER/PROPH/DIAG INJ SC/IM: CPT

## 2022-03-30 RX ORDER — BENRALIZUMAB 30 MG/ML
30 INJECTION, SOLUTION SUBCUTANEOUS
Qty: 0 | Refills: 0 | Status: COMPLETED | OUTPATIENT
Start: 2022-03-30

## 2022-04-05 ENCOUNTER — NON-APPOINTMENT (OUTPATIENT)
Age: 77
End: 2022-04-05

## 2022-04-05 ENCOUNTER — APPOINTMENT (OUTPATIENT)
Dept: PULMONOLOGY | Facility: CLINIC | Age: 77
End: 2022-04-05
Payer: MEDICARE

## 2022-04-05 VITALS
BODY MASS INDEX: 24.59 KG/M2 | TEMPERATURE: 97.6 F | HEART RATE: 75 BPM | HEIGHT: 66 IN | DIASTOLIC BLOOD PRESSURE: 78 MMHG | SYSTOLIC BLOOD PRESSURE: 112 MMHG | OXYGEN SATURATION: 96 % | RESPIRATION RATE: 16 BRPM | WEIGHT: 153 LBS

## 2022-04-05 DIAGNOSIS — J31.0 CHRONIC RHINITIS: ICD-10-CM

## 2022-04-05 DIAGNOSIS — Z71.89 OTHER SPECIFIED COUNSELING: ICD-10-CM

## 2022-04-05 PROCEDURE — 99214 OFFICE O/P EST MOD 30 MIN: CPT | Mod: 25

## 2022-04-05 PROCEDURE — 94010 BREATHING CAPACITY TEST: CPT

## 2022-04-05 PROCEDURE — 95012 NITRIC OXIDE EXP GAS DETER: CPT

## 2022-04-05 NOTE — PROCEDURE
[FreeTextEntry1] : PFT revealed normal flows, with a FEV1 of 1.77L, which is 81% of predicted, with a normal flow volume loop \par \par FENO was 26; a normal value being less than 25. Fractional exhaled nitric oxide (FENO) is regarded as a simple, noninvasive method for assessing eosinophilic airway inflammation. Produced by a variety of cells within the lung, nitric oxide (NO) concentrations are generally low in healthy individuals. However, high concentrations of NO appear to be involved in nonspecific host defense mechanisms and chronic inflammatory  diseases such as asthma. The American Thoracic Society (ATS) therefore recommended using FENO to aid in the diagnosis and monitoring of eosinophilic airway inflammation and asthma, and for identifying steroid responsive individuals whose chronic respiratory symptoms may be caused by airway inflammation

## 2022-04-05 NOTE — PHYSICAL EXAM
[No Acute Distress] : no acute distress [Well Nourished] : well nourished [No Deformities] : no deformities [Well Developed] : well developed [Normal Oropharynx] : normal oropharynx [III] : Mallampati Class: III [Normal Appearance] : normal appearance [No Neck Mass] : no neck mass [Normal Rate/Rhythm] : normal rate/rhythm [Normal S1, S2] : normal s1, s2 [No Murmurs] : no murmurs [No Resp Distress] : no resp distress [Clear to Auscultation Bilaterally] : clear to auscultation bilaterally [No Abnormalities] : no abnormalities [Benign] : benign [Normal Gait] : normal gait [No Clubbing] : no clubbing [No Cyanosis] : no cyanosis [No Edema] : no edema [FROM] : FROM [Normal Color/ Pigmentation] : normal color/ pigmentation [No Focal Deficits] : no focal deficits [Oriented x3] : oriented x3 [Normal Affect] : normal affect [TextBox_2] : OW [TextBox_68] : I:E 1:3, clear

## 2022-04-05 NOTE — ADDENDUM
[FreeTextEntry1] : Documented by Giles Abad acting as a scribe for Dr. Escobar Ryder on 04/05/2022\par \par All medical record entries made by the scribe were at my, Dr. Escobar Ryder's, direction and personally dictated by me on 04/05/2022. I have reviewed the chart and agree that the record accurately reflects my personal performance of the history, physical exam, assessment, and plan. I have also personally directed, reviewed, and agree with the discharge instructions.

## 2022-04-05 NOTE — ASSESSMENT
[FreeTextEntry1] : Mr. ARMSTRONG is a 76 year old male with a history of CAD, s/p CABG x 4 11.15.2019, DM, Asthma, Allergies (severe/ Eosinophilia), HTN, carotid stenosis, RONNIE who presents into the office today for a f/u pulmonary evaluation. SOB (NC with medications) - s/p Fasenra initiation - improved. (aborted carotid artery surgery) - untreated OSAS; improved asthma\par \par \par The patient's shortness of breath is multifactorial due to:\par -pulmonary disease \par      -eosinophilic and IgE mediated asthma, chronic sinus issues, RONNIE, GERD\par -poor breathing mechanics \par -out of shape\par -?cardiac disease (s/p CABG, Dr. King)\par \par Problem 1: Severe Persistent Asthma (uncontrolled - ?Beta Blocker) - better\par -continue Symbicort 160 2 inhalations BID\par -off Zyflo CR - 1200 BID to Accolate 20 BID / Singulair 10 mg QHS \par -continue Tudorza 1 puff BID \par -continue Ventolin rescue inhaler 2 inhalations before exercise, Q6H \par - continue Aerobika to bring up mucous \par -Asthma is believed to be caused by inherited (genetic) and environmental factor, but its exact cause is unknown. Asthma may be triggered by allergens, lung infections, or irritants in the air. Asthma triggers are different for each person \par \par Problem 2:Eosinophilic Asthma (re-discussed) - controlled \par -s/p CBC (+)\par -Fasenra [id number] (since 9/2020)\par -The safety and efficacy of Nucala was established in three double-blind, randomized, placebo-controlled trials in patients with severe asthma. Compared to a placebo, patients with severe asthma receiving Nucala had fewer exacerbation requiring hospitalization and/or emergency department visits, and a longer time to first exacerbation. In addition, patients with severe asthma receiving Nucala or Fasenra experienced greater reductions in their daily maintenance oral corticosteroid dose, while maintaining asthma control compared with patients receiving placebo. Treatment with Nucala did not result in a significant improvement in lung function, as measured by the volume of air exhaled by patients in one second. The most common side effects include: headache, injection site reactions, back pain, weakness, and fatigue; hypersensitivity reactions can occur within hours or days including swelling of the face, mouth, and tongue, fainting, dizziness, hives, breathing problems, and rash; herpes zoster infections have occurred. The drug is a monoclonal antibody that inhibits interleukin-5 which helps regular eosinophils, a type of white blood cell that contributes to asthma. The over-production of eosinophils can cause inflammation in the lungs, increasing the frequency of asthma attacks. Patients must also take other medications, including high dose inhaled corticosteroids and at least one additional asthma drug.\par \par Problem 3: IgE Elevated \par -Candidate for Xolair\par -Xolair is a recombinant DNA- derived humanized IgG1K monoclonal antibody that selectively binds to human immunoglobulin E (IgE). Xolair is produced by a Chinese hamster ovary cell suspension culture in nutrient medium containing the antibiotic gentamicin. Gentamicin is not detectable in the final product. Xolair is a sterile, white, preservative free, lyophilized powder contained in a single use vial that is reconstituted with sterile water for suspension. Side effects include: wheezing, tightness of the chest, trouble breathing, hives, skin rash, feeling anxious or light-headed, fainting, warmth or tingling under skin, or swelling of face, lips, or tongue \par \par Problem 4:Sinuses /Allergies\par -continue Olopatadine 0.6% at 1 sniff/nostril BID \par -continue Flonase 1 sniff each nostril BID \par -recommended Navage\par Environmental measures for allergies were encouraged including mattress and pillow cover, air purifier, and environmental controls. \par \par Problem 5: GERD \par -No medications Recommended at this time\par -Rule of 2s: avoid eating too much, eating too fast, eating too late, eating too spicy, eating too lousy, eating two hours before bed.\par -Things to avoid including overeating, spicy foods, tight clothing, eating within three hours of bed, this list is not all inclusive. \par -For treatment of reflux, possible options discussed including diet control, H2 blockers, PPIs, as well as coating motility agents discussed as treatment options. Timing of meals and proximity of last meal to sleep were discussed. If symptoms persist, a formal gastrointestinal evaluation is needed.\par \par Problem 6: RONNIE  (APAP 4 to 10 Cm of water)\par -continue  Trazodone 50 mg QHS\par -continue CPAP Use (compliant and tolerates well) - restart until recall of Dream Station\par Sleep apnea is associated with adverse clinical consequences which an affect most organ systems. Cardiovascular disease risk includes arrhythmias, atrial fibrillation, hypertension, coronary artery disease, and stroke. Metabolic disorders include diabetes type 2, non-alcoholic fatty liver disease. Mood disorder especially depression; and cognitive decline especially in the elderly. Associations with chronic reflux/Thurston’s esophagus some but not all inclusive. \par -Reasons include arousal consistent with hypopnea; respiratory events most prominent in REM sleep or supine position; therefore sleep staging and body position are important for accurate diagnosis and estimation of AHI. \par \par Problem 7: Poor Mechanics of Breathing\par -Recommended Wim Hof and Buteyko breathing techniques \par - Proper breathing techniques were reviewed with an emphasis on exhalation. Patient instructed to breath in for 1 second and out for four seconds. Patient was encouraged to not talk while walking. \par \par Problem 8: Out of Shape\par -exercise, and diet control were discussed and are highly encouraged. Treatment options were given such as, aqua therapy, and contacting a nutritionist. Recommended to use the elliptical, stationary bike, less use of treadmill. Mindful eating was explained to the patient Obesity is associated with worsening asthma, shortness of breath, and potential for cardiac disease, diabetes, and other underlying medical conditions. \par \par Problem 9: Cardiac (s/p CABG)- Afib\par -recommended to follow up with a cardiologist  for Ablation. \par \par Problem 10: Health Maintenance/COVID19 Precautions:- s/p x3 COVID-19 vaccines (Pfizer)\par - Clean your hands often. Wash your hands often with soap and water for at least 20 seconds, especially after blowing your nose, coughing, or sneezing, or having been in a public place.\par - If soap and water are not available, use a hand  that contains at least 60% alcohol.\par - To the extent possible, avoid touching high-touch surfaces in public places - elevator buttons, door handles, handrails, handshaking with people, etc. Use a tissue or your sleeve to cover your hand or finger if you must touch something.\par - Wash your hands after touching surfaces in public places.\par - Avoid touching your face, nose, eyes, etc.\par - Clean and disinfect your home to remove germs: practice routine cleaning of frequently touched surfaces (for example: tables, doorknobs, light switches, handles, desks, toilets, faucets, sinks & cell phones)\par - Avoid crowds, especially in poorly ventilated spaces. Your risk of exposure to respiratory viruses like COVID-19 may increase in crowded, closed-in settings with little air circulation if there are people in the crowd who are sick. All patients are recommended to practice social distancing and stay at least 6 feet away from others.\par - Avoid all non-essential travel including plane trips, and especially avoid embarking on cruise ships.\par -If COVID-19 is spreading in your community, take extra measures to put distance between yourself and other people to further reduce your risk of being exposed to this new virus.\par -Stay home as much as possible.\par - Consider ways of getting food brought to your house through family, social, or commercial networks\par -Be aware that the virus has been known to live in the air up to 3 hours post exposure, cardboard up to 24 hours post exposure, copper up to 4 hours post exposure, steel and plastic up to 2-3 days post exposure. Risk of transmission from these surfaces are affected by many variables.\par Immune Support Recommendations:\par -OTC Vitamin C 500mg BID \par -OTC Quercetin 250-500mg BID \par -OTC Zinc 75-100mg per day \par -OTC Melatonin 1 or 2 mg a night \par -OTC Vitamin D 1-4000mg per day \par -OTC Tonic Water 8oz per day\par Asthma and COVID19:\par You need to make sure your asthma is under control. This often requires the use of inhaled corticosteroids (and sometimes oral corticosteroids). Inhaled corticosteroids do not likely reduce your immune system’s ability to fight infections, but oral corticosteroids may. It is important to use the steps above to protect yourself to limit your exposure to any respiratory virus. \par \par Problem 11: health maintenance\par -s/p influenza vaccine 2021\par -recommended strep pneumonia vaccines after age 65: Prevnar-13 vaccine, followed by Pneumo vaccine 23 one year following (Completed) \par -recommended early intervention for URIs\par -recommended regular osteoporosis evaluations\par -recommended early dermatological evaluations\par -recommended after the age of 50 to the age of 70, colonoscopy every 5 years \par \par  Follow up in 6-8 weeks\par -he  is recommended to call with any changes, questions, or concerns.

## 2022-04-25 ENCOUNTER — APPOINTMENT (OUTPATIENT)
Dept: ELECTROPHYSIOLOGY | Facility: CLINIC | Age: 77
End: 2022-04-25

## 2022-05-25 ENCOUNTER — APPOINTMENT (OUTPATIENT)
Dept: PULMONOLOGY | Facility: CLINIC | Age: 77
End: 2022-05-25
Payer: MEDICARE

## 2022-05-25 VITALS
RESPIRATION RATE: 16 BRPM | HEART RATE: 76 BPM | TEMPERATURE: 98.5 F | HEIGHT: 66 IN | WEIGHT: 154 LBS | SYSTOLIC BLOOD PRESSURE: 120 MMHG | DIASTOLIC BLOOD PRESSURE: 60 MMHG | OXYGEN SATURATION: 95 % | BODY MASS INDEX: 24.75 KG/M2

## 2022-05-25 PROCEDURE — 96401 CHEMO ANTI-NEOPL SQ/IM: CPT

## 2022-05-25 RX ORDER — BENRALIZUMAB 30 MG/ML
30 INJECTION, SOLUTION SUBCUTANEOUS
Qty: 0 | Refills: 0 | Status: COMPLETED | OUTPATIENT
Start: 2022-05-24

## 2022-06-08 ENCOUNTER — APPOINTMENT (OUTPATIENT)
Dept: PULMONOLOGY | Facility: CLINIC | Age: 77
End: 2022-06-08
Payer: MEDICARE

## 2022-06-08 DIAGNOSIS — U07.1 COVID-19: ICD-10-CM

## 2022-06-08 PROCEDURE — 99441: CPT

## 2022-07-20 ENCOUNTER — APPOINTMENT (OUTPATIENT)
Dept: PULMONOLOGY | Facility: CLINIC | Age: 77
End: 2022-07-20

## 2022-07-25 ENCOUNTER — APPOINTMENT (OUTPATIENT)
Dept: PULMONOLOGY | Facility: CLINIC | Age: 77
End: 2022-07-25

## 2022-07-25 VITALS
OXYGEN SATURATION: 98 % | HEART RATE: 66 BPM | DIASTOLIC BLOOD PRESSURE: 60 MMHG | WEIGHT: 153 LBS | SYSTOLIC BLOOD PRESSURE: 120 MMHG | BODY MASS INDEX: 23.19 KG/M2 | TEMPERATURE: 98 F | HEIGHT: 68 IN | RESPIRATION RATE: 16 BRPM

## 2022-07-25 PROCEDURE — 96372 THER/PROPH/DIAG INJ SC/IM: CPT

## 2022-07-25 RX ORDER — BENRALIZUMAB 30 MG/ML
30 INJECTION, SOLUTION SUBCUTANEOUS
Qty: 0 | Refills: 0 | Status: COMPLETED | OUTPATIENT
Start: 2022-07-24

## 2022-08-10 ENCOUNTER — APPOINTMENT (OUTPATIENT)
Dept: PULMONOLOGY | Facility: CLINIC | Age: 77
End: 2022-08-10

## 2022-08-10 VITALS
HEIGHT: 66 IN | HEART RATE: 59 BPM | RESPIRATION RATE: 16 BRPM | TEMPERATURE: 97.3 F | DIASTOLIC BLOOD PRESSURE: 70 MMHG | BODY MASS INDEX: 24.91 KG/M2 | SYSTOLIC BLOOD PRESSURE: 136 MMHG | WEIGHT: 155 LBS | OXYGEN SATURATION: 98 %

## 2022-08-10 PROCEDURE — 99214 OFFICE O/P EST MOD 30 MIN: CPT

## 2022-08-13 NOTE — HISTORY OF PRESENT ILLNESS
[TextBox_4] : Mr. ARMSTRONG is a 77 year old male with a history of eosinophilic and IgE mediated asthma, chronic sinus issues, RONNIE, GERD present to the office today for follow up. \par \par His chief concern is follow up. \par \par Patient reports COVID infection 6/2022 with mild symptoms. He reports he didn't take Paxlovid. Patient reports he is in normal state of charis. Patient reports he is active walks 2-3 X a week and plays golf. He reports appetite is good, weight is stable and sleep well. Patient reports he uses PAP daily with benefit. He denies GERD or seasonal allergies.Patient reports he uses Aerobika which helps bring up mucus. \par \par Patient denies fever, chills, SOB, cough, wheezing, nasal congestion, runny nose or heart burn. \par \par

## 2022-08-13 NOTE — ASSESSMENT
[FreeTextEntry1] : Mr. ARMSTRONG is a 77 year old male with a history of eosinophilic and IgE mediated asthma, chronic sinus issues, RONNIE, GERD present to the office today for follow up. \par \par His chief concern is follow up. \par \par 1. Severe Persistent Asthma\par -continue Symbicort 160 2 inhalations BID\par -continue Singulair 10 mg QHS \par -continue Ventolin rescue inhaler 2 inhalations before exercise, Q6H \par - continue Aerobika to bring up mucous \par \par 2.:Eosinophilic Asthma \par -Fasenra [id number] (since 9/2020)\par \par 3. RONNIE \par -continue CPAP Use\par \par 4. GERD\par -diet controlled\par \par 5.Sinuses /Allergies\par -continue Olopatadine 0.6% at 1 sniff/nostril BID \par -continue Flonase 1 sniff each nostril BID \par -recommended Navage\par \par Patient to follow up with Dr. Ryder in 4 months.\par Patient to call with further questions and concerns.\par Patient verbalizes understanding of care plan and is agreeable.\par \par

## 2022-08-13 NOTE — PHYSICAL EXAM
[No Acute Distress] : no acute distress [Normal Oropharynx] : normal oropharynx [Normal Appearance] : normal appearance [Normal Rate/Rhythm] : normal rate/rhythm [Normal S1, S2] : normal s1, s2 [No Resp Distress] : no resp distress [Clear to Auscultation Bilaterally] : clear to auscultation bilaterally [No Abnormalities] : no abnormalities [Normal Color/ Pigmentation] : normal color/ pigmentation [No Focal Deficits] : no focal deficits [Oriented x3] : oriented x3 [Normal Affect] : normal affect

## 2022-08-17 NOTE — PHYSICAL EXAM
[No Acute Distress] : no acute distress [No Deformities] : no deformities [Well Nourished] : well nourished [Well Developed] : well developed [Normal Oropharynx] : normal oropharynx [No Neck Mass] : no neck mass [Normal Appearance] : normal appearance [III] : Mallampati Class: III [Normal S1, S2] : normal s1, s2 [Normal Rate/Rhythm] : normal rate/rhythm [No Murmurs] : no murmurs [No Resp Distress] : no resp distress [Clear to Auscultation Bilaterally] : clear to auscultation bilaterally [No Abnormalities] : no abnormalities [Normal Gait] : normal gait [Benign] : benign [No Clubbing] : no clubbing [No Cyanosis] : no cyanosis [No Edema] : no edema [FROM] : FROM [Normal Color/ Pigmentation] : normal color/ pigmentation [No Focal Deficits] : no focal deficits [Normal Affect] : normal affect [Oriented x3] : oriented x3 [TextBox_68] : I:E ratio 1:3; mild expiratory wheezes  MED/SURG

## 2022-08-26 ENCOUNTER — TRANSCRIPTION ENCOUNTER (OUTPATIENT)
Age: 77
End: 2022-08-26

## 2022-09-19 ENCOUNTER — APPOINTMENT (OUTPATIENT)
Dept: PULMONOLOGY | Facility: CLINIC | Age: 77
End: 2022-09-19

## 2022-09-19 VITALS
WEIGHT: 156 LBS | HEART RATE: 61 BPM | RESPIRATION RATE: 16 BRPM | BODY MASS INDEX: 25.07 KG/M2 | TEMPERATURE: 98.5 F | SYSTOLIC BLOOD PRESSURE: 100 MMHG | OXYGEN SATURATION: 98 % | HEIGHT: 66 IN | DIASTOLIC BLOOD PRESSURE: 60 MMHG

## 2022-09-19 PROCEDURE — 96372 THER/PROPH/DIAG INJ SC/IM: CPT

## 2022-09-19 RX ORDER — BENRALIZUMAB 30 MG/ML
30 INJECTION, SOLUTION SUBCUTANEOUS
Qty: 0 | Refills: 0 | Status: COMPLETED | OUTPATIENT
Start: 2022-09-19

## 2022-09-29 ENCOUNTER — APPOINTMENT (OUTPATIENT)
Dept: ELECTROPHYSIOLOGY | Facility: CLINIC | Age: 77
End: 2022-09-29

## 2022-10-03 ENCOUNTER — RX RENEWAL (OUTPATIENT)
Age: 77
End: 2022-10-03

## 2022-11-14 ENCOUNTER — APPOINTMENT (OUTPATIENT)
Dept: PULMONOLOGY | Facility: CLINIC | Age: 77
End: 2022-11-14

## 2022-11-14 VITALS
BODY MASS INDEX: 25.39 KG/M2 | OXYGEN SATURATION: 96 % | TEMPERATURE: 98 F | RESPIRATION RATE: 16 BRPM | SYSTOLIC BLOOD PRESSURE: 140 MMHG | WEIGHT: 158 LBS | HEIGHT: 66 IN | HEART RATE: 67 BPM | DIASTOLIC BLOOD PRESSURE: 60 MMHG

## 2022-11-14 PROCEDURE — 96372 THER/PROPH/DIAG INJ SC/IM: CPT

## 2022-11-14 RX ORDER — BENRALIZUMAB 30 MG/ML
30 INJECTION, SOLUTION SUBCUTANEOUS
Qty: 0 | Refills: 0 | Status: COMPLETED | OUTPATIENT
Start: 2022-11-14

## 2022-12-05 NOTE — PATIENT PROFILE ADULT - NSPROPTRIGHTSUPPORTNAME_GEN_A_NUR
Rosa Maria Marcus Rhomboid Transposition Flap Text: The defect edges were debeveled with a #15 scalpel blade.  Given the location of the defect and the proximity to free margins a rhomboid transposition flap was deemed most appropriate.  Using a sterile surgical marker, an appropriate rhomboid flap was drawn incorporating the defect.    The area thus outlined was incised deep to adipose tissue with a #15 scalpel blade.  The skin margins were undermined to an appropriate distance in all directions utilizing iris scissors.

## 2022-12-07 ENCOUNTER — APPOINTMENT (OUTPATIENT)
Dept: PULMONOLOGY | Facility: CLINIC | Age: 77
End: 2022-12-07

## 2022-12-20 ENCOUNTER — APPOINTMENT (OUTPATIENT)
Dept: PULMONOLOGY | Facility: CLINIC | Age: 77
End: 2022-12-20

## 2022-12-20 VITALS
OXYGEN SATURATION: 94 % | SYSTOLIC BLOOD PRESSURE: 120 MMHG | BODY MASS INDEX: 25.23 KG/M2 | HEART RATE: 87 BPM | HEIGHT: 66 IN | TEMPERATURE: 98.2 F | DIASTOLIC BLOOD PRESSURE: 62 MMHG | WEIGHT: 157 LBS | RESPIRATION RATE: 16 BRPM

## 2022-12-20 DIAGNOSIS — Z23 ENCOUNTER FOR IMMUNIZATION: ICD-10-CM

## 2022-12-20 PROCEDURE — G0009: CPT

## 2022-12-20 PROCEDURE — 94729 DIFFUSING CAPACITY: CPT

## 2022-12-20 PROCEDURE — 99214 OFFICE O/P EST MOD 30 MIN: CPT | Mod: 25

## 2022-12-20 PROCEDURE — 95012 NITRIC OXIDE EXP GAS DETER: CPT

## 2022-12-20 PROCEDURE — ZZZZZ: CPT

## 2022-12-20 PROCEDURE — 90677 PCV20 VACCINE IM: CPT

## 2022-12-20 PROCEDURE — 94727 GAS DIL/WSHOT DETER LNG VOL: CPT

## 2022-12-20 PROCEDURE — 94010 BREATHING CAPACITY TEST: CPT

## 2022-12-20 NOTE — ASSESSMENT
[FreeTextEntry1] : Mr. ARMSTRONG is a 77 year old male with a history of CAD, s/p CABG x 4 11.15.2019, DM, Asthma, Allergies (severe/ Eosinophilia), HTN, carotid stenosis, RONNIE who presents into the office today for a f/u pulmonary evaluation. SOB (NC with medications) - s/p Fasenra initiation - improved. (aborted carotid artery surgery) - untreated OSAS; improved asthma; #1 issue is sinus\par \par \par The patient's shortness of breath is multifactorial due to:\par -pulmonary disease \par      -eosinophilic and IgE mediated asthma, chronic sinus issues, RONNIE, GERD\par -poor breathing mechanics \par -out of shape\par -?cardiac disease (s/p CABG, Dr. King)\par \par Problem 1: Severe Persistent Asthma (uncontrolled - ?Beta Blocker) - better controlled \par -continue Symbicort 160 2 inhalations BID\par -off Zyflo CR - 1200 BID to Accolate 20 BID / Singulair 10 mg QHS \par -continue Tudorza 1 puff BID \par -continue Ventolin rescue inhaler 2 inhalations before exercise, Q6H \par - continue Aerobika to bring up mucous \par -Asthma is believed to be caused by inherited (genetic) and environmental factor, but its exact cause is unknown. Asthma may be triggered by allergens, lung infections, or irritants in the air. Asthma triggers are different for each person \par \par Problem 2:Eosinophilic Asthma (re-discussed) - controlled \par -s/p CBC (+)\par -Fasenra [id number] (since 9/2020)\par -The safety and efficacy of Nucala was established in three double-blind, randomized, placebo-controlled trials in patients with severe asthma. Compared to a placebo, patients with severe asthma receiving Nucala had fewer exacerbation requiring hospitalization and/or emergency department visits, and a longer time to first exacerbation. In addition, patients with severe asthma receiving Nucala or Fasenra experienced greater reductions in their daily maintenance oral corticosteroid dose, while maintaining asthma control compared with patients receiving placebo. Treatment with Nucala did not result in a significant improvement in lung function, as measured by the volume of air exhaled by patients in one second. The most common side effects include: headache, injection site reactions, back pain, weakness, and fatigue; hypersensitivity reactions can occur within hours or days including swelling of the face, mouth, and tongue, fainting, dizziness, hives, breathing problems, and rash; herpes zoster infections have occurred. The drug is a monoclonal antibody that inhibits interleukin-5 which helps regular eosinophils, a type of white blood cell that contributes to asthma. The over-production of eosinophils can cause inflammation in the lungs, increasing the frequency of asthma attacks. Patients must also take other medications, including high dose inhaled corticosteroids and at least one additional asthma drug.\par \par Problem 3: IgE Elevated \par -Candidate for Xolair\par -Xolair is a recombinant DNA- derived humanized IgG1K monoclonal antibody that selectively binds to human immunoglobulin E (IgE). Xolair is produced by a Chinese hamster ovary cell suspension culture in nutrient medium containing the antibiotic gentamicin. Gentamicin is not detectable in the final product. Xolair is a sterile, white, preservative free, lyophilized powder contained in a single use vial that is reconstituted with sterile water for suspension. Side effects include: wheezing, tightness of the chest, trouble breathing, hives, skin rash, feeling anxious or light-headed, fainting, warmth or tingling under skin, or swelling of face, lips, or tongue \par \par Problem 4:Sinuses /Allergies (active)\par -add Clarinex 5 mg QAM \par -continue Olopatadine 0.6% at 1 sniff/nostril BID \par -continue Flonase 1 sniff each nostril BID \par -recommended Navage\par Environmental measures for allergies were encouraged including mattress and pillow cover, air purifier, and environmental controls. \par \par Problem 5: GERD \par -No medications Recommended at this time\par -Rule of 2s: avoid eating too much, eating too fast, eating too late, eating too spicy, eating too lousy, eating two hours before bed.\par -Things to avoid including overeating, spicy foods, tight clothing, eating within three hours of bed, this list is not all inclusive. \par -For treatment of reflux, possible options discussed including diet control, H2 blockers, PPIs, as well as coating motility agents discussed as treatment options. Timing of meals and proximity of last meal to sleep were discussed. If symptoms persist, a formal gastrointestinal evaluation is needed.\par \par Problem 6: RONNIE  (APAP 4 to 10 Cm of water)\par -continue  Trazodone 50 mg QHS\par -continue CPAP Use (compliant and tolerates well) - restart until recall of [employer]\par Sleep apnea is associated with adverse clinical consequences which an affect most organ systems. Cardiovascular disease risk includes arrhythmias, atrial fibrillation, hypertension, coronary artery disease, and stroke. Metabolic disorders include diabetes type 2, non-alcoholic fatty liver disease. Mood disorder especially depression; and cognitive decline especially in the elderly. Associations with chronic reflux/Thurston’s esophagus some but not all inclusive. \par -Reasons include arousal consistent with hypopnea; respiratory events most prominent in REM sleep or supine position; therefore sleep staging and body position are important for accurate diagnosis and estimation of AHI. \par \par Problem 7: Poor Mechanics of Breathing\par -Recommended Wim Hof and Buteyko breathing techniques \par - Proper breathing techniques were reviewed with an emphasis on exhalation. Patient instructed to breath in for 1 second and out for four seconds. Patient was encouraged to not talk while walking. \par \par Problem 8: Out of Shape\par -exercise, and diet control were discussed and are highly encouraged. Treatment options were given such as, aqua therapy, and contacting a nutritionist. Recommended to use the elliptical, stationary bike, less use of treadmill. Mindful eating was explained to the patient Obesity is associated with worsening asthma, shortness of breath, and potential for cardiac disease, diabetes, and other underlying medical conditions. \par \par Problem 9: Cardiac (s/p CABG)- Afib\par -recommended to follow up with a cardiologist  for Ablation. \par \par Problem 10: Health Maintenance/COVID19 Precautions:- s/p x3 COVID-19 vaccines (Pfizer)\par - Clean your hands often. Wash your hands often with soap and water for at least 20 seconds, especially after blowing your nose, coughing, or sneezing, or having been in a public place.\par - If soap and water are not available, use a hand  that contains at least 60% alcohol.\par - To the extent possible, avoid touching high-touch surfaces in public places - elevator buttons, door handles, handrails, handshaking with people, etc. Use a tissue or your sleeve to cover your hand or finger if you must touch something.\par - Wash your hands after touching surfaces in public places.\par - Avoid touching your face, nose, eyes, etc.\par - Clean and disinfect your home to remove germs: practice routine cleaning of frequently touched surfaces (for example: tables, doorknobs, light switches, handles, desks, toilets, faucets, sinks & cell phones)\par - Avoid crowds, especially in poorly ventilated spaces. Your risk of exposure to respiratory viruses like COVID-19 may increase in crowded, closed-in settings with little air circulation if there are people in the crowd who are sick. All patients are recommended to practice social distancing and stay at least 6 feet away from others.\par - Avoid all non-essential travel including plane trips, and especially avoid embarking on cruise ships.\par -If COVID-19 is spreading in your community, take extra measures to put distance between yourself and other people to further reduce your risk of being exposed to this new virus.\par -Stay home as much as possible.\par - Consider ways of getting food brought to your house through family, social, or commercial networks\par -Be aware that the virus has been known to live in the air up to 3 hours post exposure, cardboard up to 24 hours post exposure, copper up to 4 hours post exposure, steel and plastic up to 2-3 days post exposure. Risk of transmission from these surfaces are affected by many variables.\par Immune Support Recommendations:\par -OTC Vitamin C 500mg BID \par -OTC Quercetin 250-500mg BID \par -OTC Zinc 75-100mg per day \par -OTC Melatonin 1 or 2 mg a night \par -OTC Vitamin D 1-4000mg per day \par -OTC Tonic Water 8oz per day\par Asthma and COVID19:\par You need to make sure your asthma is under control. This often requires the use of inhaled corticosteroids (and sometimes oral corticosteroids). Inhaled corticosteroids do not likely reduce your immune system’s ability to fight infections, but oral corticosteroids may. It is important to use the steps above to protect yourself to limit your exposure to any respiratory virus. \par \par Problem 11: health maintenance\par -s/p influenza vaccine 2022\par -recommended strep pneumonia vaccines after age 65: Prevnar-13 vaccine, followed by Pneumo vaccine 23 one year following (Completed) \par -recommended early intervention for URIs\par -recommended regular osteoporosis evaluations\par -recommended early dermatological evaluations\par -recommended after the age of 50 to the age of 70, colonoscopy every 5 years \par \par  Follow up in 6-8 weeks\par -he  is recommended to call with any changes, questions, or concerns.

## 2022-12-20 NOTE — HISTORY OF PRESENT ILLNESS
[TextBox_4] : Mr. ARMSTRONG is a 77 year old male with a history of  eosinophilic and IgE mediated asthma, chronic sinus issues, RONNIE, GERD presenting to the office today for f/u pulmonary evaluation. His chief complaint is\par \par -he notes feeling generally well \par -he notes gaining weight \par -he notes bowels are regular \par -he notes dysphagia\par -he notes vision is stable \par -he denies dysphonia \par -he notes compliant with Rx\par -he notes a lot of PND\par -he notes exercising \par \par -he denies any headaches, nausea, vomiting, fever, chills, sweats, chest pain, chest pressure, coughing, palpitations, constipation, diarrhea, dizziness, dysphagia, heartburn, reflux, itchy eyes, itchy ears, leg swelling, leg pain, arthralgias, myalgias, or sour taste in the mouth.

## 2022-12-20 NOTE — PROCEDURE
[FreeTextEntry1] : Full PFT reveals severe obstructive dysfunction; FEV1 was 1.56L which is 64% of predicted; normal lung volumes; normal diffusion at 18.7, which is 112% of predicted; abnormal inspiratory limb. \par \par FENO was 9; a normal value being less than 25\par Fractional exhaled nitric oxide (FENO) is regarded as a simple, noninvasive method for assessing eosinophilic airway inflammation. Produced by a variety of cells within the lung, nitric oxide (NO) concentrations are generally low in healthy individuals. However, high concentrations of NO appear to be involved in nonspecific host defense mechanisms and chronic inflammatory diseases such as asthma. The American Thoracic Society (ATS) therefore has recommended using FENO to aid in the diagnosis and monitoring of eosinophilic airway inflammation and asthma, and for identifying steroid responsive individuals whose chronic respiratory symptoms may be caused by airway inflammation.

## 2022-12-20 NOTE — PHYSICAL EXAM
[No Acute Distress] : no acute distress [Well Nourished] : well nourished [No Deformities] : no deformities [Well Developed] : well developed [Normal Oropharynx] : normal oropharynx [Normal Appearance] : normal appearance [No Neck Mass] : no neck mass [Normal Rate/Rhythm] : normal rate/rhythm [Normal S1, S2] : normal s1, s2 [No Murmurs] : no murmurs [No Resp Distress] : no resp distress [Clear to Auscultation Bilaterally] : clear to auscultation bilaterally [No Abnormalities] : no abnormalities [Benign] : benign [Normal Gait] : normal gait [No Clubbing] : no clubbing [No Cyanosis] : no cyanosis [No Edema] : no edema [FROM] : FROM [Normal Color/ Pigmentation] : normal color/ pigmentation [No Focal Deficits] : no focal deficits [Oriented x3] : oriented x3 [Normal Affect] : normal affect [II] : Mallampati Class: II [TextBox_2] : OW [TextBox_68] : I:E 1:3, mild expiratory wheeze [TextBox_105] : hematomas

## 2022-12-20 NOTE — ADDENDUM
[FreeTextEntry1] : Documented by SULEMAN Cartagena acting as a scribe for Dr. Escobar Ryder on 12/20/2022 .\par \par All medical record entries made by the Scribe were at my, Dr. Escobar Ryder's, direction and personally dictated by me on 12/20/2022. I have reviewed the chart and agree that the record accurately reflects my personal performance of the history, physical exam, assessment and plan. I have also personally directed, reviewed, and agree with the discharge instructions.

## 2023-01-01 NOTE — PROVIDER CONTACT NOTE (OTHER) - ACTION/TREATMENT ORDERED:
NP evaluated. administer simethicone 80mg, and Zofran 4mg IVP.
PAST SURGICAL HISTORY:  No pertinent past surgical history

## 2023-01-02 NOTE — PATIENT PROFILE ADULT - NSPROHMSYMPCOND_GEN_A_NUR
PROGRESS NOTE  By Javon Fonseca M.D. [facility]  Dr. Sydni Sutherland M.D.,  Dr. Carolyn Spears M.D.,   Dr. Ashleigh Brown D.O.,  Dr. Parrish Alicea M.D.,  Dr. Ozzie Perez D.O.,          Antolin Nyaxel  80 y.o.  male    SUBJECTIVE:  Improved epigastric pain. No nausea vomiting currently    OBJECTIVE:    /77   Pulse 81   Temp 97.9 °F (36.6 °C) (Oral)   Resp 16   Ht 5' 6\" (1.676 m)   Wt 180 lb (81.6 kg)   SpO2 99%   BMI 29.05 kg/m²     General: NAD/-American male. AAOx3  HEENT: Anicteric sclera/moist oral mucosa  Neck: Supple/trachea midline  Chest: Symmetric excursion/labored respirations  Cor: Regular/S1-S2  Abd.: Soft/obese. Appears mostly nontender  Extr.:  No peripheral edema  Skin: Warm and dry/anicteric        DATA:    Monitor data reviewed -sinus rhythm noted.     Stool (measured) : 0 mL  Lab Results   Component Value Date/Time    WBC 13.1 01/02/2023 08:06 AM    RBC 3.76 01/02/2023 08:06 AM    HGB 11.6 01/02/2023 08:06 AM    HCT 39.4 01/02/2023 08:06 AM    .8 01/02/2023 08:06 AM    MCH 30.9 01/02/2023 08:06 AM    MCHC 29.4 01/02/2023 08:06 AM    RDW 13.0 01/02/2023 08:06 AM     01/02/2023 08:06 AM    MPV 11.1 01/02/2023 08:06 AM     Lab Results   Component Value Date/Time     01/02/2023 08:06 AM    K 5.5 01/02/2023 08:06 AM    K 4.8 09/25/2022 08:17 AM    CL 88 01/02/2023 08:06 AM    CO2 38 01/02/2023 08:06 AM    BUN 36 01/02/2023 08:06 AM    CREATININE 1.4 01/02/2023 08:06 AM    CALCIUM 9.4 01/02/2023 08:06 AM    PROT 7.7 01/02/2023 08:06 AM    LABALBU 4.2 01/02/2023 08:06 AM    LABALBU 4.6 07/14/2011 02:10 PM    BILITOT 0.2 01/02/2023 08:06 AM    ALKPHOS 80 01/02/2023 08:06 AM    AST 28 01/02/2023 08:06 AM    ALT 7 01/02/2023 08:06 AM     Lab Results   Component Value Date/Time    LIPASE 15 12/30/2022 08:15 PM     No results found for: AMYLASE      ASSESSMENT/PLAN:  Patient Active Problem List   Diagnosis    Hepatitis C    Testicular swelling COPD exacerbation (HCC)    NSTEMI (non-ST elevated myocardial infarction) (HCC)    CHF, acute on chronic [facility])    Essential hypertension    Neck pain    Cervical disc herniation    Acute respiratory failure with hypoxia and hypercapnia (HCC)    Chest pain    Elbow effusion, right    Acute on chronic respiratory failure with hypoxia and hypercapnia (HCC)    Chronic pain syndrome [G89.4 (ICD-10-CM)]    Lumbar spondylosis    Spinal stenosis of lumbar region without neurogenic claudication    Lumbar facet arthropathy    Lumbar disc disorder    Cervical spondylosis    Cervical facet joint syndrome    Cervical stenosis of spine    Cervical disc disorder    Cervical radiculopathy    Lumbar radiculopathy     1. Epigastric pain  -Likely GERD  -Continue twice a day PPI and Carafate  -Consider inpatient endoscopy if patient fails conservative treatment -see discussion below     2. Anemia  -Chronic anemia without evidence of overt bleed  -Pending iron studies and FOBT  -EGD as above  -Outpatient colonoscopy     3. COPD exacerbation  -Per admitting/pertinent consultants    Above has been discussed in details with the patient. I have explained that given stable H&H and improvement with conservative measures, we can consider outpatient EGD followed by colonoscopy. Patient verbalized understanding however he wishes to have endoscopy while in the hospital. -Tentatively plan for EGD tomorrow. Please, see orders for plan of care       Adolfo Crowley MD  1/2/2023  10:05 AM    NOTE:  This report was transcribed using voice recognition software. Every effort was made to ensure accuracy; however, inadvertent computerized transcription errors may be present. diabetes

## 2023-01-09 ENCOUNTER — APPOINTMENT (OUTPATIENT)
Dept: PULMONOLOGY | Facility: CLINIC | Age: 78
End: 2023-01-09

## 2023-01-10 ENCOUNTER — APPOINTMENT (OUTPATIENT)
Dept: PULMONOLOGY | Facility: CLINIC | Age: 78
End: 2023-01-10
Payer: MEDICARE

## 2023-01-10 VITALS
HEART RATE: 63 BPM | WEIGHT: 158 LBS | HEIGHT: 66 IN | OXYGEN SATURATION: 96 % | BODY MASS INDEX: 25.39 KG/M2 | DIASTOLIC BLOOD PRESSURE: 60 MMHG | SYSTOLIC BLOOD PRESSURE: 130 MMHG | RESPIRATION RATE: 16 BRPM | TEMPERATURE: 98.1 F

## 2023-01-10 PROCEDURE — 96372 THER/PROPH/DIAG INJ SC/IM: CPT

## 2023-01-10 NOTE — PATIENT PROFILE ADULT - ...
Follow up with your doctor in 2-3 days.    Take prednisone 20mg daily for 1 week.  Use ibuprofen 600mg every 8 hours for 5 days.    Follow up with dermatology clinic in 1 week.  we will call you to help arrange this.    Return to the ED for fever, vomiting, or other emergent concerns.
16-Dec-2019 21:08:57

## 2023-03-14 ENCOUNTER — APPOINTMENT (OUTPATIENT)
Dept: PULMONOLOGY | Facility: CLINIC | Age: 78
End: 2023-03-14
Payer: MEDICARE

## 2023-03-14 VITALS
TEMPERATURE: 97.5 F | HEIGHT: 66 IN | SYSTOLIC BLOOD PRESSURE: 134 MMHG | DIASTOLIC BLOOD PRESSURE: 60 MMHG | RESPIRATION RATE: 16 BRPM | HEART RATE: 74 BPM | BODY MASS INDEX: 26.03 KG/M2 | OXYGEN SATURATION: 95 % | WEIGHT: 162 LBS

## 2023-03-14 PROCEDURE — 96372 THER/PROPH/DIAG INJ SC/IM: CPT

## 2023-03-14 RX ORDER — BENRALIZUMAB 30 MG/ML
30 INJECTION, SOLUTION SUBCUTANEOUS
Qty: 0 | Refills: 0 | Status: COMPLETED | OUTPATIENT
Start: 2023-03-13

## 2023-03-21 ENCOUNTER — TRANSCRIPTION ENCOUNTER (OUTPATIENT)
Age: 78
End: 2023-03-21

## 2023-03-22 ENCOUNTER — RX RENEWAL (OUTPATIENT)
Age: 78
End: 2023-03-22

## 2023-05-08 ENCOUNTER — APPOINTMENT (OUTPATIENT)
Dept: PULMONOLOGY | Facility: CLINIC | Age: 78
End: 2023-05-08
Payer: MEDICARE

## 2023-05-08 VITALS
TEMPERATURE: 97.3 F | RESPIRATION RATE: 16 BRPM | SYSTOLIC BLOOD PRESSURE: 121 MMHG | HEIGHT: 66 IN | OXYGEN SATURATION: 97 % | HEART RATE: 72 BPM | BODY MASS INDEX: 25.88 KG/M2 | DIASTOLIC BLOOD PRESSURE: 60 MMHG | WEIGHT: 161 LBS

## 2023-05-08 PROCEDURE — 96372 THER/PROPH/DIAG INJ SC/IM: CPT

## 2023-05-08 RX ORDER — BENRALIZUMAB 30 MG/ML
30 INJECTION, SOLUTION SUBCUTANEOUS
Qty: 0 | Refills: 0 | Status: COMPLETED | OUTPATIENT
Start: 2023-05-08

## 2023-05-18 ENCOUNTER — RX RENEWAL (OUTPATIENT)
Age: 78
End: 2023-05-18

## 2023-07-03 ENCOUNTER — APPOINTMENT (OUTPATIENT)
Dept: PULMONOLOGY | Facility: CLINIC | Age: 78
End: 2023-07-03
Payer: MEDICARE

## 2023-07-03 VITALS
OXYGEN SATURATION: 97 % | SYSTOLIC BLOOD PRESSURE: 110 MMHG | RESPIRATION RATE: 16 BRPM | TEMPERATURE: 97.3 F | HEART RATE: 60 BPM | DIASTOLIC BLOOD PRESSURE: 70 MMHG | WEIGHT: 160 LBS | HEIGHT: 66 IN | BODY MASS INDEX: 25.71 KG/M2

## 2023-07-03 PROCEDURE — 96372 THER/PROPH/DIAG INJ SC/IM: CPT

## 2023-07-03 RX ORDER — BENRALIZUMAB 30 MG/ML
30 INJECTION, SOLUTION SUBCUTANEOUS
Qty: 0 | Refills: 0 | Status: COMPLETED | OUTPATIENT
Start: 2023-07-03

## 2023-07-11 ENCOUNTER — APPOINTMENT (OUTPATIENT)
Dept: PULMONOLOGY | Facility: CLINIC | Age: 78
End: 2023-07-11
Payer: MEDICARE

## 2023-07-11 VITALS
HEIGHT: 66 IN | BODY MASS INDEX: 26.03 KG/M2 | TEMPERATURE: 97.6 F | WEIGHT: 162 LBS | OXYGEN SATURATION: 97 % | RESPIRATION RATE: 16 BRPM | HEART RATE: 57 BPM | DIASTOLIC BLOOD PRESSURE: 80 MMHG | SYSTOLIC BLOOD PRESSURE: 132 MMHG

## 2023-07-11 PROCEDURE — 95012 NITRIC OXIDE EXP GAS DETER: CPT

## 2023-07-11 PROCEDURE — 99214 OFFICE O/P EST MOD 30 MIN: CPT | Mod: 25

## 2023-07-11 PROCEDURE — 94010 BREATHING CAPACITY TEST: CPT

## 2023-07-11 RX ORDER — ALBUTEROL SULFATE 2.5 MG/3ML
(2.5 MG/3ML) SOLUTION RESPIRATORY (INHALATION)
Qty: 150 | Refills: 2 | Status: ACTIVE | COMMUNITY
Start: 2022-06-08 | End: 1900-01-01

## 2023-07-11 NOTE — ASSESSMENT
[FreeTextEntry1] : Mr. ARMSTRONG is a 78 year old male with a history of CAD, s/p CABG x 4 11.15.2019, DM, Asthma, Allergies (severe/ Eosinophilia), HTN, carotid stenosis, RONNIE who presents into the office today for a f/u pulmonary evaluation. SOB (NC with medications) - s/p Fasenra initiation - improved. (aborted carotid artery surgery) - untreated OSAS; improved asthma; #1 issue is sinus (Still)\par \par The patient's shortness of breath is multifactorial due to:\par -pulmonary disease \par      -eosinophilic and IgE mediated asthma, chronic sinus issues, RONNIE, GERD\par -poor breathing mechanics \par -out of shape\par -?cardiac disease (s/p CABG, Dr. King)\par \par Problem 1: Severe Persistent Asthma (uncontrolled - ?Beta Blocker) - better controlled \par -continue Symbicort 160 2 inhalations BID\par -continue Singulair 10 mg QHS \par -continue Tudorza 1 puff BID \par -continue Ventolin rescue inhaler 2 inhalations before exercise, Q6H \par - continue Aerobika to bring up mucous \par -Asthma is believed to be caused by inherited (genetic) and environmental factor, but its exact cause is unknown. Asthma may be triggered by allergens, lung infections, or irritants in the air. Asthma triggers are different for each person \par \par Problem 2:Eosinophilic Asthma (re-discussed) - controlled \par -s/p CBC (+)\par -Fasenra [id number] (since 9/2020)\par -The safety and efficacy of Nucala was established in three double-blind, randomized, placebo-controlled trials in patients with severe asthma. Compared to a placebo, patients with severe asthma receiving Nucala had fewer exacerbation requiring hospitalization and/or emergency department visits, and a longer time to first exacerbation. In addition, patients with severe asthma receiving Nucala or Fasenra experienced greater reductions in their daily maintenance oral corticosteroid dose, while maintaining asthma control compared with patients receiving placebo. Treatment with Nucala did not result in a significant improvement in lung function, as measured by the volume of air exhaled by patients in one second. The most common side effects include: headache, injection site reactions, back pain, weakness, and fatigue; hypersensitivity reactions can occur within hours or days including swelling of the face, mouth, and tongue, fainting, dizziness, hives, breathing problems, and rash; herpes zoster infections have occurred. The drug is a monoclonal antibody that inhibits interleukin-5 which helps regular eosinophils, a type of white blood cell that contributes to asthma. The over-production of eosinophils can cause inflammation in the lungs, increasing the frequency of asthma attacks. Patients must also take other medications, including high dose inhaled corticosteroids and at least one additional asthma drug.\par \par Problem 3: IgE Elevated \par -Candidate for Xolair\par -Xolair is a recombinant DNA- derived humanized IgG1K monoclonal antibody that selectively binds to human immunoglobulin E (IgE). Xolair is produced by a Chinese hamster ovary cell suspension culture in nutrient medium containing the antibiotic gentamicin. Gentamicin is not detectable in the final product. Xolair is a sterile, white, preservative free, lyophilized powder contained in a single use vial that is reconstituted with sterile water for suspension. Side effects include: wheezing, tightness of the chest, trouble breathing, hives, skin rash, feeling anxious or light-headed, fainting, warmth or tingling under skin, or swelling of face, lips, or tongue \par \par Problem 4:Sinuses /Allergies (active)\par -off Clarinex 5 mg QAM move to Xyzal 5 mg QHS \par -continue Olopatadine 0.6% at 1 sniff/nostril BID \par -continue Flonase 1 sniff each nostril BID \par -recommended Navage\par Environmental measures for allergies were encouraged including mattress and pillow cover, air purifier, and environmental controls. \par \par Problem 5: GERD \par -No medications Recommended at this time\par -Rule of 2s: avoid eating too much, eating too fast, eating too late, eating too spicy, eating too lousy, eating two hours before bed.\par -Things to avoid including overeating, spicy foods, tight clothing, eating within three hours of bed, this list is not all inclusive. \par -For treatment of reflux, possible options discussed including diet control, H2 blockers, PPIs, as well as coating motility agents discussed as treatment options. Timing of meals and proximity of last meal to sleep were discussed. If symptoms persist, a formal gastrointestinal evaluation is needed.\par \par Problem 6: RONNIE  (APAP 4 to 10 Cm of water)\par -continue  Trazodone 50 mg QHS\par -continue CPAP Use (compliant and tolerates well) - restart until recall of [employer]\par Sleep apnea is associated with adverse clinical consequences which an affect most organ systems. Cardiovascular disease risk includes arrhythmias, atrial fibrillation, hypertension, coronary artery disease, and stroke. Metabolic disorders include diabetes type 2, non-alcoholic fatty liver disease. Mood disorder especially depression; and cognitive decline especially in the elderly. Associations with chronic reflux/Thurston’s esophagus some but not all inclusive. \par -Reasons include arousal consistent with hypopnea; respiratory events most prominent in REM sleep or supine position; therefore sleep staging and body position are important for accurate diagnosis and estimation of AHI. \par \par Problem 7: Poor Mechanics of Breathing\par -Recommended Wim Hof and Buteyko breathing techniques \par - Proper breathing techniques were reviewed with an emphasis on exhalation. Patient instructed to breath in for 1 second and out for four seconds. Patient was encouraged to not talk while walking. \par \par Problem 8: Out of Shape\par -exercise, and diet control were discussed and are highly encouraged. Treatment options were given such as, aqua therapy, and contacting a nutritionist. Recommended to use the elliptical, stationary bike, less use of treadmill. Mindful eating was explained to the patient Obesity is associated with worsening asthma, shortness of breath, and potential for cardiac disease, diabetes, and other underlying medical conditions. \par \par Problem 9: Cardiac (s/p CABG)- Afib\par -recommended to follow up with a cardiologist s/p Ablation (Ismail)\par - on multiple medications \par \par Problem 10: Health Maintenance/COVID19 Precautions:- s/p x3 COVID-19 vaccines (Pfizer)\par - Clean your hands often. Wash your hands often with soap and water for at least 20 seconds, especially after blowing your nose, coughing, or sneezing, or having been in a public place.\par - If soap and water are not available, use a hand  that contains at least 60% alcohol.\par - To the extent possible, avoid touching high-touch surfaces in public places - elevator buttons, door handles, handrails, handshaking with people, etc. Use a tissue or your sleeve to cover your hand or finger if you must touch something.\par - Wash your hands after touching surfaces in public places.\par - Avoid touching your face, nose, eyes, etc.\par - Clean and disinfect your home to remove germs: practice routine cleaning of frequently touched surfaces (for example: tables, doorknobs, light switches, handles, desks, toilets, faucets, sinks & cell phones)\par - Avoid crowds, especially in poorly ventilated spaces. Your risk of exposure to respiratory viruses like COVID-19 may increase in crowded, closed-in settings with little air circulation if there are people in the crowd who are sick. All patients are recommended to practice social distancing and stay at least 6 feet away from others.\par - Avoid all non-essential travel including plane trips, and especially avoid embarking on cruise ships.\par -If COVID-19 is spreading in your community, take extra measures to put distance between yourself and other people to further reduce your risk of being exposed to this new virus.\par -Stay home as much as possible.\par - Consider ways of getting food brought to your house through family, social, or commercial networks\par -Be aware that the virus has been known to live in the air up to 3 hours post exposure, cardboard up to 24 hours post exposure, copper up to 4 hours post exposure, steel and plastic up to 2-3 days post exposure. Risk of transmission from these surfaces are affected by many variables.\par Immune Support Recommendations:\par -OTC Vitamin C 500mg BID \par -OTC Quercetin 250-500mg BID \par -OTC Zinc 75-100mg per day \par -OTC Melatonin 1 or 2 mg a night \par -OTC Vitamin D 1-4000mg per day \par -OTC Tonic Water 8oz per day\par Asthma and COVID19:\par You need to make sure your asthma is under control. This often requires the use of inhaled corticosteroids (and sometimes oral corticosteroids). Inhaled corticosteroids do not likely reduce your immune system’s ability to fight infections, but oral corticosteroids may. It is important to use the steps above to protect yourself to limit your exposure to any respiratory virus. \par \par Problem 11: health maintenance\par -s/p influenza vaccine 2022\par -recommended strep pneumonia vaccines after age 65: Prevnar-13 vaccine, followed by Pneumo vaccine 23 one year following (Completed) \par -recommended early intervention for URIs\par -recommended regular osteoporosis evaluations\par -recommended early dermatological evaluations\par -recommended after the age of 50 to the age of 70, colonoscopy every 5 years \par \par  Follow up in 6-8 weeks\par -he  is recommended to call with any changes, questions, or concerns.

## 2023-07-11 NOTE — PHYSICAL EXAM
[No Acute Distress] : no acute distress [Well Nourished] : well nourished [No Deformities] : no deformities [Well Developed] : well developed [Normal Oropharynx] : normal oropharynx [No Neck Mass] : no neck mass [Normal Appearance] : normal appearance [Normal Rate/Rhythm] : normal rate/rhythm [Normal S1, S2] : normal s1, s2 [No Murmurs] : no murmurs [Clear to Auscultation Bilaterally] : clear to auscultation bilaterally [No Resp Distress] : no resp distress [No Abnormalities] : no abnormalities [Benign] : benign [Normal Gait] : normal gait [No Clubbing] : no clubbing [No Edema] : no edema [No Cyanosis] : no cyanosis [FROM] : FROM [Normal Color/ Pigmentation] : normal color/ pigmentation [No Focal Deficits] : no focal deficits [Oriented x3] : oriented x3 [Normal Affect] : normal affect [III] : Mallampati Class: III [TextBox_2] : OW [TextBox_68] : I:E 1:3, mild forced expiratory wheeze [TextBox_105] : hematomas

## 2023-07-11 NOTE — ADDENDUM
[FreeTextEntry1] : Documented by Altaf Odom acting as a scribe for Dr. Escobar Ryder on 07/11/2023 .\par \par All medical record entries made by the Scribe were at my, Dr. Escobar Ryder's, direction and personally dictated by me on 07/11/2023 . I have reviewed the chart and agree that the record accurately reflects my personal performance of the history, physical exam, assessment and plan. I have also personally directed, reviewed, and agree with the discharge instructions.

## 2023-07-11 NOTE — HISTORY OF PRESENT ILLNESS
[TextBox_4] : Mr. ARMSTRONG is a 78 year old male with a history of  eosinophilic and IgE mediated asthma, chronic sinus issues, RONNIE, GERD presenting to the office today for f/u pulmonary evaluation. His chief complaint is\par \par - he notes he has sinus issues \par - he notes producing a white phlegm which he thinks is coming from his nose\par - he notes some SOB \par - he notes bowels are regular \par - he notes gaining some weight \par - he notes his energy levels are lower as of late \par - he notes taking with Symbicort, Montelukast, Flonase, Olopatadine, and Desloratadine\par - he notes getting an ablation for his A-fib\par \par -he denies any headaches, nausea, emesis, fever, chills, sweats, chest pain, chest pressure, coughing, wheezing, palpitations, constipation, diarrhea, vertigo, dysphagia, heartburn, reflux, itchy eyes, itchy ears, leg swelling, leg pain, arthralgias, myalgias, or sour taste in the mouth.

## 2023-07-11 NOTE — PROCEDURE
[FreeTextEntry1] : PFT reveals mild restrictive and moderate obstructive flows, with an FEV1 of 1.45 L, which is 60% of predicted, with a normal flow volume loop. PFTs were performed to evaluate for Asthma and SOB \par \par FENO was 45; a normal value being less than 25\par Fractional exhaled nitric oxide (FENO) is regarded as a simple, noninvasive method for assessing eosinophilic airway inflammation. Produced by a variety of cells within the lung, nitric oxide (NO) concentrations are generally low in healthy individuals. However, high concentrations of NO appear to be involved in nonspecific host defense mechanisms and chronic inflammatory diseases such as asthma. The American Thoracic Society (ATS) therefore has recommended using FENO to aid in the diagnosis and monitoring of eosinophilic airway inflammation and asthma, and for identifying steroid responsive individuals whose chronic respiratory symptoms may be caused by airway inflammation.

## 2023-08-22 ENCOUNTER — APPOINTMENT (OUTPATIENT)
Dept: PULMONOLOGY | Facility: CLINIC | Age: 78
End: 2023-08-22

## 2023-08-28 ENCOUNTER — APPOINTMENT (OUTPATIENT)
Dept: PULMONOLOGY | Facility: CLINIC | Age: 78
End: 2023-08-28
Payer: MEDICARE

## 2023-08-28 VITALS
HEIGHT: 66 IN | DIASTOLIC BLOOD PRESSURE: 61 MMHG | HEART RATE: 60 BPM | WEIGHT: 161 LBS | BODY MASS INDEX: 25.88 KG/M2 | TEMPERATURE: 97.2 F | SYSTOLIC BLOOD PRESSURE: 138 MMHG | RESPIRATION RATE: 17 BRPM | OXYGEN SATURATION: 96 %

## 2023-08-28 PROCEDURE — 96372 THER/PROPH/DIAG INJ SC/IM: CPT

## 2023-08-28 RX ORDER — BENRALIZUMAB 30 MG/ML
30 INJECTION, SOLUTION SUBCUTANEOUS
Qty: 0 | Refills: 0 | Status: COMPLETED | OUTPATIENT
Start: 2023-08-28

## 2023-09-28 ENCOUNTER — RX RENEWAL (OUTPATIENT)
Age: 78
End: 2023-09-28

## 2023-09-28 RX ORDER — BENRALIZUMAB 30 MG/ML
30 INJECTION, SOLUTION SUBCUTANEOUS
Qty: 1 | Refills: 6 | Status: ACTIVE | COMMUNITY
Start: 2020-08-24 | End: 1900-01-01

## 2023-10-05 ENCOUNTER — APPOINTMENT (OUTPATIENT)
Dept: UROLOGY | Facility: CLINIC | Age: 78
End: 2023-10-05
Payer: MEDICARE

## 2023-10-05 VITALS
HEIGHT: 66 IN | WEIGHT: 162 LBS | DIASTOLIC BLOOD PRESSURE: 72 MMHG | BODY MASS INDEX: 26.03 KG/M2 | HEART RATE: 63 BPM | SYSTOLIC BLOOD PRESSURE: 176 MMHG | RESPIRATION RATE: 16 BRPM

## 2023-10-05 DIAGNOSIS — R39.15 BENIGN PROSTATIC HYPERPLASIA WITH LOWER URINARY TRACT SYMPMS: ICD-10-CM

## 2023-10-05 DIAGNOSIS — N39.41 URGE INCONTINENCE: ICD-10-CM

## 2023-10-05 DIAGNOSIS — N40.1 BENIGN PROSTATIC HYPERPLASIA WITH LOWER URINARY TRACT SYMPMS: ICD-10-CM

## 2023-10-05 DIAGNOSIS — N52.01 ERECTILE DYSFUNCTION DUE TO ARTERIAL INSUFFICIENCY: ICD-10-CM

## 2023-10-05 PROCEDURE — 99204 OFFICE O/P NEW MOD 45 MIN: CPT

## 2023-10-07 ENCOUNTER — RX RENEWAL (OUTPATIENT)
Age: 78
End: 2023-10-07

## 2023-10-10 ENCOUNTER — NON-APPOINTMENT (OUTPATIENT)
Age: 78
End: 2023-10-10

## 2023-10-10 LAB
PSA FREE FLD-MCNC: 17 %
PSA FREE SERPL-MCNC: 1.1 NG/ML
PSA SERPL-MCNC: 6.45 NG/ML

## 2023-10-24 ENCOUNTER — APPOINTMENT (OUTPATIENT)
Dept: PULMONOLOGY | Facility: CLINIC | Age: 78
End: 2023-10-24
Payer: MEDICARE

## 2023-10-24 VITALS
OXYGEN SATURATION: 95 % | DIASTOLIC BLOOD PRESSURE: 70 MMHG | BODY MASS INDEX: 26.03 KG/M2 | WEIGHT: 162 LBS | TEMPERATURE: 97 F | HEART RATE: 68 BPM | HEIGHT: 66 IN | SYSTOLIC BLOOD PRESSURE: 134 MMHG | RESPIRATION RATE: 16 BRPM

## 2023-10-24 DIAGNOSIS — R49.0 DYSPHONIA: ICD-10-CM

## 2023-10-24 DIAGNOSIS — R05.9 COUGH, UNSPECIFIED: ICD-10-CM

## 2023-10-24 DIAGNOSIS — J45.909 UNSPECIFIED ASTHMA, UNCOMPLICATED: ICD-10-CM

## 2023-10-24 DIAGNOSIS — Z87.09 PERSONAL HISTORY OF OTHER DISEASES OF THE RESPIRATORY SYSTEM: ICD-10-CM

## 2023-10-24 PROCEDURE — 96372 THER/PROPH/DIAG INJ SC/IM: CPT

## 2023-10-24 PROCEDURE — 99214 OFFICE O/P EST MOD 30 MIN: CPT | Mod: 25

## 2023-10-24 PROCEDURE — ZZZZZ: CPT

## 2023-10-24 RX ORDER — BENRALIZUMAB 30 MG/ML
30 INJECTION, SOLUTION SUBCUTANEOUS
Qty: 0 | Refills: 0 | Status: COMPLETED | OUTPATIENT
Start: 2023-10-24

## 2023-10-31 ENCOUNTER — APPOINTMENT (OUTPATIENT)
Dept: OTOLARYNGOLOGY | Facility: CLINIC | Age: 78
End: 2023-10-31
Payer: MEDICARE

## 2023-10-31 VITALS — HEART RATE: 70 BPM | DIASTOLIC BLOOD PRESSURE: 86 MMHG | SYSTOLIC BLOOD PRESSURE: 183 MMHG

## 2023-10-31 VITALS — BODY MASS INDEX: 25.71 KG/M2 | HEIGHT: 66 IN | WEIGHT: 160 LBS

## 2023-10-31 PROCEDURE — 31579 LARYNGOSCOPY TELESCOPIC: CPT

## 2023-10-31 PROCEDURE — 99204 OFFICE O/P NEW MOD 45 MIN: CPT | Mod: 25

## 2023-10-31 RX ORDER — NIRMATRELVIR AND RITONAVIR 300-100 MG
20 X 150 MG & KIT ORAL
Qty: 1 | Refills: 0 | Status: COMPLETED | COMMUNITY
Start: 2022-06-08 | End: 2023-10-31

## 2023-10-31 RX ORDER — ATENOLOL 25 MG/1
25 TABLET ORAL
Refills: 0 | Status: COMPLETED | COMMUNITY
End: 2023-10-31

## 2023-11-16 ENCOUNTER — APPOINTMENT (OUTPATIENT)
Dept: OTOLARYNGOLOGY | Facility: CLINIC | Age: 78
End: 2023-11-16
Payer: MEDICARE

## 2023-11-16 VITALS
SYSTOLIC BLOOD PRESSURE: 159 MMHG | OXYGEN SATURATION: 97 % | WEIGHT: 160 LBS | HEART RATE: 94 BPM | BODY MASS INDEX: 25.71 KG/M2 | HEIGHT: 66 IN | RESPIRATION RATE: 17 BRPM | DIASTOLIC BLOOD PRESSURE: 91 MMHG

## 2023-11-16 PROCEDURE — 31579 LARYNGOSCOPY TELESCOPIC: CPT

## 2023-11-16 PROCEDURE — 99214 OFFICE O/P EST MOD 30 MIN: CPT | Mod: 25

## 2023-11-16 RX ORDER — FLUCONAZOLE 200 MG/1
200 TABLET ORAL
Qty: 14 | Refills: 0 | Status: COMPLETED | COMMUNITY
Start: 2023-10-31 | End: 2023-11-16

## 2023-11-16 RX ORDER — SULFAMETHOXAZOLE AND TRIMETHOPRIM 800; 160 MG/1; MG/1
800-160 TABLET ORAL
Qty: 28 | Refills: 0 | Status: COMPLETED | COMMUNITY
Start: 2023-10-31 | End: 2023-11-16

## 2023-11-28 ENCOUNTER — APPOINTMENT (OUTPATIENT)
Dept: OTOLARYNGOLOGY | Facility: CLINIC | Age: 78
End: 2023-11-28
Payer: MEDICARE

## 2023-11-28 VITALS
SYSTOLIC BLOOD PRESSURE: 156 MMHG | WEIGHT: 160 LBS | OXYGEN SATURATION: 96 % | HEART RATE: 72 BPM | DIASTOLIC BLOOD PRESSURE: 75 MMHG | BODY MASS INDEX: 25.71 KG/M2 | HEIGHT: 66 IN | RESPIRATION RATE: 17 BRPM

## 2023-11-28 DIAGNOSIS — J04.0 ACUTE LARYNGITIS: ICD-10-CM

## 2023-11-28 PROCEDURE — 31579 LARYNGOSCOPY TELESCOPIC: CPT

## 2023-11-28 PROCEDURE — 99213 OFFICE O/P EST LOW 20 MIN: CPT | Mod: 25

## 2023-12-11 ENCOUNTER — TRANSCRIPTION ENCOUNTER (OUTPATIENT)
Age: 78
End: 2023-12-11

## 2023-12-19 ENCOUNTER — APPOINTMENT (OUTPATIENT)
Dept: PULMONOLOGY | Facility: CLINIC | Age: 78
End: 2023-12-19
Payer: MEDICARE

## 2023-12-19 VITALS
HEART RATE: 70 BPM | SYSTOLIC BLOOD PRESSURE: 152 MMHG | TEMPERATURE: 97.5 F | BODY MASS INDEX: 25.71 KG/M2 | RESPIRATION RATE: 17 BRPM | DIASTOLIC BLOOD PRESSURE: 57 MMHG | HEIGHT: 66 IN | WEIGHT: 160 LBS | OXYGEN SATURATION: 97 %

## 2023-12-19 PROCEDURE — 96372 THER/PROPH/DIAG INJ SC/IM: CPT

## 2023-12-19 RX ORDER — BENRALIZUMAB 30 MG/ML
30 INJECTION, SOLUTION SUBCUTANEOUS
Qty: 0 | Refills: 0 | Status: COMPLETED | OUTPATIENT
Start: 2023-12-18

## 2023-12-26 NOTE — DIETITIAN INITIAL EVALUATION ADULT. - ADD RECOMMEND
1) Continue current diet: consistent carbohydrate (no snacks) - monitor/adjust as needed 2) Encouraged PO intake as tolerated and provided diet education, pt amenable and made aware RD remains available 3) Monitor PO intake, weight, labs, skin, GI status, diet 9

## 2023-12-28 ENCOUNTER — APPOINTMENT (OUTPATIENT)
Dept: OTOLARYNGOLOGY | Facility: CLINIC | Age: 78
End: 2023-12-28
Payer: MEDICARE

## 2023-12-28 ENCOUNTER — APPOINTMENT (OUTPATIENT)
Dept: PULMONOLOGY | Facility: CLINIC | Age: 78
End: 2023-12-28
Payer: MEDICARE

## 2023-12-28 VITALS
OXYGEN SATURATION: 95 % | HEIGHT: 66 IN | DIASTOLIC BLOOD PRESSURE: 67 MMHG | WEIGHT: 162 LBS | SYSTOLIC BLOOD PRESSURE: 155 MMHG | BODY MASS INDEX: 26.03 KG/M2 | HEART RATE: 74 BPM

## 2023-12-28 DIAGNOSIS — J04.0 ACUTE LARYNGITIS: ICD-10-CM

## 2023-12-28 PROCEDURE — 31579 LARYNGOSCOPY TELESCOPIC: CPT

## 2023-12-28 PROCEDURE — 99213 OFFICE O/P EST LOW 20 MIN: CPT | Mod: 25

## 2023-12-28 RX ORDER — PREDNISONE 10 MG/1
10 TABLET ORAL
Qty: 31 | Refills: 0 | Status: COMPLETED | COMMUNITY
Start: 2023-10-24 | End: 2023-12-28

## 2023-12-28 RX ORDER — SULFAMETHOXAZOLE AND TRIMETHOPRIM 800; 160 MG/1; MG/1
800-160 TABLET ORAL
Qty: 28 | Refills: 0 | Status: COMPLETED | COMMUNITY
Start: 2023-11-16 | End: 2023-12-28

## 2023-12-28 NOTE — HISTORY OF PRESENT ILLNESS
[de-identified] : INOCENCIA ARMSTRONG is a 78 year old male who presents to the [facility] for follow up of his bacterial laryngitis, left vocal fold infraglottic polyp, and dysphonia. I last saw the patient on 11/28/23. Finished course of bactrim on 11/29/23 and I wanted to follow up with him in 1 month to confirm laryngitis did not return.  Completed Bactrim. States voice has returned back to normal and has no complaints.  Patient denies throat pain, globus sensation, dysphagia, odynophagia, dyspnea, dysphonia, hemoptysis, otalgia, recent fevers or infections.   Previously reported: difficulty phonating. He now does not note periods of normal voicing - constantly hoarse for the last 3 weeks He does not note specific difficulties with swallowing. He does not note frequent classic heartburn symptoms. Hx of distant FESS. VOCAL DEMANDS: His vocal demands are primarily those of general conversation.

## 2023-12-28 NOTE — PROCEDURE
[de-identified] : Stroboscopic Laryngoscopy Procedure Note:  Indication:	Assess laryngeal biomechanics and vocal fold oscillation.  Description of Procedure:	Informed consent was verbally obtained from the patient prior to the procedure. The patient was seated in the clinic chair. Topical anesthesia was achieved by first spraying the nasal cavities with 4% lidocaine and nasal decongestant.   Findings:  Supraglottis: no masses or lesions  Glottis:    Structure:                        Right: crisp and shows no lesions or masses, mild erythema on medial half                       Left:  mid fold infraglottic atypical appearing polyp, mild erythema on medial half                Mobility:                        Right:  normal                        Left:  normal                Amplitude:                        Right:  normal                       Left:  normal                Closure: complete                 Wave symmetry:  symmetric  Subglottis: no masses or lesions within the visualized subglottis Visualized airway is widely patent.

## 2023-12-28 NOTE — ASSESSMENT
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 The risks, benefits, and alternatives to care were discussed with the patient and understanding expressed. Patient in agreement.

## 2023-12-29 NOTE — DISCHARGE NOTE PROVIDER - NSDCFUADDAPPT_GEN_ALL_CORE_FT
Addended by: Nusrat South on: 12/29/2023 03:23 PM     Modules accepted: Orders Follow up with Dr. Kendall on Oct 22 at 11:00am;   Follow up for loop recorder on 9/24 at 9:45am

## 2024-01-09 ENCOUNTER — APPOINTMENT (OUTPATIENT)
Dept: PULMONOLOGY | Facility: CLINIC | Age: 79
End: 2024-01-09
Payer: MEDICARE

## 2024-01-09 VITALS
OXYGEN SATURATION: 98 % | DIASTOLIC BLOOD PRESSURE: 88 MMHG | HEART RATE: 84 BPM | TEMPERATURE: 97.1 F | SYSTOLIC BLOOD PRESSURE: 158 MMHG | RESPIRATION RATE: 16 BRPM | WEIGHT: 163 LBS | BODY MASS INDEX: 26.2 KG/M2 | HEIGHT: 66 IN

## 2024-01-09 DIAGNOSIS — J82.83 EOSINOPHILIC ASTHMA: ICD-10-CM

## 2024-01-09 DIAGNOSIS — J45.901 UNSPECIFIED ASTHMA WITH (ACUTE) EXACERBATION: ICD-10-CM

## 2024-01-09 PROCEDURE — 95012 NITRIC OXIDE EXP GAS DETER: CPT

## 2024-01-09 PROCEDURE — 99214 OFFICE O/P EST MOD 30 MIN: CPT | Mod: 25

## 2024-01-09 PROCEDURE — 94010 BREATHING CAPACITY TEST: CPT

## 2024-01-09 PROCEDURE — ZZZZZ: CPT

## 2024-01-10 ENCOUNTER — APPOINTMENT (OUTPATIENT)
Dept: UROLOGY | Facility: CLINIC | Age: 79
End: 2024-01-10
Payer: MEDICARE

## 2024-01-10 VITALS
HEART RATE: 79 BPM | DIASTOLIC BLOOD PRESSURE: 78 MMHG | BODY MASS INDEX: 26.2 KG/M2 | SYSTOLIC BLOOD PRESSURE: 157 MMHG | WEIGHT: 163 LBS | RESPIRATION RATE: 17 BRPM | HEIGHT: 66 IN

## 2024-01-10 PROCEDURE — 99214 OFFICE O/P EST MOD 30 MIN: CPT

## 2024-01-10 NOTE — HISTORY OF PRESENT ILLNESS
[FreeTextEntry1] : 78-year-old male h/o elevated PSA and Cardiac bypass 11/59/2019 on Xarelto 2.5mg bid. Presents today for MRI of the prostate review.   Prostate MRI 12/2023 PIRADS 4 lesion Prostate volume: 43cc PSA density: 0.10ng/mL/cc  Last PSA 10/05/2023 6.45 ng/mL  Reports moderate stream, urgency with occassional mild incontinence, nocturia 1-2x.  Denies dysuria, hematuria, incomplete bladder emptying. Prescribed Tadalafil 5mg daily but have not started because insurance had denied.   Denies constipation.  Father had prostate cancer which metastasized.  Since his last visit, underwent repeat PSA which decline to 4.8 ng/mL, 19% free.

## 2024-01-10 NOTE — PHYSICAL EXAM
[General Appearance - Well Developed] : well developed [General Appearance - Well Nourished] : well nourished [Normal Appearance] : normal appearance [Well Groomed] : well groomed [Oriented To Time, Place, And Person] : oriented to person, place, and time

## 2024-01-10 NOTE — ASSESSMENT
[FreeTextEntry1] : - Imaging reviewed with pt.  PIRAD 4 lesion on prostate.  -Recommend undergoing transperineal ultrasound guided biopsy of the prostate.  Procedure reviewed including total procedure time, recovery time.  Potential risks of prostate biopsy discussed including infection that results in hospitalization, hematuria, perineal hematoma, hematospermia, and urinary retention.  Preparation for biopsy including instructions for enema reviewed.  His medication list was reviewed.  He will discontinue Xarelto 72 hours prior to the biopsy and start aspirin 81 mg daily while he is off Xarelto.  He is on anti-coagulation due to carotid stenosis. All questions answered.  - Pt to set up [employer] account and notify office once completed.  Tadalafil 5mg script will be sent as requested by patient.

## 2024-01-11 ENCOUNTER — OUTPATIENT (OUTPATIENT)
Dept: OUTPATIENT SERVICES | Facility: HOSPITAL | Age: 79
LOS: 1 days | End: 2024-01-11
Payer: MEDICARE

## 2024-01-11 DIAGNOSIS — Z00.8 ENCOUNTER FOR OTHER GENERAL EXAMINATION: ICD-10-CM

## 2024-01-11 DIAGNOSIS — Z98.890 OTHER SPECIFIED POSTPROCEDURAL STATES: Chronic | ICD-10-CM

## 2024-01-11 DIAGNOSIS — Z95.1 PRESENCE OF AORTOCORONARY BYPASS GRAFT: Chronic | ICD-10-CM

## 2024-01-11 RX ORDER — TADALAFIL 5 MG/1
5 TABLET ORAL
Qty: 90 | Refills: 3 | Status: ACTIVE | COMMUNITY
Start: 2023-10-05 | End: 1900-01-01

## 2024-01-15 PROCEDURE — C8001: CPT

## 2024-01-18 RX ORDER — EPINEPHRINE 0.3 MG/.3ML
0.3 INJECTION INTRAMUSCULAR
Qty: 1 | Refills: 0 | Status: ACTIVE | COMMUNITY
Start: 2024-01-18 | End: 1900-01-01

## 2024-01-18 RX ORDER — DUPILUMAB 300 MG/2ML
300 INJECTION, SOLUTION SUBCUTANEOUS
Qty: 1 | Refills: 0 | Status: ACTIVE | COMMUNITY
Start: 2024-01-18 | End: 1900-01-01

## 2024-01-18 RX ORDER — DUPILUMAB 300 MG/2ML
300 INJECTION, SOLUTION SUBCUTANEOUS
Qty: 1 | Refills: 11 | Status: ACTIVE | COMMUNITY
Start: 2024-01-18 | End: 1900-01-01

## 2024-02-13 ENCOUNTER — APPOINTMENT (OUTPATIENT)
Dept: PULMONOLOGY | Facility: CLINIC | Age: 79
End: 2024-02-13

## 2024-02-15 ENCOUNTER — APPOINTMENT (OUTPATIENT)
Dept: OTOLARYNGOLOGY | Facility: CLINIC | Age: 79
End: 2024-02-15
Payer: MEDICARE

## 2024-02-15 VITALS
BODY MASS INDEX: 26.03 KG/M2 | HEIGHT: 66 IN | WEIGHT: 162 LBS | SYSTOLIC BLOOD PRESSURE: 146 MMHG | HEART RATE: 72 BPM | DIASTOLIC BLOOD PRESSURE: 72 MMHG

## 2024-02-15 DIAGNOSIS — R97.20 ELEVATED PROSTATE, SPECIFIC ANTIGEN [PSA]: ICD-10-CM

## 2024-02-15 DIAGNOSIS — J38.1 POLYP OF VOCAL CORD AND LARYNX: ICD-10-CM

## 2024-02-15 DIAGNOSIS — R49.0 DYSPHONIA: ICD-10-CM

## 2024-02-15 PROCEDURE — 31579 LARYNGOSCOPY TELESCOPIC: CPT

## 2024-02-15 PROCEDURE — 99213 OFFICE O/P EST LOW 20 MIN: CPT | Mod: 25

## 2024-02-15 RX ORDER — NIFEDIPINE 30 MG/1
30 TABLET, FILM COATED, EXTENDED RELEASE ORAL
Qty: 90 | Refills: 0 | Status: ACTIVE | COMMUNITY
Start: 2024-01-08

## 2024-02-15 NOTE — PROCEDURE
[FreeTextEntry6] : Purulence from right middle meatus. Scattered polyps. History of prior FESS. [de-identified] : Laryngoscopy: Stroboscopic Laryngoscopy Procedure Note: Indication: Assess laryngeal biomechanics and vocal fold oscillation. Description of Procedure: Informed consent was verbally obtained from the patient prior to the procedure. The patient was seated in the clinic chair. Topical anesthesia was achieved by first spraying the nasal cavities with 4% lidocaine and nasal decongestant.  Findings: Supraglottis: no masses or lesions Glottis: Structure:  Right: crisp and shows no lesions or masses, mild erythema on medial half  Left: mid fold infraglottic atypical appearing polyp, mild erythema on medial half  Mobility:  Right: normal  Left: normal  Amplitude:  Right: normal  Left: normal  Closure: complete  Wave symmetry: symmetric Subglottis: no masses or lesions within the visualized subglottis Visualized airway is widely patent.

## 2024-02-15 NOTE — ASSESSMENT
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 The risks, benefits, and alternatives to care were discussed with the patient and understanding expressed. Patient in agreement.  Have referred to Dr. Lange or Dr. Holbrook for discussion of sinus disease at patients request. Last FESS approx 15 years ago.

## 2024-02-15 NOTE — HISTORY OF PRESENT ILLNESS
[de-identified] : INOCENCIA ARMSTRONG is a 78 year old male who presents to the [facility] for follow up of his h/o bacterial laryngitis and left vocal fold infraglottic polyp. I last saw the patient on 12/28/23. When last seen stated voice was back to normal. I wanted to see him back in 2 months to confirm stability of left vocal fold polyp. Today patient states voice is stable today, occasional hoarseness with prolonged conversation. Recently started Dupixent- 2nd shot today. Previously reported: difficulty phonating. He now does not note periods of normal voicing - constantly hoarse for the last 3 weeks He does not note specific difficulties with swallowing. He does not note frequent classic heartburn symptoms. Hx of distant FESS. VOCAL DEMANDS: His vocal demands are primarily those of general conversation.

## 2024-02-29 ENCOUNTER — OUTPATIENT (OUTPATIENT)
Dept: OUTPATIENT SERVICES | Facility: HOSPITAL | Age: 79
LOS: 1 days | End: 2024-02-29
Payer: MEDICARE

## 2024-02-29 VITALS
HEIGHT: 66 IN | TEMPERATURE: 98 F | WEIGHT: 162.04 LBS | HEART RATE: 86 BPM | RESPIRATION RATE: 16 BRPM | OXYGEN SATURATION: 98 % | DIASTOLIC BLOOD PRESSURE: 80 MMHG | SYSTOLIC BLOOD PRESSURE: 150 MMHG

## 2024-02-29 DIAGNOSIS — Z98.890 OTHER SPECIFIED POSTPROCEDURAL STATES: Chronic | ICD-10-CM

## 2024-02-29 DIAGNOSIS — Z95.1 PRESENCE OF AORTOCORONARY BYPASS GRAFT: ICD-10-CM

## 2024-02-29 DIAGNOSIS — R97.20 ELEVATED PROSTATE SPECIFIC ANTIGEN [PSA]: ICD-10-CM

## 2024-02-29 DIAGNOSIS — G47.33 OBSTRUCTIVE SLEEP APNEA (ADULT) (PEDIATRIC): ICD-10-CM

## 2024-02-29 DIAGNOSIS — Z86.79 PERSONAL HISTORY OF OTHER DISEASES OF THE CIRCULATORY SYSTEM: ICD-10-CM

## 2024-02-29 DIAGNOSIS — E11.9 TYPE 2 DIABETES MELLITUS WITHOUT COMPLICATIONS: ICD-10-CM

## 2024-02-29 DIAGNOSIS — Z95.1 PRESENCE OF AORTOCORONARY BYPASS GRAFT: Chronic | ICD-10-CM

## 2024-02-29 DIAGNOSIS — I48.92 UNSPECIFIED ATRIAL FLUTTER: ICD-10-CM

## 2024-02-29 DIAGNOSIS — J45.909 UNSPECIFIED ASTHMA, UNCOMPLICATED: ICD-10-CM

## 2024-02-29 DIAGNOSIS — I10 ESSENTIAL (PRIMARY) HYPERTENSION: ICD-10-CM

## 2024-02-29 LAB
A1C WITH ESTIMATED AVERAGE GLUCOSE RESULT: 6.3 % — HIGH (ref 4–5.6)
ANION GAP SERPL CALC-SCNC: 12 MMOL/L — SIGNIFICANT CHANGE UP (ref 7–14)
BUN SERPL-MCNC: 13 MG/DL — SIGNIFICANT CHANGE UP (ref 7–23)
CALCIUM SERPL-MCNC: 9.4 MG/DL — SIGNIFICANT CHANGE UP (ref 8.4–10.5)
CHLORIDE SERPL-SCNC: 105 MMOL/L — SIGNIFICANT CHANGE UP (ref 98–107)
CO2 SERPL-SCNC: 24 MMOL/L — SIGNIFICANT CHANGE UP (ref 22–31)
CREAT SERPL-MCNC: 0.87 MG/DL — SIGNIFICANT CHANGE UP (ref 0.5–1.3)
EGFR: 89 ML/MIN/1.73M2 — SIGNIFICANT CHANGE UP
ESTIMATED AVERAGE GLUCOSE: 134 — SIGNIFICANT CHANGE UP
GLUCOSE SERPL-MCNC: 80 MG/DL — SIGNIFICANT CHANGE UP (ref 70–99)
HCT VFR BLD CALC: 44.7 % — SIGNIFICANT CHANGE UP (ref 39–50)
HGB BLD-MCNC: 14.9 G/DL — SIGNIFICANT CHANGE UP (ref 13–17)
MCHC RBC-ENTMCNC: 30.2 PG — SIGNIFICANT CHANGE UP (ref 27–34)
MCHC RBC-ENTMCNC: 33.3 GM/DL — SIGNIFICANT CHANGE UP (ref 32–36)
MCV RBC AUTO: 90.5 FL — SIGNIFICANT CHANGE UP (ref 80–100)
NRBC # BLD: 0 /100 WBCS — SIGNIFICANT CHANGE UP (ref 0–0)
NRBC # FLD: 0 K/UL — SIGNIFICANT CHANGE UP (ref 0–0)
PLATELET # BLD AUTO: 197 K/UL — SIGNIFICANT CHANGE UP (ref 150–400)
POTASSIUM SERPL-MCNC: 3.8 MMOL/L — SIGNIFICANT CHANGE UP (ref 3.5–5.3)
POTASSIUM SERPL-SCNC: 3.8 MMOL/L — SIGNIFICANT CHANGE UP (ref 3.5–5.3)
RBC # BLD: 4.94 M/UL — SIGNIFICANT CHANGE UP (ref 4.2–5.8)
RBC # FLD: 13.2 % — SIGNIFICANT CHANGE UP (ref 10.3–14.5)
SODIUM SERPL-SCNC: 141 MMOL/L — SIGNIFICANT CHANGE UP (ref 135–145)
WBC # BLD: 7.04 K/UL — SIGNIFICANT CHANGE UP (ref 3.8–10.5)
WBC # FLD AUTO: 7.04 K/UL — SIGNIFICANT CHANGE UP (ref 3.8–10.5)

## 2024-02-29 PROCEDURE — 93010 ELECTROCARDIOGRAM REPORT: CPT

## 2024-02-29 RX ORDER — FLUTICASONE PROPIONATE 50 MCG
1 SPRAY, SUSPENSION NASAL
Qty: 0 | Refills: 0 | DISCHARGE

## 2024-02-29 RX ORDER — UBIDECARENONE 100 MG
1 CAPSULE ORAL
Qty: 0 | Refills: 0 | DISCHARGE

## 2024-02-29 RX ORDER — ATENOLOL 25 MG/1
1 TABLET ORAL
Qty: 0 | Refills: 0 | DISCHARGE

## 2024-02-29 RX ORDER — ATORVASTATIN CALCIUM 80 MG/1
1 TABLET, FILM COATED ORAL
Qty: 0 | Refills: 0 | DISCHARGE

## 2024-02-29 RX ORDER — BENRALIZUMAB 30 MG/ML
0 INJECTION, SOLUTION SUBCUTANEOUS
Qty: 0 | Refills: 0 | DISCHARGE

## 2024-02-29 RX ORDER — RIVAROXABAN 15 MG-20MG
1 KIT ORAL
Qty: 0 | Refills: 0 | DISCHARGE

## 2024-02-29 RX ORDER — ALBUTEROL 90 UG/1
2 AEROSOL, METERED ORAL
Qty: 0 | Refills: 0 | DISCHARGE

## 2024-02-29 RX ORDER — ASPIRIN/CALCIUM CARB/MAGNESIUM 324 MG
1 TABLET ORAL
Qty: 0 | Refills: 0 | DISCHARGE

## 2024-02-29 RX ORDER — CHOLECALCIFEROL (VITAMIN D3) 125 MCG
1 CAPSULE ORAL
Qty: 0 | Refills: 0 | DISCHARGE

## 2024-02-29 RX ORDER — PREGABALIN 225 MG/1
1 CAPSULE ORAL
Qty: 0 | Refills: 0 | DISCHARGE

## 2024-02-29 RX ORDER — POTASSIUM CHLORIDE 20 MEQ
2 PACKET (EA) ORAL
Qty: 0 | Refills: 0 | DISCHARGE

## 2024-02-29 RX ORDER — LISINOPRIL 2.5 MG/1
1 TABLET ORAL
Qty: 0 | Refills: 0 | DISCHARGE

## 2024-02-29 RX ORDER — METFORMIN HYDROCHLORIDE 850 MG/1
1 TABLET ORAL
Qty: 0 | Refills: 0 | DISCHARGE

## 2024-02-29 RX ORDER — ZINC SULFATE TAB 220 MG (50 MG ZINC EQUIVALENT) 220 (50 ZN) MG
1 TAB ORAL
Qty: 0 | Refills: 0 | DISCHARGE

## 2024-02-29 RX ORDER — FUROSEMIDE 40 MG
2 TABLET ORAL
Qty: 0 | Refills: 0 | DISCHARGE

## 2024-02-29 RX ORDER — NIFEDIPINE 30 MG
1 TABLET, EXTENDED RELEASE 24 HR ORAL
Qty: 0 | Refills: 0 | DISCHARGE

## 2024-02-29 RX ORDER — ASCORBIC ACID 60 MG
1 TABLET,CHEWABLE ORAL
Qty: 0 | Refills: 0 | DISCHARGE

## 2024-02-29 RX ORDER — BUDESONIDE AND FORMOTEROL FUMARATE DIHYDRATE 160; 4.5 UG/1; UG/1
2 AEROSOL RESPIRATORY (INHALATION)
Qty: 0 | Refills: 0 | DISCHARGE

## 2024-02-29 NOTE — H&P PST ADULT - PROBLEM SELECTOR PLAN 2
Instructed the patient to continue Symbicort and Patient instructed to take Montelukast with a sip of water on the morning of procedure.     Last PFT report 1/9/24, FEV1 55.1%  Pulmonologist evaluation in chart Instructed the patient to continue Symbicort and Patient instructed to take Montelukast with a sip of water on the morning of procedure.     Last PFT report 1/9/24, FEV1 55.1%    Pulmonologist evaluation in chart (1/9/24) Instructed the patient to continue Symbicort and Patient instructed to take Montelukast with a sip of water on the morning of procedure.   Pt stated symptoms improved with Dupixent sc Injection  and denies any exacerbations.  Last PFT report 1/9/24, FEV1 55.1%    Pulmonologist evaluation in chart (1/9/24)

## 2024-02-29 NOTE — H&P PST ADULT - CARDIOVASCULAR COMMENTS
s/p CABGX4, S/P aflutter (s/p ablation) denies any symptoms s/p CABGX4, S/P aflutter (s/p ablation) denies any symptoms. As per pt, normal echo and Stress test at cardiologist

## 2024-02-29 NOTE — H&P PST ADULT - NSICDXPASTSURGICALHX_GEN_ALL_CORE_FT
PAST SURGICAL HISTORY:  H/O cardiac radiofrequency ablation 9/2020    S/P CABG x 4 November 2019 (LIMA to LAD, SVG to OM1, OM2, RPDA)    S/P carotid endarterectomy November 2017 (left)    S/P sinus surgery      PAST SURGICAL HISTORY:  H/O cardiac radiofrequency ablation 9/2020    History of loop recorder     S/P CABG x 4 November 2019 (LIMA to LAD, SVG to OM1, OM2, RPDA)    S/P carotid endarterectomy November 2017 (left)    S/P sinus surgery

## 2024-02-29 NOTE — H&P PST ADULT - HISTORY OF PRESENT ILLNESS
78-year-old male with PMH of asthma (on nebulizers), s/p CABGX4, h/o aflutter (s/p ablation) on Xarelto, carotid stenosis, s/p Carotid Endarterectomy, severe RONNIE on CPAP, HTN, T2DM, HLD presents to PST for preop evaluation. Pt has elevated PSA level. Patient tentatively scheduled for MRI/Ultrafusion transperineal prostate biopsy.       78-year-old male with PMH of asthma (no recent exacerbation or hospitalizations or intubations, on nebulizers), s/p CABGX4, h/o aflutter (s/p ablation) on Xarelto, carotid stenosis, s/p Carotid Endarterectomy, severe RONNIE on ( as per pt, stopped using CPAP 3 months ago), ILR (explanted in 2022), HTN, T2DM, HLD presents to PST for preop evaluation. Pt has elevated PSA level. Patient tentatively scheduled for MRI/Ultrafusion transperineal prostate biopsy.       78-year-old male with PMH of asthma (no recent exacerbation or hospitalizations or intubations, on nebulizers), s/p CABGX4 (2019), h/o aflutter (s/p ablation, 2020) on Xarelto, carotid stenosis, s/p Carotid Endarterectomy, severe RONNIE on ( as per pt, stopped using CPAP 3 months ago), ILR (explanted in 2022), HTN, T2DM, HLD presents to PST for preop evaluation. Pt has elevated PSA level. Patient tentatively scheduled for MRI/Ultrafusion transperineal prostate biopsy.       78-year-old male with PMH of asthma (no recent exacerbation or hospitalizations or intubations), s/p CABGX4 (2019), h/o aflutter (s/p ablation, 2020) on Xarelto, carotid stenosis, s/p Carotid Endarterectomy, severe RONNIE on ( as per pt, stopped using CPAP 3 months ago), ILR (explanted in 2022), HTN, T2DM, HLD presents to PST for preop evaluation. Pt has elevated PSA level. Patient tentatively scheduled for MRI/Ultrafusion transperineal prostate biopsy.

## 2024-02-29 NOTE — H&P PST ADULT - OTHER CARE PROVIDERS
Escobar Ryder Pulmonologist [phone]                                                                                                                                  Milton Foster MD cardiologist  [phone]

## 2024-02-29 NOTE — H&P PST ADULT - NSICDXPASTMEDICALHX_GEN_ALL_CORE_FT
PAST MEDICAL HISTORY:  Asthma on meds    Atrial flutter s/p ablation , loop recorder SR    BPH (benign prostatic hyperplasia)     CAD (coronary artery disease) s/p 4V CABG (LIMA to LAD, SVG to OM1, OM2, RPDA)    Carotid stenosis, bilateral s/p left carotid endarterectomy    DM (diabetes mellitus)     Elevated prostate specific antigen (PSA)     GERD (gastroesophageal reflux disease)     HLD (hyperlipidemia)     HTN (hypertension)     RONNIE (obstructive sleep apnea) CPAP QHS    PVD (peripheral vascular disease)

## 2024-02-29 NOTE — H&P PST ADULT - FUNCTIONAL STATUS
DASI 5.07 DASI 5.07, pt can tolerate to walk 30 minutes without wheezing, can climb 1 flight of stairs

## 2024-02-29 NOTE — H&P PST ADULT - CARDIOVASCULAR
normal/S1 S2 present/no gallops/no rub/no murmur/irregular rate and rhythm details… normal/regular rate and rhythm/S1 S2 present/no gallops/no rub/no murmur

## 2024-02-29 NOTE — H&P PST ADULT - ENMT COMMENTS
Denies dentures. Denies loose teeth. MALLAMPATI Denies dentures. Denies loose teeth. Permanent teeth bridge MALLAMPATI II

## 2024-02-29 NOTE — H&P PST ADULT - RX
Diagnosed with severe RONNIE, last sleep study in 2019, Pt states he stopped using CPAP 3 months ago and denies any issues, Pulmonologist note in chart

## 2024-02-29 NOTE — H&P PST ADULT - PROBLEM SELECTOR PLAN 6
Patient instructed to take Lisinopril, and Nifedipine with a sip of water on the morning of procedure. Instructed to hold furosemide on day of procedure. Pt verbalized understanding with teach back.

## 2024-02-29 NOTE — H&P PST ADULT - PROBLEM SELECTOR PLAN 5
H/O CABGx 4,  Last dose of Xarelto on 3/4/24, and start aspirin 81 mg daily while he is off Xarelto as per surgeon. Pt verbalized understanding with teach back.    EKG Done at [facility] Sinus Rhythm with Ist degree AV block, stable with prior EKG.    Pt has cardiology evaluation scheduled and will request the copy of Echo, stress test and evaluation. H/O CABGx 4,  Last dose of Xarelto on 3/4/24, and start aspirin 81 mg daily while he is off Xarelto as per surgeon. Pt verbalized understanding with teach back.    EKG Done at [facility] Sinus Rhythm with Ist degree AV block, stable with prior EKG.    Pt has cardiology evaluation scheduled and will request the copy of Echo and stress test.

## 2024-02-29 NOTE — H&P PST ADULT - PROBLEM SELECTOR PLAN 1
Patient tentatively scheduled for MRI/Ultrafusion transperineal prostate biopsy for 3/8/24. Pre-op instructions provided. Pt given verbal and written instructions with teach back on pepcid. Pt verbalized understanding with return demonstration.     CBC, BMP, HbA1c done at [facility].    Done at [facility] Sinus Rhythm with Ist degree AV block, stable with prior EKG. Patient tentatively scheduled for MRI/Ultrafusion transperineal prostate biopsy for 3/8/24. Pre-op instructions provided. Pt given verbal and written instructions with teach back on pepcid. Pt verbalized understanding with return demonstration.     CBC, BMP, HbA1c, urine culture done at [facility].    EKG Done at [facility] Sinus Rhythm with Ist degree AV block, stable with prior EKG.    Pt has cardiology evaluation scheduled and will request the copy of Echo, stress test and evaluation. Patient tentatively scheduled for MRI/Ultrafusion transperineal prostate biopsy for 3/8/24. Pre-op instructions provided. Pt given verbal and written instructions with teach back on pepcid. Pt verbalized understanding with return demonstration.     CBC, BMP, HbA1c, urine culture done at [facility].    EKG Done at [facility] Sinus Rhythm with Ist degree AV block, stable with prior EKG.    Pt has cardiology evaluation scheduled and will request the copy of Echo and stress test.

## 2024-02-29 NOTE — H&P PST ADULT - PROBLEM SELECTOR PLAN 3
Last dose of Xarelto on 3/4/24, and start aspirin 81 mg daily while he is off Xarelto as per surgeon. Pt verbalized understanding with teach back.

## 2024-03-01 LAB
CULTURE RESULTS: NO GROWTH — SIGNIFICANT CHANGE UP
SPECIMEN SOURCE: SIGNIFICANT CHANGE UP

## 2024-03-05 ENCOUNTER — TRANSCRIPTION ENCOUNTER (OUTPATIENT)
Age: 79
End: 2024-03-05

## 2024-03-05 RX ORDER — MONTELUKAST 10 MG/1
10 TABLET, FILM COATED ORAL
Qty: 90 | Refills: 1 | Status: ACTIVE | COMMUNITY
Start: 2021-07-28 | End: 1900-01-01

## 2024-03-07 ENCOUNTER — TRANSCRIPTION ENCOUNTER (OUTPATIENT)
Age: 79
End: 2024-03-07

## 2024-03-08 ENCOUNTER — APPOINTMENT (OUTPATIENT)
Dept: UROLOGY | Facility: AMBULATORY SURGERY CENTER | Age: 79
End: 2024-03-08

## 2024-03-08 ENCOUNTER — OUTPATIENT (OUTPATIENT)
Dept: OUTPATIENT SERVICES | Facility: HOSPITAL | Age: 79
LOS: 1 days | Discharge: HOME | End: 2024-03-08
Payer: MEDICARE

## 2024-03-08 ENCOUNTER — TRANSCRIPTION ENCOUNTER (OUTPATIENT)
Age: 79
End: 2024-03-08

## 2024-03-08 ENCOUNTER — RESULT REVIEW (OUTPATIENT)
Age: 79
End: 2024-03-08

## 2024-03-08 VITALS
TEMPERATURE: 98 F | WEIGHT: 162.04 LBS | HEIGHT: 66 IN | SYSTOLIC BLOOD PRESSURE: 138 MMHG | HEART RATE: 88 BPM | OXYGEN SATURATION: 99 % | RESPIRATION RATE: 18 BRPM | DIASTOLIC BLOOD PRESSURE: 76 MMHG

## 2024-03-08 VITALS
RESPIRATION RATE: 18 BRPM | OXYGEN SATURATION: 99 % | SYSTOLIC BLOOD PRESSURE: 123 MMHG | TEMPERATURE: 98 F | DIASTOLIC BLOOD PRESSURE: 74 MMHG | HEART RATE: 77 BPM

## 2024-03-08 DIAGNOSIS — Z95.1 PRESENCE OF AORTOCORONARY BYPASS GRAFT: Chronic | ICD-10-CM

## 2024-03-08 DIAGNOSIS — R97.20 ELEVATED PROSTATE SPECIFIC ANTIGEN [PSA]: ICD-10-CM

## 2024-03-08 DIAGNOSIS — Z98.890 OTHER SPECIFIED POSTPROCEDURAL STATES: Chronic | ICD-10-CM

## 2024-03-08 LAB — GLUCOSE BLDC GLUCOMTR-MCNC: 117 MG/DL — HIGH (ref 70–99)

## 2024-03-08 PROCEDURE — 88381 MICRODISSECTION MANUAL: CPT | Mod: 26

## 2024-03-08 PROCEDURE — G0416: CPT | Mod: 26

## 2024-03-08 PROCEDURE — 55700: CPT

## 2024-03-08 PROCEDURE — 76999F: CUSTOM | Mod: 26

## 2024-03-08 RX ORDER — LEVOCETIRIZINE DIHYDROCHLORIDE 0.5 MG/ML
1 SOLUTION ORAL
Refills: 0 | DISCHARGE

## 2024-03-08 RX ORDER — FUROSEMIDE 40 MG
1 TABLET ORAL
Refills: 0 | DISCHARGE

## 2024-03-08 RX ORDER — LISINOPRIL 2.5 MG/1
1 TABLET ORAL
Refills: 0 | DISCHARGE

## 2024-03-08 RX ORDER — ATORVASTATIN CALCIUM 80 MG/1
1 TABLET, FILM COATED ORAL
Refills: 0 | DISCHARGE

## 2024-03-08 RX ORDER — MONTELUKAST 4 MG/1
1 TABLET, CHEWABLE ORAL
Refills: 0 | DISCHARGE

## 2024-03-08 RX ORDER — FLUTICASONE PROPIONATE 50 MCG
1 SPRAY, SUSPENSION NASAL
Refills: 0 | DISCHARGE

## 2024-03-08 RX ORDER — DUPILUMAB 300 MG/2ML
300 INJECTION, SOLUTION SUBCUTANEOUS
Refills: 0 | DISCHARGE

## 2024-03-08 RX ORDER — ACETAMINOPHEN 500 MG
1000 TABLET ORAL EVERY 6 HOURS
Refills: 0 | Status: DISCONTINUED | OUTPATIENT
Start: 2024-03-08 | End: 2024-03-22

## 2024-03-08 RX ORDER — POTASSIUM CHLORIDE 20 MEQ
1 PACKET (EA) ORAL
Refills: 0 | DISCHARGE

## 2024-03-08 RX ORDER — BUDESONIDE AND FORMOTEROL FUMARATE DIHYDRATE 160; 4.5 UG/1; UG/1
2 AEROSOL RESPIRATORY (INHALATION)
Refills: 0 | DISCHARGE

## 2024-03-08 RX ORDER — NIFEDIPINE 30 MG
1 TABLET, EXTENDED RELEASE 24 HR ORAL
Refills: 0 | DISCHARGE

## 2024-03-08 RX ORDER — OLOPATADINE HYDROCHLORIDE 665 UG/1
2 SPRAY, METERED NASAL
Refills: 0 | DISCHARGE

## 2024-03-08 RX ORDER — RIVAROXABAN 15 MG-20MG
1 KIT ORAL
Refills: 0 | DISCHARGE

## 2024-03-08 RX ORDER — METFORMIN HYDROCHLORIDE 850 MG/1
1 TABLET ORAL
Refills: 0 | DISCHARGE

## 2024-03-08 RX ORDER — SODIUM CHLORIDE 9 MG/ML
1000 INJECTION, SOLUTION INTRAVENOUS
Refills: 0 | Status: DISCONTINUED | OUTPATIENT
Start: 2024-03-08 | End: 2024-03-22

## 2024-03-08 NOTE — ASU DISCHARGE PLAN (ADULT/PEDIATRIC) - ASU DC SPECIAL INSTRUCTIONSFT
Discharge Instructions: TP biopsy    · General: It is common to have blood in the urine after your procedure. It may be pink or even red; inform your doctor if you have a significant amount of clot in the urine or if you are unable to void at all. It can take up to a week for the blood in the urine to resolve. You may also notice blood in the semen, it is common to have blood in the semen and that can take weeks to resolve.     · Bathing: You may shower or bathe.    · Diet: You may resume your regular diet and regular medication regimen.    · Pain: You may take Tylenol (acetaminophen) 650-975mg and/or Motrin (ibuprofen) 400-600mg, available over the counter, for pain every 6 hours as needed. Do not exceed 4000 milligrams of Tylenol (acetaminophen) daily. You may alternate these medications such that you take either one every 3 hours.    · Antibiotics: You do not need antibiotics for this procedure.    · Stool softeners: Do not allow yourself to become constipated as this may increase your bother from the stent and/or straining may cause bleeding. Take stool softeners (ex. Colace) or a laxative (ex. Senekot, ExLax), available over the counter, if needed.    · Activity: You may return to your normal activities in 24 hours.     · Anticoagulation: Please hold you xarelto until Tuesday 3/12 otherwise you can continue your aspirin.    · Follow-up: If you did not already schedule your post-operative appointment, please call your urologist to schedule a follow-up appointment.    · Call your urologist if: You have any bleeding that does not stop, inability to void >8 hours, fever over 100.4 F, chills.

## 2024-03-08 NOTE — ASU DISCHARGE PLAN (ADULT/PEDIATRIC) - NS MD DC FALL RISK RISK
For information on Fall & Injury Prevention, visit: [email or web address] OR  [email or web address] OR  https://www.cdc.gov/steadi/patient.html

## 2024-03-08 NOTE — ASU DISCHARGE PLAN (ADULT/PEDIATRIC) - CARE PROVIDER_API CALL
Toni Le.  Urology  [address] [place] [address], [address] [address]  Phone: [phone]  Fax: [phone]  Follow Up Time: 1 week

## 2024-03-13 ENCOUNTER — APPOINTMENT (OUTPATIENT)
Dept: OTOLARYNGOLOGY | Facility: CLINIC | Age: 79
End: 2024-03-13
Payer: MEDICARE

## 2024-03-13 VITALS
OXYGEN SATURATION: 95 % | BODY MASS INDEX: 26.03 KG/M2 | SYSTOLIC BLOOD PRESSURE: 157 MMHG | DIASTOLIC BLOOD PRESSURE: 75 MMHG | WEIGHT: 162 LBS | HEIGHT: 66 IN | HEART RATE: 73 BPM

## 2024-03-13 DIAGNOSIS — J32.9 CHRONIC SINUSITIS, UNSPECIFIED: ICD-10-CM

## 2024-03-13 PROBLEM — R97.20 ELEVATED PROSTATE SPECIFIC ANTIGEN [PSA]: Chronic | Status: ACTIVE | Noted: 2024-02-29

## 2024-03-13 PROBLEM — N40.0 BENIGN PROSTATIC HYPERPLASIA WITHOUT LOWER URINARY TRACT SYMPTOMS: Chronic | Status: ACTIVE | Noted: 2024-02-29

## 2024-03-13 PROCEDURE — 99204 OFFICE O/P NEW MOD 45 MIN: CPT | Mod: 25

## 2024-03-13 PROCEDURE — 31231 NASAL ENDOSCOPY DX: CPT

## 2024-03-13 RX ORDER — MOMETASONE FUROATE 100 %
POWDER (GRAM) MISCELLANEOUS
Qty: 1 | Refills: 3 | Status: ACTIVE | COMMUNITY
Start: 2024-03-13 | End: 1900-01-01

## 2024-03-13 NOTE — REASON FOR VISIT
[Initial Consultation] : an initial consultation for [FreeTextEntry2] : internal referral by Dr Bates for chronic sinusitis

## 2024-03-13 NOTE — HISTORY OF PRESENT ILLNESS
[de-identified] : 78 year old male presents for chronic sinusitis  internal referral by Dr Bates  Longstanding history of chronic sinusitis s/p sinus surgery x3-most recent 14 years Hx asthma, was on fasenra but now switched to dupixent as of Feb 1 Pulm- Dr. Ryder States symptoms would improve for a few years then return Frequent PND  Reports intermittent yellow anterior rhinorrhea after nasal rinses  Denies recent nasal congestion, facial pain/pressure, epistaxis Sense of smell is good No recent sinus infections  Using saline rinses daily  Using Flonase and olopatadine daily  Taking Xyzal and montelukast daily  hx asthma

## 2024-03-14 LAB — SURGICAL PATHOLOGY STUDY: SIGNIFICANT CHANGE UP

## 2024-03-26 NOTE — PATIENT PROFILE ADULT - ANY SIGNIFICANT CHANGE IN ABILITY TO PERFORM THE FOLLOWING ADL SINCE THE ONSET OF PRESENT ILLNESS?
Amber from [facility] called stating pt was trying to get blood drawn but there were no orders placed. Called Nursing to assist with Lucinda Hernandez in placing orders;no answer. Per last OV note no orders were ordered. Pt has an appt on 4/3 and would like labs placed prior to his appt. Please advise.  
Please advise pt that lab orders are in chart thanks  
Spoke with pt--he was a bit confused about whether or not he was even supposed to be getting labs for upcoming appt. I informed him that you did put an order in--he told me that he was going to skip getting them.   
no

## 2024-04-16 ENCOUNTER — APPOINTMENT (OUTPATIENT)
Dept: UROLOGY | Facility: CLINIC | Age: 79
End: 2024-04-16
Payer: MEDICARE

## 2024-04-16 VITALS
DIASTOLIC BLOOD PRESSURE: 80 MMHG | HEART RATE: 90 BPM | OXYGEN SATURATION: 95 % | RESPIRATION RATE: 16 BRPM | SYSTOLIC BLOOD PRESSURE: 160 MMHG

## 2024-04-16 DIAGNOSIS — C61 MALIGNANT NEOPLASM OF PROSTATE: ICD-10-CM

## 2024-04-16 PROCEDURE — 99214 OFFICE O/P EST MOD 30 MIN: CPT

## 2024-04-16 PROCEDURE — G2211 COMPLEX E/M VISIT ADD ON: CPT

## 2024-04-23 ENCOUNTER — APPOINTMENT (OUTPATIENT)
Dept: PULMONOLOGY | Facility: CLINIC | Age: 79
End: 2024-04-23
Payer: MEDICARE

## 2024-04-23 VITALS
HEART RATE: 88 BPM | HEIGHT: 66 IN | RESPIRATION RATE: 16 BRPM | WEIGHT: 165 LBS | DIASTOLIC BLOOD PRESSURE: 78 MMHG | TEMPERATURE: 97.1 F | BODY MASS INDEX: 26.52 KG/M2 | SYSTOLIC BLOOD PRESSURE: 152 MMHG | OXYGEN SATURATION: 98 %

## 2024-04-23 DIAGNOSIS — G47.33 OBSTRUCTIVE SLEEP APNEA (ADULT) (PEDIATRIC): ICD-10-CM

## 2024-04-23 DIAGNOSIS — J45.909 UNSPECIFIED ASTHMA, UNCOMPLICATED: ICD-10-CM

## 2024-04-23 DIAGNOSIS — R76.8 OTHER SPECIFIED ABNORMAL IMMUNOLOGICAL FINDINGS IN SERUM: ICD-10-CM

## 2024-04-23 DIAGNOSIS — J30.9 ALLERGIC RHINITIS, UNSPECIFIED: ICD-10-CM

## 2024-04-23 DIAGNOSIS — R06.02 SHORTNESS OF BREATH: ICD-10-CM

## 2024-04-23 DIAGNOSIS — K21.9 GASTRO-ESOPHAGEAL REFLUX DISEASE W/OUT ESOPHAGITIS: ICD-10-CM

## 2024-04-23 DIAGNOSIS — J45.50 SEVERE PERSISTENT ASTHMA, UNCOMPLICATED: ICD-10-CM

## 2024-04-23 PROCEDURE — 94010 BREATHING CAPACITY TEST: CPT

## 2024-04-23 PROCEDURE — 99214 OFFICE O/P EST MOD 30 MIN: CPT | Mod: 25

## 2024-04-23 PROCEDURE — 94729 DIFFUSING CAPACITY: CPT

## 2024-04-23 PROCEDURE — 94727 GAS DIL/WSHOT DETER LNG VOL: CPT

## 2024-04-23 PROCEDURE — ZZZZZ: CPT

## 2024-04-23 PROCEDURE — 95012 NITRIC OXIDE EXP GAS DETER: CPT

## 2024-04-23 NOTE — ADDENDUM
[FreeTextEntry1] :  Documented by Margaret Salazar acting as a scribe for Dr. Escobar Ryder on 04/23/2024 .   All medical record entries made by the Scribe were at my, Dr. Escobar Ryder's direction and personally dictated by me on 04/23/2024 . I have reviewed the chart and agree that the record accurately reflects my personal performance of the history, Physical exam, assessment, and plan. I have also personally directed, reviewed, and agree with the discharge instructions.

## 2024-04-23 NOTE — HISTORY OF PRESENT ILLNESS
[TextBox_4] : Mr. ARMSTRONG is a 79-year-old male with a history of eosinophilic and IgE mediated asthma, chronic sinus issues, RONNIE, GERD presenting to the office today for f/u pulmonary evaluation. His chief complaint is  -he notes recently being diagnosed with prostate cancer  -he notes that his energy levels are good -he notes he sleep pretty late around 7 hours of sleep (restful) -he notes getting a lot of phlegm (clear to white) coming from his sinuses mostly -he notes he gained a little bit of weight -he notes some ankle swelling -he notes he goes golfing -he notes he lost his voice last June 2023 -he notes some Dysphagia when he eats -he notes he stopped using his CPAP machine -he notes some wheezing  - no new medications, vitamins, or supplements -he notes he is trying to lose weight  he notes that his appetite is good.. -he notes prostate CA is #1 issue   -Patient denies any headaches, nausea, vomiting, fever, chills, sweats, chest pain, chest pressure, palpitations, coughing, fatigue, diarrhea, constipation, arthralgias, myalgias, dizziness, leg pain, itchy eyes, itchy ears, dysphonia, heartburn, reflux or sour taste in mouth.-h

## 2024-04-23 NOTE — ASSESSMENT
[FreeTextEntry1] : Mr. ARMSTRONG is a 79 year old male with a history of CAD, s/p CABG x 4 11.15.2019, DM, Asthma, Allergies (severe/ Eosinophilia), HTN, carotid stenosis, RONNIE who presents into the office today for a f/u pulmonary evaluation. SOB (NC with medications) - s/p Fasenra initiation - improved until 10/2023 (aborted carotid artery surgery) - untreated OSAS; #1 issue is Prostate CA, #2 issue is sinus  The patient's shortness of breath is multifactorial due to: -pulmonary disease  -eosinophilic and IgE mediated asthma, chronic sinus issues, RONNIE, GERD -poor breathing mechanics -out of shape -?cardiac disease (s/p CABG, Dr. King)  Problem 1: Severe Persistent Asthma (uncontrolled - ?Beta Blocker) - poorly controlled (improved) -Add NAC 900mg BID  -continue Symbicort 160 2 inhalations BID -continue Singulair 10 mg QHS -continue Tudorza 1 puff BID -continue Ventolin rescue inhaler 2 inhalations before exercise, Q6H - continue Aerobika to bring up mucous -Asthma is believed to be caused by inherited (genetic) and environmental factor, but its exact cause is unknown. Asthma may be triggered by allergens, lung infections, or irritants in the air. Asthma triggers are different for each person  Problem 2:Eosinophilic Asthma (re-discussed) - uncontrolled -s/p CBC (+) -DC Fasenra d0motwal (since 9/2020) - - move to [facility] -The safety and efficacy of Nucala was established in three double-blind, randomized, placebo-controlled trials in patients with severe asthma. Compared to a placebo, patients with severe asthma receiving Nucala had fewer exacerbation requiring hospitalization and/or emergency department visits, and a longer time to first exacerbation. In addition, patients with severe asthma receiving Nucala or Fasenra experienced greater reductions in their daily maintenance oral corticosteroid dose, while maintaining asthma control compared with patients receiving placebo. Treatment with Nucala did not result in a significant improvement in lung function, as measured by the volume of air exhaled by patients in one second. The most common side effects include: headache, injection site reactions, back pain, weakness, and fatigue; hypersensitivity reactions can occur within hours or days including swelling of the face, mouth, and tongue, fainting, dizziness, hives, breathing problems, and rash; herpes zoster infections have occurred. The drug is a monoclonal antibody that inhibits interleukin-5 which helps regular eosinophils, a type of white blood cell that contributes to asthma. The over-production of eosinophils can cause inflammation in the lungs, increasing the frequency of asthma attacks. Patients must also take other medications, including high dose inhaled corticosteroids and at least one additional asthma drug.  Problem 3: IgE Elevated (too high 1060 - 10/2023) -Candidate for Xolair -Xolair is a recombinant DNA- derived humanized IgG1K monoclonal antibody that selectively binds to human immunoglobulin E (IgE). Xolair is produced by a Chinese hamster ovary cell suspension culture in nutrient medium containing the antibiotic gentamicin. Gentamicin is not detectable in the final product. Xolair is a sterile, white, preservative free, lyophilized powder contained in a single use vial that is reconstituted with sterile water for suspension. Side effects include: wheezing, tightness of the chest, trouble breathing, hives, skin rash, feeling anxious or light-headed, fainting, warmth or tingling under skin, or swelling of face, lips, or tongue  Problem 4: Sinuses /Allergies (active) -add Clarinex 5 mg QAM -continue Olopatadine 0.6% at 1 sniff/nostril BID -continue Flonase 1 sniff each nostril BID -recommended Navage Environmental measures for allergies were encouraged including mattress and pillow cover, air purifier, and environmental controls.  Problem 5: GERD -No medications Recommended at this time -Rule of 2s: avoid eating too much, eating too fast, eating too late, eating too spicy, eating too lousy, eating two hours before bed. -Things to avoid including overeating, spicy foods, tight clothing, eating within three hours of bed, this list is not all inclusive. -For treatment of reflux, possible options discussed including diet control, H2 blockers, PPIs, as well as coating motility agents discussed as treatment options. Timing of meals and proximity of last meal to sleep were discussed. If symptoms persist, a formal gastrointestinal evaluation is needed.  Problem 6: RONNIE (APAP 4 to 10 Cm of water) -continue Trazodone 50 mg QHS -continue CPAP Use (compliant and tolerates well) - restart until recall of [place] ([place]) Sleep apnea is associated with adverse clinical consequences which an affect most organ systems. Cardiovascular disease risk includes arrhythmias, atrial fibrillation, hypertension, coronary artery disease, and stroke. Metabolic disorders include diabetes type 2, non-alcoholic fatty liver disease. Mood disorder especially depression; and cognitive decline especially in the elderly. Associations with chronic reflux/Thurston's esophagus some but not all inclusive. -Reasons include arousal consistent with hypopnea; respiratory events most prominent in REM sleep or supine position; therefore sleep staging and body position are important for accurate diagnosis and estimation of AHI.  Problem 7: Poor Mechanics of Breathing -Recommended Mariah Allison and Buteyko breathing techniques - Proper breathing techniques were reviewed with an emphasis on exhalation. Patient instructed to breath in for 1 second and out for four seconds. Patient was encouraged to not talk while walking.  Problem 8: Out of Shape -exercise, and diet control were discussed and are highly encouraged. Treatment options were given such as, aqua therapy, and contacting a nutritionist. Recommended to use the elliptical, stationary bike, less use of treadmill. Mindful eating was explained to the patient Obesity is associated with worsening asthma, shortness of breath, and potential for cardiac disease, diabetes, and other underlying medical conditions.  Problem 9: Cardiac (s/p CABG)- Afib -recommended to follow up with a cardiologist s/p Ablation (Ismail) - on multiple medications  Problem 10: Health Maintenance/COVID19 Precautions:- s/p x3 COVID-19 vaccines (Pfizer) Immune Support Recommendations: -OTC Vitamin C 500mg BID -OTC Quercetin 250-500mg BID -OTC Zinc 75-100mg per day -OTC Melatonin 1 or 2 mg a night -OTC Vitamin D 1-4000mg per day -OTC Tonic Water 8oz per day Asthma and COVID19: You need to make sure your asthma is under control. This often requires the use of inhaled corticosteroids (and sometimes oral corticosteroids). Inhaled corticosteroids do not likely reduce your immune system's ability to fight infections, but oral corticosteroids may. It is important to use the steps above to protect yourself to limit your exposure to any respiratory virus.  Problem 11: health maintenance -s/p influenza vaccine 2022 -recommended strep pneumonia vaccines after age 65: Prevnar-13 vaccine, followed by Pneumo vaccine 23 one year following (Completed) -recommended early intervention for URIs -recommended regular osteoporosis evaluations -recommended early dermatological evaluations -recommended after the age of 50 to the age of 70, colonoscopy every 5 years   Follow up in 3-6 months -he is recommended to call with any changes, questions, or concerns.

## 2024-04-23 NOTE — REASON FOR VISIT
[Follow-Up] : a follow-up visit [TextBox_44] : severe eosinophilic and IgE mediated asthma, chronic sinus issues, RONNIE, GERD

## 2024-04-23 NOTE — PHYSICAL EXAM
[No Acute Distress] : no acute distress [Well Nourished] : well nourished [No Deformities] : no deformities [Well Developed] : well developed [Normal Oropharynx] : normal oropharynx [III] : Mallampati Class: III [Normal Appearance] : normal appearance [No Neck Mass] : no neck mass [Normal Rate/Rhythm] : normal rate/rhythm [Normal S1, S2] : normal s1, s2 [No Murmurs] : no murmurs [No Resp Distress] : no resp distress [No Abnormalities] : no abnormalities [Benign] : benign [Normal Gait] : normal gait [No Clubbing] : no clubbing [No Cyanosis] : no cyanosis [No Edema] : no edema [FROM] : FROM [Normal Color/ Pigmentation] : normal color/ pigmentation [No Focal Deficits] : no focal deficits [Oriented x3] : oriented x3 [Normal Affect] : normal affect [TextBox_2] : OW [TextBox_68] : I:E 1:3, forced expiratory wheeze

## 2024-04-23 NOTE — PROCEDURE
[FreeTextEntry1] :  Full PFT reveals mild restrictive and obstructive dysfunction; FEV1 was 1.32 L which is 56 % of predicted, normal lung volumes, normal diffusions, at 13.4 L which is83 % predicted, normal flow volume loop. PFT's for performed to evaluate for SOB.    FENO was 30 ; a normal value being less than 25Fractional exhaled nitric oxide (FENO) is regarded as a simple, noninvasive method for assessing eosinophilic airway inflammation. Produced by a variety of cells within the lung, nitric oxide (NO) concentrations are generally low in healthy individuals. However, high concentrations of NO appear to be involved in nonspecific host defense mechanisms and chronic inflammatory diseases such as asthma. The American Thoracic Society (ATS) therefore has strongly recommended using FENO to aid in the assessment, management, and long-term monitoring of eosinophilic airway inflammation and asthma, and for identifying steroid responsive individuals whose chronic respiratory symptoms may be caused by airway inflammation. In their 2011 clinical practice guideline, the ATS emphasizes the importance of using FENO.

## 2024-04-24 RX ORDER — OLOPATADINE HYDROCHLORIDE 665 UG/1
0.6 SPRAY, METERED NASAL
Qty: 3 | Refills: 1 | Status: ACTIVE | COMMUNITY
Start: 2020-07-14 | End: 1900-01-01

## 2024-04-24 RX ORDER — DESLORATADINE 5 MG/1
5 TABLET ORAL
Qty: 90 | Refills: 1 | Status: ACTIVE | COMMUNITY
Start: 2022-12-20 | End: 1900-01-01

## 2024-04-24 RX ORDER — LEVOCETIRIZINE DIHYDROCHLORIDE 5 MG/1
5 TABLET ORAL
Qty: 90 | Refills: 1 | Status: ACTIVE | COMMUNITY
Start: 2023-07-11 | End: 1900-01-01

## 2024-04-24 RX ORDER — FLUTICASONE PROPIONATE 50 UG/1
50 SPRAY, METERED NASAL
Qty: 3 | Refills: 1 | Status: ACTIVE | COMMUNITY
Start: 2017-03-13 | End: 1900-01-01

## 2024-05-01 ENCOUNTER — APPOINTMENT (OUTPATIENT)
Dept: RADIATION ONCOLOGY | Facility: CLINIC | Age: 79
End: 2024-05-01
Payer: MEDICARE

## 2024-05-01 ENCOUNTER — NON-APPOINTMENT (OUTPATIENT)
Age: 79
End: 2024-05-01

## 2024-05-01 VITALS — WEIGHT: 165 LBS | HEIGHT: 66 IN | BODY MASS INDEX: 26.52 KG/M2 | RESPIRATION RATE: 16 BRPM

## 2024-05-01 DIAGNOSIS — E78.5 HYPERLIPIDEMIA, UNSPECIFIED: ICD-10-CM

## 2024-05-01 DIAGNOSIS — Z86.79 PERSONAL HISTORY OF OTHER DISEASES OF THE CIRCULATORY SYSTEM: ICD-10-CM

## 2024-05-01 DIAGNOSIS — Z80.0 FAMILY HISTORY OF MALIGNANT NEOPLASM OF DIGESTIVE ORGANS: ICD-10-CM

## 2024-05-01 DIAGNOSIS — Z80.42 FAMILY HISTORY OF MALIGNANT NEOPLASM OF PROSTATE: ICD-10-CM

## 2024-05-01 DIAGNOSIS — I25.10 ATHEROSCLEROTIC HEART DISEASE OF NATIVE CORONARY ARTERY W/OUT ANGINA PECTORIS: ICD-10-CM

## 2024-05-01 PROCEDURE — 99204 OFFICE O/P NEW MOD 45 MIN: CPT

## 2024-05-01 NOTE — DISEASE MANAGEMENT
[IIB] : IIB [1] : T1 [c] : c [0] : N0 [X] : MX [0-10] : 0 -10 ng/mL [Biopsy with Fusion] : Patient had a biopsy with fusion on [7(3+4)] : Fusion Biopsy Allison Score: 7(3+4) [Biopsy results sent to PCP/Referring Physician] : Biopsy results sent to PCP/Referring Physician [] : Patient had a Prostate MRI [4] : 4 [BiopsyDate] : 3/8/2024 [TotalCores] : 14 [TotalPositiveCores] : 6 [MaxCoreInvolvement] : 60% [FreeTextEntry7] : Prostate MRI 12/2023 PIRADS 4 lesion Prostate volume: 43cc PSA density: 0.10ng/mL/cc  PSA 10/05/2023 6.45 ng/mL

## 2024-05-01 NOTE — HISTORY OF PRESENT ILLNESS
[FreeTextEntry1] : Presented with elevated PSA and abnormal prostate MRI. Fusion biopsy 3/8/2024 showed mix of Gr 1 and Gr 2 prostate cancer. Underwent Decipher Genomic Score: 0.72, high risk. + Family hx prostate cancer: Father  Has mild to moderate baseline urinary symptoms.  Here for treatment discussion.

## 2024-05-01 NOTE — DISEASE MANAGEMENT
[IIB] : IIB [1] : T1 [c] : c [0] : N0 [X] : MX [0-10] : 0 -10 ng/mL [Biopsy with Fusion] : Patient had a biopsy with fusion on [7(3+4)] : Fusion Biopsy Durant Score: 7(3+4) [Biopsy results sent to PCP/Referring Physician] : Biopsy results sent to PCP/Referring Physician [] : Patient had a Prostate MRI [4] : 4 [BiopsyDate] : 3/8/2024 [TotalCores] : 14 [TotalPositiveCores] : 6 [MaxCoreInvolvement] : 60% [FreeTextEntry7] : Prostate MRI 12/2023 PIRADS 4 lesion Prostate volume: 43cc PSA density: 0.10ng/mL/cc  PSA 10/05/2023 6.45 ng/mL

## 2024-05-07 ENCOUNTER — APPOINTMENT (OUTPATIENT)
Dept: OTOLARYNGOLOGY | Facility: CLINIC | Age: 79
End: 2024-05-07

## 2024-05-08 NOTE — HISTORY OF PRESENT ILLNESS
[FreeTextEntry1] : 80 y/o male with HTN, CAD with h/o CABG, asthma, PAD, HLD, Henrik 3+4 prostate adenocarcinoma presents to discuss radiation options for prostate cancer.  Initially Pt presented with elevated PSA 4.2 in 2/2022 9/2022 -- PSA 4.99 4/2023 -- PSA 5.63 10/2023 -- PSA 6.45 12/2023 -- MRI showed prostate vol 43cc, PI-RADS 4.  3/8/2024 -- pathology showed prostate adenocarcinoma, GS 3+4 in 2 cores, GS 3+3 in 4 cores. Total 15 cores biopsied. DECIPHER score 0.72 (high risk).  5/1/2024 Consultation. No significant urinary symptoms.

## 2024-05-08 NOTE — REVIEW OF SYSTEMS
[IPSS Score (0-40): ___] : IPSS score: [unfilled] [Negative] : Allergic/Immunologic [FreeTextEntry5] : HTN, HLD, CABG [FreeTextEntry6] : asthma [FreeTextEntry8] : prostate cancer

## 2024-05-09 ENCOUNTER — APPOINTMENT (OUTPATIENT)
Dept: RADIATION ONCOLOGY | Facility: CLINIC | Age: 79
End: 2024-05-09

## 2024-05-14 ENCOUNTER — OUTPATIENT (OUTPATIENT)
Dept: OUTPATIENT SERVICES | Facility: HOSPITAL | Age: 79
LOS: 1 days | End: 2024-05-14
Payer: MEDICARE

## 2024-05-14 ENCOUNTER — APPOINTMENT (OUTPATIENT)
Dept: CT IMAGING | Facility: HOSPITAL | Age: 79
End: 2024-05-14

## 2024-05-14 DIAGNOSIS — Z95.1 PRESENCE OF AORTOCORONARY BYPASS GRAFT: Chronic | ICD-10-CM

## 2024-05-14 DIAGNOSIS — Z98.890 OTHER SPECIFIED POSTPROCEDURAL STATES: Chronic | ICD-10-CM

## 2024-05-14 DIAGNOSIS — C61 MALIGNANT NEOPLASM OF PROSTATE: ICD-10-CM

## 2024-05-14 PROCEDURE — 77290 THER RAD SIMULAJ FIELD CPLX: CPT

## 2024-05-14 PROCEDURE — 77263 THER RADIOLOGY TX PLNG CPLX: CPT

## 2024-05-29 PROCEDURE — 77300 RADIATION THERAPY DOSE PLAN: CPT

## 2024-05-29 PROCEDURE — 77338 DESIGN MLC DEVICE FOR IMRT: CPT

## 2024-05-29 PROCEDURE — 77301 RADIOTHERAPY DOSE PLAN IMRT: CPT

## 2024-06-06 ENCOUNTER — APPOINTMENT (OUTPATIENT)
Dept: OTOLARYNGOLOGY | Facility: CLINIC | Age: 79
End: 2024-06-06

## 2024-06-13 NOTE — H&P PST ADULT - NS MD HP PULSE RADIAL
Anesthesia Post Evaluation    Patient: Eileen Clarke    Procedure(s) Performed: Procedure(s) (LRB):  HYSTERECTOMY, TOTAL, ABDOMINAL, SALPINGECTOMY, WITH CYSTOSCOPY (N/A)  INSERTION, STENT, URETER (Left)    Final Anesthesia Type: general      Patient location during evaluation: med/surg floor  Post-procedure vital signs: reviewed and stable  Airway patency: patent      Anesthetic complications: no      Cardiovascular status: hemodynamically stable  Respiratory status: spontaneous ventilation  Follow-up not needed.              Vitals Value Taken Time   /90 06/13/24 1333   Temp 36.4 °C (97.5 °F) 06/13/24 1333   Pulse 57 06/13/24 1403   Resp 16 06/13/24 1333   SpO2 100 % 06/13/24 1403         Event Time   Out of Recovery 13:35:00         Pain/Kalyani Score: Pain Rating Prior to Med Admin: 8 (6/13/2024  2:02 PM)  Pain Rating Post Med Admin: 5 (6/13/2024  2:17 PM)  Kalyani Score: 9 (6/13/2024  1:30 PM)          
right normal/left normal

## 2024-06-17 ENCOUNTER — APPOINTMENT (OUTPATIENT)
Dept: NUCLEAR MEDICINE | Facility: IMAGING CENTER | Age: 79
End: 2024-06-17
Payer: MEDICARE

## 2024-06-17 ENCOUNTER — OUTPATIENT (OUTPATIENT)
Dept: OUTPATIENT SERVICES | Facility: HOSPITAL | Age: 79
LOS: 1 days | End: 2024-06-17
Payer: MEDICARE

## 2024-06-17 DIAGNOSIS — Z98.890 OTHER SPECIFIED POSTPROCEDURAL STATES: Chronic | ICD-10-CM

## 2024-06-17 DIAGNOSIS — Z00.8 ENCOUNTER FOR OTHER GENERAL EXAMINATION: ICD-10-CM

## 2024-06-17 DIAGNOSIS — Z95.1 PRESENCE OF AORTOCORONARY BYPASS GRAFT: Chronic | ICD-10-CM

## 2024-06-17 PROCEDURE — 78816 PET IMAGE W/CT FULL BODY: CPT

## 2024-06-17 PROCEDURE — 78816 PET IMAGE W/CT FULL BODY: CPT | Mod: 26

## 2024-06-17 PROCEDURE — A9595: CPT

## 2024-06-19 NOTE — HISTORY OF PRESENT ILLNESS
[FreeTextEntry1] : 80 y/o male with HTN, CAD with h/o CABG, asthma, PAD, HLD, Henrik 3+4 prostate adenocarcinoma presents to discuss radiation options for prostate cancer.  Initially Pt presented with elevated PSA 4.2 in 2/2022 9/2022 -- PSA 4.99 4/2023 -- PSA 5.63 10/2023 -- PSA 6.45 12/2023 -- MRI showed prostate vol 43cc, PI-RADS 4.  3/8/2024 -- pathology showed prostate adenocarcinoma, GS 3+4 in 2 cores, GS 3+3 in 4 cores. Total 15 cores biopsied. DECIPHER score 0.72 (high risk).  5/1/2024 Consultation. No significant urinary symptoms.   6/19/2024 F/U. As per Pt PET-PSMA scan done 2 days ago. Result pending.

## 2024-06-25 ENCOUNTER — APPOINTMENT (OUTPATIENT)
Dept: RADIATION ONCOLOGY | Facility: CLINIC | Age: 79
End: 2024-06-25

## 2024-07-12 ENCOUNTER — APPOINTMENT (OUTPATIENT)
Dept: PULMONOLOGY | Facility: CLINIC | Age: 79
End: 2024-07-12
Payer: MEDICARE

## 2024-07-12 VITALS
BODY MASS INDEX: 26.52 KG/M2 | WEIGHT: 165 LBS | OXYGEN SATURATION: 97 % | HEART RATE: 72 BPM | RESPIRATION RATE: 16 BRPM | SYSTOLIC BLOOD PRESSURE: 140 MMHG | TEMPERATURE: 97 F | DIASTOLIC BLOOD PRESSURE: 80 MMHG | HEIGHT: 66 IN

## 2024-07-12 DIAGNOSIS — J82.83 EOSINOPHILIC ASTHMA: ICD-10-CM

## 2024-07-12 DIAGNOSIS — J45.901 UNSPECIFIED ASTHMA WITH (ACUTE) EXACERBATION: ICD-10-CM

## 2024-07-12 DIAGNOSIS — J30.9 ALLERGIC RHINITIS, UNSPECIFIED: ICD-10-CM

## 2024-07-12 DIAGNOSIS — R06.02 SHORTNESS OF BREATH: ICD-10-CM

## 2024-07-12 DIAGNOSIS — R76.8 OTHER SPECIFIED ABNORMAL IMMUNOLOGICAL FINDINGS IN SERUM: ICD-10-CM

## 2024-07-12 DIAGNOSIS — G47.33 OBSTRUCTIVE SLEEP APNEA (ADULT) (PEDIATRIC): ICD-10-CM

## 2024-07-12 DIAGNOSIS — K21.9 GASTRO-ESOPHAGEAL REFLUX DISEASE W/OUT ESOPHAGITIS: ICD-10-CM

## 2024-07-12 DIAGNOSIS — J45.50 SEVERE PERSISTENT ASTHMA, UNCOMPLICATED: ICD-10-CM

## 2024-07-12 PROCEDURE — 94010 BREATHING CAPACITY TEST: CPT

## 2024-07-12 PROCEDURE — 95012 NITRIC OXIDE EXP GAS DETER: CPT

## 2024-07-12 PROCEDURE — G2211 COMPLEX E/M VISIT ADD ON: CPT

## 2024-07-12 PROCEDURE — 99214 OFFICE O/P EST MOD 30 MIN: CPT | Mod: 25

## 2024-07-29 ENCOUNTER — RX RENEWAL (OUTPATIENT)
Age: 79
End: 2024-07-29

## 2024-08-01 ENCOUNTER — APPOINTMENT (OUTPATIENT)
Dept: ORTHOPEDIC SURGERY | Facility: CLINIC | Age: 79
End: 2024-08-01
Payer: MEDICARE

## 2024-08-01 VITALS — BODY MASS INDEX: 26.52 KG/M2 | HEIGHT: 66 IN | WEIGHT: 165 LBS

## 2024-08-01 DIAGNOSIS — M43.17 SPONDYLOLISTHESIS, LUMBOSACRAL REGION: ICD-10-CM

## 2024-08-01 DIAGNOSIS — M54.16 RADICULOPATHY, LUMBAR REGION: ICD-10-CM

## 2024-08-01 DIAGNOSIS — M47.26 OTHER SPONDYLOSIS WITH RADICULOPATHY, LUMBAR REGION: ICD-10-CM

## 2024-08-01 PROCEDURE — 99205 OFFICE O/P NEW HI 60 MIN: CPT

## 2024-08-01 RX ORDER — GABAPENTIN 100 MG/1
100 CAPSULE ORAL
Qty: 60 | Refills: 2 | Status: ACTIVE | COMMUNITY
Start: 2024-08-01 | End: 1900-01-01

## 2024-08-01 NOTE — ASSESSMENT
[FreeTextEntry1] : R>L NF stenosis L3/4; Severe b/l NF stenosis L4/5; Gr 1 L5/S1 with Severe B/L NF stenosis Agree with further imaging Trial deja, avoid NSAIDs Gabapentin- Patient advised of sedating effects, instructed not to drive, operate machinery, or take with other sedating medications. Advised of need to taper on/off medication and risk of abruptly stopping gabapentin. PT, Pain c/s Will plan to re-image L spine in 4 months Discussed surgery

## 2024-08-01 NOTE — HISTORY OF PRESENT ILLNESS
[Lower back] : lower back [Right Leg] : right leg [6] : 6 [Shooting] : shooting [Intermittent] : intermittent [Meds] : meds [Lying in bed] : lying in bed [de-identified] : 7/29/2024 [facility]  - report noted in chart.   Ind. 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 Patient denies ABHIJEET. Patient reports intermittent pain that began about 2 weeks ago. Patient had been taken Aleve for pain relief. Patient reports shooting pain on lower back radiating down with some numbness on right leg. Patient notes pain is worse when laying down. No bb dysfunction.  PMH prostate CA, on AC  [] : no [FreeTextEntry7] : right leg

## 2024-08-01 NOTE — IMAGING
[de-identified] : LSPINE Inspection: No rash or ecchymosis Palpation: No tenderness to palpation or spasm in bilateral thoracic and lumbar paraspinal musculature, no SI joint tenderness to palpation ROM: limited all planes Strength: 5/5 bilateral hip flexors, knee extensors, ankle dorsiflexors, EHL, ankle plantarflexors Sensation: Sensation present to light touch bilateral L2-S1 distributions Provocative maneuvers: + R SLR

## 2024-08-05 ENCOUNTER — APPOINTMENT (OUTPATIENT)
Dept: ORTHOPEDIC SURGERY | Facility: CLINIC | Age: 79
End: 2024-08-05

## 2024-09-26 ENCOUNTER — APPOINTMENT (OUTPATIENT)
Dept: ORTHOPEDIC SURGERY | Facility: CLINIC | Age: 79
End: 2024-09-26
Payer: MEDICARE

## 2024-09-26 DIAGNOSIS — M54.16 RADICULOPATHY, LUMBAR REGION: ICD-10-CM

## 2024-09-26 DIAGNOSIS — M43.17 SPONDYLOLISTHESIS, LUMBOSACRAL REGION: ICD-10-CM

## 2024-09-26 PROCEDURE — 99213 OFFICE O/P EST LOW 20 MIN: CPT

## 2024-09-26 NOTE — IMAGING
[de-identified] : LSPINE Inspection: No rash or ecchymosis Palpation: No tenderness to palpation or spasm in bilateral thoracic and lumbar paraspinal musculature, no SI joint tenderness to palpation ROM: limited all planes Strength: 5/5 bilateral hip flexors, knee extensors, ankle dorsiflexors, EHL, ankle plantarflexors Sensation: Sensation present to light touch bilateral L2-S1 distributions Provocative maneuvers: + R SLR

## 2024-09-26 NOTE — HISTORY OF PRESENT ILLNESS
[Lower back] : lower back [Right Leg] : right leg [6] : 6 [Shooting] : shooting [Intermittent] : intermittent [Meds] : meds [Lying in bed] : lying in bed [de-identified] : 09/26/2024: patient is here to follow up on lower back. pt states that he is feeling good. no new pain denies new injury.  7/29/2024 [facility]  - report noted in chart.   Ind. 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 Patient denies ABHIJEET. Patient reports intermittent pain that began about 2 weeks ago. Patient had been taken Aleve for pain relief. Patient reports shooting pain on lower back radiating down with some numbness on right leg. Patient notes pain is worse when laying down. No bb dysfunction.  PMH prostate CA, on AC   9/26/2024 patient presents to office walking on his own following up for lumbar back pain, since last visit symptoms have improved and almost resolved. Patient reports PT,  gabapentin and nsaids with relief.   [] : no [FreeTextEntry7] : right leg

## 2024-09-26 NOTE — ASSESSMENT
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   Continue PT Gabapentin PRN Patient advised of sedating effects, instructed not to drive, operate machinery, or take with other sedating medications. Advised of need to taper on/off medication and risk of abruptly stopping gabapentin.  Will plan to re-image L spine in 4 months Discussed surgery

## 2024-10-17 ENCOUNTER — RX RENEWAL (OUTPATIENT)
Age: 79
End: 2024-10-17

## 2024-10-30 ENCOUNTER — RX RENEWAL (OUTPATIENT)
Age: 79
End: 2024-10-30

## 2024-10-30 RX ORDER — DUPILUMAB 300 MG/2ML
300 INJECTION, SOLUTION SUBCUTANEOUS
Qty: 4 | Refills: 2 | Status: ACTIVE | COMMUNITY
Start: 2024-10-30 | End: 1900-01-01

## 2024-11-25 ENCOUNTER — APPOINTMENT (OUTPATIENT)
Dept: UROLOGY | Facility: CLINIC | Age: 79
End: 2024-11-25

## 2024-11-25 VITALS
SYSTOLIC BLOOD PRESSURE: 145 MMHG | DIASTOLIC BLOOD PRESSURE: 66 MMHG | RESPIRATION RATE: 17 BRPM | OXYGEN SATURATION: 97 % | HEART RATE: 71 BPM | TEMPERATURE: 97.4 F

## 2024-11-25 DIAGNOSIS — C61 MALIGNANT NEOPLASM OF PROSTATE: ICD-10-CM

## 2024-11-25 PROCEDURE — G2211 COMPLEX E/M VISIT ADD ON: CPT

## 2024-11-25 PROCEDURE — 99215 OFFICE O/P EST HI 40 MIN: CPT

## 2024-11-27 ENCOUNTER — APPOINTMENT (OUTPATIENT)
Dept: PULMONOLOGY | Facility: CLINIC | Age: 79
End: 2024-11-27
Payer: MEDICARE

## 2024-11-27 ENCOUNTER — RX RENEWAL (OUTPATIENT)
Age: 79
End: 2024-11-27

## 2024-11-27 VITALS
HEIGHT: 66 IN | SYSTOLIC BLOOD PRESSURE: 140 MMHG | DIASTOLIC BLOOD PRESSURE: 68 MMHG | HEART RATE: 89 BPM | TEMPERATURE: 97.6 F | WEIGHT: 157 LBS | RESPIRATION RATE: 17 BRPM | BODY MASS INDEX: 25.23 KG/M2 | OXYGEN SATURATION: 96 %

## 2024-11-27 DIAGNOSIS — R76.8 OTHER SPECIFIED ABNORMAL IMMUNOLOGICAL FINDINGS IN SERUM: ICD-10-CM

## 2024-11-27 DIAGNOSIS — J45.901 UNSPECIFIED ASTHMA WITH (ACUTE) EXACERBATION: ICD-10-CM

## 2024-11-27 DIAGNOSIS — J45.50 SEVERE PERSISTENT ASTHMA, UNCOMPLICATED: ICD-10-CM

## 2024-11-27 DIAGNOSIS — J30.9 ALLERGIC RHINITIS, UNSPECIFIED: ICD-10-CM

## 2024-11-27 DIAGNOSIS — K21.9 GASTRO-ESOPHAGEAL REFLUX DISEASE W/OUT ESOPHAGITIS: ICD-10-CM

## 2024-11-27 DIAGNOSIS — G47.33 OBSTRUCTIVE SLEEP APNEA (ADULT) (PEDIATRIC): ICD-10-CM

## 2024-11-27 DIAGNOSIS — R06.02 SHORTNESS OF BREATH: ICD-10-CM

## 2024-11-27 PROCEDURE — 94010 BREATHING CAPACITY TEST: CPT

## 2024-11-27 PROCEDURE — 99214 OFFICE O/P EST MOD 30 MIN: CPT | Mod: 25

## 2024-11-27 PROCEDURE — 95012 NITRIC OXIDE EXP GAS DETER: CPT

## 2024-12-02 NOTE — HISTORY OF PRESENT ILLNESS
Spoke to pt's wife after she left message to cancel pt's appointment on 12/4 at 3 pm.  States she is unable to get him out of the house by herself to get him to appointments. Explained what the HF appointment entails. She states he has home health RN that comes in to see and manage his care.  Encouraged her to call HF clinic with any further questions or concerns.    [TextBox_4] : Mr. ARMSTRONG is a 76 year old male with a history of  eosinophilic and IgE mediated asthma, chronic sinus issues, RONNIE, GERD presenting to the office today for f/u pulmonary evaluation. His chief complaint is\par -he notes feeling better now than he previously did\par -he notes his IgE is elevated\par -he denies coughing or wheezing\par -he notes producing plenty of phlegm he needs to cough up\par -he notes his bowels are regular\par -he notes his vision is fine\par -he notes he has noticed a bump on his back\par -he notes he is back on the Fasenra\par -he notes he has not done the carotid artery surgery and was given an Rx instead\par -he notes he is off Montelukast\par \par -patient denies any headaches, nausea, vomiting, fever, chills, sweats, chest pain, chest pressure, palpitations, coughing, wheezing, fatigue, diarrhea, constipation, dysphagia, myalgias, dizziness, leg swelling, leg pain, itchy eyes, itchy ears, heartburn, reflux or sour taste in the mouth

## 2024-12-11 ENCOUNTER — APPOINTMENT (OUTPATIENT)
Dept: RADIATION ONCOLOGY | Facility: CLINIC | Age: 79
End: 2024-12-11
Payer: MEDICARE

## 2024-12-11 PROCEDURE — 99442: CPT

## 2024-12-13 ENCOUNTER — APPOINTMENT (OUTPATIENT)
Dept: RADIATION ONCOLOGY | Facility: CLINIC | Age: 79
End: 2024-12-13

## 2024-12-31 ENCOUNTER — RX RENEWAL (OUTPATIENT)
Age: 79
End: 2024-12-31

## 2025-01-30 NOTE — ED PROVIDER NOTE - TIMING
Most recent potassium 6.2, nephrology called in [facility] for patient to take once a day which he has been taking over the past 2 days.        gradual onset

## 2025-02-04 ENCOUNTER — TRANSCRIPTION ENCOUNTER (OUTPATIENT)
Age: 80
End: 2025-02-04

## 2025-03-06 ENCOUNTER — APPOINTMENT (OUTPATIENT)
Dept: PULMONOLOGY | Facility: CLINIC | Age: 80
End: 2025-03-06

## 2025-03-19 ENCOUNTER — APPOINTMENT (OUTPATIENT)
Dept: PULMONOLOGY | Facility: CLINIC | Age: 80
End: 2025-03-19
Payer: MEDICARE

## 2025-03-19 VITALS
SYSTOLIC BLOOD PRESSURE: 138 MMHG | RESPIRATION RATE: 17 BRPM | HEIGHT: 66 IN | BODY MASS INDEX: 25.39 KG/M2 | DIASTOLIC BLOOD PRESSURE: 64 MMHG | OXYGEN SATURATION: 95 % | WEIGHT: 158 LBS | TEMPERATURE: 97.2 F | HEART RATE: 78 BPM

## 2025-03-19 DIAGNOSIS — K21.9 GASTRO-ESOPHAGEAL REFLUX DISEASE W/OUT ESOPHAGITIS: ICD-10-CM

## 2025-03-19 DIAGNOSIS — G47.33 OBSTRUCTIVE SLEEP APNEA (ADULT) (PEDIATRIC): ICD-10-CM

## 2025-03-19 DIAGNOSIS — J45.909 UNSPECIFIED ASTHMA, UNCOMPLICATED: ICD-10-CM

## 2025-03-19 DIAGNOSIS — J30.9 ALLERGIC RHINITIS, UNSPECIFIED: ICD-10-CM

## 2025-03-19 DIAGNOSIS — J45.50 SEVERE PERSISTENT ASTHMA, UNCOMPLICATED: ICD-10-CM

## 2025-03-19 DIAGNOSIS — J82.83 EOSINOPHILIC ASTHMA: ICD-10-CM

## 2025-03-19 DIAGNOSIS — J31.0 CHRONIC RHINITIS: ICD-10-CM

## 2025-03-19 PROCEDURE — 94729 DIFFUSING CAPACITY: CPT

## 2025-03-19 PROCEDURE — 99214 OFFICE O/P EST MOD 30 MIN: CPT | Mod: 25

## 2025-03-19 PROCEDURE — 94010 BREATHING CAPACITY TEST: CPT

## 2025-03-19 PROCEDURE — ZZZZZ: CPT

## 2025-03-19 PROCEDURE — 94727 GAS DIL/WSHOT DETER LNG VOL: CPT

## 2025-03-19 RX ORDER — MUCUS CLEARING DEVICE
EACH MISCELLANEOUS
Qty: 1 | Refills: 1 | Status: ACTIVE | COMMUNITY
Start: 2025-03-19 | End: 1900-01-01

## 2025-04-11 ENCOUNTER — RX RENEWAL (OUTPATIENT)
Age: 80
End: 2025-04-11

## 2025-05-01 ENCOUNTER — APPOINTMENT (OUTPATIENT)
Dept: PULMONOLOGY | Facility: CLINIC | Age: 80
End: 2025-05-01
Payer: MEDICARE

## 2025-05-01 VITALS
BODY MASS INDEX: 25.39 KG/M2 | OXYGEN SATURATION: 96 % | HEIGHT: 66 IN | TEMPERATURE: 97.3 F | WEIGHT: 158 LBS | SYSTOLIC BLOOD PRESSURE: 140 MMHG | DIASTOLIC BLOOD PRESSURE: 62 MMHG | HEART RATE: 69 BPM | RESPIRATION RATE: 17 BRPM

## 2025-05-01 DIAGNOSIS — K21.9 GASTRO-ESOPHAGEAL REFLUX DISEASE W/OUT ESOPHAGITIS: ICD-10-CM

## 2025-05-01 DIAGNOSIS — J45.909 UNSPECIFIED ASTHMA, UNCOMPLICATED: ICD-10-CM

## 2025-05-01 DIAGNOSIS — R06.02 SHORTNESS OF BREATH: ICD-10-CM

## 2025-05-01 DIAGNOSIS — J30.9 ALLERGIC RHINITIS, UNSPECIFIED: ICD-10-CM

## 2025-05-01 DIAGNOSIS — J45.901 UNSPECIFIED ASTHMA WITH (ACUTE) EXACERBATION: ICD-10-CM

## 2025-05-01 DIAGNOSIS — G47.33 OBSTRUCTIVE SLEEP APNEA (ADULT) (PEDIATRIC): ICD-10-CM

## 2025-05-01 DIAGNOSIS — J82.83 EOSINOPHILIC ASTHMA: ICD-10-CM

## 2025-05-01 DIAGNOSIS — J45.50 SEVERE PERSISTENT ASTHMA, UNCOMPLICATED: ICD-10-CM

## 2025-05-01 DIAGNOSIS — R05.9 COUGH, UNSPECIFIED: ICD-10-CM

## 2025-05-01 PROCEDURE — 99214 OFFICE O/P EST MOD 30 MIN: CPT | Mod: 25

## 2025-05-01 RX ORDER — PREDNISONE 10 MG/1
10 TABLET ORAL
Qty: 21 | Refills: 0 | Status: ACTIVE | COMMUNITY
Start: 2025-05-01 | End: 1900-01-01

## 2025-05-01 RX ORDER — AZITHROMYCIN 500 MG/1
500 TABLET, FILM COATED ORAL DAILY
Qty: 7 | Refills: 0 | Status: ACTIVE | COMMUNITY
Start: 2025-05-01 | End: 1900-01-01

## 2025-05-06 ENCOUNTER — APPOINTMENT (OUTPATIENT)
Dept: PULMONOLOGY | Facility: CLINIC | Age: 80
End: 2025-05-06

## 2025-06-09 ENCOUNTER — APPOINTMENT (OUTPATIENT)
Dept: PULMONOLOGY | Facility: CLINIC | Age: 80
End: 2025-06-09
Payer: MEDICARE

## 2025-06-09 VITALS
DIASTOLIC BLOOD PRESSURE: 62 MMHG | RESPIRATION RATE: 16 BRPM | WEIGHT: 163 LBS | HEART RATE: 82 BPM | TEMPERATURE: 97.2 F | SYSTOLIC BLOOD PRESSURE: 140 MMHG | OXYGEN SATURATION: 97 % | HEIGHT: 66 IN | BODY MASS INDEX: 26.2 KG/M2

## 2025-06-09 PROCEDURE — 95012 NITRIC OXIDE EXP GAS DETER: CPT

## 2025-06-09 PROCEDURE — 99214 OFFICE O/P EST MOD 30 MIN: CPT | Mod: 25

## 2025-06-09 PROCEDURE — 94010 BREATHING CAPACITY TEST: CPT

## 2025-06-09 PROCEDURE — ZZZZZ: CPT

## 2025-06-11 RX ORDER — TEZEPELUMAB-EKKO 210 MG/1.9ML
210 INJECTION, SOLUTION SUBCUTANEOUS
Qty: 1 | Refills: 11 | Status: ACTIVE | COMMUNITY
Start: 2025-06-09 | End: 1900-01-01

## 2025-06-14 ENCOUNTER — RX RENEWAL (OUTPATIENT)
Age: 80
End: 2025-06-14

## 2025-06-25 ENCOUNTER — TRANSCRIPTION ENCOUNTER (OUTPATIENT)
Age: 80
End: 2025-06-25

## 2025-06-27 ENCOUNTER — RX RENEWAL (OUTPATIENT)
Age: 80
End: 2025-06-27

## 2025-07-31 ENCOUNTER — APPOINTMENT (OUTPATIENT)
Dept: ORTHOPEDIC SURGERY | Facility: CLINIC | Age: 80
End: 2025-07-31
Payer: MEDICARE

## 2025-07-31 DIAGNOSIS — M43.17 SPONDYLOLISTHESIS, LUMBOSACRAL REGION: ICD-10-CM

## 2025-07-31 PROCEDURE — 99213 OFFICE O/P EST LOW 20 MIN: CPT

## 2025-09-04 ENCOUNTER — APPOINTMENT (OUTPATIENT)
Dept: ORTHOPEDIC SURGERY | Facility: CLINIC | Age: 80
End: 2025-09-04
Payer: MEDICARE

## 2025-09-04 DIAGNOSIS — M47.26 OTHER SPONDYLOSIS WITH RADICULOPATHY, LUMBAR REGION: ICD-10-CM

## 2025-09-04 DIAGNOSIS — M43.17 SPONDYLOLISTHESIS, LUMBOSACRAL REGION: ICD-10-CM

## 2025-09-04 PROCEDURE — 99214 OFFICE O/P EST MOD 30 MIN: CPT

## 2025-09-04 RX ORDER — LIDOCAINE 5% 700 MG/1
5 PATCH TOPICAL
Qty: 1 | Refills: 1 | Status: ACTIVE | COMMUNITY
Start: 2025-09-04 | End: 1900-01-01

## 2025-09-18 ENCOUNTER — APPOINTMENT (OUTPATIENT)
Dept: PAIN MANAGEMENT | Facility: CLINIC | Age: 80
End: 2025-09-18

## (undated) DEVICE — GRID BRACHYTHERAPY EZ 18G

## (undated) DEVICE — DRAPE BACK TABLE COVER 44X90"

## (undated) DEVICE — PREP CHLORAPREP HI-LITE ORANGE 3ML

## (undated) DEVICE — DRSG TELFA 3 X 8

## (undated) DEVICE — Device

## (undated) DEVICE — NDL MAX CORE 18G X 25CM

## (undated) DEVICE — CATH ANGIO 14G X 3.25"

## (undated) DEVICE — WARMING BLANKET UPPER ADULT

## (undated) DEVICE — BALLOON ENDOCAVITY 2X14CM

## (undated) DEVICE — GLV 7.5 PROTEXIS (WHITE)

## (undated) DEVICE — POSITIONER FOAM EGG CRATE ULNAR 2PCS (PINK)

## (undated) DEVICE — NEOGUARD ENDOCAVITY PROBE COVER 1 X 11.8"

## (undated) DEVICE — BASIN SET DOUBLE

## (undated) DEVICE — NDL SPINAL 20G X 6" QUINCKE

## (undated) DEVICE — SYR LUER LOK 20CC

## (undated) DEVICE — VENODYNE/SCD SLEEVE CALF LARGE

## (undated) DEVICE — GOWN XXXL